# Patient Record
Sex: MALE | Race: WHITE | NOT HISPANIC OR LATINO | Employment: FULL TIME | URBAN - METROPOLITAN AREA
[De-identification: names, ages, dates, MRNs, and addresses within clinical notes are randomized per-mention and may not be internally consistent; named-entity substitution may affect disease eponyms.]

---

## 2001-10-09 PROCEDURE — 0003A: CPT | Performed by: INTERNAL MEDICINE

## 2017-02-20 DIAGNOSIS — Z00.00 PREVENTATIVE HEALTH CARE: Primary | ICD-10-CM

## 2017-02-20 DIAGNOSIS — I10 ESSENTIAL HYPERTENSION: Chronic | ICD-10-CM

## 2017-02-20 RX ORDER — AMLODIPINE BESYLATE 5 MG/1
TABLET ORAL
Qty: 30 TABLET | Refills: 3 | Status: SHIPPED | OUTPATIENT
Start: 2017-02-20 | End: 2017-03-13 | Stop reason: SDUPTHER

## 2017-02-22 LAB
25(OH)D3+25(OH)D2 SERPL-MCNC: 36.8 NG/ML (ref 30–100)
ALBUMIN SERPL-MCNC: 4.6 G/DL (ref 3.5–5.2)
ALBUMIN/GLOB SERPL: 2 G/DL
ALP SERPL-CCNC: 88 U/L (ref 39–117)
ALT SERPL-CCNC: 16 U/L (ref 1–41)
APPEARANCE UR: CLEAR
AST SERPL-CCNC: 19 U/L (ref 1–40)
BASOPHILS # BLD AUTO: 0.01 10*3/MM3 (ref 0–0.2)
BASOPHILS NFR BLD AUTO: 0.1 % (ref 0–1.5)
BILIRUB SERPL-MCNC: 0.9 MG/DL (ref 0.1–1.2)
BILIRUB UR QL STRIP: NEGATIVE
BUN SERPL-MCNC: 18 MG/DL (ref 8–23)
BUN/CREAT SERPL: 13.2 (ref 7–25)
CALCIUM SERPL-MCNC: 9.5 MG/DL (ref 8.6–10.5)
CHLORIDE SERPL-SCNC: 102 MMOL/L (ref 98–107)
CHOLEST SERPL-MCNC: 207 MG/DL (ref 0–200)
CHOLEST/HDLC SERPL: 3 {RATIO}
CO2 SERPL-SCNC: 21.2 MMOL/L (ref 22–29)
COLOR UR: YELLOW
CONV COMMENT: NORMAL
CREAT SERPL-MCNC: 1.36 MG/DL (ref 0.76–1.27)
EOSINOPHIL # BLD AUTO: 0.29 10*3/MM3 (ref 0–0.7)
EOSINOPHIL NFR BLD AUTO: 3.9 % (ref 0.3–6.2)
ERYTHROCYTE [DISTWIDTH] IN BLOOD BY AUTOMATED COUNT: 14.1 % (ref 11.5–14.5)
GLOBULIN SER CALC-MCNC: 2.3 GM/DL
GLUCOSE SERPL-MCNC: 94 MG/DL (ref 65–99)
GLUCOSE UR QL: NEGATIVE
HBA1C MFR BLD: 5.1 % (ref 4.8–5.6)
HCT VFR BLD AUTO: 48.2 % (ref 40.4–52.2)
HCV AB S/CO SERPL IA: <0.1 S/CO RATIO (ref 0–0.9)
HDLC SERPL-MCNC: 69 MG/DL (ref 40–60)
HGB BLD-MCNC: 16.3 G/DL (ref 13.7–17.6)
HGB UR QL STRIP: NEGATIVE
IMM GRANULOCYTES # BLD: 0 10*3/MM3 (ref 0–0.03)
IMM GRANULOCYTES NFR BLD: 0 % (ref 0–0.5)
KETONES UR QL STRIP: ABNORMAL
LDLC SERPL CALC-MCNC: 117 MG/DL (ref 0–100)
LEUKOCYTE ESTERASE UR QL STRIP: NEGATIVE
LYMPHOCYTES # BLD AUTO: 2.11 10*3/MM3 (ref 0.9–4.8)
LYMPHOCYTES NFR BLD AUTO: 28.4 % (ref 19.6–45.3)
MCH RBC QN AUTO: 32.5 PG (ref 27–32.7)
MCHC RBC AUTO-ENTMCNC: 33.8 G/DL (ref 32.6–36.4)
MCV RBC AUTO: 96.2 FL (ref 79.8–96.2)
MONOCYTES # BLD AUTO: 0.41 10*3/MM3 (ref 0.2–1.2)
MONOCYTES NFR BLD AUTO: 5.5 % (ref 5–12)
NEUTROPHILS # BLD AUTO: 4.61 10*3/MM3 (ref 1.9–8.1)
NEUTROPHILS NFR BLD AUTO: 62.1 % (ref 42.7–76)
NITRITE UR QL STRIP: NEGATIVE
PH UR STRIP: 6 [PH] (ref 5–8)
PLATELET # BLD AUTO: 213 10*3/MM3 (ref 140–500)
POTASSIUM SERPL-SCNC: 5 MMOL/L (ref 3.5–5.2)
PROT SERPL-MCNC: 6.9 G/DL (ref 6–8.5)
PROT UR QL STRIP: NEGATIVE
PSA SERPL-MCNC: 0.86 NG/ML (ref 0–4)
RBC # BLD AUTO: 5.01 10*6/MM3 (ref 4.6–6)
SODIUM SERPL-SCNC: 142 MMOL/L (ref 136–145)
SP GR UR: 1.02 (ref 1–1.03)
T4 FREE SERPL-MCNC: 1.22 NG/DL (ref 0.93–1.7)
TESTOST SERPL-MCNC: 806 NG/DL (ref 348–1197)
TRIGL SERPL-MCNC: 104 MG/DL (ref 0–150)
TSH SERPL DL<=0.005 MIU/L-ACNC: 1.38 MIU/ML (ref 0.27–4.2)
UROBILINOGEN UR STRIP-MCNC: ABNORMAL MG/DL
VLDLC SERPL CALC-MCNC: 20.8 MG/DL (ref 5–40)
WBC # BLD AUTO: 7.43 10*3/MM3 (ref 4.5–10.7)

## 2017-02-28 ENCOUNTER — OFFICE VISIT (OUTPATIENT)
Dept: INTERNAL MEDICINE | Age: 62
End: 2017-02-28

## 2017-02-28 VITALS
HEIGHT: 68 IN | WEIGHT: 172 LBS | DIASTOLIC BLOOD PRESSURE: 70 MMHG | SYSTOLIC BLOOD PRESSURE: 132 MMHG | OXYGEN SATURATION: 96 % | BODY MASS INDEX: 26.07 KG/M2 | HEART RATE: 120 BPM | TEMPERATURE: 98 F

## 2017-02-28 DIAGNOSIS — F17.219 CIGARETTE NICOTINE DEPENDENCE WITH NICOTINE-INDUCED DISORDER: ICD-10-CM

## 2017-02-28 DIAGNOSIS — R06.09 DOE (DYSPNEA ON EXERTION): ICD-10-CM

## 2017-02-28 DIAGNOSIS — E78.5 HYPERLIPIDEMIA, UNSPECIFIED HYPERLIPIDEMIA TYPE: ICD-10-CM

## 2017-02-28 DIAGNOSIS — I10 ESSENTIAL HYPERTENSION: Chronic | ICD-10-CM

## 2017-02-28 DIAGNOSIS — J43.9 PULMONARY EMPHYSEMA, UNSPECIFIED EMPHYSEMA TYPE (HCC): Chronic | ICD-10-CM

## 2017-02-28 DIAGNOSIS — F17.210 CIGARETTE NICOTINE DEPENDENCE WITHOUT COMPLICATION: Chronic | ICD-10-CM

## 2017-02-28 DIAGNOSIS — Z00.00 ENCOUNTER FOR ANNUAL HEALTH EXAMINATION: Primary | ICD-10-CM

## 2017-02-28 PROCEDURE — 90471 IMMUNIZATION ADMIN: CPT | Performed by: INTERNAL MEDICINE

## 2017-02-28 PROCEDURE — 99406 BEHAV CHNG SMOKING 3-10 MIN: CPT | Performed by: INTERNAL MEDICINE

## 2017-02-28 PROCEDURE — 99396 PREV VISIT EST AGE 40-64: CPT | Performed by: INTERNAL MEDICINE

## 2017-02-28 PROCEDURE — 99213 OFFICE O/P EST LOW 20 MIN: CPT | Performed by: INTERNAL MEDICINE

## 2017-02-28 PROCEDURE — 90670 PCV13 VACCINE IM: CPT | Performed by: INTERNAL MEDICINE

## 2017-02-28 RX ORDER — ASPIRIN 81 MG/1
81 TABLET ORAL DAILY
COMMUNITY
Start: 2017-02-28

## 2017-02-28 RX ORDER — PRAVASTATIN SODIUM 20 MG
20 TABLET ORAL DAILY
Qty: 30 TABLET | Refills: 3 | Status: SHIPPED | OUTPATIENT
Start: 2017-02-28 | End: 2017-05-31 | Stop reason: SDUPTHER

## 2017-02-28 NOTE — ASSESSMENT & PLAN NOTE
-Counseled on smoking cessation and strongly advised him to quit. (5 minutes)  -Discussed treatment options including nicotine replacement therapy, Wellbutrin, and Chantix.  -Discussed risks/dangers of smoking including risk for various cancers, lung disease and cardiovascular disease.    -Advised to watch for suicidal ideations, depression, increased anxiety/agitation, mood swings. Instructed to contact me if any problems arise.  -Handout given on 5 steps to prepare for smoking cessation.   -F/U with smoking status on next visit

## 2017-02-28 NOTE — PROGRESS NOTES
Patrick Mckeon / 61 y.o. / male  02/28/2017    CC: Main reason(s) for today's visit: Annual Exam      HPI:      Patrick presents for annual health maintenance visit.  >40 years of smoking; smokes 1 ppd, not committed to quitting.   Prior low dose CT in 2014 showed emphysema and shotty mediastinal LN's. He was not aware of emphysematous findings.  C/o mild dyspnea w/ exertion, physical activity. Never had a stress test. Strong family h/o sudden death and heart dz.  Last time started amlodipine for htn. Home BP is >150s mostly. No ha's, side effects.    · Last health maintenance visit: 2 years ago  · General health: fair  · Lifestyle:  · Attempting to lose weight?: No   · Diet: adequate/fair  · Exercise: adequate/fair  · Tobacco: Regular use (~1 pack/day)   · Alcohol: regular (moderate)  · Work: Full-time  · Reproductive health:  · Sexually active?: Yes   · Concern for STD?: No   · Sexual problems?: No     · Sees Urologist?: No    · Depression Screening: Denies depression symptoms (PHQ-2 screen is negative)      ______________________________________________________________________    ALLERGIES:    Review of patient's allergies indicates no known allergies.    MEDICATIONS:    Outpatient Medications Prior to Visit   Medication Sig Dispense Refill   • amLODIPine (NORVASC) 5 MG tablet TAKE 1 TABLET BY MOUTH DAILY. 30 tablet 3     No facility-administered medications prior to visit.        PFSH:     The following portions of the patient's history were reviewed and updated as appropriate: Allergies / Current Medications / Past Medical History / Surgical History / Social History / Family History    PROBLEM LIST:    Patient Active Problem List   Diagnosis   • Benign colonic polyp   • Enlarged prostate   • Essential hypertension   • Cigarette nicotine dependence without complication   • Emphysema lung       PAST MEDICAL HX:    Past Medical History   Diagnosis Date   • Benign colonic polyp    • Emphysema lung 2/28/2017   •  Herpes zoster    • Hypertension        PAST SURGICAL HX:    Past Surgical History   Procedure Laterality Date   • Hernia repair     • Umbilical hernia repair         SOCIAL HX:    Social History     Social History   • Marital status:      Spouse name: Samira   • Number of children: 1   • Years of education: N/A     Occupational History   • Business: owner of metal fabrication      Social History Main Topics   • Smoking status: Current Every Day Smoker     Packs/day: 1.00     Years: 40.00     Types: Cigarettes   • Smokeless tobacco: Never Used      Comment: still not committed to quitting at this time   • Alcohol use 6.0 oz/week     10 Standard drinks or equivalent per week   • Drug use: No   • Sexual activity: Yes     Partners: Female     Other Topics Concern   • None     Social History Narrative       FAMILY HX:    Family History   Problem Relation Age of Onset   • Colon cancer Father 55   • Heart attack Father 71   • Hypertension Mother    • Hyperlipidemia Mother    • Sudden death Mother 78   • Benign prostatic hyperplasia Brother    • Hypertension Brother    • Benign prostatic hyperplasia Brother    • No Known Problems Sister    • Brain cancer Maternal Grandmother    • Heart disease Maternal Grandfather    • Sudden death Maternal Grandfather        IMMUNIZATION HISTORY:    Immunization History   Administered Date(s) Administered   • Influenza (IM) Preservative Free 10/28/2016   • Influenza Split Preservative Free ID 09/19/2012   • Influenza TIV (IM) 09/19/2014   • Pneumococcal Conjugate 13-Valent 02/28/2017   • Tdap 09/21/2011       ______________________________________________________________________    REVIEW OF SYSTEMS    Review of Systems   Constitutional: Negative.    HENT: Negative.    Eyes: Negative.    Respiratory: Positive for shortness of breath.    Cardiovascular: Negative.    Gastrointestinal: Negative.    Endocrine: Negative.    Genitourinary: Negative.    Musculoskeletal: Negative.    Skin:  "Negative.    Allergic/Immunologic: Negative.    Neurological: Negative.    Hematological: Negative.    Psychiatric/Behavioral: Negative.        VITALS:    Visit Vitals   • /70   • Pulse 120   • Temp 98 °F (36.7 °C)   • Ht 68\" (172.7 cm)   • Wt 172 lb (78 kg)   • SpO2 96%   • BMI 26.15 kg/m2     BP Readings from Last 3 Encounters:   02/28/17 132/70   10/28/16 147/98   09/21/15 150/90     Wt Readings from Last 3 Encounters:   02/28/17 172 lb (78 kg)   10/28/16 172 lb (78 kg)   09/21/15 172 lb 0.1 oz (78 kg)      Body mass index is 26.15 kg/(m^2).    PHYSICAL EXAMINATION    Physical Exam   Constitutional: He is oriented to person, place, and time. He appears well-nourished. No distress.   HENT:   Head: Normocephalic.   Right Ear: External ear normal.   Left Ear: External ear normal.   Nose: Nose normal.   Mouth/Throat: Oropharynx is clear and moist.   Eyes: Conjunctivae and EOM are normal. Pupils are equal, round, and reactive to light. No scleral icterus.   Neck: Normal range of motion. Neck supple. No tracheal deviation present. No thyromegaly present.   Cardiovascular: Normal rate, regular rhythm, normal heart sounds and intact distal pulses.  Exam reveals no gallop and no friction rub.    No murmur heard.  Pulmonary/Chest: Effort normal. He has decreased breath sounds. He has no wheezes.   Abdominal: Soft. Bowel sounds are normal. He exhibits no distension and no mass. There is no tenderness. No hernia.   Genitourinary: Rectum normal, prostate normal, testes normal and penis normal.   Musculoskeletal: Normal range of motion. He exhibits no edema or deformity.   Lymphadenopathy:     He has no cervical adenopathy.     He has no axillary adenopathy.        Right: No inguinal and no supraclavicular adenopathy present.        Left: No inguinal and no supraclavicular adenopathy present.   Neurological: He is alert and oriented to person, place, and time. He has normal reflexes. He displays normal reflexes. No " cranial nerve deficit. He exhibits normal muscle tone. Coordination normal.   Skin: Skin is intact. No rash noted. He is not diaphoretic. No cyanosis or erythema. No pallor. Nails show no clubbing.   Multiple SK on torso   Psychiatric: He has a normal mood and affect. His behavior is normal. Judgment and thought content normal. Cognition and memory are normal.       REVIEWED DATA:    Lab Results   Component Value Date     02/21/2017    K 5.0 02/21/2017    AST 19 02/21/2017    ALT 16 02/21/2017    BUN 18 02/21/2017    CREATININE 1.36 (H) 02/21/2017    CREATININE 1.27 09/21/2015    EGFRIFNONA 53 (L) 02/21/2017    EGFRIFAFRI 64 02/21/2017    HGBA1C 5.10 02/21/2017    HGBA1C 5.7 (H) 09/21/2015     (H) 02/21/2017    LDL 95 09/21/2015    HDL 69 (H) 02/21/2017    HDL 55 09/21/2015    TRIG 104 02/21/2017    TRIG 92 09/21/2015    CHOLHDLRATIO 3.00 02/21/2017    TSH 1.380 02/21/2017    FREET4 1.22 02/21/2017    GCLW51UU 36.8 02/21/2017    PSA 0.859 02/21/2017    PSA 0.85 09/21/2015    TESTOSTEROTT 806 02/21/2017    WBC 7.43 02/21/2017    HGB 16.3 02/21/2017    MCV 96.2 02/21/2017     02/21/2017    PROTEIN Negative 02/21/2017    GLUCOSEU Negative 02/21/2017    BLOODU Negative 02/21/2017    NITRITEU Negative 02/21/2017    LEUKOCYTESUR Negative 02/21/2017     ______________________________________________________________________    ASSESSMENT & PLAN    1. Encounter for annual health examination    - Pneumococcal Conjugate Vaccine 13-Valent All    2. Hyperlipidemia, unspecified hyperlipidemia type  Start meds  - aspirin 81 MG EC tablet; Take 1 tablet by mouth Daily.  - pravastatin (PRAVACHOL) 20 MG tablet; Take 1 tablet by mouth Daily.  Dispense: 30 tablet; Refill: 3    3. GOODRICH (dyspnea on exertion)    - Treadmill Stress Test    4. Pulmonary emphysema, unspecified emphysema type  Quit smoking    5. Cigarette nicotine dependence with complication  -Counseled on smoking cessation and strongly advised him to quit.  (5 minutes)  -Discussed treatment options including nicotine replacement therapy, Wellbutrin, and Chantix.  -Discussed risks/dangers of smoking including risk for various cancers, lung disease and cardiovascular disease.    -Advised to watch for suicidal ideations, depression, increased anxiety/agitation, mood swings. Instructed to contact me if any problems arise.  -Handout given on 5 steps to prepare for smoking cessation.   -F/U with smoking status on next visit     - CT Chest Low Dose Wo    6. Essential hypertension  Continue amlodipine 5 mg. Send BP results in 1 month    Summary/Discussion:     ·       Return in about 3 months (around 5/28/2017) for F/U chronic medical problems.    Future Appointments  Date Time Provider Department Center   5/31/2017 8:40 AM MD JOCELYN Stone None         HEALTHCARE MAINTENANCE ISSUES:    Cancer Screening:  · Colon: Initial/Next screening at age: CURRENT  · Repeat colonoscopy every 5 years  · Prostate: Performed today and recommended annual screening  · Testicular: Recommended monthly self exam  · Skin: Monthly self skin examination, annual exam by health professional  · Lung:   · Other:    Screening Labs & Tests:  · Lab results reviewed & discussed with with patient or orders placed today.  · EKG:  · DEXA (75+ or risk factors):  · HEP C (If born [id number] or risk factors): Negative screen    Immunization/Vaccinations (to be given today unless deferred by patient)  · Tetanus/Pertussis: Up to date  · Pneumovax: Not needed at this time  · Prevnar 13: Administer today  · Zostavax: Recommended at outside pharmacy  · Influenza: Patient already had the flu vaccine.  · Other:     Lifestyle Counseling:  · Lifestyle Modifications: Follow a low fat, low cholesterol diet and Quit smoking  · Safety Issues: Always wear seatbelt, Avoid texting while driving   · Use sunscreen, regular skin examination  · Recommended annual dental/vision examination.  · Emotional/Stress/Sleep:  Reviewed and  given when appropriate      HEALTH MAINTENANCE LIST:    Health Maintenance   Topic Date Due   • GLAUCOMA SCREENING  02/28/2017   • PROSTATE CANCER SCREENING  02/21/2018   • LIPID PANEL  02/21/2018   • COLONOSCOPY  11/17/2020   • TDAP/TD VACCINES (2 - Td) 09/21/2021   • PNEUMOCOCCAL VACCINE (19-64 MEDIUM RISK)  Completed   • HEPATITIS C SCREENING  Completed   • INFLUENZA VACCINE  Completed   • ZOSTER VACCINE  Addressed         **Lila Disclaimer:   Much of this encounter note is an electronic transcription/translation of spoken language to printed text. The electronic translation of spoken language may permit erroneous, or at times, nonsensical words or phrases to be inadvertently transcribed. Although I have reviewed the note for such errors, some may still exist.

## 2017-03-09 ENCOUNTER — HOSPITAL ENCOUNTER (OUTPATIENT)
Dept: CARDIOLOGY | Facility: HOSPITAL | Age: 62
Discharge: HOME | End: 2017-03-09
Admitting: INTERNAL MEDICINE

## 2017-03-09 ENCOUNTER — HOSPITAL ENCOUNTER (OUTPATIENT)
Dept: CT IMAGING | Facility: HOSPITAL | Age: 62
Discharge: HOME | End: 2017-03-09
Admitting: INTERNAL MEDICINE

## 2017-03-09 LAB
BH CV STRESS BP STAGE 1: NORMAL
BH CV STRESS BP STAGE 2: NORMAL
BH CV STRESS DURATION MIN STAGE 1: 3
BH CV STRESS DURATION MIN STAGE 2: 3
BH CV STRESS DURATION MIN STAGE 3: 1
BH CV STRESS DURATION SEC STAGE 1: 0
BH CV STRESS DURATION SEC STAGE 2: 0
BH CV STRESS DURATION SEC STAGE 3: 0
BH CV STRESS GRADE STAGE 1: 10
BH CV STRESS GRADE STAGE 2: 12
BH CV STRESS GRADE STAGE 3: 14
BH CV STRESS HR STAGE 1: 119
BH CV STRESS HR STAGE 2: 144
BH CV STRESS HR STAGE 3: 156
BH CV STRESS METS STAGE 1: 5
BH CV STRESS METS STAGE 2: 7.5
BH CV STRESS METS STAGE 3: 10
BH CV STRESS PROTOCOL 1: NORMAL
BH CV STRESS RECOVERY BP: NORMAL MMHG
BH CV STRESS RECOVERY HR: 98 BPM
BH CV STRESS SPEED STAGE 1: 1.7
BH CV STRESS SPEED STAGE 2: 2.5
BH CV STRESS SPEED STAGE 3: 3.4
BH CV STRESS STAGE 1: 1
BH CV STRESS STAGE 2: 2
BH CV STRESS STAGE 3: 3
MAXIMAL PREDICTED HEART RATE: 158 BPM
PERCENT MAX PREDICTED HR: 98.73 %
STRESS BASELINE BP: NORMAL MMHG
STRESS BASELINE HR: 81 BPM
STRESS PERCENT HR: 116 %
STRESS POST ESTIMATED WORKLOAD: 8 METS
STRESS POST EXERCISE DUR MIN: 7 MIN
STRESS POST EXERCISE DUR SEC: 0 SEC
STRESS POST PEAK BP: NORMAL MMHG
STRESS POST PEAK HR: 156 BPM
STRESS TARGET HR: 134 BPM

## 2017-03-09 PROCEDURE — 93016 CV STRESS TEST SUPVJ ONLY: CPT | Performed by: INTERNAL MEDICINE

## 2017-03-09 PROCEDURE — 93018 CV STRESS TEST I&R ONLY: CPT | Performed by: INTERNAL MEDICINE

## 2017-03-09 PROCEDURE — G0297 LDCT FOR LUNG CA SCREEN: HCPCS

## 2017-03-09 PROCEDURE — 93017 CV STRESS TEST TRACING ONLY: CPT

## 2017-03-10 ENCOUNTER — TELEPHONE (OUTPATIENT)
Dept: INTERNAL MEDICINE | Age: 62
End: 2017-03-10

## 2017-03-10 NOTE — TELEPHONE ENCOUNTER
Spoke with pt he said he has both kidneys and does not know anything about the abdominal aorta issue.

## 2017-03-10 NOTE — TELEPHONE ENCOUNTER
----- Message from Lester Carter MD sent at 3/10/2017  4:00 PM EST -----  Call patient with his test result(s) and mail the results to him if MyChart is NOT active.    CT of chest for lung cancer screening is negative for lung mass/nodules.  Incidental finding of dilated abdominal aorta which requires further evaluation.  Radiologist noted absence of left kidney (verify whether he was aware of this: ? Prior surgery or congenital absence).    #Respond back to me regarding above question and I will determine the next best course of action to further evaluate the abdominal aorta issue.

## 2017-03-13 ENCOUNTER — OFFICE VISIT (OUTPATIENT)
Dept: INTERNAL MEDICINE | Age: 62
End: 2017-03-13

## 2017-03-13 VITALS
DIASTOLIC BLOOD PRESSURE: 70 MMHG | SYSTOLIC BLOOD PRESSURE: 148 MMHG | WEIGHT: 173 LBS | BODY MASS INDEX: 26.22 KG/M2 | OXYGEN SATURATION: 94 % | HEART RATE: 87 BPM | HEIGHT: 68 IN | TEMPERATURE: 96.8 F

## 2017-03-13 DIAGNOSIS — E78.5 HYPERLIPIDEMIA, UNSPECIFIED HYPERLIPIDEMIA TYPE: ICD-10-CM

## 2017-03-13 DIAGNOSIS — I71.40 ABDOMINAL AORTIC ANEURYSM (AAA) WITHOUT RUPTURE (HCC): Primary | ICD-10-CM

## 2017-03-13 DIAGNOSIS — Q60.0 CONGENITAL ABSENCE OF ONE KIDNEY: Chronic | ICD-10-CM

## 2017-03-13 DIAGNOSIS — I10 ESSENTIAL HYPERTENSION: Chronic | ICD-10-CM

## 2017-03-13 DIAGNOSIS — F17.219 CIGARETTE NICOTINE DEPENDENCE WITH NICOTINE-INDUCED DISORDER: Chronic | ICD-10-CM

## 2017-03-13 PROCEDURE — 99214 OFFICE O/P EST MOD 30 MIN: CPT | Performed by: INTERNAL MEDICINE

## 2017-03-13 RX ORDER — AMLODIPINE BESYLATE 10 MG/1
10 TABLET ORAL DAILY
Qty: 30 TABLET | Refills: 5 | Status: SHIPPED | OUTPATIENT
Start: 2017-03-13 | End: 2017-08-17 | Stop reason: SDUPTHER

## 2017-03-13 NOTE — PROGRESS NOTES
"Patrick Mckeon / 61 y.o. / male  03/13/2017    VITALS    Visit Vitals   • /70   • Pulse 87   • Temp 96.8 °F (36 °C)   • Ht 68\" (172.7 cm)   • Wt 173 lb (78.5 kg)   • SpO2 94%   • BMI 26.3 kg/m2     BP Readings from Last 3 Encounters:   03/13/17 148/70   02/28/17 132/70   10/28/16 147/98     Wt Readings from Last 3 Encounters:   03/13/17 173 lb (78.5 kg)   02/28/17 172 lb (78 kg)   10/28/16 172 lb (78 kg)      Body mass index is 26.3 kg/(m^2).    CC:  Main reason(s) for today's visit: Discuss abnormal CT      HPI:     Ct of chest for lung cancer screening was negative for nodules but showed 4.9 cm dilated abd aorta. No prior h/o aneurysm. Prior CT in 2014 (of chest) there was no mention of aneurysm. Denies abd pain or back pain.     Chronic essential hypertension  Home BP readings: stable with SBP <150. Compliant with medication(s).  Denies significant problems or side effects. Most recent in-office blood pressure readings:   BP Readings from Last 3 Encounters:   03/13/17 148/70   02/28/17 132/70   10/28/16 147/98     Chronic hyperlipidemia  Current therapy include pravastatin.  Denies significant problems with medication(s).  Most recent labs:   Lab Results   Component Value Date     (H) 02/21/2017    LDL 95 09/21/2015    HDL 69 (H) 02/21/2017    HDL 55 09/21/2015    TRIG 104 02/21/2017    TRIG 92 09/21/2015    CHOLHDLRATIO 3.00 02/21/2017     Still smokes but has been working to reduce the amount. Smokes 1 ppd.     ____________________________________________________________________    ASSESSMENT & PLAN:    Problem List Items Addressed This Visit        High    Abdominal aortic aneurysm (AAA) without rupture - Primary (Chronic)    Current Assessment & Plan     Check abdominal CT to further delineate AAA noted on chest CT. Consider referral to vascular.          Relevant Orders    CT Abdomen Pelvis With Contrast       Medium    Essential hypertension (Chronic)    Current Assessment & Plan     " Uncontrolled. Increase amlodipine to 10 mg QD.          Relevant Medications    amLODIPine (NORVASC) 10 MG tablet    Hyperlipidemia (Chronic)    Current Assessment & Plan     Continue pravastatin 20 mg.          Relevant Medications    aspirin 81 MG EC tablet    pravastatin (PRAVACHOL) 20 MG tablet    Congenital absence of one kidney (Chronic)    Overview     No kidney visualized on CT (2/28/17)(left side)(patient was not aware).            Low    Cigarette nicotine dependence with nicotine-induced disorder (Chronic)    Current Assessment & Plan     Again advised him to stop smoking especially in light of AAA finding on CT.              Orders Placed This Encounter   Procedures   • CT Abdomen Pelvis With Contrast       Summary/Discussion:     ·       No Follow-up on file.    Future Appointments  Date Time Provider Department Center   5/31/2017 8:40 AM MD JOCELYN Stone None       ____________________________________________________________________    REVIEW OF SYSTEMS    Review of Systems  As noted per HPI  Constitutional neg   Resp neg x h/o emphysema  CV neg    Gi neg for pain  Other: As noted per HPI      PHYSICAL EXAMINATION    Physical Exam  Constitutional  No distress   Cardiovascular Rate  normal . Rhythm: regular . Heart sounds:  normal  ; abd aorta is palpable. Bilateral femoral pulses are equal.   Psychiatric  Alert. Judgment and thought content normal. Mood normal      REVIEWED DATA:    Labs:   Lab Results   Component Value Date     02/21/2017    K 5.0 02/21/2017    AST 19 02/21/2017    ALT 16 02/21/2017    BUN 18 02/21/2017    CREATININE 1.36 (H) 02/21/2017    CREATININE 1.27 09/21/2015    EGFRIFNONA 53 (L) 02/21/2017    EGFRIFAFRI 64 02/21/2017       Lab Results   Component Value Date    GLU 94 02/21/2017    GLU 81 09/21/2015    HGBA1C 5.10 02/21/2017    HGBA1C 5.7 (H) 09/21/2015       Lab Results   Component Value Date     (H) 02/21/2017    LDL 95 09/21/2015    HDL 69 (H)  02/21/2017    TRIG 104 02/21/2017    CHOLHDLRATIO 3.00 02/21/2017       Lab Results   Component Value Date    TSH 1.380 02/21/2017    FREET4 1.22 02/21/2017          Lab Results   Component Value Date    WBC 7.43 02/21/2017    HGB 16.3 02/21/2017    HGB 15.1 09/21/2015     02/21/2017        Imaging:   CT chest (3/10/17):   No lung nodule; 4.9 cm abdominal aorta dilation; left kidney not visualized.     Medical Tests:        Summary of old records / correspondence / consultant report:        Request outside records:          ALLERGIES:    Review of patient's allergies indicates no known allergies.    MEDICATIONS:  Outpatient Medications Prior to Visit   Medication Sig Dispense Refill   • aspirin 81 MG EC tablet Take 1 tablet by mouth Daily.     • pravastatin (PRAVACHOL) 20 MG tablet Take 1 tablet by mouth Daily. 30 tablet 3   • amLODIPine (NORVASC) 5 MG tablet TAKE 1 TABLET BY MOUTH DAILY. 30 tablet 3     No facility-administered medications prior to visit.        [facility]    The following portions of the patient's history were reviewed and updated as appropriate: Allergies / Current Medications / Past Medical History / Surgical History / Social History / Family History    PROBLEM LIST:    Patient Active Problem List   Diagnosis   • Benign colonic polyp   • Enlarged prostate   • Essential hypertension   • Cigarette nicotine dependence with nicotine-induced disorder   • Emphysema lung   • Hyperlipidemia   • Abdominal aortic aneurysm (AAA) without rupture   • Congenital absence of one kidney       PAST MEDICAL HX:    Past Medical History   Diagnosis Date   • Benign colonic polyp    • Emphysema lung 2/28/2017   • Herpes zoster    • Hypertension        PAST SURGICAL HX:    Past Surgical History   Procedure Laterality Date   • Hernia repair     • Umbilical hernia repair         SOCIAL HX:    Social History     Social History   • Marital status:      Spouse name: Samira   • Number of children: 1   • Years of  education: N/A     Occupational History   • Business: owner of metal fabrication      Social History Main Topics   • Smoking status: Current Every Day Smoker     Packs/day: 1.00     Years: 40.00     Types: Cigarettes   • Smokeless tobacco: Never Used      Comment: still not committed to quitting at this time   • Alcohol use 6.0 oz/week     10 Standard drinks or equivalent per week   • Drug use: No   • Sexual activity: Yes     Partners: Female     Other Topics Concern   • None     Social History Narrative       FAMILY HX:    Family History   Problem Relation Age of Onset   • Colon cancer Father 55   • Heart attack Father 71   • Hypertension Mother    • Hyperlipidemia Mother    • Sudden death Mother 78   • Benign prostatic hyperplasia Brother    • Hypertension Brother    • Benign prostatic hyperplasia Brother    • No Known Problems Sister    • Brain cancer Maternal Grandmother    • Heart disease Maternal Grandfather    • Sudden death Maternal Grandfather          **Lila Disclaimer:   Much of this encounter note is an electronic transcription/translation of spoken language to printed text. The electronic translation of spoken language may permit erroneous, or at times, nonsensical words or phrases to be inadvertently transcribed. Although I have reviewed the note for such errors, some may still exist.

## 2017-03-13 NOTE — TELEPHONE ENCOUNTER
Have him come in to discuss these issues in greater detail. Will need further evaluation.   See if he can come in today as I have a slot open this afternoon.

## 2017-03-17 ENCOUNTER — HOSPITAL ENCOUNTER (OUTPATIENT)
Dept: CT IMAGING | Facility: HOSPITAL | Age: 62
Discharge: HOME | End: 2017-03-17
Admitting: INTERNAL MEDICINE

## 2017-03-17 LAB — CREAT BLDA-MCNC: 1.2 MG/DL (ref 0.6–1.3)

## 2017-03-17 PROCEDURE — 74177 CT ABD & PELVIS W/CONTRAST: CPT

## 2017-03-17 PROCEDURE — 0 IOPAMIDOL 61 % SOLUTION: Performed by: INTERNAL MEDICINE

## 2017-03-17 PROCEDURE — 82565 ASSAY OF CREATININE: CPT

## 2017-03-17 RX ADMIN — IOPAMIDOL 85 ML: 612 INJECTION, SOLUTION INTRAVENOUS at 07:20

## 2017-03-21 ENCOUNTER — TELEPHONE (OUTPATIENT)
Dept: INTERNAL MEDICINE | Age: 62
End: 2017-03-21

## 2017-03-21 DIAGNOSIS — I71.40 ABDOMINAL AORTIC ANEURYSM (AAA) WITHOUT RUPTURE (HCC): Primary | Chronic | ICD-10-CM

## 2017-03-21 NOTE — TELEPHONE ENCOUNTER
Discussed finding on CT of abdomen showing 2 adjacent AAA 4.8 cm and 5 cm.   Will refer to vascular for evaluation and surveillance.   Incidental findings of diverticulosis and right renal nonobstructing stone discussed w/ patient.   He expressed understanding and agreed to follow above instructions.

## 2017-03-29 ENCOUNTER — TELEPHONE (OUTPATIENT)
Dept: INTERNAL MEDICINE | Age: 62
End: 2017-03-29

## 2017-03-29 NOTE — TELEPHONE ENCOUNTER
----- Message from Janell Atkinson MA sent at 3/29/2017  9:58 AM EDT -----  Regarding: FW: Non-Urgent Medical Question  Contact: [phone]      ----- Message -----     From: Patrick Mckeon     Sent: 3/29/2017   9:47 AM       To: Traci Arreguin [id number] Clinical Pool  Subject: Non-Urgent Medical Question                      Dr. Carter , I have been to see Dr. Humble Hamilton at [facility]. and am going to plan on having surgery for my abdominal aorta aneurism. In preparation for that they have referred me to Dr. Judith Nogueira at [facility] for a pre-surgical test ( appt. is on April 11th ) . My question is , shouldn't the stress test and EKG done thru them on March 9th be sufficient ? I called Cardiology but they said I would have to go back thru you .  Thanks ,  Chidi Mckeon

## 2017-03-29 NOTE — TELEPHONE ENCOUNTER
Send as MyChart Message:     It's such a major surgery with high risk. I would suggest seeing the cardiologist for clearance in any case.

## 2017-04-11 ENCOUNTER — OFFICE VISIT (OUTPATIENT)
Dept: CARDIOLOGY | Facility: CLINIC | Age: 62
End: 2017-04-11

## 2017-04-11 VITALS
BODY MASS INDEX: 26.22 KG/M2 | DIASTOLIC BLOOD PRESSURE: 72 MMHG | HEIGHT: 68 IN | SYSTOLIC BLOOD PRESSURE: 108 MMHG | HEART RATE: 90 BPM | WEIGHT: 173 LBS

## 2017-04-11 DIAGNOSIS — E78.5 HYPERLIPIDEMIA, UNSPECIFIED HYPERLIPIDEMIA TYPE: Chronic | ICD-10-CM

## 2017-04-11 DIAGNOSIS — F17.219 CIGARETTE NICOTINE DEPENDENCE WITH NICOTINE-INDUCED DISORDER: Chronic | ICD-10-CM

## 2017-04-11 DIAGNOSIS — I10 ESSENTIAL HYPERTENSION: Chronic | ICD-10-CM

## 2017-04-11 DIAGNOSIS — I71.40 ABDOMINAL AORTIC ANEURYSM (AAA) WITHOUT RUPTURE (HCC): Chronic | ICD-10-CM

## 2017-04-11 DIAGNOSIS — Z01.810 ENCOUNTER FOR PRE-OPERATIVE CARDIOVASCULAR CLEARANCE: Primary | ICD-10-CM

## 2017-04-11 PROCEDURE — 99243 OFF/OP CNSLTJ NEW/EST LOW 30: CPT | Performed by: NURSE PRACTITIONER

## 2017-04-11 PROCEDURE — 93000 ELECTROCARDIOGRAM COMPLETE: CPT | Performed by: NURSE PRACTITIONER

## 2017-04-11 NOTE — PROGRESS NOTES
Date of Office Visit: 2017  Encounter Provider: CATIA Sam  Place of Service: [facility] CARDIOLOGY  Patient Name: Patrick Mckeon  :1955    Chief Complaint   Patient presents with   • Pre-op Exam   :     HPI: Patrick Mckeon is a 61 y.o. male who presents today for perioperative cardiac risk assessment.  The patient was referred by Dr. Humble Chaudhry.  The patient recently had a CT scan of his chest completed for his significant past history of cigarette smoking.  He was incidentally found to have an abdominal aortic aneurysm.  A CT scan of his abdomen and pelvis was completed revealing a 5.0 cm abdominal aortic aneurysm.  Also on that CT scan he was noted to have absence of the left kidney which was new information for the patient.  He has been recommended to undergo endovascular repair of the aneurysm by Dr. Chaudhry.  The procedure is currently not scheduled.    The patient states that he's been smoking since his early 20s and has been diagnosed with emphysema.  He does not see a pulmonologist.  He has a history of hyperlipidemia and remains on pravastatin.  He also has a history of essential hypertension and his blood pressure has been well-controlled on amlodipine.  He denies a history of coronary artery disease, heart failure, or diabetes.    His family physician, Dr. Carter recently ordered for the patient to have a treadmill stress test because of his history of cigarette smoking.  The treadmill stress test was completed on 3/9/17 at our office.  The EKG portion of the stress test was normal.  The patient denied any angina or shortness of breath while on the treadmill.  He was able to exercise 7 minutes and the overall impression was a normal stress test.    The patient states that he remains active exercising on a stationary bicycle 20 minutes-4 days per week.  He denies chest pain, shortness of air, paroxysmal nocturnal dyspnea, orthopnea, edema,  "palpitations, dizziness, or syncope.  He states that he occasionally has a \"smoker's cough\".      Past Medical History:   Diagnosis Date   • AAA (abdominal aortic aneurysm) without rupture    • Benign colonic polyp    • Cigarette nicotine dependence with nicotine-induced disorder    • Congenital absence of one kidney    • Emphysema lung 2/28/2017   • Enlarged prostate    • Herpes zoster    • Hyperlipidemia 2/28/2017   • Hypertension        Past Surgical History:   Procedure Laterality Date   • HERNIA REPAIR  1980   • UMBILICAL HERNIA REPAIR  1975       Social History     Social History   • Marital status:      Spouse name: Samira   • Number of children: 1   • Years of education: N/A     Occupational History   • Business: owner of metal fabrication      Social History Main Topics   • Smoking status: Current Every Day Smoker     Packs/day: 1.00     Years: 40.00     Types: Cigarettes   • Smokeless tobacco: Never Used      Comment: still not committed to quitting at this time; started in early 20s   • Alcohol use 6.0 oz/week     10 Standard drinks or equivalent per week   • Drug use: No   • Sexual activity: Yes     Partners: Female     Other Topics Concern   • Not on file     Social History Narrative       Family History   Problem Relation Age of Onset   • Colon cancer Father 55   • Heart attack Father 71   • Coronary artery disease Father    • Hypertension Mother    • Hyperlipidemia Mother    • Sudden death Mother 78   • Benign prostatic hyperplasia Brother    • Hypertension Brother    • Benign prostatic hyperplasia Brother    • No Known Problems Sister    • Brain cancer Maternal Grandmother    • Heart disease Maternal Grandfather    • Sudden death Maternal Grandfather        Review of Systems   Constitution: Negative for chills, diaphoresis, fever, malaise/fatigue, night sweats, weight gain and weight loss.   HENT: Negative for hearing loss, nosebleeds, sore throat and tinnitus.    Eyes: Negative for blurred " "vision, double vision, pain and visual disturbance.   Cardiovascular: Negative for chest pain, claudication, cyanosis, dyspnea on exertion, irregular heartbeat, leg swelling, near-syncope, orthopnea, palpitations, paroxysmal nocturnal dyspnea and syncope.   Respiratory: Positive for cough. Negative for hemoptysis, shortness of breath, snoring and wheezing.    Endocrine: Negative for cold intolerance, heat intolerance and polyuria.   Hematologic/Lymphatic: Negative for bleeding problem. Does not bruise/bleed easily.   Skin: Negative for color change, dry skin, flushing and itching.   Musculoskeletal: Negative for falls, joint pain, joint swelling, muscle cramps, muscle weakness and myalgias.   Gastrointestinal: Negative for abdominal pain, constipation, heartburn, melena, nausea and vomiting.   Genitourinary: Negative for dysuria and hematuria.   Neurological: Negative for excessive daytime sleepiness, dizziness, light-headedness, loss of balance, numbness, paresthesias, seizures and vertigo.   Psychiatric/Behavioral: Negative for altered mental status, depression, memory loss and substance abuse. The patient does not have insomnia and is not nervous/anxious.    Allergic/Immunologic: Negative for environmental allergies.       No Known Allergies      Current Outpatient Prescriptions:   •  amLODIPine (NORVASC) 10 MG tablet, Take 1 tablet by mouth Daily., Disp: 30 tablet, Rfl: 5  •  aspirin 81 MG EC tablet, Take 1 tablet by mouth Daily., Disp: , Rfl:   •  pravastatin (PRAVACHOL) 20 MG tablet, Take 1 tablet by mouth Daily., Disp: 30 tablet, Rfl: 3     Objective:     Vitals:    04/11/17 0854 04/11/17 0858   BP: 108/76 108/72   BP Location: Left arm Right arm   Pulse: 90    Weight: 173 lb (78.5 kg)    Height: 68\" (172.7 cm)      Body mass index is 26.3 kg/(m^2).    PHYSICAL EXAM:    Vitals Reviewed.   General Appearance: No acute distress, well developed and well nourished.   Eyes: Conjunctiva and lids: No erythema, " swelling, or discharge. Sclera non-icteric.   HENT: Atraumatic, normocephalic. External eyes, ears, and nose normal. No hearing loss noted. Mucous membranes normal. Lips not cyanotic. Neck supple with no tenderness.  Respiratory: No signs of respiratory distress. Respiration rhythm and depth normal.   Clear to auscultation. No rales, crackles, rhonchi, or wheezing auscultated.   Cardiovascular:  Jugular Venous Pressure: Normal  Heart Rate and Rhythm: Normal, Heart Sounds: Normal S1 and S2. No S3 or S4 noted.  Murmurs: No murmurs noted. No rubs, thrills, or gallops.   Arterial Pulses: Carotid pulses normal. No carotid bruit noted. Posterior tibialis and dorsalis pedis pulses normal.   Lower Extremities: No edema noted.  Gastrointestinal:  Abdomen soft, non-distended, non-tender. Normal bowel sounds. No hepatomegaly.   Musculoskeletal: Normal movement of extremities  Skin and Nails: General appearance normal. No pallor, cyanosis, diaphoresis. Skin temperature normal. No clubbing of fingernails.   Psychiatric: Patient alert and oriented to person, place, and time. Speech and behavior appropriate. Normal mood and affect.       ECG 12 Lead  Date/Time: 4/11/2017 9:02 AM  Performed by: MIKEY OLIVERA  Authorized by: MIKEY OLIVERA   Comparison: compared with previous ECG from 3/9/2017  Similar to previous ECG  Rhythm: sinus rhythm  Rate: normal  BPM: 90  Conduction: conduction normal  ST Segments: ST segments normal  T Waves: T waves normal  QRS axis: normal  Other: no other findings  Clinical impression: normal ECG              Assessment:       Diagnosis Plan   1. Encounter for pre-operative cardiovascular clearance     2. Abdominal aortic aneurysm (AAA) without rupture     3. Essential hypertension     4. Hyperlipidemia, unspecified hyperlipidemia type     5. Cigarette nicotine dependence with nicotine-induced disorder            Plan:       1. Perioperative Cardiac Risk Assessment: The patient presents today for  perioperative cardiac risk assessment for upcoming abdominal aortic aneurysm repair by Dr. Humble Hamilton.  The patient EKG tracing today is normal.  He recently had a treadmill stress test completed for his risk factors on 3/9/17 which was normal.  The patient had good exercise capacity on the treadmill as he went 7 minutes in duration and did not experience any EKG changes or symptoms.  He further denies any chest pain or shortness of breath.  I discussed his plan of care Dr. Roger Calvillo.  He does not feel the patient needs to pursue any further cardiac testing and is considered low risk. Patient is considered at acceptable risk for surgery from a cardiovascular standpoint. According to the Dipak's Revised Cardiac Risk Index, patient is considered low risk (0.9%) of adverse cardiovascular events occurring with high risk surgery.     2. Essential Hypertension: Blood pressure is well-controlled today in the office.     3. Hyperlipidemia: This is managed by his PCP and he remains on a pravastatin.    4.  Nicotine Dependence: The patient is actively trying to quit smoking of applauded his efforts.  I've recommended that he abstain from cigarette smoking entirely.    As always, it has been a pleasure to participate in your patient's care.  Thank you for the referral.      Sincerely,         CATIA Wheatley

## 2017-05-02 ENCOUNTER — APPOINTMENT (OUTPATIENT)
Dept: PREADMISSION TESTING | Facility: HOSPITAL | Age: 62
End: 2017-05-02

## 2017-05-02 ENCOUNTER — HOSPITAL ENCOUNTER (OUTPATIENT)
Dept: GENERAL RADIOLOGY | Facility: HOSPITAL | Age: 62
Discharge: HOME | End: 2017-05-02
Admitting: SURGERY

## 2017-05-02 VITALS
TEMPERATURE: 97.3 F | HEIGHT: 68 IN | HEART RATE: 85 BPM | DIASTOLIC BLOOD PRESSURE: 86 MMHG | WEIGHT: 172 LBS | OXYGEN SATURATION: 97 % | RESPIRATION RATE: 20 BRPM | BODY MASS INDEX: 26.07 KG/M2 | SYSTOLIC BLOOD PRESSURE: 137 MMHG

## 2017-05-02 LAB
ALBUMIN SERPL-MCNC: 4.1 G/DL (ref 3.5–5.2)
ALBUMIN/GLOB SERPL: 1.3 G/DL
ALP SERPL-CCNC: 78 U/L (ref 39–117)
ALT SERPL W P-5'-P-CCNC: 24 U/L (ref 1–41)
ANION GAP SERPL CALCULATED.3IONS-SCNC: 15.2 MMOL/L
APTT PPP: 25.3 SECONDS (ref 22.7–35.4)
AST SERPL-CCNC: 25 U/L (ref 1–40)
BACTERIA UR QL AUTO: NORMAL /HPF
BILIRUB SERPL-MCNC: 0.5 MG/DL (ref 0.1–1.2)
BILIRUB UR QL STRIP: NEGATIVE
BUN BLD-MCNC: 20 MG/DL (ref 8–23)
BUN/CREAT SERPL: 16.8 (ref 7–25)
CALCIUM SPEC-SCNC: 9.6 MG/DL (ref 8.6–10.5)
CHLORIDE SERPL-SCNC: 102 MMOL/L (ref 98–107)
CLARITY UR: CLEAR
CO2 SERPL-SCNC: 23.8 MMOL/L (ref 22–29)
COLOR UR: YELLOW
CREAT BLD-MCNC: 1.19 MG/DL (ref 0.76–1.27)
DEPRECATED RDW RBC AUTO: 49.1 FL (ref 37–54)
ERYTHROCYTE [DISTWIDTH] IN BLOOD BY AUTOMATED COUNT: 13.9 % (ref 11.5–14.5)
GFR SERPL CREATININE-BSD FRML MDRD: 62 ML/MIN/1.73
GLOBULIN UR ELPH-MCNC: 3.2 GM/DL
GLUCOSE BLD-MCNC: 95 MG/DL (ref 65–99)
GLUCOSE UR STRIP-MCNC: NEGATIVE MG/DL
HCT VFR BLD AUTO: 42.7 % (ref 40.4–52.2)
HGB BLD-MCNC: 14.2 G/DL (ref 13.7–17.6)
HGB UR QL STRIP.AUTO: NEGATIVE
HYALINE CASTS UR QL AUTO: NORMAL /LPF
INR PPP: 0.98 (ref 0.9–1.1)
KETONES UR QL STRIP: NEGATIVE
LEUKOCYTE ESTERASE UR QL STRIP.AUTO: NEGATIVE
MCH RBC QN AUTO: 32.1 PG (ref 27–32.7)
MCHC RBC AUTO-ENTMCNC: 33.3 G/DL (ref 32.6–36.4)
MCV RBC AUTO: 96.4 FL (ref 79.8–96.2)
NITRITE UR QL STRIP: NEGATIVE
PH UR STRIP.AUTO: 6.5 [PH] (ref 5–8)
PLATELET # BLD AUTO: 182 10*3/MM3 (ref 140–500)
PMV BLD AUTO: 10.5 FL (ref 6–12)
POTASSIUM BLD-SCNC: 4.8 MMOL/L (ref 3.5–5.2)
PROT SERPL-MCNC: 7.3 G/DL (ref 6–8.5)
PROT UR QL STRIP: NEGATIVE
PROTHROMBIN TIME: 12.6 SECONDS (ref 11.7–14.2)
RBC # BLD AUTO: 4.43 10*6/MM3 (ref 4.6–6)
RBC # UR: NORMAL /HPF
REF LAB TEST METHOD: NORMAL
SODIUM BLD-SCNC: 141 MMOL/L (ref 136–145)
SP GR UR STRIP: 1.02 (ref 1–1.03)
SQUAMOUS #/AREA URNS HPF: NORMAL /HPF
UROBILINOGEN UR QL STRIP: NORMAL
WBC NRBC COR # BLD: 7.61 10*3/MM3 (ref 4.5–10.7)
WBC UR QL AUTO: NORMAL /HPF

## 2017-05-02 PROCEDURE — 85027 COMPLETE CBC AUTOMATED: CPT | Performed by: SURGERY

## 2017-05-02 PROCEDURE — 85730 THROMBOPLASTIN TIME PARTIAL: CPT | Performed by: SURGERY

## 2017-05-02 PROCEDURE — 71020 HC CHEST PA AND LATERAL: CPT

## 2017-05-02 PROCEDURE — 80053 COMPREHEN METABOLIC PANEL: CPT | Performed by: SURGERY

## 2017-05-02 PROCEDURE — 81001 URINALYSIS AUTO W/SCOPE: CPT | Performed by: SURGERY

## 2017-05-02 PROCEDURE — 36415 COLL VENOUS BLD VENIPUNCTURE: CPT

## 2017-05-02 PROCEDURE — 85610 PROTHROMBIN TIME: CPT | Performed by: SURGERY

## 2017-05-08 ENCOUNTER — APPOINTMENT (OUTPATIENT)
Dept: GENERAL RADIOLOGY | Facility: HOSPITAL | Age: 62
End: 2017-05-08

## 2017-05-08 ENCOUNTER — ANESTHESIA EVENT (OUTPATIENT)
Dept: PERIOP | Facility: HOSPITAL | Age: 62
End: 2017-05-08

## 2017-05-08 ENCOUNTER — ANESTHESIA (OUTPATIENT)
Dept: PERIOP | Facility: HOSPITAL | Age: 62
End: 2017-05-08

## 2017-05-08 ENCOUNTER — HOSPITAL ENCOUNTER (INPATIENT)
Facility: HOSPITAL | Age: 62
LOS: 1 days | Discharge: HOME | End: 2017-05-09
Attending: SURGERY | Admitting: SURGERY

## 2017-05-08 PROBLEM — I71.40 AAA (ABDOMINAL AORTIC ANEURYSM): Status: ACTIVE | Noted: 2017-05-08

## 2017-05-08 LAB
ABO GROUP BLD: NORMAL
BLD GP AB SCN SERPL QL: NEGATIVE
RH BLD: POSITIVE

## 2017-05-08 PROCEDURE — 25010000002 HEPARIN (PORCINE) PER 1000 UNITS: Performed by: SURGERY

## 2017-05-08 PROCEDURE — 86901 BLOOD TYPING SEROLOGIC RH(D): CPT | Performed by: SURGERY

## 2017-05-08 PROCEDURE — C1894 INTRO/SHEATH, NON-LASER: HCPCS | Performed by: SURGERY

## 2017-05-08 PROCEDURE — 25010000002 ONDANSETRON PER 1 MG: Performed by: SURGERY

## 2017-05-08 PROCEDURE — 25010000002 MORPHINE PER 10 MG: Performed by: SURGERY

## 2017-05-08 PROCEDURE — 25010000002 DEXAMETHASONE PER 1 MG: Performed by: NURSE ANESTHETIST, CERTIFIED REGISTERED

## 2017-05-08 PROCEDURE — 86850 RBC ANTIBODY SCREEN: CPT | Performed by: SURGERY

## 2017-05-08 PROCEDURE — 25010000002 FENTANYL CITRATE (PF) 100 MCG/2ML SOLUTION: Performed by: ANESTHESIOLOGY

## 2017-05-08 PROCEDURE — C1769 GUIDE WIRE: HCPCS | Performed by: SURGERY

## 2017-05-08 PROCEDURE — 25010000002 FENTANYL CITRATE (PF) 100 MCG/2ML SOLUTION: Performed by: NURSE ANESTHETIST, CERTIFIED REGISTERED

## 2017-05-08 PROCEDURE — 75952 HC ENDOVASC REPAIR INFRARENAL AAA S AND I: CPT

## 2017-05-08 PROCEDURE — 04V03D6 RESTRICT ABD AORTA, BIFURC, W INTRALUM DEV, PERC: ICD-10-PCS | Performed by: SURGERY

## 2017-05-08 PROCEDURE — 85347 COAGULATION TIME ACTIVATED: CPT

## 2017-05-08 PROCEDURE — 25010000002 HEPARIN (PORCINE) PER 1000 UNITS: Performed by: NURSE ANESTHETIST, CERTIFIED REGISTERED

## 2017-05-08 PROCEDURE — 94799 UNLISTED PULMONARY SVC/PX: CPT

## 2017-05-08 PROCEDURE — C1760 CLOSURE DEV, VASC: HCPCS | Performed by: SURGERY

## 2017-05-08 PROCEDURE — 25010000003 CEFAZOLIN IN DEXTROSE 2-4 GM/100ML-% SOLUTION: Performed by: SURGERY

## 2017-05-08 PROCEDURE — 25010000002 PROPOFOL 10 MG/ML EMULSION: Performed by: NURSE ANESTHETIST, CERTIFIED REGISTERED

## 2017-05-08 PROCEDURE — 86900 BLOOD TYPING SEROLOGIC ABO: CPT | Performed by: SURGERY

## 2017-05-08 PROCEDURE — 25010000002 NEOSTIGMINE 10 MG/10ML SOLUTION: Performed by: NURSE ANESTHETIST, CERTIFIED REGISTERED

## 2017-05-08 PROCEDURE — 25010000002 PHENYLEPHRINE PER 1 ML: Performed by: NURSE ANESTHETIST, CERTIFIED REGISTERED

## 2017-05-08 PROCEDURE — 25010000002 PROTAMINE SULFATE PER 10 MG: Performed by: NURSE ANESTHETIST, CERTIFIED REGISTERED

## 2017-05-08 PROCEDURE — 25010000002 MIDAZOLAM PER 1 MG: Performed by: ANESTHESIOLOGY

## 2017-05-08 PROCEDURE — 25010000003 CEFAZOLIN IN DEXTROSE 2-4 GM/100ML-% SOLUTION

## 2017-05-08 PROCEDURE — C1725 CATH, TRANSLUMIN NON-LASER: HCPCS | Performed by: SURGERY

## 2017-05-08 PROCEDURE — 25010000003 CEFAZOLIN PER 500 MG: Performed by: SURGERY

## 2017-05-08 PROCEDURE — 0 IODIXANOL PER 1 ML: Performed by: SURGERY

## 2017-05-08 DEVICE — IMPLANTABLE DEVICE: Type: IMPLANTABLE DEVICE | Status: FUNCTIONAL

## 2017-05-08 DEVICE — GRFT EXCLDR CONTRALAT 14.5MMX14CM: Type: IMPLANTABLE DEVICE | Status: FUNCTIONAL

## 2017-05-08 RX ORDER — NALOXONE HCL 0.4 MG/ML
0.2 VIAL (ML) INJECTION AS NEEDED
Status: DISCONTINUED | OUTPATIENT
Start: 2017-05-08 | End: 2017-05-08 | Stop reason: HOSPADM

## 2017-05-08 RX ORDER — SODIUM CHLORIDE 0.9 % (FLUSH) 0.9 %
1-10 SYRINGE (ML) INJECTION AS NEEDED
Status: DISCONTINUED | OUTPATIENT
Start: 2017-05-08 | End: 2017-05-08 | Stop reason: HOSPADM

## 2017-05-08 RX ORDER — CEFAZOLIN SODIUM 2 G/100ML
INJECTION, SOLUTION INTRAVENOUS
Status: COMPLETED
Start: 2017-05-08 | End: 2017-05-08

## 2017-05-08 RX ORDER — HEPARIN SODIUM 1000 [USP'U]/ML
INJECTION, SOLUTION INTRAVENOUS; SUBCUTANEOUS AS NEEDED
Status: DISCONTINUED | OUTPATIENT
Start: 2017-05-08 | End: 2017-05-08 | Stop reason: SURG

## 2017-05-08 RX ORDER — PROMETHAZINE HYDROCHLORIDE 25 MG/1
12.5 TABLET ORAL ONCE AS NEEDED
Status: DISCONTINUED | OUTPATIENT
Start: 2017-05-08 | End: 2017-05-08 | Stop reason: HOSPADM

## 2017-05-08 RX ORDER — DIPHENHYDRAMINE HYDROCHLORIDE 50 MG/ML
12.5 INJECTION INTRAMUSCULAR; INTRAVENOUS
Status: DISCONTINUED | OUTPATIENT
Start: 2017-05-08 | End: 2017-05-08 | Stop reason: HOSPADM

## 2017-05-08 RX ORDER — ONDANSETRON 2 MG/ML
4 INJECTION INTRAMUSCULAR; INTRAVENOUS EVERY 6 HOURS PRN
Status: DISCONTINUED | OUTPATIENT
Start: 2017-05-08 | End: 2017-05-09 | Stop reason: HOSPADM

## 2017-05-08 RX ORDER — FENTANYL CITRATE 50 UG/ML
INJECTION, SOLUTION INTRAMUSCULAR; INTRAVENOUS AS NEEDED
Status: DISCONTINUED | OUTPATIENT
Start: 2017-05-08 | End: 2017-05-08 | Stop reason: SURG

## 2017-05-08 RX ORDER — NITROGLYCERIN 0.4 MG/1
0.4 TABLET SUBLINGUAL
Status: DISCONTINUED | OUTPATIENT
Start: 2017-05-08 | End: 2017-05-09 | Stop reason: HOSPADM

## 2017-05-08 RX ORDER — MIDAZOLAM HYDROCHLORIDE 1 MG/ML
2 INJECTION INTRAMUSCULAR; INTRAVENOUS
Status: DISCONTINUED | OUTPATIENT
Start: 2017-05-08 | End: 2017-05-08 | Stop reason: HOSPADM

## 2017-05-08 RX ORDER — ONDANSETRON 4 MG/1
4 TABLET, FILM COATED ORAL EVERY 6 HOURS PRN
Status: DISCONTINUED | OUTPATIENT
Start: 2017-05-08 | End: 2017-05-09 | Stop reason: HOSPADM

## 2017-05-08 RX ORDER — IODIXANOL 320 MG/ML
200 INJECTION, SOLUTION INTRAVASCULAR
Status: COMPLETED | OUTPATIENT
Start: 2017-05-08 | End: 2017-05-08

## 2017-05-08 RX ORDER — HEPARIN SODIUM 5000 [USP'U]/ML
5000 INJECTION, SOLUTION INTRAVENOUS; SUBCUTANEOUS EVERY 8 HOURS SCHEDULED
Status: DISCONTINUED | OUTPATIENT
Start: 2017-05-09 | End: 2017-05-09 | Stop reason: HOSPADM

## 2017-05-08 RX ORDER — MIDAZOLAM HYDROCHLORIDE 1 MG/ML
1 INJECTION INTRAMUSCULAR; INTRAVENOUS
Status: DISCONTINUED | OUTPATIENT
Start: 2017-05-08 | End: 2017-05-08 | Stop reason: HOSPADM

## 2017-05-08 RX ORDER — NALOXONE HCL 0.4 MG/ML
0.4 VIAL (ML) INJECTION
Status: DISCONTINUED | OUTPATIENT
Start: 2017-05-08 | End: 2017-05-09 | Stop reason: HOSPADM

## 2017-05-08 RX ORDER — NEOSTIGMINE METHYLSULFATE 1 MG/ML
INJECTION, SOLUTION INTRAVENOUS AS NEEDED
Status: DISCONTINUED | OUTPATIENT
Start: 2017-05-08 | End: 2017-05-08 | Stop reason: SURG

## 2017-05-08 RX ORDER — PROPOFOL 10 MG/ML
VIAL (ML) INTRAVENOUS AS NEEDED
Status: DISCONTINUED | OUTPATIENT
Start: 2017-05-08 | End: 2017-05-08 | Stop reason: SURG

## 2017-05-08 RX ORDER — PROTAMINE SULFATE 10 MG/ML
INJECTION, SOLUTION INTRAVENOUS AS NEEDED
Status: DISCONTINUED | OUTPATIENT
Start: 2017-05-08 | End: 2017-05-08 | Stop reason: SURG

## 2017-05-08 RX ORDER — HYDROMORPHONE HYDROCHLORIDE 1 MG/ML
0.5 INJECTION, SOLUTION INTRAMUSCULAR; INTRAVENOUS; SUBCUTANEOUS
Status: DISCONTINUED | OUTPATIENT
Start: 2017-05-08 | End: 2017-05-08 | Stop reason: HOSPADM

## 2017-05-08 RX ORDER — HYDRALAZINE HYDROCHLORIDE 20 MG/ML
10 INJECTION INTRAMUSCULAR; INTRAVENOUS EVERY 4 HOURS PRN
Status: DISCONTINUED | OUTPATIENT
Start: 2017-05-08 | End: 2017-05-09 | Stop reason: HOSPADM

## 2017-05-08 RX ORDER — CEFAZOLIN SODIUM 2 G/100ML
2 INJECTION, SOLUTION INTRAVENOUS ONCE
Status: DISCONTINUED | OUTPATIENT
Start: 2017-05-08 | End: 2017-05-08

## 2017-05-08 RX ORDER — HYDROCODONE BITARTRATE AND ACETAMINOPHEN 7.5; 325 MG/1; MG/1
1 TABLET ORAL ONCE AS NEEDED
Status: DISCONTINUED | OUTPATIENT
Start: 2017-05-08 | End: 2017-05-08 | Stop reason: HOSPADM

## 2017-05-08 RX ORDER — FENTANYL CITRATE 50 UG/ML
50 INJECTION, SOLUTION INTRAMUSCULAR; INTRAVENOUS
Status: DISCONTINUED | OUTPATIENT
Start: 2017-05-08 | End: 2017-05-08 | Stop reason: HOSPADM

## 2017-05-08 RX ORDER — GLYCOPYRROLATE 0.2 MG/ML
INJECTION INTRAMUSCULAR; INTRAVENOUS AS NEEDED
Status: DISCONTINUED | OUTPATIENT
Start: 2017-05-08 | End: 2017-05-08 | Stop reason: SURG

## 2017-05-08 RX ORDER — OXYCODONE AND ACETAMINOPHEN 7.5; 325 MG/1; MG/1
1 TABLET ORAL ONCE AS NEEDED
Status: DISCONTINUED | OUTPATIENT
Start: 2017-05-08 | End: 2017-05-08 | Stop reason: HOSPADM

## 2017-05-08 RX ORDER — HYDRALAZINE HYDROCHLORIDE 20 MG/ML
5 INJECTION INTRAMUSCULAR; INTRAVENOUS
Status: DISCONTINUED | OUTPATIENT
Start: 2017-05-08 | End: 2017-05-08 | Stop reason: HOSPADM

## 2017-05-08 RX ORDER — SODIUM CHLORIDE, SODIUM LACTATE, POTASSIUM CHLORIDE, CALCIUM CHLORIDE 600; 310; 30; 20 MG/100ML; MG/100ML; MG/100ML; MG/100ML
9 INJECTION, SOLUTION INTRAVENOUS CONTINUOUS
Status: DISCONTINUED | OUTPATIENT
Start: 2017-05-08 | End: 2017-05-08

## 2017-05-08 RX ORDER — SODIUM CHLORIDE 0.9 % (FLUSH) 0.9 %
1-10 SYRINGE (ML) INJECTION AS NEEDED
Status: DISCONTINUED | OUTPATIENT
Start: 2017-05-08 | End: 2017-05-09 | Stop reason: HOSPADM

## 2017-05-08 RX ORDER — ROCURONIUM BROMIDE 10 MG/ML
INJECTION, SOLUTION INTRAVENOUS AS NEEDED
Status: DISCONTINUED | OUTPATIENT
Start: 2017-05-08 | End: 2017-05-08 | Stop reason: SURG

## 2017-05-08 RX ORDER — ROSUVASTATIN CALCIUM 5 MG/1
5 TABLET, COATED ORAL DAILY
Status: DISCONTINUED | OUTPATIENT
Start: 2017-05-08 | End: 2017-05-09 | Stop reason: HOSPADM

## 2017-05-08 RX ORDER — LIDOCAINE HYDROCHLORIDE 20 MG/ML
INJECTION, SOLUTION INFILTRATION; PERINEURAL AS NEEDED
Status: DISCONTINUED | OUTPATIENT
Start: 2017-05-08 | End: 2017-05-08 | Stop reason: SURG

## 2017-05-08 RX ORDER — PROMETHAZINE HYDROCHLORIDE 25 MG/1
25 SUPPOSITORY RECTAL ONCE AS NEEDED
Status: DISCONTINUED | OUTPATIENT
Start: 2017-05-08 | End: 2017-05-08 | Stop reason: HOSPADM

## 2017-05-08 RX ORDER — PROMETHAZINE HYDROCHLORIDE 25 MG/1
25 TABLET ORAL ONCE AS NEEDED
Status: DISCONTINUED | OUTPATIENT
Start: 2017-05-08 | End: 2017-05-08 | Stop reason: HOSPADM

## 2017-05-08 RX ORDER — FLUMAZENIL 0.1 MG/ML
0.2 INJECTION INTRAVENOUS AS NEEDED
Status: DISCONTINUED | OUTPATIENT
Start: 2017-05-08 | End: 2017-05-08 | Stop reason: HOSPADM

## 2017-05-08 RX ORDER — PRAVASTATIN SODIUM 20 MG
20 TABLET ORAL DAILY
Status: DISCONTINUED | OUTPATIENT
Start: 2017-05-08 | End: 2017-05-08 | Stop reason: CLARIF

## 2017-05-08 RX ORDER — SODIUM CHLORIDE, SODIUM LACTATE, POTASSIUM CHLORIDE, CALCIUM CHLORIDE 600; 310; 30; 20 MG/100ML; MG/100ML; MG/100ML; MG/100ML
100 INJECTION, SOLUTION INTRAVENOUS CONTINUOUS
Status: DISCONTINUED | OUTPATIENT
Start: 2017-05-08 | End: 2017-05-09 | Stop reason: HOSPADM

## 2017-05-08 RX ORDER — CEFAZOLIN SODIUM 2 G/100ML
2 INJECTION, SOLUTION INTRAVENOUS EVERY 8 HOURS
Status: COMPLETED | OUTPATIENT
Start: 2017-05-08 | End: 2017-05-09

## 2017-05-08 RX ORDER — FAMOTIDINE 10 MG/ML
20 INJECTION, SOLUTION INTRAVENOUS ONCE
Status: COMPLETED | OUTPATIENT
Start: 2017-05-08 | End: 2017-05-08

## 2017-05-08 RX ORDER — PROMETHAZINE HYDROCHLORIDE 25 MG/ML
12.5 INJECTION, SOLUTION INTRAMUSCULAR; INTRAVENOUS ONCE AS NEEDED
Status: DISCONTINUED | OUTPATIENT
Start: 2017-05-08 | End: 2017-05-08 | Stop reason: HOSPADM

## 2017-05-08 RX ORDER — AMLODIPINE BESYLATE 10 MG/1
10 TABLET ORAL DAILY
Status: DISCONTINUED | OUTPATIENT
Start: 2017-05-08 | End: 2017-05-09 | Stop reason: HOSPADM

## 2017-05-08 RX ORDER — ONDANSETRON 4 MG/1
4 TABLET, ORALLY DISINTEGRATING ORAL EVERY 6 HOURS PRN
Status: DISCONTINUED | OUTPATIENT
Start: 2017-05-08 | End: 2017-05-09 | Stop reason: HOSPADM

## 2017-05-08 RX ORDER — ASPIRIN 81 MG/1
81 TABLET ORAL DAILY
Status: DISCONTINUED | OUTPATIENT
Start: 2017-05-08 | End: 2017-05-09 | Stop reason: HOSPADM

## 2017-05-08 RX ORDER — LABETALOL HYDROCHLORIDE 5 MG/ML
5 INJECTION, SOLUTION INTRAVENOUS
Status: DISCONTINUED | OUTPATIENT
Start: 2017-05-08 | End: 2017-05-08 | Stop reason: HOSPADM

## 2017-05-08 RX ORDER — ONDANSETRON 2 MG/ML
4 INJECTION INTRAMUSCULAR; INTRAVENOUS ONCE AS NEEDED
Status: DISCONTINUED | OUTPATIENT
Start: 2017-05-08 | End: 2017-05-08 | Stop reason: HOSPADM

## 2017-05-08 RX ORDER — HYDROCODONE BITARTRATE AND ACETAMINOPHEN 5; 325 MG/1; MG/1
1 TABLET ORAL EVERY 4 HOURS PRN
Status: DISCONTINUED | OUTPATIENT
Start: 2017-05-08 | End: 2017-05-09 | Stop reason: HOSPADM

## 2017-05-08 RX ORDER — BUPIVACAINE HYDROCHLORIDE 2.5 MG/ML
INJECTION, SOLUTION EPIDURAL; INFILTRATION; INTRACAUDAL AS NEEDED
Status: DISCONTINUED | OUTPATIENT
Start: 2017-05-08 | End: 2017-05-08 | Stop reason: HOSPADM

## 2017-05-08 RX ORDER — DEXAMETHASONE SODIUM PHOSPHATE 10 MG/ML
INJECTION INTRAMUSCULAR; INTRAVENOUS AS NEEDED
Status: DISCONTINUED | OUTPATIENT
Start: 2017-05-08 | End: 2017-05-08 | Stop reason: SURG

## 2017-05-08 RX ADMIN — NEOSTIGMINE METHYLSULFATE 4 MG: 1 INJECTION INTRAVENOUS at 11:46

## 2017-05-08 RX ADMIN — EPHEDRINE SULFATE 5 MG: 50 INJECTION INTRAMUSCULAR; INTRAVENOUS; SUBCUTANEOUS at 10:46

## 2017-05-08 RX ADMIN — CEFAZOLIN SODIUM 2 G: 2 INJECTION, SOLUTION INTRAVENOUS at 10:06

## 2017-05-08 RX ADMIN — FENTANYL CITRATE 50 MCG: 50 INJECTION INTRAMUSCULAR; INTRAVENOUS at 13:29

## 2017-05-08 RX ADMIN — GLYCOPYRROLATE 0.6 MG: 0.2 INJECTION INTRAMUSCULAR; INTRAVENOUS at 11:46

## 2017-05-08 RX ADMIN — ROCURONIUM BROMIDE 40 MG: 10 INJECTION INTRAVENOUS at 10:12

## 2017-05-08 RX ADMIN — MIDAZOLAM 2 MG: 1 INJECTION INTRAMUSCULAR; INTRAVENOUS at 09:17

## 2017-05-08 RX ADMIN — ROCURONIUM BROMIDE 10 MG: 10 INJECTION INTRAVENOUS at 11:00

## 2017-05-08 RX ADMIN — SODIUM CHLORIDE, POTASSIUM CHLORIDE, SODIUM LACTATE AND CALCIUM CHLORIDE 50 ML/HR: 600; 310; 30; 20 INJECTION, SOLUTION INTRAVENOUS at 16:01

## 2017-05-08 RX ADMIN — FENTANYL CITRATE 25 MCG: 50 INJECTION INTRAMUSCULAR; INTRAVENOUS at 11:28

## 2017-05-08 RX ADMIN — PHENYLEPHRINE HYDROCHLORIDE 100 MCG: 10 INJECTION INTRAVENOUS at 10:38

## 2017-05-08 RX ADMIN — EPHEDRINE SULFATE 5 MG: 50 INJECTION INTRAMUSCULAR; INTRAVENOUS; SUBCUTANEOUS at 10:38

## 2017-05-08 RX ADMIN — LIDOCAINE HYDROCHLORIDE 60 MG: 20 INJECTION, SOLUTION INFILTRATION; PERINEURAL at 10:12

## 2017-05-08 RX ADMIN — MIDAZOLAM 1 MG: 1 INJECTION INTRAMUSCULAR; INTRAVENOUS at 09:37

## 2017-05-08 RX ADMIN — SODIUM CHLORIDE, POTASSIUM CHLORIDE, SODIUM LACTATE AND CALCIUM CHLORIDE 9 ML/HR: 600; 310; 30; 20 INJECTION, SOLUTION INTRAVENOUS at 09:17

## 2017-05-08 RX ADMIN — CEFAZOLIN SODIUM 2 G: 2 INJECTION, SOLUTION INTRAVENOUS at 18:24

## 2017-05-08 RX ADMIN — FENTANYL CITRATE 50 MCG: 50 INJECTION INTRAMUSCULAR; INTRAVENOUS at 09:36

## 2017-05-08 RX ADMIN — FAMOTIDINE 20 MG: 10 INJECTION, SOLUTION INTRAVENOUS at 09:17

## 2017-05-08 RX ADMIN — FENTANYL CITRATE 50 MCG: 50 INJECTION INTRAMUSCULAR; INTRAVENOUS at 11:00

## 2017-05-08 RX ADMIN — DEXAMETHASONE SODIUM PHOSPHATE 6 MG: 10 INJECTION INTRAMUSCULAR; INTRAVENOUS at 10:27

## 2017-05-08 RX ADMIN — IODIXANOL 91.8 ML: 320 INJECTION, SOLUTION INTRAVASCULAR at 12:45

## 2017-05-08 RX ADMIN — FENTANYL CITRATE 50 MCG: 50 INJECTION INTRAMUSCULAR; INTRAVENOUS at 10:11

## 2017-05-08 RX ADMIN — PROTAMINE SULFATE 40 MG: 10 INJECTION, SOLUTION INTRAVENOUS at 11:35

## 2017-05-08 RX ADMIN — MORPHINE SULFATE 4 MG: 4 INJECTION, SOLUTION INTRAMUSCULAR; INTRAVENOUS at 19:54

## 2017-05-08 RX ADMIN — ASPIRIN 81 MG: 81 TABLET ORAL at 18:22

## 2017-05-08 RX ADMIN — ROSUVASTATIN CALCIUM 5 MG: 5 TABLET, FILM COATED ORAL at 18:22

## 2017-05-08 RX ADMIN — HEPARIN SODIUM 10000 UNITS: 1000 INJECTION, SOLUTION INTRAVENOUS; SUBCUTANEOUS at 10:50

## 2017-05-08 RX ADMIN — EPHEDRINE SULFATE 5 MG: 50 INJECTION INTRAMUSCULAR; INTRAVENOUS; SUBCUTANEOUS at 10:28

## 2017-05-08 RX ADMIN — PHENYLEPHRINE HYDROCHLORIDE 100 MCG: 10 INJECTION INTRAVENOUS at 11:34

## 2017-05-08 RX ADMIN — PROPOFOL 150 MG: 10 INJECTION, EMULSION INTRAVENOUS at 10:12

## 2017-05-08 RX ADMIN — PHENYLEPHRINE HYDROCHLORIDE 100 MCG: 10 INJECTION INTRAVENOUS at 10:28

## 2017-05-08 RX ADMIN — MORPHINE SULFATE 4 MG: 4 INJECTION, SOLUTION INTRAMUSCULAR; INTRAVENOUS at 16:03

## 2017-05-08 RX ADMIN — ONDANSETRON 4 MG: 2 INJECTION INTRAMUSCULAR; INTRAVENOUS at 16:03

## 2017-05-08 RX ADMIN — EPHEDRINE SULFATE 5 MG: 50 INJECTION INTRAMUSCULAR; INTRAVENOUS; SUBCUTANEOUS at 11:34

## 2017-05-08 RX ADMIN — EPHEDRINE SULFATE 10 MG: 50 INJECTION INTRAMUSCULAR; INTRAVENOUS; SUBCUTANEOUS at 10:16

## 2017-05-09 VITALS
RESPIRATION RATE: 18 BRPM | WEIGHT: 171.3 LBS | HEIGHT: 68 IN | SYSTOLIC BLOOD PRESSURE: 125 MMHG | BODY MASS INDEX: 25.96 KG/M2 | OXYGEN SATURATION: 95 % | HEART RATE: 90 BPM | TEMPERATURE: 98.3 F | DIASTOLIC BLOOD PRESSURE: 85 MMHG

## 2017-05-09 LAB
ACT BLD: 131 SECONDS (ref 82–152)
ACT BLD: 137 SECONDS (ref 82–152)
ACT BLD: 250 SECONDS (ref 82–152)
ACT BLD: 276 SECONDS (ref 82–152)
ANION GAP SERPL CALCULATED.3IONS-SCNC: 15.5 MMOL/L
ANION GAP SERPL CALCULATED.3IONS-SCNC: 15.7 MMOL/L
BUN BLD-MCNC: 16 MG/DL (ref 8–23)
BUN BLD-MCNC: 19 MG/DL (ref 8–23)
BUN/CREAT SERPL: 12.5 (ref 7–25)
BUN/CREAT SERPL: 15.3 (ref 7–25)
CALCIUM SPEC-SCNC: 8.4 MG/DL (ref 8.6–10.5)
CALCIUM SPEC-SCNC: 9 MG/DL (ref 8.6–10.5)
CHLORIDE SERPL-SCNC: 100 MMOL/L (ref 98–107)
CHLORIDE SERPL-SCNC: 101 MMOL/L (ref 98–107)
CO2 SERPL-SCNC: 20.3 MMOL/L (ref 22–29)
CO2 SERPL-SCNC: 23.5 MMOL/L (ref 22–29)
CREAT BLD-MCNC: 1.24 MG/DL (ref 0.76–1.27)
CREAT BLD-MCNC: 1.28 MG/DL (ref 0.76–1.27)
DEPRECATED RDW RBC AUTO: 49.5 FL (ref 37–54)
ERYTHROCYTE [DISTWIDTH] IN BLOOD BY AUTOMATED COUNT: 14 % (ref 11.5–14.5)
GFR SERPL CREATININE-BSD FRML MDRD: 57 ML/MIN/1.73
GFR SERPL CREATININE-BSD FRML MDRD: 59 ML/MIN/1.73
GLUCOSE BLD-MCNC: 115 MG/DL (ref 65–99)
GLUCOSE BLD-MCNC: 158 MG/DL (ref 65–99)
HCT VFR BLD AUTO: 38.7 % (ref 40.4–52.2)
HGB BLD-MCNC: 12.7 G/DL (ref 13.7–17.6)
MCH RBC QN AUTO: 31.7 PG (ref 27–32.7)
MCHC RBC AUTO-ENTMCNC: 32.8 G/DL (ref 32.6–36.4)
MCV RBC AUTO: 96.5 FL (ref 79.8–96.2)
PLATELET # BLD AUTO: 184 10*3/MM3 (ref 140–500)
PMV BLD AUTO: 10.6 FL (ref 6–12)
POTASSIUM BLD-SCNC: 4.1 MMOL/L (ref 3.5–5.2)
POTASSIUM BLD-SCNC: 4.7 MMOL/L (ref 3.5–5.2)
RBC # BLD AUTO: 4.01 10*6/MM3 (ref 4.6–6)
SODIUM BLD-SCNC: 136 MMOL/L (ref 136–145)
SODIUM BLD-SCNC: 140 MMOL/L (ref 136–145)
WBC NRBC COR # BLD: 12.6 10*3/MM3 (ref 4.5–10.7)

## 2017-05-09 PROCEDURE — 25010000002 MORPHINE PER 10 MG: Performed by: SURGERY

## 2017-05-09 PROCEDURE — 25010000003 CEFAZOLIN IN DEXTROSE 2-4 GM/100ML-% SOLUTION: Performed by: SURGERY

## 2017-05-09 PROCEDURE — 80048 BASIC METABOLIC PNL TOTAL CA: CPT | Performed by: SURGERY

## 2017-05-09 PROCEDURE — 85027 COMPLETE CBC AUTOMATED: CPT | Performed by: SURGERY

## 2017-05-09 PROCEDURE — 94799 UNLISTED PULMONARY SVC/PX: CPT

## 2017-05-09 PROCEDURE — 25010000002 HEPARIN (PORCINE) PER 1000 UNITS: Performed by: SURGERY

## 2017-05-09 RX ORDER — HYDROCODONE BITARTRATE AND ACETAMINOPHEN 5; 325 MG/1; MG/1
1 TABLET ORAL EVERY 6 HOURS PRN
Qty: 25 TABLET | Refills: 0 | Status: SHIPPED | OUTPATIENT
Start: 2017-05-09 | End: 2017-05-18

## 2017-05-09 RX ADMIN — ROSUVASTATIN CALCIUM 5 MG: 5 TABLET, FILM COATED ORAL at 09:29

## 2017-05-09 RX ADMIN — HEPARIN SODIUM 5000 UNITS: 5000 INJECTION, SOLUTION INTRAVENOUS; SUBCUTANEOUS at 14:57

## 2017-05-09 RX ADMIN — CEFAZOLIN SODIUM 2 G: 2 INJECTION, SOLUTION INTRAVENOUS at 02:33

## 2017-05-09 RX ADMIN — ASPIRIN 81 MG: 81 TABLET ORAL at 09:29

## 2017-05-09 RX ADMIN — HEPARIN SODIUM 5000 UNITS: 5000 INJECTION, SOLUTION INTRAVENOUS; SUBCUTANEOUS at 05:51

## 2017-05-09 RX ADMIN — MORPHINE SULFATE 4 MG: 4 INJECTION, SOLUTION INTRAMUSCULAR; INTRAVENOUS at 02:34

## 2017-05-09 RX ADMIN — AMLODIPINE BESYLATE 10 MG: 10 TABLET ORAL at 09:29

## 2017-05-30 ENCOUNTER — TRANSCRIBE ORDERS (OUTPATIENT)
Dept: ADMINISTRATIVE | Facility: HOSPITAL | Age: 62
End: 2017-05-30

## 2017-05-30 DIAGNOSIS — I71.40 ABDOMINAL AORTIC ANEURYSM WITHOUT RUPTURE (HCC): Primary | ICD-10-CM

## 2017-05-31 ENCOUNTER — OFFICE VISIT (OUTPATIENT)
Dept: INTERNAL MEDICINE | Age: 62
End: 2017-05-31

## 2017-05-31 VITALS
HEIGHT: 68 IN | SYSTOLIC BLOOD PRESSURE: 118 MMHG | HEART RATE: 98 BPM | WEIGHT: 171 LBS | TEMPERATURE: 97.7 F | OXYGEN SATURATION: 99 % | BODY MASS INDEX: 25.91 KG/M2 | DIASTOLIC BLOOD PRESSURE: 64 MMHG

## 2017-05-31 DIAGNOSIS — E78.2 MIXED HYPERLIPIDEMIA: Chronic | ICD-10-CM

## 2017-05-31 DIAGNOSIS — I71.40 ABDOMINAL AORTIC ANEURYSM (AAA) WITHOUT RUPTURE (HCC): Primary | Chronic | ICD-10-CM

## 2017-05-31 DIAGNOSIS — I10 ESSENTIAL HYPERTENSION: Chronic | ICD-10-CM

## 2017-05-31 DIAGNOSIS — F17.219 CIGARETTE NICOTINE DEPENDENCE WITH NICOTINE-INDUCED DISORDER: Chronic | ICD-10-CM

## 2017-05-31 DIAGNOSIS — E78.5 HYPERLIPIDEMIA, UNSPECIFIED HYPERLIPIDEMIA TYPE: ICD-10-CM

## 2017-05-31 DIAGNOSIS — J43.9 PULMONARY EMPHYSEMA, UNSPECIFIED EMPHYSEMA TYPE (HCC): Chronic | ICD-10-CM

## 2017-05-31 PROBLEM — Q60.0 CONGENITAL ABSENCE OF ONE KIDNEY: Chronic | Status: RESOLVED | Noted: 2017-03-13 | Resolved: 2017-05-31

## 2017-05-31 PROBLEM — C44.90 SKIN CANCER: Status: RESOLVED | Noted: 2017-05-31 | Resolved: 2017-05-31

## 2017-05-31 PROCEDURE — 99214 OFFICE O/P EST MOD 30 MIN: CPT | Performed by: INTERNAL MEDICINE

## 2017-05-31 RX ORDER — PRAVASTATIN SODIUM 40 MG
40 TABLET ORAL NIGHTLY
Qty: 30 TABLET | Refills: 5 | Status: ON HOLD | OUTPATIENT
Start: 2017-05-31 | End: 2017-07-30

## 2017-06-13 ENCOUNTER — HOSPITAL ENCOUNTER (OUTPATIENT)
Dept: CT IMAGING | Facility: HOSPITAL | Age: 62
Discharge: HOME | End: 2017-06-13
Attending: SURGERY | Admitting: SURGERY

## 2017-06-13 DIAGNOSIS — I71.40 ABDOMINAL AORTIC ANEURYSM WITHOUT RUPTURE (HCC): ICD-10-CM

## 2017-06-13 LAB — CREAT BLDA-MCNC: 1.4 MG/DL (ref 0.6–1.3)

## 2017-06-13 PROCEDURE — 82565 ASSAY OF CREATININE: CPT

## 2017-06-13 PROCEDURE — 74174 CTA ABD&PLVS W/CONTRAST: CPT

## 2017-06-13 PROCEDURE — 0 IOPAMIDOL 61 % SOLUTION: Performed by: SURGERY

## 2017-06-13 RX ADMIN — IOPAMIDOL 100 ML: 612 INJECTION, SOLUTION INTRAVENOUS at 08:00

## 2017-06-24 DIAGNOSIS — E78.5 HYPERLIPIDEMIA, UNSPECIFIED HYPERLIPIDEMIA TYPE: ICD-10-CM

## 2017-06-26 RX ORDER — PRAVASTATIN SODIUM 20 MG
TABLET ORAL
Qty: 30 TABLET | Refills: 3 | Status: SHIPPED | OUTPATIENT
Start: 2017-06-26 | End: 2017-08-17 | Stop reason: SDUPTHER

## 2017-07-10 ENCOUNTER — HOSPITAL ENCOUNTER (OUTPATIENT)
Dept: RESPIRATORY THERAPY | Facility: HOSPITAL | Age: 62
Discharge: HOME | End: 2017-07-10
Admitting: INTERNAL MEDICINE

## 2017-07-10 LAB
BDY SITE: NORMAL
HGB BLDA-MCNC: 13.7 G/DL

## 2017-07-10 PROCEDURE — 94010 BREATHING CAPACITY TEST: CPT

## 2017-07-10 PROCEDURE — 94729 DIFFUSING CAPACITY: CPT

## 2017-07-10 PROCEDURE — 94726 PLETHYSMOGRAPHY LUNG VOLUMES: CPT

## 2017-07-10 PROCEDURE — 82820 HEMOGLOBIN-OXYGEN AFFINITY: CPT | Performed by: INTERNAL MEDICINE

## 2017-07-30 ENCOUNTER — APPOINTMENT (OUTPATIENT)
Dept: CT IMAGING | Facility: HOSPITAL | Age: 62
End: 2017-07-30

## 2017-07-30 ENCOUNTER — APPOINTMENT (OUTPATIENT)
Dept: GENERAL RADIOLOGY | Facility: HOSPITAL | Age: 62
End: 2017-07-30

## 2017-07-30 ENCOUNTER — HOSPITAL ENCOUNTER (INPATIENT)
Facility: HOSPITAL | Age: 62
LOS: 4 days | Discharge: HOME | End: 2017-08-03
Attending: EMERGENCY MEDICINE | Admitting: INTERNAL MEDICINE

## 2017-07-30 DIAGNOSIS — R50.9 FEVER, UNSPECIFIED FEVER CAUSE: ICD-10-CM

## 2017-07-30 DIAGNOSIS — K92.1 HEMATOCHEZIA: Primary | ICD-10-CM

## 2017-07-30 PROBLEM — R79.89 ELEVATED LFTS: Status: ACTIVE | Noted: 2017-07-30

## 2017-07-30 PROBLEM — R06.02 SHORTNESS OF BREATH: Status: ACTIVE | Noted: 2017-07-30

## 2017-07-30 PROBLEM — N17.9 ACUTE RENAL FAILURE: Status: ACTIVE | Noted: 2017-07-30

## 2017-07-30 PROBLEM — R93.41 ABNORMAL CT SCAN, BLADDER: Status: ACTIVE | Noted: 2017-07-30

## 2017-07-30 LAB
ALBUMIN SERPL-MCNC: 3.7 G/DL (ref 3.5–5.2)
ALBUMIN/GLOB SERPL: 1.1 G/DL
ALP SERPL-CCNC: 180 U/L (ref 39–117)
ALT SERPL W P-5'-P-CCNC: 67 U/L (ref 1–41)
ANION GAP SERPL CALCULATED.3IONS-SCNC: 14.5 MMOL/L
ANION GAP SERPL CALCULATED.3IONS-SCNC: 16.6 MMOL/L
AST SERPL-CCNC: 49 U/L (ref 1–40)
BASOPHILS # BLD AUTO: 0.01 10*3/MM3 (ref 0–0.2)
BASOPHILS # BLD AUTO: 0.01 10*3/MM3 (ref 0–0.2)
BASOPHILS NFR BLD AUTO: 0.1 % (ref 0–1.5)
BASOPHILS NFR BLD AUTO: 0.1 % (ref 0–1.5)
BILIRUB SERPL-MCNC: 1.4 MG/DL (ref 0.1–1.2)
BILIRUB UR QL STRIP: NEGATIVE
BUN BLD-MCNC: 20 MG/DL (ref 8–23)
BUN BLD-MCNC: 21 MG/DL (ref 8–23)
BUN/CREAT SERPL: 13.4 (ref 7–25)
BUN/CREAT SERPL: 14 (ref 7–25)
CALCIUM SPEC-SCNC: 8.4 MG/DL (ref 8.6–10.5)
CALCIUM SPEC-SCNC: 9 MG/DL (ref 8.6–10.5)
CEA SERPL-MCNC: 3.25 NG/ML
CHLORIDE SERPL-SCNC: 98 MMOL/L (ref 98–107)
CHLORIDE SERPL-SCNC: 99 MMOL/L (ref 98–107)
CLARITY UR: CLEAR
CO2 SERPL-SCNC: 22.4 MMOL/L (ref 22–29)
CO2 SERPL-SCNC: 22.5 MMOL/L (ref 22–29)
COLOR UR: ABNORMAL
CREAT BLD-MCNC: 1.43 MG/DL (ref 0.76–1.27)
CREAT BLD-MCNC: 1.57 MG/DL (ref 0.76–1.27)
D-LACTATE SERPL-SCNC: 1.2 MMOL/L (ref 0.5–2)
DEPRECATED RDW RBC AUTO: 49 FL (ref 37–54)
DEPRECATED RDW RBC AUTO: 51.8 FL (ref 37–54)
EOSINOPHIL # BLD AUTO: 0 10*3/MM3 (ref 0–0.7)
EOSINOPHIL # BLD AUTO: 0.01 10*3/MM3 (ref 0–0.7)
EOSINOPHIL NFR BLD AUTO: 0 % (ref 0.3–6.2)
EOSINOPHIL NFR BLD AUTO: 0.1 % (ref 0.3–6.2)
ERYTHROCYTE [DISTWIDTH] IN BLOOD BY AUTOMATED COUNT: 14.1 % (ref 11.5–14.5)
ERYTHROCYTE [DISTWIDTH] IN BLOOD BY AUTOMATED COUNT: 14.4 % (ref 11.5–14.5)
GFR SERPL CREATININE-BSD FRML MDRD: 45 ML/MIN/1.73
GFR SERPL CREATININE-BSD FRML MDRD: 50 ML/MIN/1.73
GLOBULIN UR ELPH-MCNC: 3.5 GM/DL
GLUCOSE BLD-MCNC: 108 MG/DL (ref 65–99)
GLUCOSE BLD-MCNC: 119 MG/DL (ref 65–99)
GLUCOSE UR STRIP-MCNC: NEGATIVE MG/DL
HCT VFR BLD AUTO: 36.2 % (ref 40.4–52.2)
HCT VFR BLD AUTO: 38.2 % (ref 40.4–52.2)
HGB BLD-MCNC: 11.7 G/DL (ref 13.7–17.6)
HGB BLD-MCNC: 13 G/DL (ref 13.7–17.6)
HGB UR QL STRIP.AUTO: NEGATIVE
HOLD SPECIMEN: NORMAL
HOLD SPECIMEN: NORMAL
IMM GRANULOCYTES # BLD: 0.03 10*3/MM3 (ref 0–0.03)
IMM GRANULOCYTES # BLD: 0.04 10*3/MM3 (ref 0–0.03)
IMM GRANULOCYTES NFR BLD: 0.2 % (ref 0–0.5)
IMM GRANULOCYTES NFR BLD: 0.3 % (ref 0–0.5)
KETONES UR QL STRIP: ABNORMAL
LEUKOCYTE ESTERASE UR QL STRIP.AUTO: NEGATIVE
LIPASE SERPL-CCNC: 24 U/L (ref 13–60)
LYMPHOCYTES # BLD AUTO: 0.94 10*3/MM3 (ref 0.9–4.8)
LYMPHOCYTES # BLD AUTO: 1.75 10*3/MM3 (ref 0.9–4.8)
LYMPHOCYTES NFR BLD AUTO: 14.5 % (ref 19.6–45.3)
LYMPHOCYTES NFR BLD AUTO: 7.1 % (ref 19.6–45.3)
MCH RBC QN AUTO: 31.4 PG (ref 27–32.7)
MCH RBC QN AUTO: 32.3 PG (ref 27–32.7)
MCHC RBC AUTO-ENTMCNC: 32.3 G/DL (ref 32.6–36.4)
MCHC RBC AUTO-ENTMCNC: 34 G/DL (ref 32.6–36.4)
MCV RBC AUTO: 95 FL (ref 79.8–96.2)
MCV RBC AUTO: 97.1 FL (ref 79.8–96.2)
MONOCYTES # BLD AUTO: 0.95 10*3/MM3 (ref 0.2–1.2)
MONOCYTES # BLD AUTO: 1.34 10*3/MM3 (ref 0.2–1.2)
MONOCYTES NFR BLD AUTO: 10.1 % (ref 5–12)
MONOCYTES NFR BLD AUTO: 7.9 % (ref 5–12)
NEUTROPHILS # BLD AUTO: 10.89 10*3/MM3 (ref 1.9–8.1)
NEUTROPHILS # BLD AUTO: 9.35 10*3/MM3 (ref 1.9–8.1)
NEUTROPHILS NFR BLD AUTO: 77.3 % (ref 42.7–76)
NEUTROPHILS NFR BLD AUTO: 82.3 % (ref 42.7–76)
NITRITE UR QL STRIP: NEGATIVE
NRBC BLD MANUAL-RTO: 0 /100 WBC (ref 0–0)
PH UR STRIP.AUTO: 5.5 [PH] (ref 5–8)
PLATELET # BLD AUTO: 120 10*3/MM3 (ref 140–500)
PLATELET # BLD AUTO: 129 10*3/MM3 (ref 140–500)
PMV BLD AUTO: 10.2 FL (ref 6–12)
PMV BLD AUTO: 10.7 FL (ref 6–12)
POTASSIUM BLD-SCNC: 4 MMOL/L (ref 3.5–5.2)
POTASSIUM BLD-SCNC: 4.5 MMOL/L (ref 3.5–5.2)
PROCALCITONIN SERPL-MCNC: 1.56 NG/ML (ref 0.1–0.25)
PROT SERPL-MCNC: 7.2 G/DL (ref 6–8.5)
PROT UR QL STRIP: ABNORMAL
RBC # BLD AUTO: 3.73 10*6/MM3 (ref 4.6–6)
RBC # BLD AUTO: 4.02 10*6/MM3 (ref 4.6–6)
SODIUM BLD-SCNC: 136 MMOL/L (ref 136–145)
SODIUM BLD-SCNC: 137 MMOL/L (ref 136–145)
SP GR UR STRIP: 1.03 (ref 1–1.03)
UROBILINOGEN UR QL STRIP: ABNORMAL
WBC NRBC COR # BLD: 12.09 10*3/MM3 (ref 4.5–10.7)
WBC NRBC COR # BLD: 13.23 10*3/MM3 (ref 4.5–10.7)
WHOLE BLOOD HOLD SPECIMEN: NORMAL
WHOLE BLOOD HOLD SPECIMEN: NORMAL

## 2017-07-30 PROCEDURE — 87040 BLOOD CULTURE FOR BACTERIA: CPT

## 2017-07-30 PROCEDURE — 85025 COMPLETE CBC W/AUTO DIFF WBC: CPT | Performed by: INTERNAL MEDICINE

## 2017-07-30 PROCEDURE — 71020 HC CHEST PA AND LATERAL: CPT

## 2017-07-30 PROCEDURE — 80048 BASIC METABOLIC PNL TOTAL CA: CPT | Performed by: INTERNAL MEDICINE

## 2017-07-30 PROCEDURE — 25010000002 PIPERACILLIN SOD-TAZOBACTAM PER 1 G: Performed by: INTERNAL MEDICINE

## 2017-07-30 PROCEDURE — 84145 PROCALCITONIN (PCT): CPT | Performed by: EMERGENCY MEDICINE

## 2017-07-30 PROCEDURE — 99254 IP/OBS CNSLTJ NEW/EST MOD 60: CPT | Performed by: SURGERY

## 2017-07-30 PROCEDURE — 85025 COMPLETE CBC W/AUTO DIFF WBC: CPT

## 2017-07-30 PROCEDURE — 83690 ASSAY OF LIPASE: CPT | Performed by: EMERGENCY MEDICINE

## 2017-07-30 PROCEDURE — 82378 CARCINOEMBRYONIC ANTIGEN: CPT | Performed by: INTERNAL MEDICINE

## 2017-07-30 PROCEDURE — 80053 COMPREHEN METABOLIC PANEL: CPT | Performed by: EMERGENCY MEDICINE

## 2017-07-30 PROCEDURE — 81003 URINALYSIS AUTO W/O SCOPE: CPT | Performed by: EMERGENCY MEDICINE

## 2017-07-30 PROCEDURE — 87040 BLOOD CULTURE FOR BACTERIA: CPT | Performed by: EMERGENCY MEDICINE

## 2017-07-30 PROCEDURE — 25010000002 PIPERACILLIN SOD-TAZOBACTAM PER 1 G: Performed by: EMERGENCY MEDICINE

## 2017-07-30 PROCEDURE — 71250 CT THORAX DX C-: CPT

## 2017-07-30 PROCEDURE — 86301 IMMUNOASSAY TUMOR CA 19-9: CPT | Performed by: INTERNAL MEDICINE

## 2017-07-30 PROCEDURE — 74177 CT ABD & PELVIS W/CONTRAST: CPT

## 2017-07-30 PROCEDURE — 0 IOPAMIDOL 61 % SOLUTION: Performed by: EMERGENCY MEDICINE

## 2017-07-30 PROCEDURE — 83605 ASSAY OF LACTIC ACID: CPT | Performed by: EMERGENCY MEDICINE

## 2017-07-30 PROCEDURE — 99284 EMERGENCY DEPT VISIT MOD MDM: CPT

## 2017-07-30 RX ORDER — SODIUM CHLORIDE 9 MG/ML
30 INJECTION, SOLUTION INTRAVENOUS CONTINUOUS
Status: DISCONTINUED | OUTPATIENT
Start: 2017-07-30 | End: 2017-08-03 | Stop reason: HOSPADM

## 2017-07-30 RX ORDER — ONDANSETRON 4 MG/1
4 TABLET, FILM COATED ORAL EVERY 6 HOURS PRN
Status: DISCONTINUED | OUTPATIENT
Start: 2017-07-30 | End: 2017-08-03 | Stop reason: HOSPADM

## 2017-07-30 RX ORDER — MORPHINE SULFATE 2 MG/ML
2 INJECTION, SOLUTION INTRAMUSCULAR; INTRAVENOUS
Status: DISCONTINUED | OUTPATIENT
Start: 2017-07-30 | End: 2017-08-03 | Stop reason: HOSPADM

## 2017-07-30 RX ORDER — ACETAMINOPHEN 325 MG/1
650 TABLET ORAL EVERY 4 HOURS PRN
Status: DISCONTINUED | OUTPATIENT
Start: 2017-07-30 | End: 2017-08-03 | Stop reason: HOSPADM

## 2017-07-30 RX ORDER — SODIUM CHLORIDE 0.9 % (FLUSH) 0.9 %
10 SYRINGE (ML) INJECTION AS NEEDED
Status: DISCONTINUED | OUTPATIENT
Start: 2017-07-30 | End: 2017-08-03 | Stop reason: HOSPADM

## 2017-07-30 RX ORDER — SODIUM CHLORIDE 9 MG/ML
125 INJECTION, SOLUTION INTRAVENOUS CONTINUOUS
Status: DISCONTINUED | OUTPATIENT
Start: 2017-07-30 | End: 2017-07-30

## 2017-07-30 RX ORDER — ASPIRIN 81 MG/1
81 TABLET ORAL DAILY
Status: DISCONTINUED | OUTPATIENT
Start: 2017-07-30 | End: 2017-08-03 | Stop reason: HOSPADM

## 2017-07-30 RX ORDER — ONDANSETRON 4 MG/1
4 TABLET, ORALLY DISINTEGRATING ORAL EVERY 6 HOURS PRN
Status: DISCONTINUED | OUTPATIENT
Start: 2017-07-30 | End: 2017-08-03 | Stop reason: HOSPADM

## 2017-07-30 RX ORDER — AMLODIPINE BESYLATE 10 MG/1
10 TABLET ORAL DAILY
Status: DISCONTINUED | OUTPATIENT
Start: 2017-07-30 | End: 2017-08-03 | Stop reason: HOSPADM

## 2017-07-30 RX ORDER — SODIUM CHLORIDE 0.9 % (FLUSH) 0.9 %
1-10 SYRINGE (ML) INJECTION AS NEEDED
Status: DISCONTINUED | OUTPATIENT
Start: 2017-07-30 | End: 2017-08-03 | Stop reason: HOSPADM

## 2017-07-30 RX ORDER — ACETAMINOPHEN 500 MG
1000 TABLET ORAL ONCE
Status: COMPLETED | OUTPATIENT
Start: 2017-07-30 | End: 2017-07-30

## 2017-07-30 RX ORDER — NALOXONE HCL 0.4 MG/ML
0.4 VIAL (ML) INJECTION
Status: DISCONTINUED | OUTPATIENT
Start: 2017-07-30 | End: 2017-08-03 | Stop reason: HOSPADM

## 2017-07-30 RX ORDER — ONDANSETRON 2 MG/ML
4 INJECTION INTRAMUSCULAR; INTRAVENOUS EVERY 6 HOURS PRN
Status: DISCONTINUED | OUTPATIENT
Start: 2017-07-30 | End: 2017-08-03 | Stop reason: HOSPADM

## 2017-07-30 RX ADMIN — ACETAMINOPHEN 1000 MG: 500 TABLET ORAL at 12:10

## 2017-07-30 RX ADMIN — IOPAMIDOL 85 ML: 612 INJECTION, SOLUTION INTRAVENOUS at 14:14

## 2017-07-30 RX ADMIN — ASPIRIN 81 MG: 81 TABLET ORAL at 17:21

## 2017-07-30 RX ADMIN — SODIUM CHLORIDE 125 ML/HR: 9 INJECTION, SOLUTION INTRAVENOUS at 14:46

## 2017-07-30 RX ADMIN — TAZOBACTAM SODIUM AND PIPERACILLIN SODIUM 3.38 G: 375; 3 INJECTION, SOLUTION INTRAVENOUS at 20:43

## 2017-07-30 RX ADMIN — ACETAMINOPHEN 650 MG: 325 TABLET ORAL at 20:46

## 2017-07-30 RX ADMIN — SODIUM CHLORIDE 500 ML: 9 INJECTION, SOLUTION INTRAVENOUS at 14:46

## 2017-07-30 RX ADMIN — TAZOBACTAM SODIUM AND PIPERACILLIN SODIUM 4.5 G: 500; 4 INJECTION, SOLUTION INTRAVENOUS at 13:48

## 2017-07-31 ENCOUNTER — APPOINTMENT (OUTPATIENT)
Dept: MRI IMAGING | Facility: HOSPITAL | Age: 62
End: 2017-07-31

## 2017-07-31 ENCOUNTER — TELEPHONE (OUTPATIENT)
Dept: INTERNAL MEDICINE | Age: 62
End: 2017-07-31

## 2017-07-31 ENCOUNTER — APPOINTMENT (OUTPATIENT)
Dept: ULTRASOUND IMAGING | Facility: HOSPITAL | Age: 62
End: 2017-07-31
Attending: INTERNAL MEDICINE

## 2017-07-31 LAB
ALBUMIN SERPL-MCNC: 3.3 G/DL (ref 3.5–5.2)
ALBUMIN/GLOB SERPL: 1.1 G/DL
ALP SERPL-CCNC: 160 U/L (ref 39–117)
ALT SERPL W P-5'-P-CCNC: 93 U/L (ref 1–41)
ANION GAP SERPL CALCULATED.3IONS-SCNC: 13.8 MMOL/L
ANION GAP SERPL CALCULATED.3IONS-SCNC: 14.5 MMOL/L
AST SERPL-CCNC: 69 U/L (ref 1–40)
BASOPHILS # BLD AUTO: 0.01 10*3/MM3 (ref 0–0.2)
BASOPHILS NFR BLD AUTO: 0.1 % (ref 0–1.5)
BILIRUB SERPL-MCNC: 1 MG/DL (ref 0.1–1.2)
BUN BLD-MCNC: 13 MG/DL (ref 8–23)
BUN BLD-MCNC: 18 MG/DL (ref 8–23)
BUN/CREAT SERPL: 11.3 (ref 7–25)
BUN/CREAT SERPL: 14.8 (ref 7–25)
C DIFF TOX GENS STL QL NAA+PROBE: NEGATIVE
CALCIUM SPEC-SCNC: 8.1 MG/DL (ref 8.6–10.5)
CALCIUM SPEC-SCNC: 8.1 MG/DL (ref 8.6–10.5)
CHLORIDE SERPL-SCNC: 101 MMOL/L (ref 98–107)
CHLORIDE SERPL-SCNC: 103 MMOL/L (ref 98–107)
CO2 SERPL-SCNC: 19.5 MMOL/L (ref 22–29)
CO2 SERPL-SCNC: 21.2 MMOL/L (ref 22–29)
CREAT BLD-MCNC: 1.15 MG/DL (ref 0.76–1.27)
CREAT BLD-MCNC: 1.22 MG/DL (ref 0.76–1.27)
DEPRECATED RDW RBC AUTO: 51.5 FL (ref 37–54)
EOSINOPHIL # BLD AUTO: 0.08 10*3/MM3 (ref 0–0.7)
EOSINOPHIL NFR BLD AUTO: 0.8 % (ref 0.3–6.2)
ERYTHROCYTE [DISTWIDTH] IN BLOOD BY AUTOMATED COUNT: 14.4 % (ref 11.5–14.5)
GFR SERPL CREATININE-BSD FRML MDRD: 60 ML/MIN/1.73
GFR SERPL CREATININE-BSD FRML MDRD: 64 ML/MIN/1.73
GLOBULIN UR ELPH-MCNC: 3.1 GM/DL
GLUCOSE BLD-MCNC: 103 MG/DL (ref 65–99)
GLUCOSE BLD-MCNC: 98 MG/DL (ref 65–99)
HCT VFR BLD AUTO: 35.8 % (ref 40.4–52.2)
HCT VFR BLD AUTO: 36.8 % (ref 40.4–52.2)
HEMOCCULT STL QL: POSITIVE
HGB BLD-MCNC: 11.9 G/DL (ref 13.7–17.6)
HGB BLD-MCNC: 12.1 G/DL (ref 13.7–17.6)
IMM GRANULOCYTES # BLD: 0.02 10*3/MM3 (ref 0–0.03)
IMM GRANULOCYTES NFR BLD: 0.2 % (ref 0–0.5)
LIPASE SERPL-CCNC: 27 U/L (ref 13–60)
LYMPHOCYTES # BLD AUTO: 1.35 10*3/MM3 (ref 0.9–4.8)
LYMPHOCYTES NFR BLD AUTO: 13 % (ref 19.6–45.3)
MCH RBC QN AUTO: 31.4 PG (ref 27–32.7)
MCHC RBC AUTO-ENTMCNC: 32.3 G/DL (ref 32.6–36.4)
MCV RBC AUTO: 97.1 FL (ref 79.8–96.2)
MONOCYTES # BLD AUTO: 0.71 10*3/MM3 (ref 0.2–1.2)
MONOCYTES NFR BLD AUTO: 6.8 % (ref 5–12)
NEUTROPHILS # BLD AUTO: 8.24 10*3/MM3 (ref 1.9–8.1)
NEUTROPHILS NFR BLD AUTO: 79.1 % (ref 42.7–76)
PLATELET # BLD AUTO: 122 10*3/MM3 (ref 140–500)
PMV BLD AUTO: 10.6 FL (ref 6–12)
POTASSIUM BLD-SCNC: 4.2 MMOL/L (ref 3.5–5.2)
POTASSIUM BLD-SCNC: 4.2 MMOL/L (ref 3.5–5.2)
PROT SERPL-MCNC: 6.4 G/DL (ref 6–8.5)
RBC # BLD AUTO: 3.79 10*6/MM3 (ref 4.6–6)
SODIUM BLD-SCNC: 135 MMOL/L (ref 136–145)
SODIUM BLD-SCNC: 138 MMOL/L (ref 136–145)
WBC NRBC COR # BLD: 10.41 10*3/MM3 (ref 4.5–10.7)

## 2017-07-31 PROCEDURE — 25010000002 PIPERACILLIN SOD-TAZOBACTAM PER 1 G: Performed by: INTERNAL MEDICINE

## 2017-07-31 PROCEDURE — 85014 HEMATOCRIT: CPT | Performed by: INTERNAL MEDICINE

## 2017-07-31 PROCEDURE — 76705 ECHO EXAM OF ABDOMEN: CPT

## 2017-07-31 PROCEDURE — 74183 MRI ABD W/O CNTR FLWD CNTR: CPT

## 2017-07-31 PROCEDURE — 87045 FECES CULTURE AEROBIC BACT: CPT | Performed by: SURGERY

## 2017-07-31 PROCEDURE — 87046 STOOL CULTR AEROBIC BACT EA: CPT | Performed by: SURGERY

## 2017-07-31 PROCEDURE — 87209 SMEAR COMPLEX STAIN: CPT | Performed by: SURGERY

## 2017-07-31 PROCEDURE — A9577 INJ MULTIHANCE: HCPCS | Performed by: INTERNAL MEDICINE

## 2017-07-31 PROCEDURE — 83690 ASSAY OF LIPASE: CPT | Performed by: INTERNAL MEDICINE

## 2017-07-31 PROCEDURE — 85025 COMPLETE CBC W/AUTO DIFF WBC: CPT | Performed by: INTERNAL MEDICINE

## 2017-07-31 PROCEDURE — 87177 OVA AND PARASITES SMEARS: CPT | Performed by: SURGERY

## 2017-07-31 PROCEDURE — 80053 COMPREHEN METABOLIC PANEL: CPT | Performed by: INTERNAL MEDICINE

## 2017-07-31 PROCEDURE — 87493 C DIFF AMPLIFIED PROBE: CPT | Performed by: SURGERY

## 2017-07-31 PROCEDURE — 82272 OCCULT BLD FECES 1-3 TESTS: CPT | Performed by: SURGERY

## 2017-07-31 PROCEDURE — 80048 BASIC METABOLIC PNL TOTAL CA: CPT | Performed by: INTERNAL MEDICINE

## 2017-07-31 PROCEDURE — 0 GADOBENATE DIMEGLUMINE 529 MG/ML SOLUTION: Performed by: INTERNAL MEDICINE

## 2017-07-31 PROCEDURE — 85018 HEMOGLOBIN: CPT | Performed by: INTERNAL MEDICINE

## 2017-07-31 PROCEDURE — 99232 SBSQ HOSP IP/OBS MODERATE 35: CPT | Performed by: SURGERY

## 2017-07-31 RX ADMIN — TAZOBACTAM SODIUM AND PIPERACILLIN SODIUM 3.38 G: 375; 3 INJECTION, SOLUTION INTRAVENOUS at 04:36

## 2017-07-31 RX ADMIN — TAZOBACTAM SODIUM AND PIPERACILLIN SODIUM 3.38 G: 375; 3 INJECTION, SOLUTION INTRAVENOUS at 20:24

## 2017-07-31 RX ADMIN — ASPIRIN 81 MG: 81 TABLET ORAL at 08:23

## 2017-07-31 RX ADMIN — AMLODIPINE BESYLATE 10 MG: 10 TABLET ORAL at 08:23

## 2017-07-31 RX ADMIN — SODIUM CHLORIDE 150 ML/HR: 9 INJECTION, SOLUTION INTRAVENOUS at 02:15

## 2017-07-31 RX ADMIN — SODIUM CHLORIDE 100 ML/HR: 9 INJECTION, SOLUTION INTRAVENOUS at 15:44

## 2017-07-31 RX ADMIN — TAZOBACTAM SODIUM AND PIPERACILLIN SODIUM 3.38 G: 375; 3 INJECTION, SOLUTION INTRAVENOUS at 13:04

## 2017-07-31 RX ADMIN — ACETAMINOPHEN 650 MG: 325 TABLET ORAL at 04:39

## 2017-07-31 RX ADMIN — SODIUM CHLORIDE 150 ML/HR: 9 INJECTION, SOLUTION INTRAVENOUS at 08:32

## 2017-07-31 RX ADMIN — GADOBENATE DIMEGLUMINE 15 ML: 529 INJECTION, SOLUTION INTRAVENOUS at 16:58

## 2017-07-31 NOTE — TELEPHONE ENCOUNTER
----- Message from Patrick Mckeon sent at 7/29/2017  9:24 PM EDT -----  Regarding: Non-Urgent Medical Question  Contact: [phone]  Dr Betito Carter , not sure if you will see this in time , but I started feeling pretty bad on Wed. , slept about 15-16 hours & felt a fair amount better on Thur. Started off about the same on Fri. then it went down hill in the afternoon & evening. Today was the worst of all , rarely up more then 30 mins. at a time .Chills , and sweating , shortness of breath , coughing , a little pain below the rib cage and tired , very tired . If not quite a bit better in the morning , I've got to go somewhere. Didn't know if you had a recommendation , preferably within the [facility].Going to bed soon but will check back on here in the morning , just in case you see this before Mon.    Patrick Mckeon

## 2017-08-01 LAB
ALBUMIN SERPL-MCNC: 3.3 G/DL (ref 3.5–5.2)
ALBUMIN/GLOB SERPL: 0.9 G/DL
ALP SERPL-CCNC: 203 U/L (ref 39–117)
ALT SERPL W P-5'-P-CCNC: 83 U/L (ref 1–41)
ANION GAP SERPL CALCULATED.3IONS-SCNC: 14.4 MMOL/L
APTT PPP: 26.6 SECONDS (ref 22.7–35.4)
APTT PPP: 54.2 SECONDS (ref 22.7–35.4)
AST SERPL-CCNC: 39 U/L (ref 1–40)
AT III PPP CHRO-ACNC: 83 % (ref 90–134)
BASOPHILS # BLD AUTO: 0.02 10*3/MM3 (ref 0–0.2)
BASOPHILS NFR BLD AUTO: 0.2 % (ref 0–1.5)
BILIRUB SERPL-MCNC: 0.5 MG/DL (ref 0.1–1.2)
BUN BLD-MCNC: 10 MG/DL (ref 8–23)
BUN/CREAT SERPL: 10.2 (ref 7–25)
CALCIUM SPEC-SCNC: 8.8 MG/DL (ref 8.6–10.5)
CANCER AG19-9 SERPL-ACNC: 1 U/ML (ref 0–35)
CHLORIDE SERPL-SCNC: 103 MMOL/L (ref 98–107)
CO2 SERPL-SCNC: 19.6 MMOL/L (ref 22–29)
CREAT BLD-MCNC: 0.98 MG/DL (ref 0.76–1.27)
DEPRECATED RDW RBC AUTO: 52 FL (ref 37–54)
EOSINOPHIL # BLD AUTO: 0.13 10*3/MM3 (ref 0–0.7)
EOSINOPHIL NFR BLD AUTO: 1.4 % (ref 0.3–6.2)
ERYTHROCYTE [DISTWIDTH] IN BLOOD BY AUTOMATED COUNT: 14.6 % (ref 11.5–14.5)
FERRITIN SERPL-MCNC: 890.9 NG/ML (ref 30–400)
FOLATE SERPL-MCNC: >20 NG/ML (ref 4.78–24.2)
GFR SERPL CREATININE-BSD FRML MDRD: 77 ML/MIN/1.73
GLOBULIN UR ELPH-MCNC: 3.5 GM/DL
GLUCOSE BLD-MCNC: 111 MG/DL (ref 65–99)
HCT VFR BLD AUTO: 36.3 % (ref 40.4–52.2)
HGB BLD-MCNC: 12.1 G/DL (ref 13.7–17.6)
HGB RETIC QN: 23.9 PG (ref 32.7–38.6)
IMM GRANULOCYTES # BLD: 0.02 10*3/MM3 (ref 0–0.03)
IMM GRANULOCYTES NFR BLD: 0.2 % (ref 0–0.5)
IMM RETICS NFR: 13.2 % (ref 0.7–13.7)
INR PPP: 1.25 (ref 0.9–1.1)
IRON 24H UR-MRATE: 17 MCG/DL (ref 59–158)
IRON SATN MFR SERPL: 8 % (ref 20–50)
LYMPHOCYTES # BLD AUTO: 1.44 10*3/MM3 (ref 0.9–4.8)
LYMPHOCYTES NFR BLD AUTO: 15.4 % (ref 19.6–45.3)
MCH RBC QN AUTO: 32.3 PG (ref 27–32.7)
MCHC RBC AUTO-ENTMCNC: 33.3 G/DL (ref 32.6–36.4)
MCV RBC AUTO: 96.8 FL (ref 79.8–96.2)
MONOCYTES # BLD AUTO: 0.56 10*3/MM3 (ref 0.2–1.2)
MONOCYTES NFR BLD AUTO: 6 % (ref 5–12)
NEUTROPHILS # BLD AUTO: 7.2 10*3/MM3 (ref 1.9–8.1)
NEUTROPHILS NFR BLD AUTO: 76.8 % (ref 42.7–76)
PLATELET # BLD AUTO: 174 10*3/MM3 (ref 140–500)
PMV BLD AUTO: 10.6 FL (ref 6–12)
POTASSIUM BLD-SCNC: 3.8 MMOL/L (ref 3.5–5.2)
PROCALCITONIN SERPL-MCNC: 0.94 NG/ML (ref 0.1–0.25)
PROT SERPL-MCNC: 6.8 G/DL (ref 6–8.5)
PROTHROMBIN TIME: 15.2 SECONDS (ref 11.7–14.2)
RBC # BLD AUTO: 3.75 10*6/MM3 (ref 4.6–6)
RETICS/RBC NFR AUTO: 0.74 % (ref 0.5–1.5)
SODIUM BLD-SCNC: 137 MMOL/L (ref 136–145)
TIBC SERPL-MCNC: 206 MCG/DL
TRANSFERRIN SERPL-MCNC: 138 MG/DL (ref 200–360)
VIT B12 BLD-MCNC: 667 PG/ML (ref 211–946)
WBC NRBC COR # BLD: 9.37 10*3/MM3 (ref 4.5–10.7)

## 2017-08-01 PROCEDURE — 85732 THROMBOPLASTIN TIME PARTIAL: CPT | Performed by: INTERNAL MEDICINE

## 2017-08-01 PROCEDURE — 85730 THROMBOPLASTIN TIME PARTIAL: CPT | Performed by: INTERNAL MEDICINE

## 2017-08-01 PROCEDURE — 85613 RUSSELL VIPER VENOM DILUTED: CPT | Performed by: INTERNAL MEDICINE

## 2017-08-01 PROCEDURE — 99255 IP/OBS CONSLTJ NEW/EST HI 80: CPT | Performed by: INTERNAL MEDICINE

## 2017-08-01 PROCEDURE — 80053 COMPREHEN METABOLIC PANEL: CPT | Performed by: INTERNAL MEDICINE

## 2017-08-01 PROCEDURE — 82746 ASSAY OF FOLIC ACID SERUM: CPT | Performed by: INTERNAL MEDICINE

## 2017-08-01 PROCEDURE — 81241 F5 GENE: CPT | Performed by: INTERNAL MEDICINE

## 2017-08-01 PROCEDURE — 85303 CLOT INHIBIT PROT C ACTIVITY: CPT | Performed by: INTERNAL MEDICINE

## 2017-08-01 PROCEDURE — 84145 PROCALCITONIN (PCT): CPT | Performed by: INTERNAL MEDICINE

## 2017-08-01 PROCEDURE — 85306 CLOT INHIBIT PROT S FREE: CPT | Performed by: INTERNAL MEDICINE

## 2017-08-01 PROCEDURE — 85610 PROTHROMBIN TIME: CPT | Performed by: INTERNAL MEDICINE

## 2017-08-01 PROCEDURE — 81240 F2 GENE: CPT | Performed by: INTERNAL MEDICINE

## 2017-08-01 PROCEDURE — 85300 ANTITHROMBIN III ACTIVITY: CPT | Performed by: INTERNAL MEDICINE

## 2017-08-01 PROCEDURE — 82728 ASSAY OF FERRITIN: CPT | Performed by: INTERNAL MEDICINE

## 2017-08-01 PROCEDURE — 82607 VITAMIN B-12: CPT | Performed by: INTERNAL MEDICINE

## 2017-08-01 PROCEDURE — 25010000002 PIPERACILLIN SOD-TAZOBACTAM PER 1 G: Performed by: INTERNAL MEDICINE

## 2017-08-01 PROCEDURE — 83540 ASSAY OF IRON: CPT | Performed by: INTERNAL MEDICINE

## 2017-08-01 PROCEDURE — 85025 COMPLETE CBC W/AUTO DIFF WBC: CPT | Performed by: INTERNAL MEDICINE

## 2017-08-01 PROCEDURE — 85046 RETICYTE/HGB CONCENTRATE: CPT | Performed by: INTERNAL MEDICINE

## 2017-08-01 PROCEDURE — 85705 THROMBOPLASTIN INHIBITION: CPT | Performed by: INTERNAL MEDICINE

## 2017-08-01 PROCEDURE — 85670 THROMBIN TIME PLASMA: CPT | Performed by: INTERNAL MEDICINE

## 2017-08-01 PROCEDURE — 84466 ASSAY OF TRANSFERRIN: CPT | Performed by: INTERNAL MEDICINE

## 2017-08-01 PROCEDURE — 25010000002 HEPARIN (PORCINE) PER 1000 UNITS: Performed by: INTERNAL MEDICINE

## 2017-08-01 PROCEDURE — 99233 SBSQ HOSP IP/OBS HIGH 50: CPT | Performed by: SURGERY

## 2017-08-01 PROCEDURE — 99223 1ST HOSP IP/OBS HIGH 75: CPT | Performed by: INTERNAL MEDICINE

## 2017-08-01 RX ADMIN — ASPIRIN 81 MG: 81 TABLET ORAL at 08:29

## 2017-08-01 RX ADMIN — TAZOBACTAM SODIUM AND PIPERACILLIN SODIUM 3.38 G: 375; 3 INJECTION, SOLUTION INTRAVENOUS at 04:52

## 2017-08-01 RX ADMIN — TAZOBACTAM SODIUM AND PIPERACILLIN SODIUM 3.38 G: 375; 3 INJECTION, SOLUTION INTRAVENOUS at 14:25

## 2017-08-01 RX ADMIN — TAZOBACTAM SODIUM AND PIPERACILLIN SODIUM 3.38 G: 375; 3 INJECTION, SOLUTION INTRAVENOUS at 21:09

## 2017-08-01 RX ADMIN — ACETAMINOPHEN 650 MG: 325 TABLET ORAL at 20:01

## 2017-08-01 RX ADMIN — SODIUM CHLORIDE 125 ML/HR: 9 INJECTION, SOLUTION INTRAVENOUS at 08:44

## 2017-08-01 RX ADMIN — HEPARIN SODIUM 18 UNITS/KG/HR: 10000 INJECTION, SOLUTION INTRAVENOUS at 16:10

## 2017-08-01 RX ADMIN — AMLODIPINE BESYLATE 10 MG: 10 TABLET ORAL at 08:29

## 2017-08-01 RX ADMIN — SODIUM CHLORIDE 125 ML/HR: 9 INJECTION, SOLUTION INTRAVENOUS at 01:10

## 2017-08-01 RX ADMIN — SODIUM CHLORIDE 125 ML/HR: 9 INJECTION, SOLUTION INTRAVENOUS at 16:47

## 2017-08-01 NOTE — CONSULTS
Referring Provider: Rachel Tapia MD    Reason for Consultation: fever and leukocytosis    History of present illness:  Patrick is a 61 YO who I am asked to evaluate and give opinion for fever and leukocytosis. History is obtained from the patient, Dr Tapia, and review of the old medical records which I summarize/synthesize as follows: He was in his usual state of health until Wednesdsay 7/26 when he had sudden onset fatigue, fever, and shaking chills. He left work early and slept most of the day but alternated between hot and freezing cold. This pattern persisted. he denies much in the way of pain. He reports very mild epigastric pain when I really push for symptoms. No recent travel and no sick contacts. He drinks EtOH but not excessively. He was not having weight loss, sweats, or poor appetite prior to this time. He did not take any antibiotics prior to admission and OTC anti-pyretics would moderately alleviate the fever.    He presented to the ER on 7/30/17 with fever to 102 F with  and normal BP. Labs were notable for WBC 13k with 82 % PMNs, Crt 1.6, AST 49, ALT 67, AlkPhos 180, TBili 1.4, and procal 1.56. He was started on piperacillin-tazobactam. 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 His blood cultures have been negative. He now feels back to his baseline with Zosyn and IVFs. MRCP was overall reassuring other than the clots.    Of note, he reports a normal colonoscopy in 2015 though has had some hematochezia this admission. His AAA incisions are well-healed. Vascular has been following. No evidence of graft infection on imaging.    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  No pets    Family History:  Mom: HTN, HLD  Dad: colon CA and CAD    Allergies:  NKDA    Medications:    Current  Facility-Administered Medications:   •  acetaminophen (TYLENOL) tablet 650 mg, 650 mg, Oral, Q4H PRN, Esther Tapia MD, 650 mg at 07/31/17 0439  •  amLODIPine (NORVASC) tablet 10 mg, 10 mg, Oral, Daily, Esther Tapia MD, 10 mg at 08/01/17 0829  •  aspirin EC tablet 81 mg, 81 mg, Oral, Daily, Esther Tapia MD, 81 mg at 08/01/17 0829  •  heparin 57690 units/250 mL (100 units/mL) in 0.45 % NaCl infusion, 18 Units/kg/hr, Intravenous, Titrated, Esther Tapia MD  •  morphine injection 2 mg, 2 mg, Intravenous, Q2H PRN **AND** naloxone (NARCAN) injection 0.4 mg, 0.4 mg, Intravenous, Q5 Min PRN, Esther Tapia MD  •  ondansetron (ZOFRAN) tablet 4 mg, 4 mg, Oral, Q6H PRN **OR** ondansetron ODT (ZOFRAN-ODT) disintegrating tablet 4 mg, 4 mg, Oral, Q6H PRN **OR** ondansetron (ZOFRAN) injection 4 mg, 4 mg, Intravenous, Q6H PRN, Esther Tapia MD  •  piperacillin-tazobactam (ZOSYN) 3.375 g in iso-osmotic dextrose 50 ml (premix), 3.375 g, Intravenous, Q8H, Esther Tapia MD, 3.375 g at 08/01/17 0452  •  sodium chloride 0.9 % flush 1-10 mL, 1-10 mL, Intravenous, PRN, Esther Tapia MD  •  sodium chloride 0.9 % flush 10 mL, 10 mL, Intravenous, PRN, Eros Su MD  •  sodium chloride 0.9 % infusion, 125 mL/hr, Intravenous, Continuous, Esther Tapia MD, Last Rate: 125 mL/hr at 08/01/17 0844, 125 mL/hr at 08/01/17 0844      Review of Systems  All systems were reviewed and are negative unless otherwise stated above in the HPI    Objective   Vital Signs   Temp:  [97.6 °F (36.4 °C)-99.5 °F (37.5 °C)] 97.6 °F (36.4 °C)  Heart Rate:  [] 98  Resp:  [18-20] 18  BP: (122-131)/(82-87) 131/87    Physical Exam:   General: awake, alert, NAD, good historian, very nice   Head: Normocephalic, atraumatic  Eyes: PERRL, EOMI, no scleral icterus, no conjunctival pallor, no conjunctival hemorrhages.   ENT: MMM, OP clear, no thrush. Fair dentition.   Neck: Supple, no visible  thyromegaly  Cardiovascular: NR, RR, no murmurs, rubs, or gallops; no LE edema  Respiratory: Lungs are clear to ascultation bilaterally, no rales or wheezing; normal work of breathing on ambient air  GI: Abdomen is soft, mildly tender in epigastric area, mildly distended, normal bowel sounds in all four quadrants; no hepatosplenomegaly, no masses palpated  : no Faustin catheter present  Musculoskeletal: no joint abnormalities, normal musculature  Skin: many nevi, no rashes, lesions, or embolic phenomenon  Neurological: Alert and oriented x 3, cranial nerves 2-12 grossly intact, motor strength 5/5 in all four extremities  Psychiatric: Normal mood and affect   Lymph: no pre-auricular, post-auricular, submandibular, cervical, supraclavicular  LAD  Vasc: no cyanosis; PIV w/o erythema    Labs:     Lab Results   Component Value Date    WBC 9.37 08/01/2017    HGB 12.1 (L) 08/01/2017    HCT 36.3 (L) 08/01/2017    MCV 96.8 (H) 08/01/2017     08/01/2017       Lab Results   Component Value Date    GLUCOSE 111 (H) 08/01/2017    BUN 10 08/01/2017    CREATININE 0.98 08/01/2017    EGFRIFNONA 77 08/01/2017    EGFRIFAFRI 64 02/21/2017    BCR 10.2 08/01/2017    CO2 19.6 (L) 08/01/2017    CALCIUM 8.8 08/01/2017    PROTENTOTREF 6.9 02/21/2017    ALBUMIN 3.30 (L) 08/01/2017    LABIL2 0.9 08/01/2017    AST 39 08/01/2017    ALT 83 (H) 08/01/2017     Procal 1.5  LA 1.2  Lipase 24    Microbiology:  7/30 BCx: NGTD  7/31 C diff: negative  7/31 Stool Cx: negative    Radiology (personally reviewed images/report):  CXR negative for pneumonia  MRCP with left hepatic vein thrombosis and right hepatic vein thrombosis    Assessment/Plan   1. Sepsis due to unspecified organism, possibly cholangitis  -definitely septic when he came in and has responded well to IVFs and antibiotics  -cholangitis fits clinically though imaging didn't support this very well  -does not appear to have cholecystitis or pancreatitis  -Blood cultures negative without  antecedent antibiotics  -continue Zosyn 3.375 g q8h; duration TBD likely 7 days (could switch to PO if ready for DC prior to that time)  -repeat procal to make sure trending down  -appears to be improving and he is eager for discharge    2. Hepatic vein thromboses  - hematology consulted for AC considerations    3. Acute kidney injury and solitary kidney  -Crt normalized w/ hydration and supportive care  -Zosyn dosed appropriately    4. Hematochezia  -colonoscopy negative as of 2015    5. Hepatitis  -improved    6. AAA repair  -incisions healed; no concerns; vascular following      Thank you for this consult. ID will follow. I discussed the case w/ Dr Tapia.

## 2017-08-01 NOTE — PLAN OF CARE
Problem: Patient Care Overview (Adult)  Goal: Plan of Care Review  Outcome: Ongoing (interventions implemented as appropriate)    08/01/17 9134   Coping/Psychosocial Response Interventions   Plan Of Care Reviewed With patient   Patient Care Overview   Progress improving   Outcome Evaluation   Outcome Summary/Follow up Plan Pt rested well. No c/o pain or SOB. No bloody stools noted during shift. Cont IVF and ABX. VSS, no s/s acute distress H&H stable. Will continue to monitor       Goal: Adult Individualization and Mutuality  Outcome: Ongoing (interventions implemented as appropriate)    Problem: Fall Risk (Adult)  Goal: Absence of Falls  Outcome: Ongoing (interventions implemented as appropriate)    Problem: Infection, Risk/Actual (Adult)  Goal: Infection Prevention/Resolution  Outcome: Ongoing (interventions implemented as appropriate)

## 2017-08-01 NOTE — CONSULTS
Subjective .     REASON FOR CONSULTATION:   Portal vein thrombosis  Provide an opinion on any further workup or treatment                             REQUESTING PHYSICIAN: Dr. Tapia  RECORDS OBTAINED:  Records of the patients history including those obtained from the referring provider were reviewed and summarized in detail.    HISTORY OF PRESENT ILLNESS:  The patient is a 62 y.o. year old male  who is here for follow-up with the above-mentioned history.    Came to the emergency department 7/30/17 for subjective fevers and shivering ×4 days.  CT 7/30/17 AP with contrast and CT without contrast:  · New left hepatic biliary ductal dilation  · Portal inflammatory change extending to the pancreatic head.  The radiologist questioned acute pancreatitis.  · Prostate enlargement  Ultrasound gallbladder 7/31/17:   · Mild left hepatic ductal dilation.  1 cm hepatic cysts.  Gallbladder sludge.  MRCP 7/31/17:   · Acute/subacute thrombosis of multiple intrahepatic left portal venous branches.  · Single tiny right hepatic lobe portal venous branch, thrombosed.  · Right portal vein otherwise patent.  Left portal vein otherwise patent.  Main portal vein, splenic vein, SMV patent.  · Unremarkable gallbladder.    Denies any prior clotting events.  Denies any family history of clotting.  He reports last colonoscopy 2015.  Has not had an EGD.  He reports he is up-to-date on digital rectal exams through his PCP and these and PSAs have been unremarkable.    Past Medical History:   Diagnosis Date   • AAA (abdominal aortic aneurysm) without rupture    • Cancer    • Colon polyp    • Congenital absence of one kidney    • Emphysema lung 2/28/2017   • Enlarged prostate    • Hyperlipidemia 2/28/2017   • Skin cancer 5/31/2017     Past Surgical History:   Procedure Laterality Date   • ABDOMINAL AORTIC ANEURYSM REPAIR     • ARTERIOGRAM AORTIC N/A 5/8/2017    Procedure:  AORTIC STENT GRAFT REPAIR W/ GORE BILATERAL FEMORAL MINI CUTDOWNS;  Surgeon:  Humble Hamilton MD;  Location: [facility] 18/19;  Service:    • HERNIA REPAIR  1980   • UMBILICAL HERNIA REPAIR  1975       HEMATOLOGIC/ONCOLOGIC HISTORY:  (History from previous dates can be found in the separate document.)    MEDICATIONS    Current Facility-Administered Medications:   •  acetaminophen (TYLENOL) tablet 650 mg, 650 mg, Oral, Q4H PRN, Esther Tapia MD, 650 mg at 07/31/17 0439  •  amLODIPine (NORVASC) tablet 10 mg, 10 mg, Oral, Daily, Esther Tapia MD, 10 mg at 08/01/17 0829  •  aspirin EC tablet 81 mg, 81 mg, Oral, Daily, Esther Tapia MD, 81 mg at 08/01/17 0829  •  heparin 32660 units/250 mL (100 units/mL) in 0.45 % NaCl infusion, 18 Units/kg/hr, Intravenous, Titrated, Esther Tapia MD  •  morphine injection 2 mg, 2 mg, Intravenous, Q2H PRN **AND** naloxone (NARCAN) injection 0.4 mg, 0.4 mg, Intravenous, Q5 Min PRN, Esther Tapia MD  •  ondansetron (ZOFRAN) tablet 4 mg, 4 mg, Oral, Q6H PRN **OR** ondansetron ODT (ZOFRAN-ODT) disintegrating tablet 4 mg, 4 mg, Oral, Q6H PRN **OR** ondansetron (ZOFRAN) injection 4 mg, 4 mg, Intravenous, Q6H PRN, Esther Tapia MD  •  piperacillin-tazobactam (ZOSYN) 3.375 g in iso-osmotic dextrose 50 ml (premix), 3.375 g, Intravenous, Q8H, John Parker MD, 3.375 g at 08/01/17 1425  •  sodium chloride 0.9 % flush 1-10 mL, 1-10 mL, Intravenous, PRN, Esther Tapia MD  •  sodium chloride 0.9 % flush 10 mL, 10 mL, Intravenous, PRN, Eros Su MD  •  sodium chloride 0.9 % infusion, 125 mL/hr, Intravenous, Continuous, Esther Tapia MD, Last Rate: 125 mL/hr at 08/01/17 0844, 125 mL/hr at 08/01/17 0844    ALLERGIES:   No Known Allergies    SOCIAL HISTORY:       Social History     Social History   • Marital status:      Spouse name: Samira   • Number of children: 1   • Years of education: N/A     Occupational History   • Business: owner of metal fabrication      Social History Main Topics   •  Smoking status: Current Every Day Smoker     Packs/day: 0.25     Years: 40.00     Types: Cigarettes   • Smokeless tobacco: Never Used      Comment: previously over 1 ppd   • Alcohol use 6.0 oz/week     10 Standard drinks or equivalent per week      Comment: 1-2 drinks bourbon   • Drug use: No   • Sexual activity: Defer     Other Topics Concern   • Not on file     Social History Narrative         FAMILY HISTORY:  Family History   Problem Relation Age of Onset   • Colon cancer Father 55   • Heart attack Father 71   • Coronary artery disease Father    • Hypertension Mother    • Hyperlipidemia Mother    • Sudden death Mother 78   • Benign prostatic hyperplasia Brother    • Hypertension Brother    • Benign prostatic hyperplasia Brother    • No Known Problems Sister    • Brain cancer Maternal Grandmother    • Heart disease Maternal Grandfather    • Sudden death Maternal Grandfather        REVIEW OF SYSTEMS:  GENERAL: No change in appetite or weight;   No fevers, chills, sweats.    SKIN: No nonhealing lesions.   No rashes.  HEME/LYMPH: No easy bruising, bleeding.   No swollen nodes.   EYES: No vision changes or diplopia.   ENT: No tinnitus, hearing loss, gum bleeding, epistaxis, hoarseness or dysphagia.   RESPIRATORY: No cough, shortness of breath, hemoptysis or wheezing.   CVS: No chest pain, palpitations, orthopnea, dyspnea on exertion or PND.   GI: No melena or hematochezia.   No abdominal pain.  No nausea, vomiting, constipation, diarrhea  : No lower tract obstructive symptoms, dysuria or hematuria.   MUSCULOSKELETAL: No bone pain.  No joint stiffness.   NEUROLOGICAL: No global weakness, loss of consciousness or seizures.   PSYCHIATRIC: No increased nervousness, mood changes or depression.     Objective    Vitals:    07/31/17 1940 07/31/17 2344 08/01/17 0827 08/01/17 1404   BP: 122/82 122/82 131/87 135/86   BP Location: Left arm Left arm Left arm Right arm   Patient Position: Lying Lying Lying Sitting   Pulse: 102 98  98 102   Resp: 20 20 18 18   Temp: 99.5 °F (37.5 °C) 98.9 °F (37.2 °C) 97.6 °F (36.4 °C) 97.8 °F (36.6 °C)   TempSrc: Oral Oral Oral Oral   SpO2:  95% 97%    Weight:       Height:         No flowsheet data found.   PHYSICAL EXAM:    GENERAL:  Well-developed, well-nourished in no acute distress.   SKIN:  Warm, dry without rashes, purpura or petechiae.  EYES:  Pupils equal, round and reactive to light.  EOMs intact.  Conjunctivae normal.  EARS:  Hearing intact.  NOSE:  Septum midline.  No excoriations or nasal discharge.  MOUTH:  Tongue is well-papillated; no stomatitis or ulcers.  Lips normal.  THROAT:  Oropharynx without lesions or exudates.  NECK:  Supple with good range of motion; no thyromegaly or masses, no JVD.  LYMPHATICS:  No cervical, supraclavicular, axillary or inguinal adenopathy.  CHEST:  Lungs clear to auscultation. Good airflow.  CARDIAC:  Regular rate and rhythm without murmurs, rubs or gallops. Normal S1,S2.  ABDOMEN:  Soft, nontender with no hepatosplenomegaly or masses.  EXTREMITIES:  No clubbing, cyanosis or edema.  NEUROLOGICAL:  Cranial Nerves II-XII grossly intact.  No focal neurological deficits.  PSYCHIATRIC:  Normal affect and mood.    RECENT LABS:        WBC   Date Value Ref Range Status   08/01/2017 9.37 4.50 - 10.70 10*3/mm3 Final   07/31/2017 10.41 4.50 - 10.70 10*3/mm3 Final   07/30/2017 12.09 (H) 4.50 - 10.70 10*3/mm3 Final   07/30/2017 13.23 (H) 4.50 - 10.70 10*3/mm3 Final     Hemoglobin   Date Value Ref Range Status   08/01/2017 12.1 (L) 13.7 - 17.6 g/dL Final   07/31/2017 12.1 (L) 13.7 - 17.6 g/dL Final   07/31/2017 11.9 (L) 13.7 - 17.6 g/dL Final   07/30/2017 11.7 (L) 13.7 - 17.6 g/dL Final   07/30/2017 13.0 (L) 13.7 - 17.6 g/dL Final     Platelets   Date Value Ref Range Status   08/01/2017 174 140 - 500 10*3/mm3 Final   07/31/2017 122 (L) 140 - 500 10*3/mm3 Final   07/30/2017 129 (L) 140 - 500 10*3/mm3 Final   07/30/2017 120 (L) 140 - 500 10*3/mm3 Final       Assessment/Plan    *Portal venous thrombosis.  (Left portal venous branches, acute/subacute and single tiny right hepatic lobe on MRCP 7/31/17.  Not seen on CT AP with contrast).  Started on heparin per Dr. Farrell.  (Patient has had rectal bleeding)  I will order hypercoagulable labs.    *Sepsis due to unidentified organism, possibly cholangitis.  Shaking chills, fever, RUQ pain    *Rectal bleeding.  Suspected hemorrhoidal, per Dr. Farrell.  Last scope 2015.  If this significantly worsens on anticoagulation, anticoagulation may need to be discontinued.    *Possible prostate mass on CT 7/30/17.  Urology has been consulted.  Await their evaluation.  PSA 0.9 2/21/17.    *Anemia.  Check anemia labs.    plan  · Check hypercoagulable labs  · Check anemia labs.  · Plan 6 months anticoagulation unless a significant hypercoagulable state is found.  · Any oral anticoagulant is fine with me.  Defer timing/choice to hospitalist service.   · (Note the use of heparin now to see if he has significant worsening of rectal bleeding on anticoagulation)

## 2017-08-01 NOTE — PROGRESS NOTES
"General Surgery  Patrick Mckeon  1955    CC:  \"i want to get out of here.\"      Reason for consult: left hepatic ductal dilatation, cholangitis    HPI:  Better overnight, now saying that he feels pretty well.  No nausea.  No chest pain.  No emesis.      Yesterday, he had a bowel movement with a small amount of blood in the stool, then followed by a bowel movement of all blood, then completely brown.  He gives a long history of hemorrhoidal bleeding with blood on the stool.    Mild improvement in his abdominal pain, with minor RUQ tenderness that persists.    We discussed the results of his MRCP with left hepatic vein thrombosis, left veins distally with thrombosis.  There is also a single branch in the right hepatic lobe thrombosed.  The main portal vein, splenic vein, and SMV are patent.  There is gallbladder sludge.  Thrombosis is acute/subacute.      Diet:  Clear liquids    Temp:  [97.6 °F (36.4 °C)-99.5 °F (37.5 °C)] 97.6 °F (36.4 °C)  Heart Rate:  [] 98  Resp:  [18-20] 18  BP: (122-131)/(82-87) 131/87    Intake & Output (last day)       07/31 0701 - 08/01 0700 08/01 0701 - 08/02 0700    P.O. 360     I.V. (mL/kg) 1646 (21.7)     IV Piggyback 100     Total Intake(mL/kg) 2106 (27.8)     Urine (mL/kg/hr) 775 (0.4) 450 (1.1)    Stool 0 (0)     Total Output 775 450    Net +1331 -450          Unmeasured Urine Occurrence 1 x     Unmeasured Stool Occurrence 2 x         Physical Exam   Constitutional: He is oriented to person, place, and time. He appears well-developed and well-nourished.   HENT:   Head: Normocephalic and atraumatic.   Eyes: Conjunctivae are normal. No scleral icterus.   Neck: Neck supple. No JVD present. No tracheal deviation present.   Cardiovascular: Normal rate.    Pulmonary/Chest: Effort normal.   Abdominal: Soft. Tenderness: right upper quadrant.   Musculoskeletal: He exhibits no edema or deformity.   Lymphadenopathy:     He has no cervical adenopathy.   Neurological: He is alert and " oriented to person, place, and time.   Skin:   Male pattern balding   Psychiatric: He has a normal mood and affect. His behavior is normal.       Results from last 7 days  Lab Units 17  18517  0818   WBC 10*3/mm3  --  10.41   HEMOGLOBIN g/dL 12.1* 11.9*   HEMATOCRIT % 35.8* 36.8*   PLATELETS 10*3/mm3  --  122*       Results from last 7 days  Lab Units 17  1859 17  0818 17  1906 17  1200   SODIUM mmol/L 135* 138 137 136   POTASSIUM mmol/L 4.2 4.2 4.0 4.5   CHLORIDE mmol/L 101 103 98 99   CO2 mmol/L 19.5* 21.2* 22.4 22.5   BUN mg/dL 13 18 20 21   CREATININE mg/dL 1.15 1.22 1.43* 1.57*   CALCIUM mg/dL 8.1* 8.1* 8.4* 9.0   BILIRUBIN mg/dL  --  1.0  --  1.4*   ALK PHOS U/L  --  160*  --  180*   ALT (SGPT) U/L  --  93*  --  67*   AST (SGOT) U/L  --  69*  --  49*   GLUCOSE mg/dL 103* 98 108* 119*       US GB with sludge in the gallbladder, common duct normal size, left hepatic mildly dilated.    CEA 3.25, ca 19-9=1 (normal).    Assessment:  · Cholangitis, clinically, with shaking chills, fever, right upper quadrant pain.  Improved today with WBC decreased, LFT's decreased.  · MRCP with results that conflict with prior images, now diagnosed as biliary venous thrombosis.    · Father with rectal cancer  · Rectal bleeding, likely hemorrhoidal, without abdominal pain, with last scope 2 years ago.  · History of colon polyps.  · Congenital absence of left kidney, with elevated creatinine, improved today with GFR >50    · Anemia, new, (Hgb 14.2 in )  · S/p AAA stent placement  with no known op complications.   · Possible inflammation extending to the pancreas, with 1-2 drinks daily, with 2 cousins  from pancreatic cancer.  History and CT not consistent with pancreatic mass or classic pancreatitis.   · Prostrate mass on CT, with stable PSA, 0.85    Plan  · Anticoagulation.  Discussed with Dr Tapia.  I think the use of heparin would be best, especially with his rectal bleeding,  ideally without a bolus.  If necessary, can scope while here.  · Cancel any eminent plans for lap chol.  · Hematologic workup.      Anni Farrell MD  >35 minutes spent with over half in counseling and coordination of care.

## 2017-08-01 NOTE — PLAN OF CARE
Problem: Patient Care Overview (Adult)  Goal: Plan of Care Review  Outcome: Ongoing (interventions implemented as appropriate)    08/01/17 5561   Coping/Psychosocial Response Interventions   Plan Of Care Reviewed With patient   Patient Care Overview   Progress no change   Outcome Evaluation   Outcome Summary/Follow up Plan no c/o pain or shortness of breath, and no bloody stools this shift, heparin drip started as per dr's orders next ptt due at 2200, wife at bedside          Problem: Fall Risk (Adult)  Goal: Absence of Falls  Outcome: Ongoing (interventions implemented as appropriate)    Problem: Infection, Risk/Actual (Adult)  Goal: Infection Prevention/Resolution  Outcome: Ongoing (interventions implemented as appropriate)

## 2017-08-01 NOTE — PROGRESS NOTES
Date of Admission:  7/30/2017  Humble Hamilton MD       LOS: 2 days   Patient Care Team:  Lester Carter MD as PCP - General (Internal Medicine)  Orlin Quintero MD as Consulting Physician (Gastroenterology)    Subjective     62 y.o. male overall feels much better since admission the hospital.  No chest pain or shortness of breath.  Did well yesterday with his MRI yesterday afternoon.    Review of Systems    Objective     Vital Signs  Vital Signs Patient Vitals for the past 24 hrs:   BP Temp Temp src Pulse Resp SpO2   07/31/17 2344 122/82 98.9 °F (37.2 °C) Oral 98 20 95 %   07/31/17 1940 122/82 99.5 °F (37.5 °C) Oral 102 20 -   07/31/17 1200 109/74 98.2 °F (36.8 °C) Oral 90 20 97 %     I/O:  I/O last 3 completed shifts:  In: 4765 [P.O.:360; I.V.:4205; IV Piggyback:200]  Out: 1325 [Urine:1325]    Physical Exam:  Physical Exam   Constitutional: He is oriented to person, place, and time. He appears well-developed.   Pulmonary/Chest: Effort normal. No respiratory distress.   Abdominal: Soft. He exhibits no distension.   Neurological: He is alert and oriented to person, place, and time.    minimal if any abdominal pain.  Bilateral common femoral artery percutaneous access sites appear well healed.    Results Review:     CBC    Results from last 7 days  Lab Units 07/31/17 1859 07/31/17 0818 07/30/17 1906 07/30/17  1200   WBC 10*3/mm3  --  10.41 12.09* 13.23*   HEMOGLOBIN g/dL 12.1* 11.9* 11.7* 13.0*   PLATELETS 10*3/mm3  --  122* 129* 120*     BMP   Results from last 7 days  Lab Units 07/31/17 1859 07/31/17 0818 07/30/17 1906 07/30/17  1200   SODIUM mmol/L 135* 138 137 136   POTASSIUM mmol/L 4.2 4.2 4.0 4.5   CHLORIDE mmol/L 101 103 98 99   CO2 mmol/L 19.5* 21.2* 22.4 22.5   BUN mg/dL 13 18 20 21   CREATININE mg/dL 1.15 1.22 1.43* 1.57*   GLUCOSE mg/dL 103* 98 108* 119*     Cr Clearance Estimated Creatinine Clearance: 70.5 mL/min (by C-G formula based on Cr of 1.15).  Coag     HbA1C   Lab Results   Component Value Date     HGBA1C 5.10 02/21/2017    HGBA1C 5.7 (H) 09/21/2015     Blood Glucose No results found for: POCGLU  Micro   Results from last 7 days  Lab Units 07/30/17  1227 07/30/17  1200   BLOODCX  No growth at 24 hours No growth at 24 hours       Assessment/Plan     Assessment & Plan    Active Problems:    Essential hypertension    Emphysema lung    Congenital absence of left kidney    Fever    Acute kidney injury    Elevated LFTs    Shortness of breath    Abnormal CT scan, bladder      62 y.o. male with left lobe of the liver intrahepatic ductal dilatation.  MRI performed yesterday afternoon.  We'll have to follow up with the interpretation.  I see no evidence that his recent aortic stent graft cause the ductal dilatation.  We will follow peripherally.    Humble Hamilton MD  08/01/17  8:07 AM    Please call my office with any question: [phone]    Active Hospital Problems (** Indicates Principal Problem)    Diagnosis Date Noted   • Fever [R50.9] 07/30/2017   • Acute kidney injury [N17.9] 07/30/2017   • Elevated LFTs [R79.89] 07/30/2017   • Shortness of breath [R06.02] 07/30/2017   • Abnormal CT scan, bladder [R93.41] 07/30/2017   • Congenital absence of left kidney [Q60.0] 03/13/2017   • Emphysema lung [J43.9] 02/28/2017   • Essential hypertension [I10] 10/28/2016      Resolved Hospital Problems    Diagnosis Date Noted Date Resolved   No resolved problems to display.

## 2017-08-01 NOTE — PROGRESS NOTES
Discharge Planning Assessment  [facility]     Patient Name: Patrick Mckeon  MRN: [id number]  Today's Date: 8/1/2017    Admit Date: 7/30/2017          Discharge Needs Assessment       08/01/17 1053    Living Environment    Lives With spouse    Living Arrangements house   [facility] upstairs    Transportation Available car;family or friend will provide            Discharge Plan       08/01/17 1055    Case Management/Social Work Plan    Plan Home, no needs    Additional Comments Spoke with patient. He works full time and owns his own business. Face sheet, pharmacy and PCP verified. He has [employer] insurance and has no trouble getting his medication. He hopes to go home soon, hopefully today. No HH. No DME, No Rehab. Patient  plans to go home  on discharge. He  anticipates no needs and  states his wife will transport. ...........  Krystin Bass ROSAURA        Discharge Placement     No information found                Demographic Summary       08/01/17 1052    Referral Information    Admission Type inpatient    Arrived From home or self-care    Referral Source admission list    Reason For Consult discharge planning    Primary Care Physician Information    Name Lester Carter MD            Functional Status       08/01/17 1055    Cognitive/Perceptual/Developmental    Developmental Stage (Eriksson's Stages of Development) Stage 7 (35-65 years/Middle Adulthood) Generativity vs. Stagnation      08/01/17 1053    Functional Status Current    Ambulation 0-->independent    Transferring 0-->independent    Toileting 0-->independent    Bathing 0-->independent    Dressing 0-->independent    Eating 0-->independent    Communication 0-->understands/communicates without difficulty    Functional Status Prior    Ambulation 0-->independent    Transferring 0-->independent    Toileting 0-->independent    Bathing 0-->independent    Dressing 0-->independent    Eating 0-->independent    Communication 0-->understands/communicates without  difficulty            Psychosocial     None            Abuse/Neglect     None            Legal     None            Substance Abuse     None            Patient Forms     None          KENZIE Hardy

## 2017-08-02 ENCOUNTER — APPOINTMENT (OUTPATIENT)
Dept: CT IMAGING | Facility: HOSPITAL | Age: 62
End: 2017-08-02

## 2017-08-02 PROBLEM — A41.9 SEPSIS (HCC): Status: ACTIVE | Noted: 2017-08-02

## 2017-08-02 PROBLEM — K92.1 HEMATOCHEZIA: Status: ACTIVE | Noted: 2017-07-30

## 2017-08-02 LAB
ALBUMIN SERPL-MCNC: 3 G/DL (ref 3.5–5.2)
ALBUMIN/GLOB SERPL: 1 G/DL
ALP SERPL-CCNC: 257 U/L (ref 39–117)
ALT SERPL W P-5'-P-CCNC: 86 U/L (ref 1–41)
ANION GAP SERPL CALCULATED.3IONS-SCNC: 14.1 MMOL/L
APTT PPP: 80.8 SECONDS (ref 22.7–35.4)
AST SERPL-CCNC: 58 U/L (ref 1–40)
BACTERIA SPEC AEROBE CULT: NORMAL
BACTERIA SPEC AEROBE CULT: NORMAL
BASOPHILS # BLD AUTO: 0.01 10*3/MM3 (ref 0–0.2)
BASOPHILS NFR BLD AUTO: 0.1 % (ref 0–1.5)
BILIRUB SERPL-MCNC: 0.4 MG/DL (ref 0.1–1.2)
BUN BLD-MCNC: 7 MG/DL (ref 8–23)
BUN/CREAT SERPL: 6.8 (ref 7–25)
CALCIUM SPEC-SCNC: 8 MG/DL (ref 8.6–10.5)
CHLORIDE SERPL-SCNC: 106 MMOL/L (ref 98–107)
CO2 SERPL-SCNC: 19.9 MMOL/L (ref 22–29)
CREAT BLD-MCNC: 1.03 MG/DL (ref 0.76–1.27)
DEPRECATED RDW RBC AUTO: 51.2 FL (ref 37–54)
EOSINOPHIL # BLD AUTO: 0.21 10*3/MM3 (ref 0–0.7)
EOSINOPHIL NFR BLD AUTO: 2.7 % (ref 0.3–6.2)
ERYTHROCYTE [DISTWIDTH] IN BLOOD BY AUTOMATED COUNT: 14.4 % (ref 11.5–14.5)
GFR SERPL CREATININE-BSD FRML MDRD: 73 ML/MIN/1.73
GLOBULIN UR ELPH-MCNC: 2.9 GM/DL
GLUCOSE BLD-MCNC: 92 MG/DL (ref 65–99)
HCT VFR BLD AUTO: 31.1 % (ref 40.4–52.2)
HCT VFR BLD AUTO: 35.9 % (ref 40.4–52.2)
HGB BLD-MCNC: 10.4 G/DL (ref 13.7–17.6)
HGB BLD-MCNC: 11.6 G/DL (ref 13.7–17.6)
IMM GRANULOCYTES # BLD: 0.02 10*3/MM3 (ref 0–0.03)
IMM GRANULOCYTES NFR BLD: 0.3 % (ref 0–0.5)
LYMPHOCYTES # BLD AUTO: 2.13 10*3/MM3 (ref 0.9–4.8)
LYMPHOCYTES NFR BLD AUTO: 27.4 % (ref 19.6–45.3)
MCH RBC QN AUTO: 32 PG (ref 27–32.7)
MCHC RBC AUTO-ENTMCNC: 33.4 G/DL (ref 32.6–36.4)
MCV RBC AUTO: 95.7 FL (ref 79.8–96.2)
MONOCYTES # BLD AUTO: 0.61 10*3/MM3 (ref 0.2–1.2)
MONOCYTES NFR BLD AUTO: 7.8 % (ref 5–12)
NEUTROPHILS # BLD AUTO: 4.8 10*3/MM3 (ref 1.9–8.1)
NEUTROPHILS NFR BLD AUTO: 61.7 % (ref 42.7–76)
PLATELET # BLD AUTO: 160 10*3/MM3 (ref 140–500)
PMV BLD AUTO: 10.3 FL (ref 6–12)
POTASSIUM BLD-SCNC: 4 MMOL/L (ref 3.5–5.2)
PROT SERPL-MCNC: 5.9 G/DL (ref 6–8.5)
RBC # BLD AUTO: 3.25 10*6/MM3 (ref 4.6–6)
SODIUM BLD-SCNC: 140 MMOL/L (ref 136–145)
WBC NRBC COR # BLD: 7.78 10*3/MM3 (ref 4.5–10.7)

## 2017-08-02 PROCEDURE — 0 IOPAMIDOL 61 % SOLUTION: Performed by: INTERNAL MEDICINE

## 2017-08-02 PROCEDURE — 80053 COMPREHEN METABOLIC PANEL: CPT | Performed by: INTERNAL MEDICINE

## 2017-08-02 PROCEDURE — 25010000002 PIPERACILLIN SOD-TAZOBACTAM PER 1 G: Performed by: INTERNAL MEDICINE

## 2017-08-02 PROCEDURE — 99232 SBSQ HOSP IP/OBS MODERATE 35: CPT | Performed by: INTERNAL MEDICINE

## 2017-08-02 PROCEDURE — 85018 HEMOGLOBIN: CPT | Performed by: INTERNAL MEDICINE

## 2017-08-02 PROCEDURE — 85025 COMPLETE CBC W/AUTO DIFF WBC: CPT | Performed by: INTERNAL MEDICINE

## 2017-08-02 PROCEDURE — 99231 SBSQ HOSP IP/OBS SF/LOW 25: CPT | Performed by: SURGERY

## 2017-08-02 PROCEDURE — 25010000002 HEPARIN (PORCINE) PER 1000 UNITS: Performed by: INTERNAL MEDICINE

## 2017-08-02 PROCEDURE — 74177 CT ABD & PELVIS W/CONTRAST: CPT

## 2017-08-02 PROCEDURE — 85014 HEMATOCRIT: CPT | Performed by: INTERNAL MEDICINE

## 2017-08-02 PROCEDURE — 85730 THROMBOPLASTIN TIME PARTIAL: CPT | Performed by: INTERNAL MEDICINE

## 2017-08-02 RX ORDER — POLYETHYLENE GLYCOL 3350, SODIUM CHLORIDE, POTASSIUM CHLORIDE, SODIUM BICARBONATE, AND SODIUM SULFATE 240; 5.84; 2.98; 6.72; 22.72 G/4L; G/4L; G/4L; G/4L; G/4L
2000 POWDER, FOR SOLUTION ORAL EVERY 8 HOURS SCHEDULED
Status: COMPLETED | OUTPATIENT
Start: 2017-08-02 | End: 2017-08-03

## 2017-08-02 RX ADMIN — TAZOBACTAM SODIUM AND PIPERACILLIN SODIUM 3.38 G: 375; 3 INJECTION, SOLUTION INTRAVENOUS at 14:39

## 2017-08-02 RX ADMIN — POLYETHYLENE GLYCOL 3350, SODIUM CHLORIDE, POTASSIUM CHLORIDE, SODIUM BICARBONATE, AND SODIUM SULFATE 2000 ML: 240; 5.84; 2.98; 6.72; 22.72 POWDER, FOR SOLUTION ORAL at 22:15

## 2017-08-02 RX ADMIN — TAZOBACTAM SODIUM AND PIPERACILLIN SODIUM 3.38 G: 375; 3 INJECTION, SOLUTION INTRAVENOUS at 04:59

## 2017-08-02 RX ADMIN — SODIUM CHLORIDE 125 ML/HR: 9 INJECTION, SOLUTION INTRAVENOUS at 00:44

## 2017-08-02 RX ADMIN — IOPAMIDOL 85 ML: 612 INJECTION, SOLUTION INTRAVENOUS at 22:15

## 2017-08-02 RX ADMIN — HEPARIN SODIUM 21.94 UNITS/KG/HR: 10000 INJECTION, SOLUTION INTRAVENOUS at 08:35

## 2017-08-02 RX ADMIN — AMLODIPINE BESYLATE 10 MG: 10 TABLET ORAL at 08:36

## 2017-08-02 RX ADMIN — TAZOBACTAM SODIUM AND PIPERACILLIN SODIUM 3.38 G: 375; 3 INJECTION, SOLUTION INTRAVENOUS at 22:15

## 2017-08-02 NOTE — PROGRESS NOTES
[facility] INPATIENT PROGRESS NOTE  Subjective     CC: portal vein thrombosis    Interval history: He denies any GI bleeding or dark stools over past 24 hours, but hemoglobin has declined quite significantly. He did have rectal bleeding a few days ago. Fever last night at 7 pm. Anxious to go home. Abdominal pain greatly improved.     General ROS: negative for - chills or night sweats     Medications:  The current medication list was reviewed in the EMR.    Allergies:  No Known Allergies    Objective      Vitals:    08/02/17 0751   BP: 127/80   Pulse: 90   Resp: 18   Temp: 98 °F (36.7 °C)   SpO2: 97%        Physical exam:   General appearance: alert, appears stated age and no distress  Lungs: clear to auscultation bilaterally and good air exchange  Heart: regular rate and rhythm, S1, S2 normal, no murmur, click, rub or gallop  Abdomen: soft, non-tender; bowel sounds normal; no masses,  no organomegaly  Extremities: extremities normal, atraumatic, no cyanosis or edema  Skin: Skin color, texture, turgor normal. No rashes or lesions      RECENT LABS:      Results from last 7 days  Lab Units 08/02/17 0441 08/01/17  1213 07/31/17  1859 07/31/17  0818   WBC 10*3/mm3 7.78 9.37  --  10.41   HEMOGLOBIN g/dL 10.4* 12.1* 12.1* 11.9*   HEMATOCRIT % 31.1* 36.3* 35.8* 36.8*   PLATELETS 10*3/mm3 160 174  --  122*       Results from last 7 days  Lab Units 08/02/17 0441 08/01/17  1213 07/31/17  1859 07/31/17  0818   SODIUM mmol/L 140 137 135* 138   POTASSIUM mmol/L 4.0 3.8 4.2 4.2   CHLORIDE mmol/L 106 103 101 103   CO2 mmol/L 19.9* 19.6* 19.5* 21.2*   BUN mg/dL 7* 10 13 18   CREATININE mg/dL 1.03 0.98 1.15 1.22   CALCIUM mg/dL 8.0* 8.8 8.1* 8.1*   BILIRUBIN mg/dL 0.4 0.5  --  1.0   ALK PHOS U/L 257* 203*  --  160*   ALT (SGPT) U/L 86* 83*  --  93*   AST (SGOT) U/L 58* 39  --  69*   GLUCOSE mg/dL 92 111* 103* 98           Assessment/Plan   1. Portal venous thrombosis.  (Left portal venous branches, acute/subacute and  single tiny right hepatic lobe on MRCP 7/31/17.  Not seen on CT AP with contrast).   - Started on heparin 8/1/17.     - Hypercoagulable labs pending.   - Hemoglobin with quite a decline, but no obvious bleeding. Continue heparin for now, but recommend that we proceed forward with GI eval.      2. Sepsis due to unidentified organism, possibly cholangitis.  Shaking chills, fever, RUQ pain. Per ID     3. Rectal bleeding.     - Suspected hemorrhoidal, per Dr. Farrell.  Last scope 2015.   - Reviewed Dr Farrell's note from 8/1.   - Given significant drop in hemoglobin since heparin started, I think its best to proceed forward with GI endoscopic evaluation now as I am worried that he'll have continued decline.      4. Possible prostate mass on CT 7/30/17.  Urology has been consulted.  Await their evaluation.  PSA 0.9 2/21/17.     5. Anemia.   - Worsened since initiation of heparin concerning for blood loss.    - Component of anemia of chronic disease     Plan/Recs:  · Follow up hypercoagulable labs  · GI endoscopic evaluation for hemoglobin decline if ok with sugery  · Continue IV heparin for time being since patient is stable. Repeat hemoglobin this afternoon.   · Plan 6 months anticoagulation unless a significant hypercoagulable state is found. If significant GI bleeding, may not be able to tolerate anticoagulation.                Vianney Samuels MD  8/2/2017  9:39 AM

## 2017-08-02 NOTE — PROGRESS NOTES
LOS: 3 days     Chief Complaint:  Follow-up fever    Interval History:  1x fever at 7 pm yesterday. He reports chills w/ it. Now feels back to normal. It resolved w/ Tylenol. He is now on heparin gtt. Tolerating Zosyn. Still eager for DC but told him we still had some work to do.    ROS: no n/v/d; no chest pain    Vital Signs  Temp:  [97.8 °F (36.6 °C)-100.6 °F (38.1 °C)] 98 °F (36.7 °C)  Heart Rate:  [] 90  Resp:  [18-20] 18  BP: ()/(73-86) 127/80    Physical Exam:  General: awake, alert, NAD  Head: Normocephalic  Eyes: no scleral icterus  ENT: MMM, OP clear, no thrush. Fair dentition.   Neck: Supple  Cardiovascular: NR, RR, no murmurs, rubs, or gallops; no LE edema  Respiratory:  normal work of breathing on ambient air  GI: Abdomen is soft, mildly tender in epigastric area, mildly distended  : no Faustin catheter present  Musculoskeletal: no joint abnormalities, normal musculature  Skin: many nevi, no rashes, lesions, or embolic phenomenon  Neurological: Alert and oriented x 3, motor strength 5/5 in all four extremities  Psychiatric: Normal mood and affect   Vasc: no cyanosis; PIV w/o erythema    Antibiotics:  •  piperacillin-tazobactam (ZOSYN) 3.375 g in iso-osmotic dextrose 50 ml (premix), 3.375 g, Intravenous, Q8H, John Parker MD, 3.375 g at 08/02/17 0459      LABS:  CBC, CMP, Procal, micro reviewed today  Lab Results   Component Value Date    WBC 7.78 08/02/2017    HGB 10.4 (L) 08/02/2017    HCT 31.1 (L) 08/02/2017    MCV 95.7 08/02/2017     08/02/2017     Lab Results   Component Value Date    GLUCOSE 92 08/02/2017    BUN 7 (L) 08/02/2017    CREATININE 1.03 08/02/2017    EGFRIFNONA 73 08/02/2017    EGFRIFAFRI 64 02/21/2017    BCR 6.8 (L) 08/02/2017    CO2 19.9 (L) 08/02/2017    CALCIUM 8.0 (L) 08/02/2017    PROTENTOTREF 6.9 02/21/2017    ALBUMIN 3.00 (L) 08/02/2017    LABIL2 1.0 08/02/2017    AST 58 (H) 08/02/2017    ALT 86 (H) 08/02/2017     Procal 1.5--->0.9  LA 1.2  Lipase  24     Microbiology:  7/30 BCx: NGTD  7/31 C diff: negative  7/31 Stool Cx: negative     Radiology (personally reviewed images/report):  No new imaging today    Assessment/Plan   1. Sepsis due to unspecified organism, possibly cholangitis  -definitely septic when he came in and has responded well to IVFs and antibiotics  -cholangitis fits clinically though imaging didn't support this very well  -does not appear to have cholecystitis or pancreatitis  -Blood cultures negative without antecedent antibiotics  -continue Zosyn 3.375 g q8h; duration TBD likely 7 days (could switch to PO if ready for DC prior to that time)  -procal improving  -1x fever yesterday at 7 pm; if this recurs, will check BCx     2. Hepatic vein thromboses  - on heparin gtt; malignancy and hypercoag w/u underway     3. Acute kidney injury and solitary kidney  -Crt normalized w/ hydration and supportive care  -Zosyn dosed appropriately     4. Hematochezia  -colonoscopy negative as of 2015     5. Hepatitis  -improved     6. AAA repair  -incisions healed; no concerns; vascular saw in-house        Thank you for this consult. ID will follow.

## 2017-08-02 NOTE — PLAN OF CARE
Problem: Patient Care Overview (Adult)  Goal: Plan of Care Review  Outcome: Ongoing (interventions implemented as appropriate)    08/02/17 5680   Coping/Psychosocial Response Interventions   Plan Of Care Reviewed With patient   Patient Care Overview   Progress progress toward functional goals as expected   Outcome Evaluation   Outcome Summary/Follow up Plan no c/o pain n/v or soa. Heparin drip running w/o change this shift. no s/s of acute distress.       Goal: Adult Individualization and Mutuality  Outcome: Ongoing (interventions implemented as appropriate)  Goal: Discharge Needs Assessment  Outcome: Ongoing (interventions implemented as appropriate)    Problem: Fall Risk (Adult)  Goal: Absence of Falls  Outcome: Ongoing (interventions implemented as appropriate)    Problem: Infection, Risk/Actual (Adult)  Goal: Infection Prevention/Resolution  Outcome: Ongoing (interventions implemented as appropriate)

## 2017-08-02 NOTE — PROGRESS NOTES
Date of Admission:  7/30/2017  Humble Hamilton MD       LOS: 3 days   Patient Care Team:  Lester Carter MD as PCP - General (Internal Medicine)  Orlin Quintero MD as Consulting Physician (Gastroenterology)    Subjective     62 y.o. male overall doing well.  Feels much better than on admission to hospital.  No chest pain or shortness of breath.    Review of Systems    Objective     Vital Signs  Vital Signs Patient Vitals for the past 24 hrs:   BP Temp Temp src Pulse Resp SpO2   08/02/17 0751 127/80 98 °F (36.7 °C) Oral 90 18 97 %   08/02/17 0007 112/73 98.1 °F (36.7 °C) Oral 79 18 94 %   08/01/17 2109 - 99.3 °F (37.4 °C) - - - -   08/01/17 1940 115/75 (!) 100.6 °F (38.1 °C) Oral 98 18 97 %   08/01/17 1604 99/84 - - 91 20 95 %   08/01/17 1404 135/86 97.8 °F (36.6 °C) Oral 102 18 -     I/O:  I/O last 3 completed shifts:  In: 7118.4 [P.O.:840; I.V.:6078.4; IV Piggyback:200]  Out: 1100 [Urine:1100]    Physical Exam:  Physical Exam   Constitutional: He is oriented to person, place, and time. He appears well-developed.   Pulmonary/Chest: Effort normal. No respiratory distress.   Abdominal: Soft. He exhibits no distension.   Neurological: He is alert and oriented to person, place, and time.       Results Review:     CBC    Results from last 7 days  Lab Units 08/02/17  0441 08/01/17  1213 07/31/17  1859 07/31/17  0818 07/30/17  1906 07/30/17  1200   WBC 10*3/mm3 7.78 9.37  --  10.41 12.09* 13.23*   HEMOGLOBIN g/dL 10.4* 12.1* 12.1* 11.9* 11.7* 13.0*   PLATELETS 10*3/mm3 160 174  --  122* 129* 120*     BMP   Results from last 7 days  Lab Units 08/02/17  0441 08/01/17  1213 07/31/17  1859 07/31/17  0818 07/30/17  1906 07/30/17  1200   SODIUM mmol/L 140 137 135* 138 137 136   POTASSIUM mmol/L 4.0 3.8 4.2 4.2 4.0 4.5   CHLORIDE mmol/L 106 103 101 103 98 99   CO2 mmol/L 19.9* 19.6* 19.5* 21.2* 22.4 22.5   BUN mg/dL 7* 10 13 18 20 21   CREATININE mg/dL 1.03 0.98 1.15 1.22 1.43* 1.57*   GLUCOSE mg/dL 92 111* 103* 98 108* 119*     Cr  Clearance Estimated Creatinine Clearance: 78.7 mL/min (by C-G formula based on Cr of 1.03).  Coag   Results from last 7 days  Lab Units 08/02/17  0441 08/01/17  2201 08/01/17  1213   INR   --   --  1.25*   APTT seconds 80.8* 54.2* 26.6     HbA1C   Lab Results   Component Value Date    HGBA1C 5.10 02/21/2017    HGBA1C 5.7 (H) 09/21/2015     Blood Glucose No results found for: POCGLU  Micro   Results from last 7 days  Lab Units 07/30/17  1227 07/30/17  1200   BLOODCX  No growth at 2 days No growth at 2 days       Assessment/Plan     Assessment & Plan    Active Problems:    Essential hypertension    Emphysema lung    Congenital absence of left kidney    Fever    Acute kidney injury    Elevated LFTs    Shortness of breath    Abnormal CT scan, bladder      62 y.o. male patient overall feeling better.  MRI of the liver shows clotted in portal vein branches.  On anticoagulation and hematology workup pending.  Okay for anticoagulation from my standpoint.  Please call with any further questions.  Patient can keep follow-up with me as previously scheduled appointment.    Humble Hamilton MD  08/02/17  8:49 AM    Please call my office with any question: [phone]    Active Hospital Problems (** Indicates Principal Problem)    Diagnosis Date Noted   • Fever [R50.9] 07/30/2017   • Acute kidney injury [N17.9] 07/30/2017   • Elevated LFTs [R79.89] 07/30/2017   • Shortness of breath [R06.02] 07/30/2017   • Abnormal CT scan, bladder [R93.41] 07/30/2017   • Congenital absence of left kidney [Q60.0] 03/13/2017   • Emphysema lung [J43.9] 02/28/2017   • Essential hypertension [I10] 10/28/2016      Resolved Hospital Problems    Diagnosis Date Noted Date Resolved   No resolved problems to display.

## 2017-08-02 NOTE — PLAN OF CARE
Problem: Patient Care Overview (Adult)  Goal: Plan of Care Review  Outcome: Ongoing (interventions implemented as appropriate)    08/02/17 0636   Coping/Psychosocial Response Interventions   Plan Of Care Reviewed With patient   Patient Care Overview   Progress improving   Outcome Evaluation   Outcome Summary/Follow up Plan Pt rested well. No c/o pain. Heparin drip adjusted earlier in the shift and Ptt was 80.8 with morning labs, no adjustment needed. VSS, no s/s acute distress, will continue to monitor       Goal: Adult Individualization and Mutuality  Outcome: Ongoing (interventions implemented as appropriate)    Problem: Fall Risk (Adult)  Goal: Absence of Falls  Outcome: Ongoing (interventions implemented as appropriate)    Problem: Infection, Risk/Actual (Adult)  Goal: Infection Prevention/Resolution  Outcome: Ongoing (interventions implemented as appropriate)

## 2017-08-02 NOTE — PROGRESS NOTES
"General Surgery  Patrick Mckeon  1955    CC:  \"i want to get out of here.\"      Reason for consult: left hepatic portal venous thrombosis, cholangitis    HPI:  Very anxious to leave, but had another bloody stool last night.  Hgb 11.7 at admit, then 12.1, now 10. 4    Mild improvement in his abdominal pain, with minor RUQ tenderness that persists.    Team feels c scope is necessary.  Patient is angry and states he won't stay here for c scope if we have to wait until tomorrow or Friday.  He says he will \"walk out of here\" if necessary.    Diet:  full liquids    Temp:  [97.8 °F (36.6 °C)-100.6 °F (38.1 °C)] 98 °F (36.7 °C)  Heart Rate:  [] 90  Resp:  [18-20] 18  BP: ()/(73-86) 127/80    Intake & Output (last day)       08/01 0701 - 08/02 0700 08/02 0701 - 08/03 0700    P.O. 840     I.V. (mL/kg) 4432.4 (58.5)     IV Piggyback 100     Total Intake(mL/kg) 5372.4 (70.9)     Urine (mL/kg/hr) 1100 (0.6)     Stool      Total Output 1100      Net +4272.4            Unmeasured Urine Occurrence  1 x        Physical Exam   Constitutional: He is oriented to person, place, and time. He appears well-developed and well-nourished.   HENT:   Head: Normocephalic and atraumatic.   Eyes: Conjunctivae are normal. No scleral icterus.   Neck: Neck supple. No JVD present. No tracheal deviation present.   Cardiovascular: Normal rate.    Pulmonary/Chest: Effort normal.   Abdominal: Soft. Tenderness: right upper quadrant.   Musculoskeletal: He exhibits no edema or deformity.   Lymphadenopathy:     He has no cervical adenopathy.   Neurological: He is alert and oriented to person, place, and time.   Skin:   Male pattern balding   Psychiatric: He has a normal mood and affect. His behavior is normal.       Results from last 7 days  Lab Units 08/02/17  0441   WBC 10*3/mm3 7.78   HEMOGLOBIN g/dL 10.4*   HEMATOCRIT % 31.1*   PLATELETS 10*3/mm3 160       Results from last 7 days  Lab Units 08/02/17  0441 08/01/17  1213 07/31/17  1859 " 17  0818   SODIUM mmol/L 140 137 135* 138   POTASSIUM mmol/L 4.0 3.8 4.2 4.2   CHLORIDE mmol/L 106 103 101 103   CO2 mmol/L 19.9* 19.6* 19.5* 21.2*   BUN mg/dL 7* 10 13 18   CREATININE mg/dL 1.03 0.98 1.15 1.22   CALCIUM mg/dL 8.0* 8.8 8.1* 8.1*   BILIRUBIN mg/dL 0.4 0.5  --  1.0   ALK PHOS U/L 257* 203*  --  160*   ALT (SGPT) U/L 86* 83*  --  93*   AST (SGOT) U/L 58* 39  --  69*   GLUCOSE mg/dL 92 111* 103* 98       US GB with sludge in the gallbladder, common duct normal size, left hepatic mildly dilated.    CEA 3.25, ca 19-9=1 (normal).    Assessment:  · Cholangitis, clinically, with shaking chills, fever, right upper quadrant pain.  Improved today with WBC decreased, LFT's decreased.  · MRCP with results that conflict with prior images, now diagnosed as biliary venous thrombosis.    · Father with rectal cancer  · Rectal bleeding, likely hemorrhoidal, without abdominal pain, with last scope 2 years ago.  · History of colon polyps.  · Congenital absence of left kidney, with elevated creatinine, improved today with GFR >50    · Anemia, new, (Hgb 14.2 in )  · S/p AAA stent placement  with no known op complications.   · Possible inflammation extending to the pancreas, with 1-2 drinks daily, with 2 cousins  from pancreatic cancer.  History and CT not consistent with pancreatic mass or classic pancreatitis.   · Prostrate mass on CT, with stable PSA, 0.85  · Patient adamant about leaving today.  I will speak to dr Tapia    Plan  · Anticoagulation.    · c scope  · Cancel any eminent plans for lap chol.  · Hematologic workup on going  · i'll phone Dr Willie Farrell MD

## 2017-08-02 NOTE — PROGRESS NOTES
LOS: 3 days   Patient Care Team:  Lester Carter MD as PCP - General (Internal Medicine)  Orlin Quintero MD as Consulting Physician (Gastroenterology)    Chief Complaint   Patient presents with   • SHIVERING     HOT AND COLD FLASHES, SHIVERING A LOT FOR THE PAST 4 DAYS         Subjective   Has now developed bloody diarrhea.  Has been taking Tylenol around the clock to keep from having the chills again     Objective     Vital Signs  Temp:  [98 °F (36.7 °C)-100.6 °F (38.1 °C)] 98.5 °F (36.9 °C)  Heart Rate:  [79-98] 96  Resp:  [18-20] 18  BP: ()/(73-88) 134/88    Physical Exam:  WDWN WM in NAD  Skin warm & dry  Lungs clear  Heart RRR  Abd soft, BS+, some tenderness in RUQ  Ext no edema     Results Review:     I reviewed the patient's new clinical results.    Medication Review:       LABS    Results from last 7 days  Lab Units 08/02/17  0441 08/01/17  1213 07/31/17  1859   SODIUM mmol/L 140 137 135*   POTASSIUM mmol/L 4.0 3.8 4.2   CHLORIDE mmol/L 106 103 101   CO2 mmol/L 19.9* 19.6* 19.5*   BUN mg/dL 7* 10 13   CREATININE mg/dL 1.03 0.98 1.15   GLUCOSE mg/dL 92 111* 103*   CALCIUM mg/dL 8.0* 8.8 8.1*       Results from last 7 days  Lab Units 08/02/17  1242 08/02/17  0441 08/01/17  1213  07/31/17  0818   WBC 10*3/mm3  --  7.78 9.37  --  10.41   HEMOGLOBIN g/dL 11.6* 10.4* 12.1*  < > 11.9*   HEMATOCRIT % 35.9* 31.1* 36.3*  < > 36.8*   PLATELETS 10*3/mm3  --  160 174  --  122*   < > = values in this interval not displayed.    Results from last 7 days  Lab Units 08/01/17  1213   INR  1.25*           Assessment/Plan     Active Problems:    Portal vein thrombosis - MRI showed that this was actually clot in the hepatic vein. Given the bloody diarrhea he had yesterday, will start pt on heparin gtt & monitor. Discussed at length with pt & wife. The pt says that he feels fine & wants to go home. I had a very lengthy discussion with the patient and his wife about the findings on the MRI and the need for anticoagulation.  I  also discussed the concern about having him on an anticoagulant given the bloody diarrhea that he's just had recently.  Discussed that he needs further workup to determine the cause of the thrombosis.  I plan to consult hematology.  I also spoke with Dr. Parker about seeing the patient.  There was the fever and leukocytosis that was present on admission and it doesn't seem that the amount of thrombosis that is present would be enough to have caused that.    Essential hypertension    Emphysema lung    Congenital absence of left kidney    Fever - none since admission so far but yesterday he was taking tylenol to prevent any fever.    Acute kidney injury - resolved    Elevated LFTs     Shortness of breath    Abnormal CT scan, bladder    Thrombocytopenia      Bloody diarrhea - he is still having diarrhea but no definite blood noted. His hgb is stable. I'm going to progress him to full liquid diet but explained to pt & his wife that he shouldn't have solid food until we can be sure that he isn't going to have any further bleeding.    Over 60 min total time      Esther Tapia MD  08/02/17  2:36 PM      Time:

## 2017-08-03 ENCOUNTER — ANESTHESIA EVENT (OUTPATIENT)
Dept: GASTROENTEROLOGY | Facility: HOSPITAL | Age: 62
End: 2017-08-03

## 2017-08-03 ENCOUNTER — ANESTHESIA (OUTPATIENT)
Dept: GASTROENTEROLOGY | Facility: HOSPITAL | Age: 62
End: 2017-08-03

## 2017-08-03 VITALS
HEART RATE: 70 BPM | DIASTOLIC BLOOD PRESSURE: 76 MMHG | TEMPERATURE: 98 F | OXYGEN SATURATION: 100 % | RESPIRATION RATE: 16 BRPM | BODY MASS INDEX: 26.24 KG/M2 | WEIGHT: 167.2 LBS | HEIGHT: 67 IN | SYSTOLIC BLOOD PRESSURE: 126 MMHG

## 2017-08-03 PROBLEM — K92.1 HEMATOCHEZIA: Status: ACTIVE | Noted: 2017-07-30

## 2017-08-03 PROBLEM — R50.9 FEVER: Status: RESOLVED | Noted: 2017-07-30 | Resolved: 2017-08-03

## 2017-08-03 PROBLEM — R93.41 ABNORMAL CT SCAN, BLADDER: Status: RESOLVED | Noted: 2017-07-30 | Resolved: 2017-08-03

## 2017-08-03 PROBLEM — A41.9 SEPSIS: Status: RESOLVED | Noted: 2017-08-02 | Resolved: 2017-08-03

## 2017-08-03 PROBLEM — N17.9 ACUTE KIDNEY INJURY: Status: RESOLVED | Noted: 2017-07-30 | Resolved: 2017-08-03

## 2017-08-03 LAB
ALBUMIN SERPL-MCNC: 3.3 G/DL (ref 3.5–5.2)
ALBUMIN/GLOB SERPL: 1 G/DL
ALP SERPL-CCNC: 313 U/L (ref 39–117)
ALT SERPL W P-5'-P-CCNC: 98 U/L (ref 1–41)
ANION GAP SERPL CALCULATED.3IONS-SCNC: 15.7 MMOL/L
APTT PPP: 26.4 SECONDS (ref 22.7–35.4)
APTT PPP: 75.8 SECONDS (ref 22.7–35.4)
AST SERPL-CCNC: 56 U/L (ref 1–40)
BASOPHILS # BLD AUTO: 0.02 10*3/MM3 (ref 0–0.2)
BASOPHILS NFR BLD AUTO: 0.3 % (ref 0–1.5)
BILIRUB SERPL-MCNC: 0.4 MG/DL (ref 0.1–1.2)
BUN BLD-MCNC: 5 MG/DL (ref 8–23)
BUN/CREAT SERPL: 4.7 (ref 7–25)
CALCIUM SPEC-SCNC: 8.7 MG/DL (ref 8.6–10.5)
CHLORIDE SERPL-SCNC: 104 MMOL/L (ref 98–107)
CO2 SERPL-SCNC: 22.3 MMOL/L (ref 22–29)
CREAT BLD-MCNC: 1.06 MG/DL (ref 0.76–1.27)
DEPRECATED RDW RBC AUTO: 50.6 FL (ref 37–54)
EOSINOPHIL # BLD AUTO: 0.24 10*3/MM3 (ref 0–0.7)
EOSINOPHIL NFR BLD AUTO: 3.4 % (ref 0.3–6.2)
ERYTHROCYTE [DISTWIDTH] IN BLOOD BY AUTOMATED COUNT: 14.4 % (ref 11.5–14.5)
GFR SERPL CREATININE-BSD FRML MDRD: 71 ML/MIN/1.73
GLOBULIN UR ELPH-MCNC: 3.4 GM/DL
GLUCOSE BLD-MCNC: 87 MG/DL (ref 65–99)
HCT VFR BLD AUTO: 34.6 % (ref 40.4–52.2)
HGB BLD-MCNC: 11.4 G/DL (ref 13.7–17.6)
IMM GRANULOCYTES # BLD: 0.02 10*3/MM3 (ref 0–0.03)
IMM GRANULOCYTES NFR BLD: 0.3 % (ref 0–0.5)
LYMPHOCYTES # BLD AUTO: 1.57 10*3/MM3 (ref 0.9–4.8)
LYMPHOCYTES NFR BLD AUTO: 22.3 % (ref 19.6–45.3)
MCH RBC QN AUTO: 31.6 PG (ref 27–32.7)
MCHC RBC AUTO-ENTMCNC: 32.9 G/DL (ref 32.6–36.4)
MCV RBC AUTO: 95.8 FL (ref 79.8–96.2)
MONOCYTES # BLD AUTO: 0.59 10*3/MM3 (ref 0.2–1.2)
MONOCYTES NFR BLD AUTO: 8.4 % (ref 5–12)
NEUTROPHILS # BLD AUTO: 4.59 10*3/MM3 (ref 1.9–8.1)
NEUTROPHILS NFR BLD AUTO: 65.3 % (ref 42.7–76)
PLATELET # BLD AUTO: 226 10*3/MM3 (ref 140–500)
PMV BLD AUTO: 10 FL (ref 6–12)
POTASSIUM BLD-SCNC: 3.8 MMOL/L (ref 3.5–5.2)
PROT SERPL-MCNC: 6.7 G/DL (ref 6–8.5)
RBC # BLD AUTO: 3.61 10*6/MM3 (ref 4.6–6)
SODIUM BLD-SCNC: 142 MMOL/L (ref 136–145)
WBC NRBC COR # BLD: 7.03 10*3/MM3 (ref 4.5–10.7)

## 2017-08-03 PROCEDURE — 43235 EGD DIAGNOSTIC BRUSH WASH: CPT | Performed by: SURGERY

## 2017-08-03 PROCEDURE — 85730 THROMBOPLASTIN TIME PARTIAL: CPT | Performed by: INTERNAL MEDICINE

## 2017-08-03 PROCEDURE — 0DJ08ZZ INSPECTION OF UPPER INTESTINAL TRACT, VIA NATURAL OR ARTIFICIAL OPENING ENDOSCOPIC: ICD-10-PCS | Performed by: SURGERY

## 2017-08-03 PROCEDURE — 45378 DIAGNOSTIC COLONOSCOPY: CPT | Performed by: SURGERY

## 2017-08-03 PROCEDURE — 80053 COMPREHEN METABOLIC PANEL: CPT | Performed by: INTERNAL MEDICINE

## 2017-08-03 PROCEDURE — 25010000002 PROPOFOL 10 MG/ML EMULSION: Performed by: ANESTHESIOLOGY

## 2017-08-03 PROCEDURE — 0DJD8ZZ INSPECTION OF LOWER INTESTINAL TRACT, VIA NATURAL OR ARTIFICIAL OPENING ENDOSCOPIC: ICD-10-PCS | Performed by: SURGERY

## 2017-08-03 PROCEDURE — 25010000002 HEPARIN (PORCINE) PER 1000 UNITS: Performed by: INTERNAL MEDICINE

## 2017-08-03 PROCEDURE — 99232 SBSQ HOSP IP/OBS MODERATE 35: CPT | Performed by: INTERNAL MEDICINE

## 2017-08-03 PROCEDURE — 85025 COMPLETE CBC W/AUTO DIFF WBC: CPT | Performed by: INTERNAL MEDICINE

## 2017-08-03 PROCEDURE — 25010000002 PIPERACILLIN SOD-TAZOBACTAM PER 1 G: Performed by: INTERNAL MEDICINE

## 2017-08-03 RX ORDER — HYDRALAZINE HYDROCHLORIDE 20 MG/ML
5 INJECTION INTRAMUSCULAR; INTRAVENOUS
Status: DISCONTINUED | OUTPATIENT
Start: 2017-08-03 | End: 2017-08-03

## 2017-08-03 RX ORDER — FENTANYL CITRATE 50 UG/ML
50 INJECTION, SOLUTION INTRAMUSCULAR; INTRAVENOUS
Status: DISCONTINUED | OUTPATIENT
Start: 2017-08-03 | End: 2017-08-03

## 2017-08-03 RX ORDER — AMOXICILLIN AND CLAVULANATE POTASSIUM 500; 125 MG/1; MG/1
1 TABLET, FILM COATED ORAL EVERY 8 HOURS
Qty: 6 TABLET | Refills: 0 | Status: SHIPPED | OUTPATIENT
Start: 2017-08-04 | End: 2017-08-06

## 2017-08-03 RX ORDER — PROMETHAZINE HYDROCHLORIDE 25 MG/1
25 SUPPOSITORY RECTAL ONCE AS NEEDED
Status: DISCONTINUED | OUTPATIENT
Start: 2017-08-03 | End: 2017-08-03

## 2017-08-03 RX ORDER — PROPOFOL 10 MG/ML
VIAL (ML) INTRAVENOUS CONTINUOUS PRN
Status: DISCONTINUED | OUTPATIENT
Start: 2017-08-03 | End: 2017-08-03 | Stop reason: SURG

## 2017-08-03 RX ORDER — PROPOFOL 10 MG/ML
VIAL (ML) INTRAVENOUS AS NEEDED
Status: DISCONTINUED | OUTPATIENT
Start: 2017-08-03 | End: 2017-08-03 | Stop reason: SURG

## 2017-08-03 RX ORDER — FLUMAZENIL 0.1 MG/ML
0.2 INJECTION INTRAVENOUS AS NEEDED
Status: DISCONTINUED | OUTPATIENT
Start: 2017-08-03 | End: 2017-08-03

## 2017-08-03 RX ORDER — EPHEDRINE SULFATE 50 MG/ML
5 INJECTION, SOLUTION INTRAVENOUS ONCE AS NEEDED
Status: DISCONTINUED | OUTPATIENT
Start: 2017-08-03 | End: 2017-08-03

## 2017-08-03 RX ORDER — PROMETHAZINE HYDROCHLORIDE 25 MG/ML
12.5 INJECTION, SOLUTION INTRAMUSCULAR; INTRAVENOUS ONCE AS NEEDED
Status: DISCONTINUED | OUTPATIENT
Start: 2017-08-03 | End: 2017-08-03

## 2017-08-03 RX ORDER — SODIUM CHLORIDE, SODIUM LACTATE, POTASSIUM CHLORIDE, CALCIUM CHLORIDE 600; 310; 30; 20 MG/100ML; MG/100ML; MG/100ML; MG/100ML
INJECTION, SOLUTION INTRAVENOUS CONTINUOUS PRN
Status: DISCONTINUED | OUTPATIENT
Start: 2017-08-03 | End: 2017-08-03 | Stop reason: SURG

## 2017-08-03 RX ORDER — ONDANSETRON 2 MG/ML
4 INJECTION INTRAMUSCULAR; INTRAVENOUS ONCE AS NEEDED
Status: DISCONTINUED | OUTPATIENT
Start: 2017-08-03 | End: 2017-08-03

## 2017-08-03 RX ORDER — PROMETHAZINE HYDROCHLORIDE 25 MG/1
25 TABLET ORAL ONCE AS NEEDED
Status: DISCONTINUED | OUTPATIENT
Start: 2017-08-03 | End: 2017-08-03

## 2017-08-03 RX ORDER — DIPHENHYDRAMINE HYDROCHLORIDE 50 MG/ML
12.5 INJECTION INTRAMUSCULAR; INTRAVENOUS
Status: DISCONTINUED | OUTPATIENT
Start: 2017-08-03 | End: 2017-08-03

## 2017-08-03 RX ORDER — HYDROCODONE BITARTRATE AND ACETAMINOPHEN 7.5; 325 MG/1; MG/1
1 TABLET ORAL ONCE AS NEEDED
Status: DISCONTINUED | OUTPATIENT
Start: 2017-08-03 | End: 2017-08-03

## 2017-08-03 RX ORDER — AMOXICILLIN AND CLAVULANATE POTASSIUM 500; 125 MG/1; MG/1
1 TABLET, FILM COATED ORAL EVERY 8 HOURS
Status: DISCONTINUED | OUTPATIENT
Start: 2017-08-04 | End: 2017-08-03 | Stop reason: HOSPADM

## 2017-08-03 RX ORDER — PROMETHAZINE HYDROCHLORIDE 12.5 MG/1
12.5 TABLET ORAL ONCE AS NEEDED
Status: DISCONTINUED | OUTPATIENT
Start: 2017-08-03 | End: 2017-08-03

## 2017-08-03 RX ORDER — LABETALOL HYDROCHLORIDE 5 MG/ML
5 INJECTION, SOLUTION INTRAVENOUS
Status: DISCONTINUED | OUTPATIENT
Start: 2017-08-03 | End: 2017-08-03

## 2017-08-03 RX ORDER — HYDROMORPHONE HYDROCHLORIDE 1 MG/ML
0.5 INJECTION, SOLUTION INTRAMUSCULAR; INTRAVENOUS; SUBCUTANEOUS
Status: DISCONTINUED | OUTPATIENT
Start: 2017-08-03 | End: 2017-08-03

## 2017-08-03 RX ORDER — OXYCODONE AND ACETAMINOPHEN 7.5; 325 MG/1; MG/1
1 TABLET ORAL ONCE AS NEEDED
Status: DISCONTINUED | OUTPATIENT
Start: 2017-08-03 | End: 2017-08-03

## 2017-08-03 RX ADMIN — TAZOBACTAM SODIUM AND PIPERACILLIN SODIUM 3.38 G: 375; 3 INJECTION, SOLUTION INTRAVENOUS at 06:27

## 2017-08-03 RX ADMIN — SODIUM CHLORIDE, POTASSIUM CHLORIDE, SODIUM LACTATE AND CALCIUM CHLORIDE: 600; 310; 30; 20 INJECTION, SOLUTION INTRAVENOUS at 16:03

## 2017-08-03 RX ADMIN — PROPOFOL 75 MG: 10 INJECTION, EMULSION INTRAVENOUS at 16:14

## 2017-08-03 RX ADMIN — SODIUM CHLORIDE 30 ML/HR: 9 INJECTION, SOLUTION INTRAVENOUS at 15:34

## 2017-08-03 RX ADMIN — TAZOBACTAM SODIUM AND PIPERACILLIN SODIUM 3.38 G: 375; 3 INJECTION, SOLUTION INTRAVENOUS at 14:08

## 2017-08-03 RX ADMIN — AMLODIPINE BESYLATE 10 MG: 10 TABLET ORAL at 09:17

## 2017-08-03 RX ADMIN — POLYETHYLENE GLYCOL 3350, SODIUM CHLORIDE, POTASSIUM CHLORIDE, SODIUM BICARBONATE, AND SODIUM SULFATE 2000 ML: 240; 5.84; 2.98; 6.72; 22.72 POWDER, FOR SOLUTION ORAL at 06:27

## 2017-08-03 RX ADMIN — PROPOFOL 125 MCG/KG/MIN: 10 INJECTION, EMULSION INTRAVENOUS at 16:14

## 2017-08-03 RX ADMIN — TAZOBACTAM SODIUM AND PIPERACILLIN SODIUM 3.38 G: 375; 3 INJECTION, SOLUTION INTRAVENOUS at 20:00

## 2017-08-03 RX ADMIN — HEPARIN SODIUM 21.9 UNITS/KG/HR: 10000 INJECTION, SOLUTION INTRAVENOUS at 02:21

## 2017-08-03 NOTE — ANESTHESIA PREPROCEDURE EVALUATION
Anesthesia Evaluation     Patient summary reviewed   NPO Solid Status: > 8 hours       Airway   no difficulty expected  Dental      Pulmonary    (+) COPD,   Cardiovascular     Rhythm: regular    (+) hypertension, PVD,       Neuro/Psych  GI/Hepatic/Renal/Endo      Musculoskeletal     Abdominal    Substance History      OB/GYN          Other                                        Anesthesia Plan    ASA 3     MAC     intravenous induction   Anesthetic plan and risks discussed with patient.  Use of blood products discussed with patient .

## 2017-08-03 NOTE — PROGRESS NOTES
LOS: 4 days     Chief Complaint:  Follow-up fever    Interval History:  No acute events.  CT w/ hepatic vein thrombosis but no abscess. Fevers resolved. WBC normal. He tells me he is going for EGD/colonoscopy today.    ROS: no n/v/d; no chest pain    Vital Signs  Temp:  [97.6 °F (36.4 °C)-98.7 °F (37.1 °C)] 97.6 °F (36.4 °C)  Heart Rate:  [92-96] 93  Resp:  [18] 18  BP: (122-140)/(77-88) 130/84    Physical Exam:  General: awake, alert, NAD  Head: Normocephalic  Eyes: no scleral icterus  ENT: MMM, OP clear, no thrush. Fair dentition.   Neck: Supple  Cardiovascular: NR, RR, no murmurs, rubs, or gallops; no LE edema  Respiratory:  normal work of breathing on ambient air  GI: Abdomen is soft, mildly tender in epigastric area, mildly distended  : no Faustin catheter present  Musculoskeletal: no joint abnormalities, normal musculature  Skin: many nevi, no rashes, lesions, or embolic phenomenon  Neurological: Alert and oriented x 3, motor strength 5/5 in all four extremities  Psychiatric: Normal mood and affect   Vasc: no cyanosis; PIV w/o erythema    Antibiotics:  •  piperacillin-tazobactam (ZOSYN) 3.375 g in iso-osmotic dextrose 50 ml (premix), 3.375 g, Intravenous, Q8H, John Parker MD, 3.375 g at 08/02/17 0459      LABS:  CBC, CMP,  micro reviewed today  Lab Results   Component Value Date    WBC 7.03 08/03/2017    HGB 11.4 (L) 08/03/2017    HCT 34.6 (L) 08/03/2017    MCV 95.8 08/03/2017     08/03/2017     Lab Results   Component Value Date    GLUCOSE 87 08/03/2017    BUN 5 (L) 08/03/2017    CREATININE 1.06 08/03/2017    EGFRIFNONA 71 08/03/2017    EGFRIFAFRI 64 02/21/2017    BCR 4.7 (L) 08/03/2017    CO2 22.3 08/03/2017    CALCIUM 8.7 08/03/2017    PROTENTOTREF 6.9 02/21/2017    ALBUMIN 3.30 (L) 08/03/2017    LABIL2 1.0 08/03/2017    AST 56 (H) 08/03/2017    ALT 98 (H) 08/03/2017     Procal 1.5--->0.9  LA 1.2  Lipase 24     Microbiology:  7/30 BCx: NGTD  7/31 C diff: negative  7/31 Stool Cx:  negative     Radiology (personally reviewed images/report):  CT with acute thrombosis left portal vein    Assessment/Plan   1. Sepsis due to unspecified organism, possibly cholangitis  -definitely septic when he came in and has responded well to IVFs and antibiotics  -cholangitis fits clinically though imaging didn't support this very well  -does not appear to have cholecystitis or pancreatitis  -Blood cultures negative without antecedent antibiotics  -continue Zosyn 3.375 g q8h through tonight then change to amox-clav 500/125 mg PO q8h x 2 more days to complete a 7 day total course     2. Hepatic vein thromboses  - on heparin gtt; malignancy and hypercoag w/u underway  -will be on oral AC at discharge  -plans for colonoscopy and EGD today per patient     3. Acute kidney injury and solitary kidney  -Crt normalized w/ hydration and supportive care     4. Hematochezia  -colonoscopy negative as of 2015     5. Hepatitis  -improved     6. AAA repair  -incisions healed; no concerns; vascular saw in-house        Thank you for allowing me to be involved in the care of this patient. Infectious diseases will sign off at this time with antibiotics plan in place but please call me at [phone] if any further question.

## 2017-08-03 NOTE — PLAN OF CARE
Problem: Patient Care Overview (Adult)  Goal: Plan of Care Review  Outcome: Ongoing (interventions implemented as appropriate)    08/03/17 1616   Coping/Psychosocial Response Interventions   Plan Of Care Reviewed With patient   Patient Care Overview   Progress progress toward functional goals as expected   Outcome Evaluation   Outcome Summary/Follow up Plan pt denies pain, n/v soa. pt bowel prep completed this am. heparin stopped for procedure. no acute s/s of distress       Goal: Adult Individualization and Mutuality  Outcome: Ongoing (interventions implemented as appropriate)  Goal: Discharge Needs Assessment  Outcome: Ongoing (interventions implemented as appropriate)    Problem: Fall Risk (Adult)  Goal: Absence of Falls  Outcome: Ongoing (interventions implemented as appropriate)    Problem: Infection, Risk/Actual (Adult)  Goal: Infection Prevention/Resolution  Outcome: Ongoing (interventions implemented as appropriate)

## 2017-08-03 NOTE — PLAN OF CARE
Problem: Patient Care Overview (Adult)  Goal: Plan of Care Review  Outcome: Ongoing (interventions implemented as appropriate)    08/03/17 0416   Coping/Psychosocial Response Interventions   Plan Of Care Reviewed With patient   Outcome Evaluation   Outcome Summary/Follow up Plan Patient denies any pain or shortness of breath. Bowel prep started and tolerating well, On Heparin drip per protocol. Dr. Rivers notified of the CT results with no new orders made. No signs of bleeding seen. NPO for colonosocoy. Vitals stable. Monitored.       Goal: Adult Individualization and Mutuality  Outcome: Ongoing (interventions implemented as appropriate)  Goal: Discharge Needs Assessment  Outcome: Ongoing (interventions implemented as appropriate)    Problem: Fall Risk (Adult)  Goal: Absence of Falls  Outcome: Ongoing (interventions implemented as appropriate)

## 2017-08-03 NOTE — PROGRESS NOTES
[facility] INPATIENT PROGRESS NOTE  Subjective     CC: portal vein thrombosis    Interval history: Endoscopy today. Hemoglobin stable now. Anxious to leave.     General ROS: negative for - chills or night sweats     Medications:  The current medication list was reviewed in the EMR.    Allergies:  No Known Allergies    Objective      Vitals:    08/02/17 2352   BP: 140/87   Pulse:    Resp: 18   Temp: 97.9 °F (36.6 °C)   SpO2: 95%        Physical exam:   General appearance: alert, appears stated age and no distress  Lungs: clear to auscultation bilaterally and good air exchange  Heart: regular rate and rhythm, S1, S2 normal, no murmur, click, rub or gallop  Abdomen: soft, non-tender; bowel sounds normal; no masses,  no organomegaly  Extremities: extremities normal, atraumatic, no cyanosis or edema  Skin: Skin color, texture, turgor normal. No rashes or lesions      RECENT LABS:      Results from last 7 days  Lab Units 08/03/17  0625 08/02/17  1242 08/02/17  0441 08/01/17  1213   WBC 10*3/mm3 7.03  --  7.78 9.37   HEMOGLOBIN g/dL 11.4* 11.6* 10.4* 12.1*   HEMATOCRIT % 34.6* 35.9* 31.1* 36.3*   PLATELETS 10*3/mm3 226  --  160 174       Results from last 7 days  Lab Units 08/02/17  0441 08/01/17  1213 07/31/17  1859 07/31/17  0818   SODIUM mmol/L 140 137 135* 138   POTASSIUM mmol/L 4.0 3.8 4.2 4.2   CHLORIDE mmol/L 106 103 101 103   CO2 mmol/L 19.9* 19.6* 19.5* 21.2*   BUN mg/dL 7* 10 13 18   CREATININE mg/dL 1.03 0.98 1.15 1.22   CALCIUM mg/dL 8.0* 8.8 8.1* 8.1*   BILIRUBIN mg/dL 0.4 0.5  --  1.0   ALK PHOS U/L 257* 203*  --  160*   ALT (SGPT) U/L 86* 83*  --  93*   AST (SGOT) U/L 58* 39  --  69*   GLUCOSE mg/dL 92 111* 103* 98           Assessment/Plan   1. Portal venous thrombosis.  (Left portal venous branches, acute/subacute and single tiny right hepatic lobe on MRCP 7/31/17.  Not seen on CT AP with contrast).   - Started on heparin 8/1/17.     - Hypercoagulable labs pending.   - Hemoglobin with decline  yesterday and stability today. Gi eval today   - If GI eval without obvious bleeding, okay to initiate Xarelto 15 mg bid for 3 weeks then 20 mg daily. Discussed multiple anticoagulation agents and risks associated with each. He understands and wants to proceed forward with Xarelto.      2. Sepsis due to unidentified organism, possibly cholangitis.  Shaking chills, fever, RUQ pain. Per ID     3. Rectal bleeding.     - Suspected hemorrhoidal, per Dr. Farrell.  Last scope 2015.   - Reviewed Dr Farrell's note from 8/1.   - GI eval today     4. Possible prostate mass on CT 7/30/17. PSA 0.9 2/21/17. Needs urology eval - defer to Hospitalist.     5. Anemia.   - Worsened since initiation of heparin concerning for blood loss but now stabilized.     - Component of anemia of chronic disease     Plan/Recs:  · Follow up hypercoagulable labs as outpatient  · GI endoscopic evaluation for hemoglobin decline   · Plan 6 months anticoagulation unless a significant hypercoagulable state is found.   ¨ If no bleeding seen on GI eval, OK to start Xarelto 15 mg bid for three weeks and then 20 mg daily to complete 6 months.     I'll arrange outpatient follow up in [facility] office in a few weeks. OK for discharge on Xarelto after GI evaluation as long as no significant bleeding.                 Vianney Samuels MD  8/3/2017  7:24 AM

## 2017-08-03 NOTE — NURSING NOTE
Dr. Foy made aware of the Ct scan(right portal vein thrombosis) results with no new orders received.

## 2017-08-03 NOTE — ANESTHESIA POSTPROCEDURE EVALUATION
Patient: Patrick Mckeon    Procedure Summary     Date Anesthesia Start Anesthesia Stop Room / Location    08/03/17 1603 1642  [facility] /  [facility]       Procedure Diagnosis Surgeon Provider    COLONOSCOPY to cecum (N/A ); ESOPHAGOGASTRODUODENOSCOPY (Esophagus) Hematochezia  (Hematochezia [K92.1]) MD Olvin Roberts MD          Anesthesia Type: MAC  Last vitals  BP        Temp        Pulse       Resp        SpO2          Post Anesthesia Care and Evaluation    Patient location during evaluation: PHASE II  Anesthetic complications: No anesthetic complications

## 2017-08-03 NOTE — OP NOTE
Preoperative diagnosis:   Hematochezia.  Postoperative diagnosis:   Normal EGD.  Diverticulosis.  Hemorrhoids.  Procedure:   EGD  Colonoscopy to cecum.  Attending surgeon: Patrick Elizabeth M.D.  Anesthesia: MAC  Specimens: None.  Blood loss: None.  Drains: None.  Bowel prep: Excellent.  Scope withdrawal time: 7:27.    Description of procedure: He is taken to the endoscopy suite and positioned on the stretcher in the left lateral decubitus position. A surgical pause was performed. After graduated amounts of propofol were administered, the flexible endoscope was inserted into the mouth through a bite-block.    The esophagus was examined upon advancement of the scope. It was normal. There was no hiatal hernia. The GE junction occurred at 42 cm from the incisors. It was easily traversed.     The stomach was entered. A retroflexed view confirmed that there was no hiatal hernia. There were no ulcers, polyps or gastritis. The bulb, second and third portions were normal. The endoscope was withdrawn into the stomach. The insufflated air was evacuated. The scope was completely withdrawn.    He tolerated the procedure very well, and is returned to the recovery area in stable condition.    A digital rectal exam was performed. It was normal.     The flexible colonoscope was inserted into the anus and advanced to the level of the cecum without difficulty. The ileocecal valve was identified. The appendiceal orifice was identified and photographed.  The ileocecal valve was traversed briefly, and after I could not re-intubate it.  It appeared normal.  The intraluminal surface of the colon was examined upon withdrawal scope.    The bowel prep was excellent. There was some clear liquid within all segments of the colon that was easily evacuated with the suction channel the scope. There were scattered pan-colonic diverticuli, although they were more numerous in the sigmoid. There were no polyps. There were no vascular malformations.  There were grade 2 hemorrhoids.    He tolerated the procedure very well, and is returned to the recovery area in stable condition.

## 2017-08-04 LAB
BACTERIA SPEC AEROBE CULT: NORMAL
BACTERIA SPEC AEROBE CULT: NORMAL
LA NT DPL PPP: 43.4 SEC (ref 0–55)
LA NT DPL/LA NT HPL PPP-RTO: 0.88 RATIO (ref 0–1.4)
LA NT PLATELET PPP: 39.1 SEC (ref 0–51.9)
LUPUS ANTICOAGULANT REFLEX: NORMAL
PROT C PPP-ACNC: 77 % (ref 86–163)
PROT S ACT/NOR PPP: 91 % (ref 70–127)
PROT S FREE PPP-ACNC: >150 % (ref 49–138)
SCREEN DRVVT: 44.7 SEC (ref 0–47)
THROMBIN TIME: 16.2 SEC (ref 0–23)

## 2017-08-04 NOTE — PLAN OF CARE
Problem: Patient Care Overview (Adult)  Goal: Plan of Care Review  Outcome: Ongoing (interventions implemented as appropriate)    08/03/17 2101   Coping/Psychosocial Response Interventions   Plan Of Care Reviewed With patient;spouse   Patient Care Overview   Progress improving   Outcome Evaluation   Outcome Summary/Follow up Plan d/c pt home with po Eliquis & Augmentin which were both sent to RX per Dr. Tapia; f/u appts reviewed; up ad ruel; ambulated to ground floor with NA at side; IV sites x2 removed and 2x2 gauze applied; d/c paperwork given to pt with handout for Eliquis and coupon from [facility].       Goal: Adult Individualization and Mutuality  Outcome: Ongoing (interventions implemented as appropriate)  Goal: Discharge Needs Assessment  Outcome: Outcome(s) achieved Date Met:  08/03/17    Problem: Fall Risk (Adult)  Goal: Absence of Falls  Outcome: Outcome(s) achieved Date Met:  08/03/17    Problem: Infection, Risk/Actual (Adult)  Goal: Infection Prevention/Resolution  Outcome: Outcome(s) achieved Date Met:  08/03/17

## 2017-08-04 NOTE — DISCHARGE SUMMARY
NAME: Patrick Mckeon ADMIT: 2017   : 1955  PCP: Lester Carter MD    MRN: [id number] LOS: 4 days   AGE/SEX: 62 y.o. male  ROOM: [facility]/     Date of Admission:  2017  Date of Discharge:  8/3/2017    PCP: Lester Carter MD      CHIEF COMPLAINT  SHIVERING (HOT AND COLD FLASHES, SHIVERING A LOT FOR THE PAST 4 DAYS)      DISCHARGE DIAGNOSIS  Active Hospital Problems (** Indicates Principal Problem)    Diagnosis Date Noted   • **Hematochezia [K92.1] 2017   • Elevated LFTs [R79.89] 2017   • Shortness of breath [R06.02] 2017   • Congenital absence of left kidney [Q60.0] 2017   • Emphysema lung [J43.9] 2017   • Essential hypertension [I10] 10/28/2016      Resolved Hospital Problems    Diagnosis Date Noted Date Resolved   • Sepsis [A41.9] 2017   • Fever [R50.9] 2017   • Acute kidney injury [N17.9] 2017   • Abnormal CT scan, bladder [R93.41] 2017       SECONDARY DIAGNOSES  Past Medical History:   Diagnosis Date   • AAA (abdominal aortic aneurysm) without rupture    • Cancer    • Colon polyp    • Congenital absence of one kidney    • Emphysema lung 2017   • Enlarged prostate    • Hyperlipidemia 2017   • Skin cancer 2017       CONSULTS   None  Consult Orders (all)     Start     Ordered    17 1056  Inpatient Consult to Hematology & Oncology  Once     Specialty:  Hematology and Oncology  Provider:  Eli Strange MD    17 1055    17 1535  Inpatient Consult to Vascular Surgery  Once     Specialty:  Vascular Surgery  Provider:  Humble Hamilton MD    17 1534    17 1620  Inpatient Consult to General Surgery  Once     Specialty:  General Surgery  Provider:  Patrick Elizabeth MD    17 1619    17 1608  Inpatient Consult to General Surgery  Once,   Status:  Canceled     Specialty:  General Surgery  Provider:  (Not yet assigned)    17 1610    17 1338  A  (on-call MD unless specified)  Once     Specialty:  Internal Medicine  Provider:  (Not yet assigned)    07/30/17 1914        Treatment Team     Provider Relationship Specialty Contact    Esther Tapia MD Attending --  [phone]    [facility] --      Nathalia Nicole Mouser, RRT Respiratory Therapist Respiratory Therapy  [phone]    Anni Farrell MD Surgeon General Surgery  [phone]    Gilda Hoang, RN Case Manager --      Eli Strange MD Consulting Physician Hematology and Oncology  [phone]    Maribeth Parisi, RN Registered Nurse --      Jacqueline Contreras, RN Registered Nurse --  1616    Misty Beebe Patient Care Technician --      Cindy Burnett CNA Technician --            PROCEDURES PERFORMED  Procedure(s):  COLONOSCOPY to cecum  ESOPHAGOGASTRODUODENOSCOPY      HOSPITAL COURSE  63 yo WM who presented with fever, extreme fatigue & shortness of breath.  Please see the admitting history physical for further details.  He was started on IV Zosyn as well as IV fluids for hydration.  Dr. Farrell saw him in consultation and felt that his presentation would be consistent with cholangitis.  The day after admission the patient developed diarrhea and had blood in the stool initially.  He continued to have liquid diarrhea that wasn't odd color but not definitely bloody.  His hemoglobin did remain stable overall.  When he came in it was 13 but he was also dehydrated at that time and hemoconcentrated.  The following day it had dropped to 11.7 but he was receiving IV fluids.  His hemoglobin has been pretty much in the 11 range throughout the hospital stay.  There was 1 hemoglobin of 10.4 but a follow-up one done 6 hours later was 11.6 so I think that was more related to some laboratory variation.    An ultrasound of the gallbladder did show sludge.  MRCP was also obtained which showed acute/subacute thrombosis of multiple intrahepatic left portal venous branches as well as a single  tiny right hepatic lobe portal venous branch which was thrombosed.  He was started on a heparin drip and hematology consultation was requested.  I had also already requested infectious disease to see the patient.  Dr. Parker saw him and felt that he did have sepsis on presentation but the source of this was definitely not clear.  By 8/1, the patient was feeling significantly better and was very anxious for discharge.  I explained to the patient that, given the bloody diarrhea that had occurred just a day prior, there was no way for him to be safely discharged home at that point.  He continued, however, to be very anxious for discharge given that he was feeling so much better.  Because he was going to need further outpatient anticoagulation it was decided to proceed with an EGD and colonoscopy.  On his initial CT scan it was reported that there was periportal inflammatory changes extending to the pancreatic head.  There was also reported prostatic enlargement with focal median lobe enlargement and a question of tumor extending into the floor the bladder.  Because of these abdomen bowel disease on CT scan I did go ahead and repeat that.  There was no mention of periportal inflammatory change being present.  It was also noted that the urinary bladder was unremarkable.  I also spoke with one of the urologist today about the initial CT scan and he told me that the median lobe of the prostate will push into the bladder and cause an indentation.  He said it is perfectly normal.    He underwent his EGD and colonoscopy today both of which were unremarkable.  He will follow-up with Dr. Mejia at the [facility] office for the results of the hypercoagulable workup.  I had discussed anticoagulation with Dr. Mejia earlier today.  Because the clot load associated with the venous thrombosis is low and because he has been on a heparin drip for 2-1/2 days, doing the higher dose for the initial one week wouldn't be necessary.  The patient  does have extensive diverticulosis and with the bleeding that he had the other day I would rather not increase the risk of any recurrent bleeding any further.  No other source of bleeding was found but it certainly could've been diverticular.  He will have 2 additional days of by mouth Augmentin to complete a 7 day course of antibiotics.  I've asked him to follow-up with Dr. Carter late next week.      PHYSICAL EXAM  Temp:  [97.6 °F (36.4 °C)-98.7 °F (37.1 °C)] 98 °F (36.7 °C)  Heart Rate:  [66-92] 67  Resp:  [12-19] 16  BP: ()/(51-87) 96/60  Body mass index is 26.16 kg/(m^2).  Physical Exam  WDWN WM in NAD  Skin warm & dry  Lungs clear  Heart RRR  Ext no edema    CONDITION ON DISCHARGE  Stable.      DISCHARGE DISPOSITION   Home or Self Care        DISCHARGE MEDICATIONS   Patrick Mckeon   Home Medication Instructions KATT:[id number]    Printed on:08/03/17 2013   Medication Information                      amLODIPine (NORVASC) 10 MG tablet  Take 1 tablet by mouth Daily.             amoxicillin-clavulanate (AUGMENTIN) 500-125 MG per tablet  Take 1 tablet by mouth Every 8 (Eight) Hours for 2 days. Indications: Infection Within the Abdomen             apixaban (ELIQUIS) 5 MG tablet tablet  Take 1 tablet by mouth 2 (Two) Times a Day.             aspirin 81 MG EC tablet  Take 1 tablet by mouth Daily.             pravastatin (PRAVACHOL) 20 MG tablet  TAKE 1 TABLET BY MOUTH DAILY.                Future Appointments  Date Time Provider Department Center   8/29/2017 2:50 PM LAB CHAIR 4 [facility]  [facility]   8/29/2017 3:20 PM MD JOCELYN Hutchison [facility]   10/2/2017 8:40 AM MD JOCELYN [facility] None     Additional Instructions for the Follow-ups that You Need to Schedule     Discharge Follow-up with PCP    As directed    Follow Up Details:  late next wk             Follow-up Information     Follow up with Lester Carter MD .    Specialty:  Internal Medicine    Why:  late next wk    Contact  information:    [address] [address]  [phone]            TEST  RESULTS PENDING AT DISCHARGE   Order Current Status    Beta-2 Glycoprotein Antibodies In process    Cardiolipin Antibody In process    Factor 5 Leiden In process    Factor II, DNA Analysis In process    Lupus Anticoagulant In process    Ova & Parasite Examination In process    Protein C Activity In process    Protein S Antigen, Free In process    Protein S Functional In process    Blood Culture Preliminary result    Blood Culture Preliminary result             Esther Tapia MD  [facility]  08/03/17  8:13 PM      Time: greater than 30 minutes.

## 2017-08-04 NOTE — PAYOR COMM NOTE
"Patrick Patten (62 y.o. Male)     PLEASE SEE ATTACHED DC SUMMARY    REF#[id number]    THANK YOU    CALIXTO WILL LPN,[facility]  [phone]     Date of Birth Social Security Number Address Home Phone MRN    1955  [address]  [place] [id number] [phone] [id number]    Yazdanism Marital Status          Lutheran        Admission Date Admission Type Admitting Provider Attending Provider Department, Room/Bed    17 Emergency Esther Tapia MD  [address], [address]37    Discharge Date Discharge Disposition Discharge Destination        8/3/2017 Home or Self Care             Attending Provider: (none)    Allergies:  No Known Allergies    Isolation:  None   Infection:  None   Code Status:  Prior    Ht:  67\" (170.2 cm)   Wt:  167 lb 3.2 oz (75.8 kg)    Admission Cmt:  None   Principal Problem:  Hematochezia [K92.1] More...                 Active Insurance as of 2017     Primary Coverage     Payor Plan Insurance Group Employer/Plan Group    [employer]OSS [facility] [employer] [facility] [id number]     Payor Plan Address Payor Plan Phone Number Effective From Effective To    [address] [phone] 2015     [place] [address]       Subscriber Name Subscriber Birth Date Member ID       SLIME PATTEN 1955 [id number]                 Emergency Contacts      (Rel.) Home Phone Work Phone Mobile Phone    Slime Patten (Spouse) [phone] -- [phone]               Discharge Summary      Esther Tapia MD at 8/3/2017  8:13 PM                 NAME: Patrick Patten ADMIT: 2017   : 1955  PCP: Lester Carter MD    MRN: [id number] LOS: 4 days   AGE/SEX: 62 y.o. male  ROOM: [facility]/     Date of Admission:  2017  Date of Discharge:  8/3/2017    PCP: Lester Carter MD      CHIEF COMPLAINT  SHIVERING (HOT AND COLD FLASHES, SHIVERING A LOT FOR THE PAST 4 DAYS)      DISCHARGE DIAGNOSIS  Active Hospital Problems (** Indicates " Principal Problem)    Diagnosis Date Noted   • **Hematochezia [K92.1] 07/30/2017   • Elevated LFTs [R79.89] 07/30/2017   • Shortness of breath [R06.02] 07/30/2017   • Congenital absence of left kidney [Q60.0] 03/13/2017   • Emphysema lung [J43.9] 02/28/2017   • Essential hypertension [I10] 10/28/2016      Resolved Hospital Problems    Diagnosis Date Noted Date Resolved   • Sepsis [A41.9] 08/02/2017 08/03/2017   • Fever [R50.9] 07/30/2017 08/03/2017   • Acute kidney injury [N17.9] 07/30/2017 08/03/2017   • Abnormal CT scan, bladder [R93.41] 07/30/2017 08/03/2017       SECONDARY DIAGNOSES  Past Medical History:   Diagnosis Date   • AAA (abdominal aortic aneurysm) without rupture    • Cancer    • Colon polyp    • Congenital absence of one kidney    • Emphysema lung 2/28/2017   • Enlarged prostate    • Hyperlipidemia 2/28/2017   • Skin cancer 5/31/2017       CONSULTS   None  Consult Orders (all)     Start     Ordered    08/01/17 1056  Inpatient Consult to Hematology & Oncology  Once     Specialty:  Hematology and Oncology  Provider:  Eli Strange MD    08/01/17 1055    07/31/17 1535  Inpatient Consult to Vascular Surgery  Once     Specialty:  Vascular Surgery  Provider:  Humble Hamilton MD    07/31/17 1534    07/30/17 1620  Inpatient Consult to General Surgery  Once     Specialty:  General Surgery  Provider:  Patrick Elizabeth MD    07/30/17 1619    07/30/17 1608  Inpatient Consult to General Surgery  Once,   Status:  Canceled     Specialty:  General Surgery  Provider:  (Not yet assigned)    07/30/17 1610    07/30/17 1338  [facility] (on-call MD unless specified)  Once     Specialty:  Internal Medicine  Provider:  (Not yet assigned)    07/30/17 1337        Treatment Team     Provider Relationship Specialty Contact    Esther Tapia MD Attending --  [phone]    [facility] [place] --      Nathalia Nicole Mouser, RRT Respiratory Therapist Respiratory Therapy  [phone]    Anni Farrell MD Surgeon  General Surgery  [phone]    Gilda Hoang, ALLISON Case Manager --      Eli Strange MD Consulting Physician Hematology and Oncology  [phone]    Maribeth Parisi, RN Registered Nurse --      Jacqueline Contreras RN Registered Nurse --  [phone]    Misty Beebe Patient Care Technician --      Cindy Burnett CNA Technician --            PROCEDURES PERFORMED  Procedure(s):  COLONOSCOPY to cecum  ESOPHAGOGASTRODUODENOSCOPY      HOSPITAL COURSE  63 yo WM who presented with fever, extreme fatigue & shortness of breath.  Please see the admitting history physical for further details.  He was started on IV Zosyn as well as IV fluids for hydration.  Dr. Farrell saw him in consultation and felt that his presentation would be consistent with cholangitis.  The day after admission the patient developed diarrhea and had blood in the stool initially.  He continued to have liquid diarrhea that wasn't odd color but not definitely bloody.  His hemoglobin did remain stable overall.  When he came in it was 13 but he was also dehydrated at that time and hemoconcentrated.  The following day it had dropped to 11.7 but he was receiving IV fluids.  His hemoglobin has been pretty much in the 11 range throughout the hospital stay.  There was 1 hemoglobin of 10.4 but a follow-up one done 6 hours later was 11.6 so I think that was more related to some laboratory variation.    An ultrasound of the gallbladder did show sludge.  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  He was started on a heparin drip and hematology consultation was requested.  I had also already requested infectious disease to see the patient.  Dr. Parker saw him and felt that he did have sepsis on presentation but the source of this was definitely not clear.  By 8/1, the patient was feeling significantly better  and was very anxious for discharge.  I explained to the patient that, given the bloody diarrhea that had occurred just a day prior, there was no way for him to be safely discharged home at that point.  He continued, however, to be very anxious for discharge given that he was feeling so much better.  Because he was going to need further outpatient anticoagulation it was decided to proceed with an EGD and colonoscopy.  On his initial CT scan it was reported that there was periportal inflammatory changes extending to the pancreatic head.  There was also reported prostatic enlargement with focal median lobe enlargement and a question of tumor extending into the floor the bladder.  Because of these abdomen bowel disease on CT scan I did go ahead and repeat that.  There was no mention of periportal inflammatory change being present.  It was also noted that the urinary bladder was unremarkable.  I also spoke with one of the urologist today about the initial CT scan and he told me that the median lobe of the prostate will push into the bladder and cause an indentation.  He said it is perfectly normal.    He underwent his EGD and colonoscopy today both of which were unremarkable.  He will follow-up with Dr. Mejia at the [facility] office for the results of the hypercoagulable workup.  I had discussed anticoagulation with Dr. Mejia earlier today.  Because the clot load associated with the venous thrombosis is low and because he has been on a heparin drip for 2-1/2 days, doing the higher dose for the initial one week wouldn't be necessary.  The patient does have extensive diverticulosis and with the bleeding that he had the other day I would rather not increase the risk of any recurrent bleeding any further.  No other source of bleeding was found but it certainly could've been diverticular.  He will have 2 additional days of by mouth Augmentin to complete a 7 day course of antibiotics.  I've asked him to follow-up with Dr. Emma an  next week.      PHYSICAL EXAM  Temp:  [97.6 °F (36.4 °C)-98.7 °F (37.1 °C)] 98 °F (36.7 °C)  Heart Rate:  [66-92] 67  Resp:  [12-19] 16  BP: ()/(51-87) 96/60  Body mass index is 26.16 kg/(m^2).  Physical Exam  WDWN WM in NAD  Skin warm & dry  Lungs clear  Heart RRR  Ext no edema    CONDITION ON DISCHARGE  Stable.      DISCHARGE DISPOSITION   Home or Self Care        DISCHARGE MEDICATIONS   Patrick Mckeon   Home Medication Instructions KATT:[id number]    Printed on:08/03/17 2013   Medication Information                      amLODIPine (NORVASC) 10 MG tablet  Take 1 tablet by mouth Daily.             amoxicillin-clavulanate (AUGMENTIN) 500-125 MG per tablet  Take 1 tablet by mouth Every 8 (Eight) Hours for 2 days. Indications: Infection Within the Abdomen             apixaban (ELIQUIS) 5 MG tablet tablet  Take 1 tablet by mouth 2 (Two) Times a Day.             aspirin 81 MG EC tablet  Take 1 tablet by mouth Daily.             pravastatin (PRAVACHOL) 20 MG tablet  TAKE 1 TABLET BY MOUTH DAILY.                Future Appointments  Date Time Provider Department Center   8/29/2017 2:50 PM LAB CHAIR 4 [facility]   8/29/2017 3:20 PM Keyur Mejia II, [facility]   10/2/2017 8:40 AM Lester Carter MD [facility]  [facility] None     Additional Instructions for the Follow-ups that You Need to Schedule     Discharge Follow-up with PCP    As directed    Follow Up Details:  late next wk             Follow-up Information     Follow up with Lester Carter MD .    Specialty:  Internal Medicine    Why:  late next wk    Contact information:    [address] [place] [id number] [phone]            TEST  RESULTS PENDING AT DISCHARGE   Order Current Status    Beta-2 Glycoprotein Antibodies In process    Cardiolipin Antibody In process    Factor 5 Leiden In process    Factor II, DNA Analysis In process    Lupus Anticoagulant In process    Ova & Parasite Examination In process    Protein C Activity In  process    Protein S Antigen, Free In process    Protein S Functional In process    Blood Culture Preliminary result    Blood Culture Preliminary result             Esther Tapia MD  [facility]  08/03/17  8:13 PM      Time: greater than 30 minutes.     Electronically signed by Esther Tapia MD at 8/3/2017  8:51 PM

## 2017-08-05 LAB
B2 GLYCOPROT1 IGA SER-ACNC: <9 GPI IGA UNITS (ref 0–25)
B2 GLYCOPROT1 IGG SER-ACNC: <9 GPI IGG UNITS (ref 0–20)
B2 GLYCOPROT1 IGM SER-ACNC: <9 GPI IGM UNITS (ref 0–32)
CARDIOLIPIN IGA SER IA-ACNC: <9 APL U/ML (ref 0–11)
CARDIOLIPIN IGG SER IA-ACNC: <9 GPL U/ML (ref 0–14)
CARDIOLIPIN IGM SER IA-ACNC: <9 MPL U/ML (ref 0–12)

## 2017-08-07 ENCOUNTER — DOCUMENTATION (OUTPATIENT)
Dept: INTERNAL MEDICINE | Facility: HOSPITAL | Age: 62
End: 2017-08-07

## 2017-08-07 LAB
F5 GENE MUT ANL BLD/T: NORMAL
FACTOR V COMMENT: NORMAL
O+P SPEC MICRO: NORMAL
OVA + PARASITE RESULT 1: NORMAL

## 2017-08-08 LAB
F2 GENE MUT ANL BLD/T: NORMAL
Lab: NORMAL

## 2017-08-09 ENCOUNTER — OFFICE VISIT (OUTPATIENT)
Dept: INTERNAL MEDICINE | Age: 62
End: 2017-08-09

## 2017-08-09 VITALS
HEART RATE: 80 BPM | BODY MASS INDEX: 26.68 KG/M2 | SYSTOLIC BLOOD PRESSURE: 112 MMHG | OXYGEN SATURATION: 99 % | HEIGHT: 67 IN | TEMPERATURE: 98.5 F | DIASTOLIC BLOOD PRESSURE: 70 MMHG | WEIGHT: 170 LBS

## 2017-08-09 DIAGNOSIS — I82.0 THROMBOSIS, HEPATIC VEIN (HCC): ICD-10-CM

## 2017-08-09 DIAGNOSIS — R21 RASH OF BODY: ICD-10-CM

## 2017-08-09 DIAGNOSIS — A41.9 SEPSIS, DUE TO UNSPECIFIED ORGANISM: Primary | ICD-10-CM

## 2017-08-09 DIAGNOSIS — K57.30 DIVERTICULOSIS OF LARGE INTESTINE WITHOUT HEMORRHAGE: Chronic | ICD-10-CM

## 2017-08-09 PROCEDURE — 99215 OFFICE O/P EST HI 40 MIN: CPT | Performed by: INTERNAL MEDICINE

## 2017-08-09 NOTE — ASSESSMENT & PLAN NOTE
Had mild bloody diarrhea in the hospital. Instructed him to watch for any recurrent bleeding episode as he is on Eliquis.

## 2017-08-09 NOTE — ASSESSMENT & PLAN NOTE
? Source of infection. Blood cultures negative. Finished abx course and appears stable clinically.

## 2017-08-09 NOTE — PROGRESS NOTES
"Patrick Mckeon / 62 y.o. / male  08/09/2017    VITALS    /70  Pulse 80  Temp 98.5 °F (36.9 °C)  Ht 67\" (170.2 cm)  Wt 170 lb (77.1 kg)  SpO2 99%  BMI 26.63 kg/m2  BP Readings from Last 3 Encounters:   08/09/17 112/70   08/03/17 126/76   05/31/17 118/64     Wt Readings from Last 3 Encounters:   08/09/17 170 lb (77.1 kg)   07/30/17 167 lb 3.2 oz (75.8 kg)   07/31/17 167 lb (75.8 kg)      Body mass index is 26.63 kg/(m^2).    CC:  Main reason(s) for today's visit: Hematochezia (F/U hospital stay 7/30/17 Discharged 8/3/2017)      HPI:     Date of admission/discharge: above dates   Hospital: [facility]  Principle Dx: Probable sepsis  Secondary Dx: thrombosis of intrahepatic veins  Treatment: treated for probable sepsis with IVF, empiric antibiotics, IV heparin for thrombosis, underwent EGD/CLS due to blood diarrhea which were unremarkable x for extensive diverticulosis, discharged on Eliquis  Course: completed course of augmentin w/ fever, chills, abd pain or further bloody diarrhea. No n/v. To f/u with CBC for evaluation of thrombophilia workup.       Patient Care Team:  Lester Carter MD as PCP - General (Internal Medicine)  Orlin Quintero MD as Consulting Physician (Gastroenterology)    ____________________________________________________________________    ASSESSMENT & PLAN:    1. Sepsis, due to unspecified organism    2. Thrombosis, hepatic vein    3. Diverticulosis of large intestine without hemorrhage    4. Rash of body       No orders of the defined types were placed in this encounter.      Summary/Discussion:     · Stable after completion of abx course. Watch for recurrent fever, chills.  · Continue Eliquis and f/u with CBC to review thrombophilia lab results  · Watch for any recurrent bleeding  · Watch for worsening rash as this could possibly be drug related. He expressed understanding and agreed to follow above instructions.       No Follow-up on file.    Future Appointments  Date Time Provider Department " [place]   8/29/2017 2:50 PM LAB CHAIR 4 [facility]  LAB [facility]   8/29/2017 3:20 PM Keyur Mejia II, MD [facility] CBC [facility]  CBC [facility]   10/2/2017 8:40 AM Lester Carter MD [facility] None     ____________________________________________________________________    REVIEW OF SYSTEMS    Review of Systems   Constitutional: Negative for appetite change, chills, fever and unexpected weight change.   Respiratory: Negative.    Cardiovascular: Negative.    Gastrointestinal: Negative for abdominal pain, blood in stool, diarrhea, nausea and vomiting.   Genitourinary: Negative.  Negative for hematuria.   Musculoskeletal: Negative.    Skin: Positive for rash (torso).   Allergic/Immunologic: Negative.    Neurological: Negative.    Hematological: Bruises/bleeds easily: small superficial bruising on hand.   Psychiatric/Behavioral: Negative.      Other: As noted per HPI      PHYSICAL EXAMINATION    Physical Exam   Constitutional:  Non-toxic appearance. No distress.   Eyes: No scleral icterus.   Cardiovascular: Normal rate and regular rhythm.    No murmur heard.  Pulmonary/Chest: Effort normal. He has no wheezes. He has no rales.   Abdominal: Soft. He exhibits no distension, no pulsatile liver and no mass. There is no hepatosplenomegaly. There is no tenderness. There is no rebound, no guarding and negative Horner's sign.   Neurological: He is alert.   Skin: Skin is warm. Rash (flesh colored small macular rash diffusely on back and chest) noted.   Psychiatric: He has a normal mood and affect. Judgment normal.         REVIEWED DATA:    Labs:   Lab Results   Component Value Date    GLUCOSE 87 08/03/2017    BUN 5 (L) 08/03/2017    CREATININE 1.06 08/03/2017    EGFRIFNONA 71 08/03/2017    EGFRIFAFRI 64 02/21/2017    BCR 4.7 (L) 08/03/2017    K 3.8 08/03/2017    CO2 22.3 08/03/2017    CALCIUM 8.7 08/03/2017    PROTENTOTREF 6.9 02/21/2017    ALBUMIN 3.30 (L) 08/03/2017    LABIL2 1.0 08/03/2017    AST 56 (H) 08/03/2017    ALT 98 (H) 08/03/2017       Lab Results   Component Value Date    WBC 7.03 08/03/2017    HGB 11.4 (L) 08/03/2017     08/03/2017      Blood culture negative x 2    Stool culture negative for bacterial growth (in particular for salmonella)    Protein C and ATIII low, cardiolipin AB neg, factor v neg    Imaging:   Xr Chest 2 View    Result Date: 7/30/2017  Narrative: EMERGENCY TWO-VIEW CHEST  HISTORY: 62-year-old male with fever times several days  COMPARISON: 05/02/2017  FINDINGS: 1. Stable negative acute chest. 2. Thoracic spondylosis with previous AAA stent graft repair mild and chronic pulmonary change.  This report was finalized on 7/30/2017 1:24 PM by Dr. Betito Fairbanks MD.      Ct Chest Without Contrast    Addendum Date: 8/7/2017 Addendum:   CORRECTION:  EXAMINATION: NONCONTRAST CT CHEST  This report was finalized on 8/7/2017 4:48 PM by Dr. Betito Fairbanks MD.      Result Date: 8/7/2017  Narrative: EMERGENCY NONCONTRAST CT ABDOMEN AND PELVIS  HISTORY: 62-year-old male with fever, elevated liver function tests and lethargy. HISTORY includes AAA stent graft repair, tobacco abuse, emphysema, hypertension and hyperlipidemia.  COMPARISON: 03/09/2017  FINDINGS: 1. Mild chronic scarring right lung base, emphysema. 2. Emphysema, without evidence of an active pulmonary process, no mass, pleural fluid nor pneumonia. 3. Vascular calcification without aneurysm. 4. Prominent coronary artery calcification. 5. No mediastinal, hilar no other thoracic adenopathy.      Impression: 1. No evidence of an active intrathoracic process.  This report was finalized on 7/30/2017 2:45 PM by Dr. Betito Fairbanks MD.      Mri Abdomen With & Without Contrast    Result Date: 8/1/2017  Narrative: MRI ABDOMEN WITH AND WITHOUT CONTRAST- MRCP  HISTORY: 62-year-old male with abdominal pain and increasing temperature. Sludge within the gallbladder. Abnormal LFTs. Recent endovascular stent placement for an infrarenal abdominal aortic aneurysm.  TECHNIQUE: Routine MRCP was  performed without and with IV contrast. Compared with CT of the abdomen from 07/30/2017 as well as CTA of the abdomen from 06/13/2017.  FINDINGS: There is no intra or extrahepatic biliary dilatation. The common bile duct measures up to 6 mm. There is no abnormal signal intensity within the biliary tree. Pancreatic duct appears unremarkable as well and there is no evidence for pancreatic divisum. There is a truncated appearance of the intrahepatic left portal vein and the left hepatic lobe pulmonary veins distally appear thrombosed. There is also a single thrombosed portal venous branch within the right hepatic lobe. There is an associated change in the pattern of liver enhancement which normalizes on the delayed portal venous phase. The main portal vein, splenic vein, and SMV are patent. There is a 1 cm left hepatic lobe cyst. There is marginal thickening of the gallbladder wall, but the gallbladder appears otherwise unremarkable. No definite stones are seen within the gallbladder and there is no pericholecystic fluid. Spleen, pancreas, and adrenals appear unremarkable.      Impression: 1. There is acute/subacute thrombosis of multiple intrahepatic left portal venous branches. There is also a single tiny right hepatic lobe portal venous branch which is thrombosed. The right portal vein and branches are otherwise patent and the left portal vein is patent as well. The main portal vein, splenic vein, and SMV are patent. 2. Fairly unremarkable appearance of the gallbladder and there is no biliary dilatation or evidence for choledocholithiasis.  Discussed with Dr. Farrell by phone.  This report was finalized on 8/1/2017 1:26 PM by Dr. Smita Mena MD.      Ct Abdomen Pelvis With Contrast    Result Date: 8/3/2017  Narrative: CT ABDOMEN AND PELVIS WITH CONTRAST.  TECHNIQUE: A routine CT scan of the abdomen and pelvis was performed with coronal and sagittal reconstructed images.  HISTORY: Follow-up CT scan.  COMPARISON:  07/30/2017.  FINDINGS: Lung bases demonstrate no consolidation or effusion.      Liver demonstrates homogeneous parenchyma, no definite mass. Low-attenuation lesions of the liver are likely to be cysts, measuring up to 1 cm, unchanged. There is an abrupt termination of the left portal vein image 26, with corresponding changes of the portal vein in the left hepatic lobe. Unremarkable gallbladder. No intra or extrahepatic biliary ductal dilatation.  The spleen is unremarkable. Pancreas is unremarkable, no pancreatic ductal dilatation. Adrenal glands are within normal limits.  Left nephrectomy. Renal stones on the right measure up to 0.5 cm. Right renal cyst measures 1.2 cm. No definite renal calculi. Urinary bladder is unremarkable.     Small and large bowel loops are within normal limits. Appendix is normal. Colonic diverticulosis.  No significant retroperitoneal lymphadenopathy. Atherosclerotic disease, intra-aortic stent is unchanged.       Impression: 1.  Acute thrombosis of the intrahepatic left portal vein. Follow-up is recommended. 2.  Hepatic cysts, the right renal cyst and right renal calculi are unchanged. Left nephrectomy.   Findings called to patient's nurse Romana, 10:21 PM.  This report was finalized on 8/3/2017 5:22 AM by Dr. Jerod Friend MD.      Ct Abdomen Pelvis With Contrast    Result Date: 7/30/2017  Narrative: EMERGENCY POSTCONTRAST CT ABDOMEN AND PELVIS  HISTORY: 62-year-old male with fever, elevated liver function tests and lethargy. HISTORY includes AAA stent graft repair, tobacco abuse, emphysema, hypertension and hyperlipidemia.  NOTE: CT chest was obtained separately, be dictated separately  COMPARISON: 06/13/2017  FINDINGS: 1. There is evidence of left hepatic biliary ductal dilatation, new since previous study. 2. New periportal inflammatory change extending to the pancreatic head superiorly question acute pancreatitis, common duct is normal in caliber and the gallbladder has normal CT  appearance. 3. The liver and spleen are otherwise normal. 4. AAA stent graft repair stable. 5. Extensive diverticulosis without diverticulitis. 6. No obstruction, free air nor dilatation of bowel, the appendix is normal. 7. Prostatic enlargement with focal median lobe enlargement question tumor extending into the floor the bladder measuring up to 27 x 21 mm. 8. Nonobstructing right kidney stones, benign right renal cyst, absent left kidney, normal adrenal glands.  The above was discussed with Dr. Su by Dr. Fairbanks at approximately 14:38  This report was finalized on 7/30/2017 2:38 PM by Dr. Betito Fairbanks MD.      Us Gallbladder    Result Date: 7/31/2017  Narrative: RIGHT UPPER QUADRANT ULTRASOUND  CLINICAL HISTORY:  Male who is 62 years-old, with a history of elevated liver function tests  FINDINGS: 1. The right kidney is normal in size measuring 13.2 x 5.7 x 5.7 cm. 12 mm cyst incidentally noted. Nonobstructing kidney stone on CT of 07/30/2017 is not imaged with ultrasound. 2. The liver is normal in size measuring 18 cm. There is mild left hepatic ductal dilatation. 1 cm cyst noted. 3. Gallbladder sludge is present but there is no cholelithiasis nor gallbladder wall thickening. Common bile duct measures 5 mm which is normal. 4. Visualized pancreas is normal.      Impression: 1. Left hepatic ductal dilatation mild. 2. Gallbladder sludge without cholelithiasis.  This report was finalized on 7/31/2017 7:29 AM by Dr. Betito Fairbanks MD.         Medical Tests:   Colonoscopy: multiple diverticuli of colon     Summary of old records / correspondence / consultant report:   DC summary re: issues addressed on HPI    Request outside records:       No Known Allergies     Current Outpatient Prescriptions on File Prior to Visit   Medication Sig Dispense Refill   • amLODIPine (NORVASC) 10 MG tablet Take 1 tablet by mouth Daily. 30 tablet 5   • apixaban (ELIQUIS) 5 MG tablet tablet Take 1 tablet by mouth 2 (Two) Times a Day. 60  tablet 0   • aspirin 81 MG EC tablet Take 1 tablet by mouth Daily.     • pravastatin (PRAVACHOL) 20 MG tablet TAKE 1 TABLET BY MOUTH DAILY. 30 tablet 3     No current facility-administered medications on file prior to visit.        PFSH:     The following portions of the patient's history were reviewed and updated as appropriate: Allergies / Current Medications / Past Medical History / Surgical History / Social History / Family History    Patient Active Problem List   Diagnosis   • Benign colonic polyp   • Essential hypertension   • Cigarette nicotine dependence with nicotine-induced disorder   • Emphysema lung   • Hyperlipidemia   • Abdominal aortic aneurysm (AAA) without rupture   • Congenital absence of left kidney   • Elevated LFTs   • Sepsis   • Hematochezia   • Diverticulosis of large intestine without hemorrhage   • Rash of body   • Thrombosis, hepatic vein       Past Medical History:   Diagnosis Date   • Colon polyp    • Diverticulosis of large intestine without hemorrhage 8/9/2017   • Emphysema lung 2/28/2017   • Hyperlipidemia 2/28/2017   • Skin cancer 5/31/2017   • Thrombosis, 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       Past Surgical History:   Procedure Laterality Date   • ABDOMINAL AORTIC ANEURYSM REPAIR     • ARTERIOGRAM AORTIC N/A 5/8/2017    Procedure:  AORTIC STENT GRAFT REPAIR W/ GORE BILATERAL FEMORAL MINI CUTDOWNS;  Surgeon: Humble Hamilton MD;  [place] [facility] [place] 18[place];  Service:    • COLONOSCOPY N/A 8/3/2017    Procedure: COLONOSCOPY to cecum;  Surgeon: Patrick Elizabeth MD;  [place] [facility] [place];  Service:    • ENDOSCOPY  8/3/2017    Procedure: ESOPHAGOGASTRODUODENOSCOPY;  Surgeon: Patrick Elizabeth MD;  [place] [facility] [place];  Service:    • HERNIA REPAIR  1980   • UMBILICAL HERNIA REPAIR  1975       Social History     Social History   • Marital status:      Spouse name: Samira   • Number of children: 1   • Years of education: N/A     Occupational History   • Business: owner of  metal fabrication      Social History Main Topics   • Smoking status: Current Every Day Smoker     Packs/day: 0.25     Years: 40.00     Types: Cigarettes   • Smokeless tobacco: Never Used      Comment: previously over 1 ppd   • Alcohol use 6.0 oz/week     10 Standard drinks or equivalent per week      Comment: 1-2 drinks bourbon   • Drug use: No   • Sexual activity: Yes     Partners: Female     Other Topics Concern   • None     Social History Narrative       Family History   Problem Relation Age of Onset   • Colon cancer Father 55   • Heart attack Father 71   • Coronary artery disease Father    • Hypertension Mother    • Hyperlipidemia Mother    • Sudden death Mother 78   • Benign prostatic hyperplasia Brother    • Hypertension Brother    • Benign prostatic hyperplasia Brother    • No Known Problems Sister    • Brain cancer Maternal Grandmother    • Heart disease Maternal Grandfather    • Sudden death Maternal Grandfather          **Lila Disclaimer:   Much of this encounter note is an electronic transcription/translation of spoken language to printed text. The electronic translation of spoken language may permit erroneous, or at times, nonsensical words or phrases to be inadvertently transcribed. Although I have reviewed the note for such errors, some may still exist.

## 2017-08-17 DIAGNOSIS — E78.5 HYPERLIPIDEMIA, UNSPECIFIED HYPERLIPIDEMIA TYPE: ICD-10-CM

## 2017-08-17 DIAGNOSIS — I10 ESSENTIAL HYPERTENSION: Chronic | ICD-10-CM

## 2017-08-17 RX ORDER — PRAVASTATIN SODIUM 20 MG
20 TABLET ORAL DAILY
Qty: 90 TABLET | Refills: 0 | Status: SHIPPED | OUTPATIENT
Start: 2017-08-17 | End: 2017-11-13 | Stop reason: SDUPTHER

## 2017-08-17 RX ORDER — AMLODIPINE BESYLATE 10 MG/1
10 TABLET ORAL DAILY
Qty: 90 TABLET | Refills: 0 | Status: SHIPPED | OUTPATIENT
Start: 2017-08-17 | End: 2017-11-13 | Stop reason: SDUPTHER

## 2017-08-29 ENCOUNTER — OFFICE VISIT (OUTPATIENT)
Dept: ONCOLOGY | Facility: CLINIC | Age: 62
End: 2017-08-29

## 2017-08-29 ENCOUNTER — LAB (OUTPATIENT)
Dept: LAB | Facility: HOSPITAL | Age: 62
End: 2017-08-29

## 2017-08-29 VITALS
OXYGEN SATURATION: 99 % | HEART RATE: 85 BPM | HEIGHT: 68 IN | BODY MASS INDEX: 25.94 KG/M2 | DIASTOLIC BLOOD PRESSURE: 80 MMHG | RESPIRATION RATE: 14 BRPM | SYSTOLIC BLOOD PRESSURE: 120 MMHG | WEIGHT: 171.2 LBS | TEMPERATURE: 97.9 F

## 2017-08-29 DIAGNOSIS — I81 PORTAL VEIN THROMBOSIS: Primary | ICD-10-CM

## 2017-08-29 DIAGNOSIS — J43.9 PULMONARY EMPHYSEMA, UNSPECIFIED EMPHYSEMA TYPE (HCC): Primary | ICD-10-CM

## 2017-08-29 LAB
ALBUMIN SERPL-MCNC: 4.4 G/DL (ref 3.5–5.2)
ALBUMIN/GLOB SERPL: 1.5 G/DL (ref 1.1–2.4)
ALP SERPL-CCNC: 107 U/L (ref 38–116)
ALT SERPL W P-5'-P-CCNC: 65 U/L (ref 0–41)
ANION GAP SERPL CALCULATED.3IONS-SCNC: 15.2 MMOL/L
AST SERPL-CCNC: 40 U/L (ref 0–40)
BASOPHILS # BLD AUTO: 0.03 10*3/MM3 (ref 0–0.1)
BASOPHILS NFR BLD AUTO: 0.4 % (ref 0–1.1)
BILIRUB SERPL-MCNC: 0.6 MG/DL (ref 0.1–1.2)
BUN BLD-MCNC: 15 MG/DL (ref 6–20)
BUN/CREAT SERPL: 11.8 (ref 7.3–30)
CALCIUM SPEC-SCNC: 9.7 MG/DL (ref 8.5–10.2)
CHLORIDE SERPL-SCNC: 100 MMOL/L (ref 98–107)
CO2 SERPL-SCNC: 24.8 MMOL/L (ref 22–29)
CREAT BLD-MCNC: 1.27 MG/DL (ref 0.7–1.3)
DEPRECATED RDW RBC AUTO: 50.1 FL (ref 37–49)
EOSINOPHIL # BLD AUTO: 0.22 10*3/MM3 (ref 0–0.36)
EOSINOPHIL NFR BLD AUTO: 2.9 % (ref 1–5)
ERYTHROCYTE [DISTWIDTH] IN BLOOD BY AUTOMATED COUNT: 14.1 % (ref 11.7–14.5)
GFR SERPL CREATININE-BSD FRML MDRD: 57 ML/MIN/1.73
GLOBULIN UR ELPH-MCNC: 3 GM/DL (ref 1.8–3.5)
GLUCOSE BLD-MCNC: 101 MG/DL (ref 74–124)
HCT VFR BLD AUTO: 43.6 % (ref 40–49)
HGB BLD-MCNC: 14.3 G/DL (ref 13.5–16.5)
HOLD SPECIMEN: NORMAL
IMM GRANULOCYTES # BLD: 0.04 10*3/MM3 (ref 0–0.03)
IMM GRANULOCYTES NFR BLD: 0.5 % (ref 0–0.5)
LYMPHOCYTES # BLD AUTO: 2.72 10*3/MM3 (ref 1–3.5)
LYMPHOCYTES NFR BLD AUTO: 36.4 % (ref 20–49)
MCH RBC QN AUTO: 31.5 PG (ref 27–33)
MCHC RBC AUTO-ENTMCNC: 32.8 G/DL (ref 32–35)
MCV RBC AUTO: 96 FL (ref 83–97)
MONOCYTES # BLD AUTO: 0.41 10*3/MM3 (ref 0.25–0.8)
MONOCYTES NFR BLD AUTO: 5.5 % (ref 4–12)
NEUTROPHILS # BLD AUTO: 4.05 10*3/MM3 (ref 1.5–7)
NEUTROPHILS NFR BLD AUTO: 54.3 % (ref 39–75)
NRBC BLD MANUAL-RTO: 0 /100 WBC (ref 0–0)
PLATELET # BLD AUTO: 177 10*3/MM3 (ref 150–375)
PMV BLD AUTO: 9.7 FL (ref 8.9–12.1)
POTASSIUM BLD-SCNC: 4.8 MMOL/L (ref 3.5–4.7)
PROT SERPL-MCNC: 7.4 G/DL (ref 6.3–8)
RBC # BLD AUTO: 4.54 10*6/MM3 (ref 4.3–5.5)
SODIUM BLD-SCNC: 140 MMOL/L (ref 134–145)
WBC NRBC COR # BLD: 7.47 10*3/MM3 (ref 4–10)

## 2017-08-29 PROCEDURE — 85025 COMPLETE CBC W/AUTO DIFF WBC: CPT | Performed by: INTERNAL MEDICINE

## 2017-08-29 PROCEDURE — 36415 COLL VENOUS BLD VENIPUNCTURE: CPT | Performed by: INTERNAL MEDICINE

## 2017-08-29 PROCEDURE — 99214 OFFICE O/P EST MOD 30 MIN: CPT | Performed by: INTERNAL MEDICINE

## 2017-08-29 PROCEDURE — 80053 COMPREHEN METABOLIC PANEL: CPT | Performed by: INTERNAL MEDICINE

## 2017-08-29 NOTE — PROGRESS NOTES
Subjective .     REASONS FOR FOLLOWUP:  Portal vein thrombosis    HISTORY OF PRESENT ILLNESS:  The patient is a 62 y.o. year old male  who is here for follow-up with the above-mentioned history.    Denies chest pain, shortness of breath, pain or swelling in 1 leg more than the other.  Denies bleeding from any location.  Denies abdominal pain.    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     Past Surgical History:   Procedure Laterality Date   • ABDOMINAL AORTIC ANEURYSM REPAIR     • ARTERIOGRAM AORTIC N/A 5/8/2017    Procedure:  AORTIC STENT GRAFT REPAIR W/ GORE BILATERAL FEMORAL MINI CUTDOWNS;  Surgeon: Humble Hamilton MD;  Location: [facility] HYBRID OR 18/19;  Service:    • COLONOSCOPY N/A 8/3/2017    Procedure: COLONOSCOPY to cecum;  Surgeon: Patrick Elizabeth MD;  Location: [facility] ENDOSCOPY;  Service:    • COLONOSCOPY  09/2010   • ENDOSCOPY  8/3/2017    Procedure: ESOPHAGOGASTRODUODENOSCOPY;  Surgeon: Patrick Elizabeth MD;  Location: [facility] ENDOSCOPY;  Service:    • HERNIA REPAIR  1980   • UMBILICAL HERNIA REPAIR  1975       HEMATOLOGIC/ONCOLOGIC HISTORY:  (History from previous dates can be found in the separate document.)    MEDICATIONS    Current Outpatient Prescriptions:   •  amLODIPine (NORVASC) 10 MG tablet, Take 1 tablet by mouth Daily., Disp: 90 tablet, Rfl: 0  •  aspirin 81 MG EC tablet, Take 1 tablet by mouth Daily., Disp: , Rfl:   •  pravastatin (PRAVACHOL) 20 MG tablet, Take 1 tablet by mouth Daily., Disp: 90 tablet, Rfl: 0  •  rivaroxaban (XARELTO) 20 MG tablet, Take 1 tablet by mouth Daily., Disp: 30 tablet, Rfl: 5    ALLERGIES:   No Known Allergies    SOCIAL HISTORY:       Social History     Social History   • Marital status:      Spouse name: Samira   • Number of  children: 1   • Years of education: N/A     Occupational History   • Business: owner of metal fabrication [employer]     Social History Main Topics   • Smoking status: Current Every Day Smoker     Packs/day: 0.25     Years: 40.00     Types: Cigarettes   • Smokeless tobacco: Never Used      Comment: previously over 1 ppd   • Alcohol use 6.0 oz/week     10 Standard drinks or equivalent per week      Comment: 1-2 drinks bourbon   • Drug use: No   • Sexual activity: Yes     Partners: Female     Other Topics Concern   • Not on file     Social History Narrative         FAMILY HISTORY:  Family History   Problem Relation Age of Onset   • Colon cancer Father 55   • Heart attack Father 71   • Coronary artery disease Father    • Hypertension Mother    • Hyperlipidemia Mother    • Sudden death Mother 78   • Benign prostatic hyperplasia Brother    • Hypertension Brother    • Benign prostatic hyperplasia Brother    • No Known Problems Sister    • Brain cancer Maternal Grandmother    • Heart disease Maternal Grandfather    • Sudden death Maternal Grandfather        REVIEW OF SYSTEMS:  GENERAL: No change in appetite or weight;   No fevers, chills, sweats.    SKIN: No nonhealing lesions.   No rashes.  HEME/LYMPH: No easy bruising, bleeding.   No swollen nodes.   EYES: No vision changes or diplopia.   ENT: No tinnitus, hearing loss, gum bleeding, epistaxis, hoarseness or dysphagia.   RESPIRATORY: No cough, shortness of breath, hemoptysis or wheezing.   CVS: No chest pain, palpitations, orthopnea, dyspnea on exertion or PND.   GI: No melena or hematochezia.   No abdominal pain.  No nausea, vomiting, constipation, diarrhea  : No lower tract obstructive symptoms, dysuria or hematuria.   MUSCULOSKELETAL: No bone pain.  No joint stiffness.   NEUROLOGICAL: No global weakness, loss of consciousness or seizures.   PSYCHIATRIC: No increased nervousness, mood changes or depression.     Objective    Vitals:    08/29/17 1525   BP: 120/80  "  Pulse: 85   Resp: 14   Temp: 97.9 °F (36.6 °C)   TempSrc: Oral   SpO2: 99%   Weight: 171 lb 3.2 oz (77.7 kg)   Height: 68.11\" (173 cm)  Comment: new height   PainSc: 0-No pain     Current Status 8/29/2017   ECOG score 0      PHYSICAL EXAM:    GENERAL:  Well-developed, well-nourished in no acute distress.   SKIN:  Warm, dry without rashes, purpura or petechiae.  EYES:  Pupils equal, round and reactive to light.  EOMs intact.  Conjunctivae normal.  EARS:  Hearing intact.  NOSE:  Septum midline.  No excoriations or nasal discharge.  MOUTH:  Tongue is well-papillated; no stomatitis or ulcers.  Lips normal.  THROAT:  Oropharynx without lesions or exudates.  NECK:  Supple with good range of motion; no thyromegaly or masses, no JVD.  LYMPHATICS:  No cervical, supraclavicular, axillary or inguinal adenopathy.  CHEST:  Lungs clear to auscultation. Good airflow.  CARDIAC:  Regular rate and rhythm without murmurs, rubs or gallops. Normal S1,S2.  ABDOMEN:  Soft, nontender with no hepatosplenomegaly or masses.  EXTREMITIES:  No clubbing, cyanosis or edema.  NEUROLOGICAL:  Cranial Nerves II-XII grossly intact.  No focal neurological deficits.  PSYCHIATRIC:  Normal affect and mood.     RECENT LABS:        WBC   Date/Time Value Ref Range Status   08/29/2017 02:50 PM 7.47 4.00 - 10.00 10*3/mm3 Final     Hemoglobin   Date/Time Value Ref Range Status   08/29/2017 02:50 PM 14.3 13.5 - 16.5 g/dL Final     Platelets   Date/Time Value Ref Range Status   08/29/2017 02:50  150 - 375 10*3/mm3 Final       Assessment/Plan   Portal vein thrombosis  - Protein C Activity  - Antithrombin III  *Portal venous thrombosis.  (Left portal venous branches, acute/subacute and single tiny right hepatic lobe on MRCP 7/31/17.  Not seen on CT AP with contrast).  Unremarkable: Protein S free, protein S activity, lupus anticoagulant, anticardiolipin antibodies, beta-2 glycoprotein antibodies, prothrombin gene mutation, factor V Leiden gene " mutation.    On Eliquis.  States he forgets the evening dose of Eliquis 20% of the time.  He request to change to a daily anticoagulant.  Changed to Xarelto 20 mg daily, today, 8/29/17.    *Protein C activity mildly low at 77% (normal range  percent)  This was a new issue for me to address today.    *Antithrombin mildly low at 83% (normal range  percent).  This was a new issue for me to address today.      *Rectal bleeding.  Suspected hemorrhoidal, per Dr. Farrell.  Last scope 2015.  If this significantly worsens on anticoagulation, anticoagulation may need to be discontinued.  He denied bleeding today.     *Possible prostate mass on CT 7/30/17.  Not mentioned on CT 8/2/17.  Dr. Tapia (hospitalist) discussed with a urologist.  The urologist stated these scans look unremarkable and no further workup is needed.     *Anemia.  On 8/1/17 ferritin elevated at 890, but only 8% iron saturation.  Folate and B12 unremarkable.  Hb now normal.     PLAN  · Likely the mildly low antithrombin and protein C activity levels are not significant.  · It is likely reasonable to stop anticoagulation after a total of 6 months.  · M.D. 5 months.  One week prior: Protein C activity, antithrombin level  · Change Eliquis to Xarelto, 20 mg daily.

## 2017-10-02 ENCOUNTER — OFFICE VISIT (OUTPATIENT)
Dept: INTERNAL MEDICINE | Age: 62
End: 2017-10-02

## 2017-10-02 VITALS
BODY MASS INDEX: 26.07 KG/M2 | DIASTOLIC BLOOD PRESSURE: 66 MMHG | HEIGHT: 68 IN | SYSTOLIC BLOOD PRESSURE: 108 MMHG | WEIGHT: 172 LBS | HEART RATE: 88 BPM | OXYGEN SATURATION: 97 % | TEMPERATURE: 97.2 F

## 2017-10-02 DIAGNOSIS — I82.0 THROMBOSIS, HEPATIC VEIN (HCC): Primary | ICD-10-CM

## 2017-10-02 DIAGNOSIS — Z23 NEED FOR INFLUENZA VACCINATION: ICD-10-CM

## 2017-10-02 DIAGNOSIS — I10 ESSENTIAL HYPERTENSION: Chronic | ICD-10-CM

## 2017-10-02 DIAGNOSIS — E78.2 MIXED HYPERLIPIDEMIA: Chronic | ICD-10-CM

## 2017-10-02 DIAGNOSIS — F17.219 CIGARETTE NICOTINE DEPENDENCE WITH NICOTINE-INDUCED DISORDER: Chronic | ICD-10-CM

## 2017-10-02 PROCEDURE — 90471 IMMUNIZATION ADMIN: CPT | Performed by: INTERNAL MEDICINE

## 2017-10-02 PROCEDURE — 90686 IIV4 VACC NO PRSV 0.5 ML IM: CPT | Performed by: INTERNAL MEDICINE

## 2017-10-02 PROCEDURE — 99214 OFFICE O/P EST MOD 30 MIN: CPT | Performed by: INTERNAL MEDICINE

## 2017-10-02 NOTE — PROGRESS NOTES
"[facility] [place] [facility]  RAIMUNDO MEDINA M.D.      Patrick Mckeon / 62 y.o. / male  10/02/2017    VITALS:    /66 (BP Location: Left arm, Patient Position: Sitting, Cuff Size: Adult)  Pulse 88  Temp 97.2 °F (36.2 °C) (Tympanic)   Ht 68.11\" (173 cm)  Wt 172 lb (78 kg)  SpO2 97%  BMI 26.07 kg/m2    BP Readings from Last 3 Encounters:   10/02/17 108/66   08/29/17 120/80   08/09/17 112/70     Wt Readings from Last 3 Encounters:   10/02/17 172 lb (78 kg)   08/29/17 171 lb 3.2 oz (77.7 kg)   08/09/17 170 lb (77.1 kg)      Body mass index is 26.07 kg/(m^2).    CC:  Main reason(s) for today's visit: Hematochezia (1 month follow up from being in hospital was discharged on 8/3/17)      HPI:     Denies any bleeding complications on Xarelto for hepatic vein thrombosis.  Protein C activity and antithrombin III level was mildly low.  He has a follow-up with [facility].    Chronic essential hypertension:  Since prior visit: compliant with medication(s), checks blood pressure regularly and denies significant problems with medication(s).  Most recent in-office blood pressure readings:   BP Readings from Last 3 Encounters:   10/02/17 108/66   08/29/17 120/80   08/09/17 112/70     Chronic hyperlipidemia:  Current therapy include pravastatin, denies problems with medication.    Most recent labs:   Lab Results   Component Value Date     (H) 02/21/2017    LDL 95 09/21/2015    HDL 69 (H) 02/21/2017    HDL 55 09/21/2015    TRIG 104 02/21/2017    CHOLHDLRATIO 3.00 02/21/2017     Still smoking about 6 cigarettes daily but is working to further decrease.  History of COPD without complaints of increased cough or wheezing.      Patient Care Team:  Lester Medina MD as PCP - General (Internal Medicine)  Orlin Quintero MD as Consulting Physician (Gastroenterology)  Esther Tapia MD as Referring Physician (Internal Medicine)  Keyur Mejia II, MD as Consulting Physician (Hematology and " Oncology)  ____________________________________________________________________    ASSESSMENT & PLAN:    Problem List Items Addressed This Visit     Essential hypertension (Chronic)    Overview     Stable. Continue amlodipine          Relevant Medications    amLODIPine (NORVASC) 10 MG tablet    Thrombosis, hepatic vein - Primary    Current Assessment & Plan     Continue Xarelto. F/u with CBC. Consider stopping ASA. Instructed him to discuss with vascular.          Hyperlipidemia (Chronic)    Relevant Medications    aspirin 81 MG EC tablet    pravastatin (PRAVACHOL) 20 MG tablet    Cigarette nicotine dependence with nicotine-induced disorder (Chronic)    Current Assessment & Plan     Advised to quit smoking.            Other Visit Diagnoses     Need for influenza vaccination        Relevant Orders    Flu Vaccine Quad PF 3YR+        Orders Placed This Encounter   Procedures   • Flu Vaccine Quad PF 3YR+       Summary/Discussion:     · Discussed with his vascular surgeon.  He probably can discontinue aspirin while on Xarelto.    Return in about 6 months (around 4/2/2018) for Hypertension, hyperlipidemia.    Future Appointments  Date Time Provider Department Center   1/9/2018 8:00 AM LAB CHAIR 6 [facility]  LAB [facility]   1/16/2018 8:40 AM VITALS ONLY [facility] LAB [facility]   1/16/2018 9:00 AM Keyur Mejia II, MD K [facility] CBC [place]       ____________________________________________________________________    REVIEW OF SYSTEMS    Review of Systems   Gastrointestinal: Negative.  Negative for abdominal pain and blood in stool.     As noted per HPI  Constitutional neg  Resp neg  CV neg  Other: As noted per HPI      PHYSICAL EXAMINATION    Physical Exam  Constitutional  No distress  Cardiovascular Rate  normal . Rhythm: regular . Heart sounds:  Normal  Pulmonary/Chest  Effort normal. Breath sounds:  Decreased BS w/o wheezing  Abdomen: Soft and nontender, no palpable mass, no hepatomegaly.   Psychiatric  Alert.  Judgment and thought content normal. Mood normal    REVIEWED DATA:    Labs:     Lab Results   Component Value Date     08/29/2017    K 4.8 (H) 08/29/2017    AST 40 08/29/2017    ALT 65 (H) 08/29/2017    BUN 15 08/29/2017    CREATININE 1.27 08/29/2017    CREATININE 1.06 08/03/2017    CREATININE 1.03 08/02/2017    EGFRIFNONA 57 (L) 08/29/2017    EGFRIFAFRI 64 02/21/2017       Lab Results   Component Value Date    HGBA1C 5.10 02/21/2017    HGBA1C 5.7 (H) 09/21/2015    GLU 94 02/21/2017    GLU 81 09/21/2015       Lab Results   Component Value Date     (H) 02/21/2017    LDL 95 09/21/2015    HDL 69 (H) 02/21/2017    TRIG 104 02/21/2017    CHOLHDLRATIO 3.00 02/21/2017       Lab Results   Component Value Date    TSH 1.380 02/21/2017    FREET4 1.22 02/21/2017       Lab Results   Component Value Date    WBC 7.47 08/29/2017    HGB 14.3 08/29/2017    HGB 11.4 (L) 08/03/2017    HGB 11.6 (L) 08/02/2017     08/29/2017       Imaging:         Medical Tests:         Summary of old records / correspondence / consultant report:         Request outside records:         ALLERGIES  No Known Allergies     MEDICATIONS  Current Outpatient Prescriptions on File Prior to Visit   Medication Sig Dispense Refill   • amLODIPine (NORVASC) 10 MG tablet Take 1 tablet by mouth Daily. 90 tablet 0   • aspirin 81 MG EC tablet Take 1 tablet by mouth Daily.     • pravastatin (PRAVACHOL) 20 MG tablet Take 1 tablet by mouth Daily. 90 tablet 0   • rivaroxaban (XARELTO) 20 MG tablet Take 1 tablet by mouth Daily. 30 tablet 5     No current facility-administered medications on file prior to visit.        PFSH:     The following portions of the patient's history were reviewed and updated as appropriate: Allergies / Current Medications / Past Medical History / Surgical History / Social History / Family History    PROBLEM LIST   Patient Active Problem List   Diagnosis   • Benign colonic polyp   • Essential hypertension   • Cigarette nicotine  dependence with nicotine-induced disorder   • Emphysema lung   • Hyperlipidemia   • Abdominal aortic aneurysm (AAA) without rupture   • Congenital absence of left kidney   • Elevated LFTs   • Sepsis   • Hematochezia   • Diverticulosis of large intestine without hemorrhage   • Thrombosis, hepatic vein   • Portal vein thrombosis       PAST MEDICAL HISTORY  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       SURGICAL HISTORY  Past Surgical History:   Procedure Laterality Date   • ABDOMINAL AORTIC ANEURYSM REPAIR     • ARTERIOGRAM AORTIC N/A 5/8/2017    Procedure:  AORTIC STENT GRAFT REPAIR W/ GORE BILATERAL FEMORAL MINI CUTDOWNS;  Surgeon: Humble Hamilton MD;  Location: [facility] HYBRID OR 18/19;  Service:    • COLONOSCOPY N/A 8/3/2017    Procedure: COLONOSCOPY to cecum;  Surgeon: Patrick Elizabeth MD;  Location: [facility] ENDOSCOPY;  Service:    • COLONOSCOPY  09/2010   • ENDOSCOPY  8/3/2017    Procedure: ESOPHAGOGASTRODUODENOSCOPY;  Surgeon: Patrick Elizabeth MD;  Location: [facility] ENDOSCOPY;  Service:    • HERNIA REPAIR  1980   • UMBILICAL HERNIA REPAIR  1975       SOCIAL HISTORY  Social History     Social History   • Marital status:      Spouse name: Samira   • Number of children: 1   • Years of education: N/A     Occupational History   • Business: owner of metal [employer]     Social History Main Topics   • Smoking status: Current Every Day Smoker     Packs/day: 0.25     Years: 40.00     Types: Cigarettes   • Smokeless tobacco: Never Used      Comment: previously over 1 ppd   • Alcohol use 6.0 oz/week     10 Standard drinks or equivalent per week      Comment: 1-2 drinks bourbon   • Drug use: No   • Sexual activity: Yes     Partners: Female     Other Topics Concern   • None      Social History Narrative       FAMILY HISTORY  Family History   Problem Relation Age of Onset   • Colon cancer Father 55   • Heart attack Father 71   • Coronary artery disease Father    • Hypertension Mother    • Hyperlipidemia Mother    • Sudden death Mother 78   • Benign prostatic hyperplasia Brother    • Hypertension Brother    • Benign prostatic hyperplasia Brother    • No Known Problems Sister    • Brain cancer Maternal Grandmother    • Heart disease Maternal Grandfather    • Sudden death Maternal Grandfather          **Lila Disclaimer:   Much of this encounter note is an electronic transcription/translation of spoken language to printed text. The electronic translation of spoken language may permit erroneous, or at times, nonsensical words or phrases to be inadvertently transcribed. Although I have reviewed the note for such errors, some may still exist.

## 2017-10-03 ENCOUNTER — APPOINTMENT (OUTPATIENT)
Dept: GENERAL RADIOLOGY | Facility: HOSPITAL | Age: 62
End: 2017-10-03

## 2017-10-03 ENCOUNTER — HOSPITAL ENCOUNTER (EMERGENCY)
Facility: HOSPITAL | Age: 62
Discharge: HOME | End: 2017-10-03
Attending: EMERGENCY MEDICINE | Admitting: EMERGENCY MEDICINE

## 2017-10-03 VITALS
HEART RATE: 80 BPM | RESPIRATION RATE: 18 BRPM | SYSTOLIC BLOOD PRESSURE: 136 MMHG | BODY MASS INDEX: 27 KG/M2 | OXYGEN SATURATION: 92 % | HEIGHT: 67 IN | TEMPERATURE: 97.3 F | DIASTOLIC BLOOD PRESSURE: 94 MMHG | WEIGHT: 172 LBS

## 2017-10-03 DIAGNOSIS — S51.811A FOREARM LACERATION, RIGHT, INITIAL ENCOUNTER: Primary | ICD-10-CM

## 2017-10-03 DIAGNOSIS — S57.81XA CRUSHING INJURY OF RIGHT FOREARM, INITIAL ENCOUNTER: ICD-10-CM

## 2017-10-03 PROCEDURE — 99284 EMERGENCY DEPT VISIT MOD MDM: CPT

## 2017-10-03 PROCEDURE — 73090 X-RAY EXAM OF FOREARM: CPT

## 2017-10-03 RX ORDER — OXYCODONE HYDROCHLORIDE AND ACETAMINOPHEN 5; 325 MG/1; MG/1
1 TABLET ORAL ONCE
Status: COMPLETED | OUTPATIENT
Start: 2017-10-03 | End: 2017-10-03

## 2017-10-03 RX ORDER — LIDOCAINE HYDROCHLORIDE 10 MG/ML
INJECTION, SOLUTION INFILTRATION; PERINEURAL
Status: DISCONTINUED
Start: 2017-10-03 | End: 2017-10-03 | Stop reason: HOSPADM

## 2017-10-03 RX ORDER — LIDOCAINE HYDROCHLORIDE 10 MG/ML
10 INJECTION, SOLUTION INFILTRATION; PERINEURAL ONCE
Status: COMPLETED | OUTPATIENT
Start: 2017-10-03 | End: 2017-10-03

## 2017-10-03 RX ORDER — HYDROCODONE BITARTRATE AND ACETAMINOPHEN 5; 325 MG/1; MG/1
1 TABLET ORAL EVERY 6 HOURS PRN
Qty: 15 TABLET | Refills: 0 | Status: SHIPPED | OUTPATIENT
Start: 2017-10-03 | End: 2018-01-16

## 2017-10-03 RX ORDER — ONDANSETRON 4 MG/1
4 TABLET, ORALLY DISINTEGRATING ORAL ONCE
Status: COMPLETED | OUTPATIENT
Start: 2017-10-03 | End: 2017-10-03

## 2017-10-03 RX ADMIN — ONDANSETRON 4 MG: 4 TABLET, ORALLY DISINTEGRATING ORAL at 14:23

## 2017-10-03 RX ADMIN — LIDOCAINE HYDROCHLORIDE 10 ML: 10 INJECTION, SOLUTION INFILTRATION; PERINEURAL at 16:44

## 2017-10-03 RX ADMIN — OXYCODONE HYDROCHLORIDE AND ACETAMINOPHEN 1 TABLET: 5; 325 TABLET ORAL at 14:23

## 2017-10-03 NOTE — ED PROVIDER NOTES
Laceration Repair  Date/Time: 10/3/2017 4:43 PM  Performed by: ELMO SMYTH  Authorized by: FÁTIMA JULIAN   Consent: Verbal consent obtained.  Consent given by: patient  Patient identity confirmed: arm band  Body area: upper extremity  Location details: right lower arm  Laceration length: 7.2 cm  Foreign bodies: no foreign bodies  Tendon involvement: none  Nerve involvement: none  Vascular damage: yes    Anesthesia:  Local Anesthetic: lidocaine 1% without epinephrine  Anesthetic total: 20 mL    Sedation:  Patient sedated: no  Preparation: Patient was prepped and draped in the usual sterile fashion.  Irrigation solution: saline  Irrigation method: jet lavage  Amount of cleaning: standard  Debridement: moderate  Degree of undermining: extensive  Skin closure: 5-0 nylon  Number of sutures: 18  Technique: horizontal mattress (and Interrupted)  Approximation: close  Approximation difficulty: complex  Dressing: 4x4 sterile gauze, antibiotic ointment and gauze roll  Patient tolerance: Patient tolerated the procedure well with no immediate complications            Documentation assistance provided by ita Foreman for Elmo Smyth PA-C.  Information recorded by the scribe was done at my direction and has been verified and validated by me.       Jl Foreman  10/03/17 1738       SARAVANAN Blake  10/03/17 1824

## 2017-10-03 NOTE — DISCHARGE INSTRUCTIONS
Use ice as needed.  Keep wounds clean and dry and use neosporin or bacitracin twice a day.  Sutures need to be removed in 12 to 14 days.  Please return to the ED if you develop increasing pain to your left arm with tingling or numbness to your left hand.

## 2017-10-03 NOTE — ED PROVIDER NOTES
" EMERGENCY DEPARTMENT ENCOUNTER    CHIEF COMPLAINT  Chief Complaint: R arm pain  History given by: Pt  History limited by: N/A  Room Number: 22/22  PMD: Lester Carter MD      HPI:  Pt is a 62 y.o. male who presents complaining of R arm pain after squeezing his forearm between two pieces of metal while at work.  He states that the pain caused nausea, lightheadedness and caused him to briefly pass out.  He denies any chest pain, palpitations or trouble breathing.  Pt denies any other injuries, abdominal pain, chest pain, or any other numbness or tingling.     Duration:  PTA  Onset: sudden  Timing: constant  Location: R forearm  Radiation: none  Quality: \"pain\"  Intensity/Severity: moderate  Progression: unchanged   Associated Symptoms: none  Aggravating Factors: none  Alleviating Factors: none  Previous Episodes: No history.   Treatment before arrival: No treatment PTA.     PAST MEDICAL HISTORY  Active Ambulatory Problems     Diagnosis Date Noted   • Benign colonic polyp 10/28/2016   • Essential hypertension 10/28/2016   • Cigarette nicotine dependence with nicotine-induced disorder 10/28/2016   • Emphysema lung 02/28/2017   • Hyperlipidemia 02/28/2017   • Abdominal aortic aneurysm (AAA) without rupture 03/13/2017   • Congenital absence of left kidney 03/13/2017   • Elevated LFTs 07/30/2017   • Sepsis 08/02/2017   • Hematochezia 07/30/2017   • Diverticulosis of large intestine without hemorrhage 08/09/2017   • Thrombosis, 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   • Portal vein thrombosis 08/29/2017     Resolved Ambulatory Problems     Diagnosis Date Noted   • No Resolved Ambulatory Problems     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       PAST SURGICAL HISTORY  Past Surgical History:   Procedure Laterality Date "   • ABDOMINAL AORTIC ANEURYSM REPAIR     • ARTERIOGRAM AORTIC N/A 5/8/2017    Procedure:  AORTIC STENT GRAFT REPAIR W/ GORE BILATERAL FEMORAL MINI CUTDOWNS;  Surgeon: Humble Hamilton MD;  Location: [facility] HYBRID OR 18/19;  Service:    • COLONOSCOPY N/A 8/3/2017    Procedure: COLONOSCOPY to cecum;  Surgeon: Patrick Elizabeth MD;  Location: [facility] ENDOSCOPY;  Service:    • COLONOSCOPY  09/2010   • ENDOSCOPY  8/3/2017    Procedure: ESOPHAGOGASTRODUODENOSCOPY;  Surgeon: Patrick Elizabeth MD;  Location: [facility] ENDOSCOPY;  Service:    • HERNIA REPAIR  1980   • UMBILICAL HERNIA REPAIR  1975       FAMILY HISTORY  Family History   Problem Relation Age of Onset   • Colon cancer Father 55   • Heart attack Father 71   • Coronary artery disease Father    • Hypertension Mother    • Hyperlipidemia Mother    • Sudden death Mother 78   • Benign prostatic hyperplasia Brother    • Hypertension Brother    • Benign prostatic hyperplasia Brother    • No Known Problems Sister    • Brain cancer Maternal Grandmother    • Heart disease Maternal Grandfather    • Sudden death Maternal Grandfather        SOCIAL HISTORY  Social History     Social History   • Marital status:      Spouse name: Samira   • Number of children: 1   • Years of education: N/A     Occupational History   • Business: owner of metal [employer]     Social History Main Topics   • Smoking status: Current Every Day Smoker     Packs/day: 0.25     Years: 40.00     Types: Cigarettes   • Smokeless tobacco: Never Used      Comment: previously over 1 ppd   • Alcohol use 6.0 oz/week     10 Standard drinks or equivalent per week      Comment: 1-2 drinks bourbon   • Drug use: No   • Sexual activity: Yes     Partners: Female     Other Topics Concern   • Not on file     Social History Narrative       ALLERGIES  Review of patient's allergies indicates no known allergies.    REVIEW OF SYSTEMS  Review of Systems   Constitutional: Negative for activity change, appetite  change and fever.   HENT: Negative for congestion and sore throat.    Eyes: Negative.    Respiratory: Negative for cough and shortness of breath.    Cardiovascular: Negative for chest pain and leg swelling.   Gastrointestinal: Negative for abdominal pain, diarrhea and vomiting.   Endocrine: Negative.    Genitourinary: Negative for decreased urine volume and dysuria.   Musculoskeletal: Positive for myalgias (R arm pain). Negative for neck pain.   Skin: Negative for rash and wound.   Allergic/Immunologic: Negative.    Neurological: Positive for dizziness, light-headedness and numbness (R arm ). Negative for weakness and headaches.   Hematological: Negative.    Psychiatric/Behavioral: Negative.    All other systems reviewed and are negative.      PHYSICAL EXAM  ED Triage Vitals   Temp Heart Rate Resp BP SpO2   10/03/17 1245 10/03/17 1245 10/03/17 1245 10/03/17 1245 10/03/17 1245   97.3 °F (36.3 °C) 87 18 150/74 98 %      Temp src Heart Rate Source Patient Position BP Location FiO2 (%)   10/03/17 1245 10/03/17 1245 10/03/17 1245 10/03/17 1341 --   Tympanic Monitor Sitting Left arm        Physical Exam   Constitutional: He is oriented to person, place, and time and well-developed, well-nourished, and in no distress.   HENT:   Head: Normocephalic and atraumatic.   Eyes: EOM are normal. Pupils are equal, round, and reactive to light.   Neck: Normal range of motion. Neck supple.   Cardiovascular: Normal rate, regular rhythm and normal heart sounds.    No murmur heard.  Pulmonary/Chest: Effort normal and breath sounds normal. No respiratory distress.   Abdominal: Soft. There is no tenderness. There is no rebound and no guarding.   Musculoskeletal: Normal range of motion. He exhibits no edema.   Neurological: He is alert and oriented to person, place, and time. He has normal sensation and normal strength.   Skin: Skin is warm and dry.   Pt has a laceration on the ulnar aspect of his forearm, and 8 cm puncture wound on the  proximal radial aspect of his arm, that goes into the fascia. Pt has an abrasion on the distal forearm.    Psychiatric: Mood and affect normal.   Nursing note and vitals reviewed.      RADIOLOGY  XR Forearm 2 View Right    There is soft tissue deformity of the right forearm. No  foreign bodies are seen, no fractures are seen.           I ordered the above noted radiological studies. Interpreted by radiologist. Reviewed by me in PACS.       PROCEDURES  Procedures      PROGRESS AND CONSULTS  ED Course     1349  Ordered XR R forearm for further evaluation, and Zofran-ODT.    1508  Ordered Suture tray supplies to bedside.   1532  I updated patient about XR report, and advised them on what to look for compartment syndrome. Him and wife understand and agree with plan.   1643  Ordered Lac repair.   1710  Rechecked pt, who is getting a suture repair at this time and will be discharged by Roderick Smyth PA-C.   1734  Ordered to obtain and apply R arm sling.    MEDICAL DECISION MAKING  Results were reviewed/discussed with the patient and they were also made aware of online access. Pt also made aware that some labs, such as cultures, will not be resulted during ER visit and follow up with PMD is necessary.     MDM  Number of Diagnoses or Management Options     Amount and/or Complexity of Data Reviewed  Tests in the radiology section of CPT®: ordered and reviewed (XR R forearm- showed soft tissue deformity, no formed bodies, no fracture.   )  Independent visualization of images, tracings, or specimens: yes    Patient Progress  Patient progress: stable         DIAGNOSIS  Final diagnoses:   Forearm laceration, right, initial encounter   Crushing injury of right forearm, initial encounter       DISPOSITION  DISCHARGE    Patient discharged in stable condition.    Reviewed implications of results, diagnosis, meds, responsibility to follow up, warning signs and symptoms of possible worsening, potential complications and reasons to  return to ER.    Patient/Family voiced understanding of above instructions.    Discussed plan for discharge, as there is no emergent indication for admission.  Pt/family is agreeable and understands need for follow up and repeat testing.  Pt is aware that discharge does not mean that nothing is wrong but it indicates no emergency is present that requires admission and they must continue care with follow-up as given below or physician of their choice.     FOLLOW-UP  No follow-up provider specified.       Medication List      New Prescriptions          HYDROcodone-acetaminophen 5-325 MG per tablet   Commonly known as:  NORCO   Take 1 tablet by mouth Every 6 (Six) Hours As Needed for Moderate Pain .               Latest Documented Vital Signs:  As of 7:46 PM  BP- 136/94 HR- 80 Temp- 97.3 °F (36.3 °C) (Tympanic) O2 sat- 92%    --  Documentation assistance provided by ita Zarate for Dr.Ettin Scotty MD.  Information recorded by the scribe was done at my direction and has been verified and validated by me.     Violet Morgan  10/03/17 1426       Violet Morgan  10/03/17 1546       Violet Morgan  10/03/17 1745       Violet Morgan  10/03/17 1759       Scotty Rojas MD  10/03/17 2540

## 2017-10-05 ENCOUNTER — TELEPHONE (OUTPATIENT)
Dept: SOCIAL WORK | Facility: HOSPITAL | Age: 62
End: 2017-10-05

## 2017-10-05 ENCOUNTER — TELEPHONE (OUTPATIENT)
Dept: ONCOLOGY | Facility: HOSPITAL | Age: 62
End: 2017-10-05

## 2017-10-05 NOTE — TELEPHONE ENCOUNTER
Pt got his right forearm squeezed between two pieces of metal 2 days ago and had to go to the ER and had to get stiches. Pt is on Xarelto. He states that it is bleeding around the stiches and soaking through a gauze in about 18-24 hours. Spoke with CATIA Malcolm and per her order have pt watch through the weekend and if not better on Monday pt to call if not better and we might need to hold a dose or so of his Xarelto. Informed pt of this and he V/u.

## 2017-10-17 ENCOUNTER — OFFICE VISIT (OUTPATIENT)
Dept: INTERNAL MEDICINE | Age: 62
End: 2017-10-17

## 2017-10-17 VITALS
WEIGHT: 173.8 LBS | DIASTOLIC BLOOD PRESSURE: 76 MMHG | SYSTOLIC BLOOD PRESSURE: 132 MMHG | OXYGEN SATURATION: 99 % | TEMPERATURE: 97.5 F | HEART RATE: 97 BPM | HEIGHT: 67 IN | BODY MASS INDEX: 27.28 KG/M2

## 2017-10-17 DIAGNOSIS — M79.89 ARM SWELLING: ICD-10-CM

## 2017-10-17 DIAGNOSIS — Z48.02 VISIT FOR SUTURE REMOVAL: Primary | ICD-10-CM

## 2017-10-17 DIAGNOSIS — S51.811D FOREARM LACERATION, RIGHT, SUBSEQUENT ENCOUNTER: ICD-10-CM

## 2017-10-17 PROCEDURE — 99213 OFFICE O/P EST LOW 20 MIN: CPT | Performed by: NURSE PRACTITIONER

## 2017-10-17 NOTE — PROGRESS NOTES
Subjective   Patrick Mckeon is a 62 y.o. male.     Laceration    Incident onset: 2 weeks ago. The laceration is located on the right arm. The laceration is 8 cm in size. The laceration mechanism was a blunt object. The pain is moderate. The pain has been improving since onset. His tetanus status is UTD.    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  He was seen in [facility] ED on 10/3/17, negative forearm x-ray, had 18 stitches placed and right forearm.  He still has significant swelling of the arm, but does report that the swelling has somewhat improved.  No fevers or chills.  Still has some tenderness to the elbow area along with bruising.  Denies any numbness or tingling.  Denies any pain or injury to right wrist or hand.    The following portions of the patient's history were reviewed and updated as appropriate: allergies, current medications, past family history, past medical history, past social history, past surgical history and problem list.    Review of Systems   Constitutional: Negative for chills and fever.   Neurological: Negative for numbness.       Objective   Physical Exam   Constitutional: He is oriented to person, place, and time. Vital signs are normal. He appears well-developed and well-nourished. He is cooperative. He does not appear ill. No distress.   Musculoskeletal:        Right forearm: He exhibits tenderness, swelling and edema. He exhibits no bony tenderness, no deformity and no laceration.        Arms:  Generalized swelling of right forearm.  Large 6 cm area of bruising to dorsal elbow area.  Patient has laceration with sutures in place to dorsal forearm, no redness, swelling, or warmth noted of that area; however, he does appear to have some contact dermatitis to the immediate area that he associates with the use of topical antibiotic cream.     Neurological: He is alert and oriented to person, place, and time. He has normal strength.   Nursing note and vitals reviewed.      Assessment/Plan   Problems Addressed this Visit     None      Visit Diagnoses     Visit for suture removal    -  Primary    Forearm laceration, right, subsequent encounter        Arm swelling            1. Visit for suture removal      2. Forearm laceration, right, subsequent encounter      3. Arm swelling  Discussed and examined patient with Dr. Carter.  Patient with a history of significant crush injury to right arm 2 weeks ago status post laceration repair.  He still has significant amount of swelling, although he does self reports that it is improved significantly.  No neurovascular compromise noted of arm.  Instructed to continue to keep elevated as much as possible to reduce swelling, apply heat to area to help resolve the edema, and follow-up as needed if pain is worsening or new symptoms.

## 2017-10-17 NOTE — PATIENT INSTRUCTIONS
Suture Removal, Care After  Refer to this sheet in the next few weeks. These instructions provide you with information on caring for yourself after your procedure. Your health care provider may also give you more specific instructions. Your treatment has been planned according to current medical practices, but problems sometimes occur. Call your health care provider if you have any problems or questions after your procedure.  WHAT TO EXPECT AFTER THE PROCEDURE  After your stitches (sutures) are removed, it is typical to have the following:  · Some discomfort and swelling in the wound area.  · Slight redness in the area.  HOME CARE INSTRUCTIONS   · If you have skin adhesive strips over the wound area, do not take the strips off. They will fall off on their own in a few days. If the strips remain in place after 14 days, you may remove them.  · Change any bandages (dressings) at least once a day or as directed by your health care provider. If the bandage sticks, soak it off with warm, soapy water.  · Apply cream or ointment only as directed by your health care provider. If using cream or ointment, wash the area with soap and water 2 times a day to remove all the cream or ointment. Rinse off the soap and pat the area dry with a clean towel.  · Keep the wound area dry and clean. If the bandage becomes wet or dirty, or if it develops a bad smell, change it as soon as possible.  · Continue to protect the wound from injury.  · Use sunscreen when out in the sun. New scars become sunburned easily.  SEEK MEDICAL CARE IF:  · You have increasing redness, swelling, or pain in the wound.  · You see pus coming from the wound.  · You have a fever.  · You notice a bad smell coming from the wound or dressing.  · Your wound breaks open (edges not staying together).     This information is not intended to replace advice given to you by your health care provider. Make sure you discuss any questions you have with your health care  provider.     Document Released: 09/12/2002 Document Revised: 10/08/2014 Document Reviewed: 07/30/2014  [facility] Interactive Patient Education ©2017 [employer]

## 2017-11-06 ENCOUNTER — OFFICE VISIT (OUTPATIENT)
Dept: INTERNAL MEDICINE | Age: 62
End: 2017-11-06

## 2017-11-06 VITALS
SYSTOLIC BLOOD PRESSURE: 132 MMHG | HEIGHT: 67 IN | DIASTOLIC BLOOD PRESSURE: 72 MMHG | BODY MASS INDEX: 27.31 KG/M2 | OXYGEN SATURATION: 98 % | WEIGHT: 174 LBS | TEMPERATURE: 100.6 F | HEART RATE: 133 BPM

## 2017-11-06 DIAGNOSIS — R50.9 FEVER AND CHILLS: ICD-10-CM

## 2017-11-06 DIAGNOSIS — Z87.09 HISTORY OF COPD: ICD-10-CM

## 2017-11-06 DIAGNOSIS — J10.1 INFLUENZA A: Primary | ICD-10-CM

## 2017-11-06 DIAGNOSIS — R05.9 COUGH: ICD-10-CM

## 2017-11-06 DIAGNOSIS — F17.200 CURRENT SMOKER: ICD-10-CM

## 2017-11-06 LAB
EXPIRATION DATE: ABNORMAL
FLUAV AG NPH QL: POSITIVE
FLUBV AG NPH QL: NEGATIVE
INTERNAL CONTROL: ABNORMAL
Lab: ABNORMAL

## 2017-11-06 PROCEDURE — 87804 INFLUENZA ASSAY W/OPTIC: CPT | Performed by: NURSE PRACTITIONER

## 2017-11-06 PROCEDURE — 99213 OFFICE O/P EST LOW 20 MIN: CPT | Performed by: NURSE PRACTITIONER

## 2017-11-06 RX ORDER — OSELTAMIVIR PHOSPHATE 75 MG/1
75 CAPSULE ORAL 2 TIMES DAILY
Qty: 10 CAPSULE | Refills: 0 | Status: SHIPPED | OUTPATIENT
Start: 2017-11-06 | End: 2017-11-11

## 2017-11-06 NOTE — PATIENT INSTRUCTIONS
"Influenza, Adult  Influenza, more commonly known as \"the flu,\" is a viral infection that primarily affects the respiratory tract. The respiratory tract includes organs that help you breathe, such as the lungs, nose, and throat. The flu causes many common cold symptoms, as well as a high fever and body aches.  The flu spreads easily from person to person (is contagious). Getting a flu shot (influenza vaccination) every year is the best way to prevent influenza.  CAUSES  Influenza is caused by a virus. You can catch the virus by:  · Breathing in droplets from an infected person's cough or sneeze.  · Touching something that was recently contaminated with the virus and then touching your mouth, nose, or eyes.  RISK FACTORS  The following factors may make you more likely to get the flu:  · Not cleaning your hands frequently with soap and water or alcohol-based hand .  · Having close contact with many people during cold and flu season.  · Touching your mouth, eyes, or nose without washing or sanitizing your hands first.  · Not drinking enough fluids or not eating a healthy diet.  · Not getting enough sleep or exercise.  · Being under a high amount of stress.  · Not getting a yearly (annual) flu shot.  You may be at a higher risk of complications from the flu, such as a severe lung infection (pneumonia), if you:  · Are over the age of 65.  · Are pregnant.  · Have a weakened disease-fighting system (immune system). You may have a weakened immune system if you:    Have HIV or AIDS.    Are undergoing chemotherapy.    Are taking medicines that reduce the activity of (suppress) the immune system.  · Have a long-term (chronic) illness, such as heart disease, kidney disease, diabetes, or lung disease.  · Have a liver disorder.  · Are obese.  · Have anemia.  SYMPTOMS  Symptoms of this condition typically last 4-10 days and may include:  · Fever.  · Chills.  · Headache, body aches, or muscle aches.  · Sore " throat.  · Cough.  · Runny or congested nose.  · Chest discomfort and cough.  · Poor appetite.  · Weakness or tiredness (fatigue).  · Dizziness.  · Nausea or vomiting.  DIAGNOSIS  This condition may be diagnosed based on your medical history and a physical exam. Your health care provider may do a nose or throat swab test to confirm the diagnosis.  TREATMENT  If influenza is detected early, you can be treated with antiviral medicine that can reduce the length of your illness and the severity of your symptoms. This medicine may be given by mouth (orally) or through an IV tube that is inserted in one of your veins.  The goal of treatment is to relieve symptoms by taking care of yourself at home. This may include taking over-the-counter medicines, drinking plenty of fluids, and adding humidity to the air in your home.  In some cases, influenza goes away on its own. Severe influenza or complications from influenza may be treated in a hospital.  HOME CARE INSTRUCTIONS  · Take over-the-counter and prescription medicines only as told by your health care provider.  · Use a cool mist humidifier to add humidity to the air in your home. This can make breathing easier.  · Rest as needed.  · Drink enough fluid to keep your urine clear or pale yellow.  · Cover your mouth and nose when you cough or sneeze.  · Wash your hands with soap and water often, especially after you cough or sneeze. If soap and water are not available, use hand .  · Stay home from work or school as told by your health care provider. Unless you are visiting your health care provider, try to avoid leaving home until your fever has been gone for 24 hours without the use of medicine.  · Keep all follow-up visits as told by your health care provider. This is important.  PREVENTION  · Getting an annual flu shot is the best way to avoid getting the flu. You may get the flu shot in late summer, fall, or winter. Ask your health care provider when you should  get your flu shot.  · Wash your hands often or use hand  often.  · Avoid contact with people who are sick during cold and flu season.  · Eat a healthy diet, drink plenty of fluids, get enough sleep, and exercise regularly.  SEEK MEDICAL CARE IF:  · You develop new symptoms.  · You have:    Chest pain.    Diarrhea.    A fever.  · Your cough gets worse.  · You produce more mucus.  · You feel nauseous or you vomit.  SEEK IMMEDIATE MEDICAL CARE IF:  · You develop shortness of breath or difficulty breathing.  · Your skin or nails turn a bluish color.  · You have severe pain or stiffness in your neck.  · You develop a sudden headache or sudden pain in your face or ear.  · You cannot stop vomiting.     This information is not intended to replace advice given to you by your health care provider. Make sure you discuss any questions you have with your health care provider.     Document Released: 12/15/2001 Document Revised: 04/10/2017 Document Reviewed: 10/11/2016  [employer] Interactive Patient Education ©2017 [employer]

## 2017-11-06 NOTE — PROGRESS NOTES
Subjective   Patrick Mckeon is a 62 y.o. male.     URI    This is a new problem. The current episode started yesterday. The maximum temperature recorded prior to his arrival was 101 - 101.9 F. Associated symptoms include coughing (non-productive). Pertinent negatives include no abdominal pain, chest pain, congestion, dysuria, ear pain, headaches, joint pain, nausea, rhinorrhea, sore throat or vomiting. Associated symptoms comments: + fatigue. He has tried acetaminophen (cough drop) for the symptoms. The treatment provided no relief.    He has had flu vaccination this season.   No PMH of pneumonia, has COPD. Still smoking (5-6 cigarettes per day).       The following portions of the patient's history were reviewed and updated as appropriate: allergies, current medications, past family history, past medical history, past social history, past surgical history and problem list.    Review of Systems   Constitutional: Positive for chills, fatigue and fever.   HENT: Negative for congestion, ear pain, rhinorrhea and sore throat.    Respiratory: Positive for cough (non-productive).    Cardiovascular: Negative for chest pain.   Gastrointestinal: Negative for abdominal pain, constipation, nausea and vomiting.   Genitourinary: Positive for frequency. Negative for dysuria and hematuria.   Musculoskeletal: Negative for arthralgias, joint pain and myalgias.   Neurological: Negative for headaches.       Objective   Physical Exam   Constitutional: He is oriented to person, place, and time. Vital signs are normal. He appears well-developed and well-nourished. He is cooperative. He does not appear ill. No distress.   HENT:   Head: Normocephalic and atraumatic.   Right Ear: Hearing, tympanic membrane, external ear and ear canal normal. No drainage or swelling. Tympanic membrane is not injected and not erythematous. No middle ear effusion.   Left Ear: Hearing, tympanic membrane, external ear and ear canal normal. No drainage or  swelling. Tympanic membrane is not injected and not erythematous.  No middle ear effusion.   Nose: Nose normal.   Mouth/Throat: Uvula is midline, oropharynx is clear and moist and mucous membranes are normal. No tonsillar exudate.   Cardiovascular: Normal rate, regular rhythm and normal heart sounds.    No murmur heard.  Pulmonary/Chest: Effort normal and breath sounds normal. He has no decreased breath sounds. He has no wheezes. He has no rhonchi. He has no rales.   Lymphadenopathy:     He has no cervical adenopathy.        Right cervical: No superficial cervical, no deep cervical and no posterior cervical adenopathy present.       Left cervical: No superficial cervical, no deep cervical and no posterior cervical adenopathy present.   Neurological: He is alert and oriented to person, place, and time.   Skin: Skin is warm, dry and intact.   Psychiatric: He has a normal mood and affect. His speech is normal and behavior is normal. Judgment and thought content normal. Cognition and memory are normal.   Nursing note and vitals reviewed.      Assessment/Plan   Problems Addressed this Visit     None      Visit Diagnoses     Influenza A    -  Primary    Relevant Medications    oseltamivir (TAMIFLU) 75 MG capsule    Fever and chills        Relevant Orders    POC Influenza A / B    Cough        Relevant Orders    POC Influenza A / B    History of COPD        Current smoker              1. Influenza A  Patient with 24-36 hours of symptoms. Discussed risk vs. Benefit of starting Tamiflu, patient agrees to starting treatment. Discussed appropriate conservative and symptomatic treatment with patient, including use of Tylenol for fever or pain, increase by mouth fluids, and rest. Discussed when to follow up for recheck, including worsening symptoms such as fever, purulent nasal drainage, worsening cough, productive cough, etc. Discussed with patient contagious period of transmitting influenza. Discussed increased risk of  developing PNA due to history of COPD and smoking status.     - oseltamivir (TAMIFLU) 75 MG capsule; Take 1 capsule by mouth 2 (Two) Times a Day for 5 days.  Dispense: 10 capsule; Refill: 0    2. Fever and chills    - POC Influenza A / B    3. Cough    - POC Influenza A / B    4. History of COPD      5. Current smoker

## 2017-11-13 DIAGNOSIS — I10 ESSENTIAL HYPERTENSION: Chronic | ICD-10-CM

## 2017-11-13 DIAGNOSIS — E78.5 HYPERLIPIDEMIA, UNSPECIFIED HYPERLIPIDEMIA TYPE: ICD-10-CM

## 2017-11-14 RX ORDER — AMLODIPINE BESYLATE 10 MG/1
TABLET ORAL
Qty: 90 TABLET | Refills: 0 | Status: SHIPPED | OUTPATIENT
Start: 2017-11-14 | End: 2018-05-13 | Stop reason: SDUPTHER

## 2017-11-14 RX ORDER — PRAVASTATIN SODIUM 20 MG
TABLET ORAL
Qty: 90 TABLET | Refills: 0 | Status: SHIPPED | OUTPATIENT
Start: 2017-11-14 | End: 2018-05-13 | Stop reason: SDUPTHER

## 2017-12-12 ENCOUNTER — TRANSCRIBE ORDERS (OUTPATIENT)
Dept: ADMINISTRATIVE | Facility: HOSPITAL | Age: 62
End: 2017-12-12

## 2017-12-12 DIAGNOSIS — I71.40 ABDOMINAL AORTIC ANEURYSM (AAA) WITHOUT RUPTURE (HCC): Primary | ICD-10-CM

## 2017-12-18 ENCOUNTER — TELEPHONE (OUTPATIENT)
Dept: INTERNAL MEDICINE | Age: 62
End: 2017-12-18

## 2018-01-09 ENCOUNTER — LAB (OUTPATIENT)
Dept: LAB | Facility: HOSPITAL | Age: 63
End: 2018-01-09

## 2018-01-09 DIAGNOSIS — I81 PORTAL VEIN THROMBOSIS: ICD-10-CM

## 2018-01-09 PROCEDURE — 85303 CLOT INHIBIT PROT C ACTIVITY: CPT | Performed by: INTERNAL MEDICINE

## 2018-01-09 PROCEDURE — 36415 COLL VENOUS BLD VENIPUNCTURE: CPT

## 2018-01-09 PROCEDURE — 85300 ANTITHROMBIN III ACTIVITY: CPT | Performed by: INTERNAL MEDICINE

## 2018-01-11 LAB
AT III PPP CHRO-ACNC: 108 % (ref 90–134)
PROT C PPP-ACNC: 162 % (ref 86–163)

## 2018-01-16 ENCOUNTER — OFFICE VISIT (OUTPATIENT)
Dept: ONCOLOGY | Facility: CLINIC | Age: 63
End: 2018-01-16

## 2018-01-16 ENCOUNTER — APPOINTMENT (OUTPATIENT)
Dept: LAB | Facility: HOSPITAL | Age: 63
End: 2018-01-16

## 2018-01-16 VITALS
RESPIRATION RATE: 16 BRPM | WEIGHT: 175.8 LBS | HEIGHT: 68 IN | DIASTOLIC BLOOD PRESSURE: 80 MMHG | BODY MASS INDEX: 26.64 KG/M2 | SYSTOLIC BLOOD PRESSURE: 132 MMHG | TEMPERATURE: 97.7 F | HEART RATE: 86 BPM

## 2018-01-16 DIAGNOSIS — I81 PORTAL VEIN THROMBOSIS: Primary | ICD-10-CM

## 2018-01-16 PROCEDURE — 99214 OFFICE O/P EST MOD 30 MIN: CPT | Performed by: INTERNAL MEDICINE

## 2018-01-16 NOTE — PROGRESS NOTES
Subjective .     REASONS FOR FOLLOWUP:  Portal vein thrombosis    HISTORY OF PRESENT ILLNESS:  The patient is a 62 y.o. year old male  who is here for follow-up with the above-mentioned history.      Denies chest pain, shortness of breath, pain or swelling in 1 leg more than the other.  Denies bleeding from any location.      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     Past Surgical History:   Procedure Laterality Date   • ABDOMINAL AORTIC ANEURYSM REPAIR  05/2017   • ARTERIOGRAM AORTIC N/A 5/8/2017    Procedure:  AORTIC STENT GRAFT REPAIR W/ GORE BILATERAL FEMORAL MINI CUTDOWNS;  Surgeon: Humble Hamilton MD;  Location: [facility] HYBRID OR 18/19;  Service:    • COLONOSCOPY N/A 8/3/2017    Procedure: COLONOSCOPY to cecum;  Surgeon: Patrick Elizabeth MD;  Location: [facility] ENDOSCOPY;  Service:    • COLONOSCOPY  09/2010   • ENDOSCOPY  8/3/2017    Procedure: ESOPHAGOGASTRODUODENOSCOPY;  Surgeon: Patrick Elizabeth MD;  Location: [facility] ENDOSCOPY;  Service:    • HERNIA REPAIR  1980   • UMBILICAL HERNIA REPAIR  1975       HEMATOLOGIC/ONCOLOGIC HISTORY:  (History from previous dates can be found in the separate document.)    MEDICATIONS    Current Outpatient Prescriptions:   •  amLODIPine (NORVASC) 10 MG tablet, TAKE 1 TABLET BY MOUTH DAILY., Disp: 90 tablet, Rfl: 0  •  aspirin 81 MG EC tablet, Take 1 tablet by mouth Daily., Disp: , Rfl:   •  pravastatin (PRAVACHOL) 20 MG tablet, TAKE 1 TABLET BY MOUTH DAILY., Disp: 90 tablet, Rfl: 0  •  rivaroxaban (XARELTO) 20 MG tablet, Take 1 tablet by mouth Daily., Disp: 30 tablet, Rfl: 5    ALLERGIES:   No Known Allergies    SOCIAL HISTORY:       Social History     Social History   • Marital status:      Spouse name: Samira   • Number of children: 1   •  Years of education: High School     Occupational History   • Business: owner of metal [employer]     Social History Main Topics   • Smoking status: Current Every Day Smoker     Packs/day: 0.25     Years: 40.00     Types: Cigarettes   • Smokeless tobacco: Never Used      Comment: previously over 1 ppd   • Alcohol use 6.0 oz/week     10 Standard drinks or equivalent per week      Comment: 1-2 drinks bourbon   • Drug use: No   • Sexual activity: Yes     Partners: Female     Other Topics Concern   • Not on file     Social History Narrative         FAMILY HISTORY:  Family History   Problem Relation Age of Onset   • Colon cancer Father 55   • Heart attack Father 71   • Coronary artery disease Father    • Heart disease Father    • Hypertension Mother    • Hyperlipidemia Mother    • Sudden death Mother 78   • Aneurysm Mother    • Heart disease Mother    • Benign prostatic hyperplasia Brother    • Hypertension Brother    • Benign prostatic hyperplasia Brother    • No Known Problems Sister    • Brain cancer Maternal Grandmother    • Heart disease Maternal Grandfather    • Sudden death Maternal Grandfather        REVIEW OF SYSTEMS:  GENERAL: No change in appetite or weight;   No fevers, chills, sweats.    SKIN: No nonhealing lesions.   No rashes.  HEME/LYMPH: No easy bruising, bleeding.   No swollen nodes.   EYES: No vision changes or diplopia.   ENT: No tinnitus, hearing loss, gum bleeding, epistaxis, hoarseness or dysphagia.   RESPIRATORY: No cough, shortness of breath, hemoptysis or wheezing.   CVS: No chest pain, palpitations, orthopnea, dyspnea on exertion or PND.   GI: No melena or hematochezia.   No abdominal pain.  No nausea, vomiting, constipation, diarrhea  : No lower tract obstructive symptoms, dysuria or hematuria.   MUSCULOSKELETAL: No bone pain.  No joint stiffness.   NEUROLOGICAL: No global weakness, loss of consciousness or seizures.   PSYCHIATRIC: No increased nervousness, mood changes or  "depression.     Objective    Vitals:    01/16/18 0845   BP: 132/80   Pulse: 86   Resp: 16   Temp: 97.7 °F (36.5 °C)   Weight: 79.7 kg (175 lb 12.8 oz)   Height: 173 cm (68.11\")   PainSc: 0-No pain     Current Status 1/16/2018   ECOG score 0      PHYSICAL EXAM:    GENERAL:  Well-developed, well-nourished in no acute distress.   SKIN:  Warm, dry without rashes, purpura or petechiae.  EYES:  Pupils equal, round and reactive to light.  EOMs intact.  Conjunctivae normal.  EARS:  Hearing intact.  NOSE:  Septum midline.  No excoriations or nasal discharge.  MOUTH:  Tongue is well-papillated; no stomatitis or ulcers.  Lips normal.  THROAT:  Oropharynx without lesions or exudates.  NECK:  Supple with good range of motion; no thyromegaly or masses, no JVD.  LYMPHATICS:  No cervical, supraclavicular, axillary or inguinal adenopathy.  CHEST:  Lungs clear to auscultation. Good airflow.  CARDIAC:  Regular rate and rhythm without murmurs, rubs or gallops. Normal S1,S2.  ABDOMEN:  Soft, nontender with no hepatosplenomegaly or masses.  EXTREMITIES:  No clubbing, cyanosis or edema.  NEUROLOGICAL:  Cranial Nerves II-XII grossly intact.  No focal neurological deficits.  PSYCHIATRIC:  Normal affect and mood.     RECENT LABS:        WBC   Date/Time Value Ref Range Status   08/29/2017 02:50 PM 7.47 4.00 - 10.00 10*3/mm3 Final     Hemoglobin   Date/Time Value Ref Range Status   08/29/2017 02:50 PM 14.3 13.5 - 16.5 g/dL Final     Platelets   Date/Time Value Ref Range Status   08/29/2017 02:50  150 - 375 10*3/mm3 Final       Assessment/Plan   There are no diagnoses linked to this encounter.  *Portal venous thrombosis.  (Left portal venous branches, acute/subacute and single tiny right hepatic lobe on MRCP 7/31/17.  Not seen on CT AP with contrast).  Unremarkable: Protein S free, protein S activity, lupus anticoagulant, anticardiolipin antibodies, beta-2 glycoprotein antibodies, prothrombin gene mutation, factor V Leiden gene " mutation.Initially on Eliquis but change to Xarelto 8/29/17 because he was forgetting to take the evening dose of Eliquis (and Xarelto is a once a day medication).  Because protein C activity and antithrombin level are now normal, I think it is reasonable to stop Xarelto since he has completed around 6 months of therapy.  He was on aspirin prior to taking Xarelto and has been on aspirin during Xarelto.  I recommended he continue aspirin after stopping Xarelto.    *Protein C activity mildly low at 77% (normal range  percent)  Protein C activity normalized at 162% 1/9/18.    *Antithrombin mildly low at 83% (normal range  percent).  Antithrombin normalized at 108% on 1/9/18.      *Prior Rectal bleeding.  Suspected hemorrhoidal, per Dr. Farrell.  Last scope 2015.  If this significantly worsens on anticoagulation, anticoagulation may need to be discontinued.   He states this is no longer an issue.  He has not had any issues with bleeding for several months.     *Possible prostate mass on CT 7/30/17.  Not mentioned on CT 8/2/17.  Dr. Tapia (hospitalist) discussed with a urologist.  The urologist stated these scans look unremarkable and no further workup is needed.     *Anemia.  On 8/1/17 ferritin elevated at 890, but only 8% iron saturation.  Folate and B12 unremarkable.  Last Hb check 8/29/17, Hb normal.     PLAN  · Stop Xarelto.  · No scheduled follow-up here.

## 2018-05-02 ENCOUNTER — OFFICE VISIT (OUTPATIENT)
Dept: INTERNAL MEDICINE | Age: 63
End: 2018-05-02

## 2018-05-02 VITALS
HEIGHT: 68 IN | TEMPERATURE: 98.5 F | DIASTOLIC BLOOD PRESSURE: 74 MMHG | WEIGHT: 173 LBS | HEART RATE: 84 BPM | SYSTOLIC BLOOD PRESSURE: 134 MMHG | BODY MASS INDEX: 26.22 KG/M2 | OXYGEN SATURATION: 98 %

## 2018-05-02 DIAGNOSIS — Z23 NEED FOR 23-POLYVALENT PNEUMOCOCCAL POLYSACCHARIDE VACCINE: ICD-10-CM

## 2018-05-02 DIAGNOSIS — F17.219 CIGARETTE NICOTINE DEPENDENCE WITH NICOTINE-INDUCED DISORDER: Chronic | ICD-10-CM

## 2018-05-02 DIAGNOSIS — E78.2 MIXED HYPERLIPIDEMIA: Chronic | ICD-10-CM

## 2018-05-02 DIAGNOSIS — I71.40 ABDOMINAL AORTIC ANEURYSM (AAA) WITHOUT RUPTURE (HCC): Chronic | ICD-10-CM

## 2018-05-02 DIAGNOSIS — I10 ESSENTIAL HYPERTENSION: Primary | Chronic | ICD-10-CM

## 2018-05-02 PROBLEM — I82.0: Status: RESOLVED | Noted: 2017-08-09 | Resolved: 2018-05-02

## 2018-05-02 PROBLEM — A41.9 SEPSIS: Status: RESOLVED | Noted: 2017-08-02 | Resolved: 2018-05-02

## 2018-05-02 LAB
ALBUMIN SERPL-MCNC: 4.2 G/DL (ref 3.5–5.2)
ALBUMIN/GLOB SERPL: 1.8 G/DL
ALP SERPL-CCNC: 94 U/L (ref 39–117)
ALT SERPL-CCNC: 12 U/L (ref 1–41)
AST SERPL-CCNC: 15 U/L (ref 1–40)
BILIRUB SERPL-MCNC: 0.4 MG/DL (ref 0.1–1.2)
BUN SERPL-MCNC: 22 MG/DL (ref 8–23)
BUN/CREAT SERPL: 19.6 (ref 7–25)
CALCIUM SERPL-MCNC: 8.9 MG/DL (ref 8.6–10.5)
CHLORIDE SERPL-SCNC: 106 MMOL/L (ref 98–107)
CHOLEST SERPL-MCNC: 167 MG/DL (ref 0–200)
CHOLEST/HDLC SERPL: 2.78 {RATIO}
CO2 SERPL-SCNC: 20 MMOL/L (ref 22–29)
CREAT SERPL-MCNC: 1.12 MG/DL (ref 0.76–1.27)
GFR SERPLBLD CREATININE-BSD FMLA CKD-EPI: 66 ML/MIN/1.73
GFR SERPLBLD CREATININE-BSD FMLA CKD-EPI: 80 ML/MIN/1.73
GLOBULIN SER CALC-MCNC: 2.3 GM/DL
GLUCOSE SERPL-MCNC: 94 MG/DL (ref 65–99)
HDLC SERPL-MCNC: 60 MG/DL (ref 40–60)
LDLC SERPL CALC-MCNC: 91 MG/DL (ref 0–100)
POTASSIUM SERPL-SCNC: 4.9 MMOL/L (ref 3.5–5.2)
PROT SERPL-MCNC: 6.5 G/DL (ref 6–8.5)
SODIUM SERPL-SCNC: 141 MMOL/L (ref 136–145)
TRIGL SERPL-MCNC: 81 MG/DL (ref 0–150)
VLDLC SERPL CALC-MCNC: 16.2 MG/DL (ref 5–40)

## 2018-05-02 PROCEDURE — 90732 PPSV23 VACC 2 YRS+ SUBQ/IM: CPT | Performed by: INTERNAL MEDICINE

## 2018-05-02 PROCEDURE — 99406 BEHAV CHNG SMOKING 3-10 MIN: CPT | Performed by: INTERNAL MEDICINE

## 2018-05-02 PROCEDURE — 99214 OFFICE O/P EST MOD 30 MIN: CPT | Performed by: INTERNAL MEDICINE

## 2018-05-02 PROCEDURE — 90471 IMMUNIZATION ADMIN: CPT | Performed by: INTERNAL MEDICINE

## 2018-05-02 RX ORDER — LOSARTAN POTASSIUM 25 MG/1
25 TABLET ORAL DAILY
Qty: 30 TABLET | Refills: 3 | Status: SHIPPED | OUTPATIENT
Start: 2018-05-02 | End: 2018-08-20 | Stop reason: SDUPTHER

## 2018-05-02 NOTE — PROGRESS NOTES
[facility] [place] [facility] MEDICINE  RAIMUNDO MEDINA M.D.      Patrick Mckeon / 62 y.o. / male  05/02/2018      ASSESSMENT & PLAN:    Problem List Items Addressed This Visit        High    Essential hypertension - Primary (Chronic)    Current Assessment & Plan     Sub-optimal. Add losartan 25 mg qd. Continue amlodipine 10 mg qd.          Relevant Medications    amLODIPine (NORVASC) 10 MG tablet    losartan (COZAAR) 25 MG tablet    Other Relevant Orders    Comprehensive Metabolic Panel       Medium    Hyperlipidemia (Chronic)    Overview     Stable. Continue pravastatin 20 mg          Relevant Medications    aspirin 81 MG EC tablet    pravastatin (PRAVACHOL) 20 MG tablet    Other Relevant Orders    Lipid Panel With / Chol / HDL Ratio    Comprehensive Metabolic Panel       Low    Cigarette nicotine dependence with nicotine-induced disorder (Chronic)    Current Assessment & Plan     Counseled on smoking cessation for 3 minutes: advised patient to quit, discussed nicotine replacement therapy, start NRT (patch/gum) and discussed watching for significant mood changes, watching for suicidial ideations/depression/anxiety/agitiation          Abdominal aortic aneurysm (AAA) without rupture (Chronic)    Overview     S/p EVAR 5/2017         Current Assessment & Plan     Advised him to d/w vascular regarding nonspecific epigastric abdominal discomfort. Today's exam was normal. He expressed understanding and agreed to follow above instructions.            Other Visit Diagnoses     Need for 23-polyvalent pneumococcal polysaccharide vaccine        Relevant Orders    Pneumococcal Polysaccharide Vaccine 23-Valent Greater Than or Equal To 3yo Subcutaneous / IM        Orders Placed This Encounter   Procedures   • Pneumococcal Polysaccharide Vaccine 23-Valent Greater Than or Equal To 3yo Subcutaneous / IM   • Lipid Panel With / Chol / HDL Ratio   • Comprehensive Metabolic Panel       Summary/Discussion:      Return in about 4 months (around 9/2/2018) for  "Annual physical, Hypertension.    ____________________________________________________________________    MEDICATIONS  Current Outpatient Prescriptions   Medication Sig Dispense Refill   • amLODIPine (NORVASC) 10 MG tablet TAKE 1 TABLET BY MOUTH DAILY. 90 tablet 0   • aspirin 81 MG EC tablet Take 1 tablet by mouth Daily.     • pravastatin (PRAVACHOL) 20 MG tablet TAKE 1 TABLET BY MOUTH DAILY. 90 tablet 0   • losartan (COZAAR) 25 MG tablet Take 1 tablet by mouth Daily. 30 tablet 3     No current facility-administered medications for this visit.          VITALS:    Visit Vitals  /74   Pulse 84   Temp 98.5 °F (36.9 °C)   Ht 173 cm (68.11\")   Wt 78.5 kg (173 lb)   SpO2 98%   BMI 26.22 kg/m²       BP Readings from Last 3 Encounters:   05/02/18 134/74   01/16/18 132/80   11/06/17 132/72     Wt Readings from Last 3 Encounters:   05/02/18 78.5 kg (173 lb)   01/16/18 79.7 kg (175 lb 12.8 oz)   11/06/17 78.9 kg (174 lb)      Body mass index is 26.22 kg/m².    CC:  Main reason(s) for today's visit: Hypertension and Hyperlipidemia      HPI:     Burning sensation in the upper mid abdomen area. S/p EVAR 5/2017.    Chronic essential hypertension:  Since prior visit: compliant with medication(s), checks blood pressure regularly, denies significant problems with medication(s), SBP > 130 and SBP > 140.  Most recent in-office blood pressure readings:   BP Readings from Last 3 Encounters:   05/02/18 134/74   01/16/18 132/80   11/06/17 132/72     Chronic hyperlipidemia:  Current therapy include pravastatin, denies problems with medication.    Most recent labs:   Lab Results   Component Value Date     (H) 02/21/2017    LDL 95 09/21/2015    HDL 69 (H) 02/21/2017    HDL 55 09/21/2015    TRIG 104 02/21/2017    CHOLHDLRATIO 3.00 02/21/2017     Still smokes about 10 cig/day. Emphysema stable without increased cough or shortness of breath.     Patient Care Team:  Lester Carter MD as PCP - General (Internal Medicine)  Orlin Quintero MD " "as Consulting Physician (Gastroenterology)  Keyur Mejia II, MD as Consulting Physician (Hematology and Oncology)  Humble Hamilton MD as Surgeon (Vascular Surgery)    ____________________________________________________________________    REVIEW OF SYSTEMS    Review of Systems  Constitutional neg  Resp neg  CV neg for chest pain, claudication of legs  Abdomen \"burning\"   Off bloodthinner    PHYSICAL EXAMINATION    Physical Exam  Constitutional  No distress  Cardiovascular Rate  normal . Rhythm: regular . Heart sounds:  Normal  Pulmonary/Chest  Effort normal. Breath sounds:  Decreased breath sounds without wheezing  Abdomen soft, nontender, no pulsatile mass, no bruit, femoral pulses symmetric  Psychiatric  Alert. Judgment and thought content normal. Mood normal    REVIEWED DATA:    Labs:     Lab Results   Component Value Date     08/29/2017    K 4.8 (H) 08/29/2017    AST 40 08/29/2017    ALT 65 (H) 08/29/2017    BUN 15 08/29/2017    CREATININE 1.27 08/29/2017    CREATININE 1.06 08/03/2017    CREATININE 1.03 08/02/2017    EGFRIFNONA 57 (L) 08/29/2017    EGFRIFAFRI 64 02/21/2017       Lab Results   Component Value Date    HGBA1C 5.10 02/21/2017    HGBA1C 5.7 (H) 09/21/2015    GLUCOSE 101 08/29/2017    GLUCOSE 87 08/03/2017    GLUCOSE 92 08/02/2017       Lab Results   Component Value Date     (H) 02/21/2017    LDL 95 09/21/2015    HDL 69 (H) 02/21/2017    HDL 55 09/21/2015    TRIG 104 02/21/2017    TRIG 92 09/21/2015    CHOLHDLRATIO 3.00 02/21/2017       Lab Results   Component Value Date    TSH 1.380 02/21/2017    FREET4 1.22 02/21/2017       Lab Results   Component Value Date    WBC 7.47 08/29/2017    HGB 14.3 08/29/2017    HGB 11.4 (L) 08/03/2017    HGB 11.6 (L) 08/02/2017     08/29/2017       Imaging:         Medical Tests:         Summary of old records / correspondence / consultant report:         Request outside records:         ALLERGIES  No Known Allergies     PFSH:     The following " portions of the patient's history were reviewed and updated as appropriate: Allergies / Current Medications / Past Medical History / Surgical History / Social History / Family History    PROBLEM LIST   Patient Active Problem List   Diagnosis   • Essential hypertension   • Cigarette nicotine dependence with nicotine-induced disorder   • Emphysema lung   • Hyperlipidemia   • Abdominal aortic aneurysm (AAA) without rupture   • Congenital absence of left kidney   • Diverticulosis of large intestine without hemorrhage       PAST MEDICAL HISTORY  Past Medical History:   Diagnosis Date   • Colon polyp    • Diverticulosis of large intestine without hemorrhage 8/9/2017   • Emphysema lung 2/28/2017   • Hyperlipidemia 2/28/2017   • Portal vein thrombosis 8/29/2017   • Sepsis 8/2/2017   • Shingles 09/2010   • Skin cancer 5/31/2017   • Thrombosis, 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       SURGICAL HISTORY  Past Surgical History:   Procedure Laterality Date   • ABDOMINAL AORTIC ANEURYSM REPAIR  05/2017   • ARTERIOGRAM AORTIC N/A 5/8/2017    Procedure:  AORTIC STENT GRAFT REPAIR W/ GORE BILATERAL FEMORAL MINI CUTDOWNS;  Surgeon: Humble Hamilton MD;  Location: [facility] HYBRID OR 18/19;  Service:    • COLONOSCOPY N/A 8/3/2017    Procedure: COLONOSCOPY to cecum;  Surgeon: Patrick Elizabeth MD;  Location: [facility] ENDOSCOPY;  Service:    • COLONOSCOPY  09/2010   • ENDOSCOPY  8/3/2017    Procedure: ESOPHAGOGASTRODUODENOSCOPY;  Surgeon: Patrick Elizabeth MD;  Location: [facility] ENDOSCOPY;  Service:    • HERNIA REPAIR  1980   • UMBILICAL HERNIA REPAIR  1975       SOCIAL HISTORY  Social History     Social History   • Marital status:      Spouse name: Samira   • Number of children: 1   • Years of education: High School     Occupational History   • Business: owner of metal fabrication [employer]     Social History Main Topics   • Smoking status: Current Every Day Smoker     Packs/day: 0.25     Years: 40.00     Types: Cigarettes   • Smokeless  tobacco: Never Used      Comment: previously over 1 ppd   • Alcohol use 6.0 oz/week     10 Standard drinks or equivalent per week      Comment: 1-2 drinks bourbon   • Drug use: No   • Sexual activity: Yes     Partners: Female     Other Topics Concern   • Not on file       FAMILY HISTORY  Family History   Problem Relation Age of Onset   • Colon cancer Father 55   • Heart attack Father 71   • Coronary artery disease Father    • Heart disease Father    • Hypertension Mother    • Hyperlipidemia Mother    • Sudden death Mother 78   • Aneurysm Mother    • Heart disease Mother    • Benign prostatic hyperplasia Brother    • Hypertension Brother    • Benign prostatic hyperplasia Brother    • No Known Problems Sister    • Brain cancer Maternal Grandmother    • Heart disease Maternal Grandfather    • Sudden death Maternal Grandfather          **Lila Disclaimer:   Much of this encounter note is an electronic transcription/translation of spoken language to printed text. The electronic translation of spoken language may permit erroneous, or at times, nonsensical words or phrases to be inadvertently transcribed. Although I have reviewed the note for such errors, some may still exist.

## 2018-05-02 NOTE — ASSESSMENT & PLAN NOTE
Counseled on smoking cessation for 3 minutes: advised patient to quit, discussed nicotine replacement therapy, start NRT (patch/gum) and discussed watching for significant mood changes, watching for suicidial ideations/depression/anxiety/agitiation

## 2018-05-02 NOTE — ASSESSMENT & PLAN NOTE
Advised him to d/w vascular regarding nonspecific epigastric abdominal discomfort. Today's exam was normal. He expressed understanding and agreed to follow above instructions.

## 2018-05-13 DIAGNOSIS — I10 ESSENTIAL HYPERTENSION: Chronic | ICD-10-CM

## 2018-05-13 DIAGNOSIS — E78.5 HYPERLIPIDEMIA, UNSPECIFIED HYPERLIPIDEMIA TYPE: ICD-10-CM

## 2018-05-14 RX ORDER — AMLODIPINE BESYLATE 10 MG/1
TABLET ORAL
Qty: 90 TABLET | Refills: 1 | Status: SHIPPED | OUTPATIENT
Start: 2018-05-14 | End: 2018-11-06 | Stop reason: SDUPTHER

## 2018-05-14 RX ORDER — PRAVASTATIN SODIUM 20 MG
TABLET ORAL
Qty: 90 TABLET | Refills: 1 | Status: SHIPPED | OUTPATIENT
Start: 2018-05-14 | End: 2018-09-06 | Stop reason: SDUPTHER

## 2018-06-05 ENCOUNTER — HOSPITAL ENCOUNTER (OUTPATIENT)
Dept: CT IMAGING | Facility: HOSPITAL | Age: 63
Discharge: HOME | End: 2018-06-05
Attending: SURGERY | Admitting: SURGERY

## 2018-06-05 DIAGNOSIS — I71.40 ABDOMINAL AORTIC ANEURYSM (AAA) WITHOUT RUPTURE (HCC): ICD-10-CM

## 2018-06-05 LAB — CREAT BLDA-MCNC: 1.3 MG/DL (ref 0.6–1.3)

## 2018-06-05 PROCEDURE — 82565 ASSAY OF CREATININE: CPT

## 2018-06-05 PROCEDURE — 74174 CTA ABD&PLVS W/CONTRAST: CPT

## 2018-06-05 PROCEDURE — 0 IOPAMIDOL PER 1 ML: Performed by: SURGERY

## 2018-06-05 RX ADMIN — IOPAMIDOL 95 ML: 755 INJECTION, SOLUTION INTRAVENOUS at 08:45

## 2018-06-14 ENCOUNTER — TELEPHONE (OUTPATIENT)
Dept: INTERNAL MEDICINE | Age: 63
End: 2018-06-14

## 2018-06-14 NOTE — TELEPHONE ENCOUNTER
----- Message from Janell Atkinson MA sent at 6/14/2018  6:40 AM EDT -----  Regarding: FW: Visit Follow-Up Question  Contact: [phone]      ----- Message -----  From: Patrick Mckeon  Sent: 6/13/2018   6:56 PM  To: Traci Arreguin [facility] Clinical [facility]  Subject: Visit Follow-Up Question                         Dr. Carter , you added a supplement to my BP med. in early May and asked me to send some readings in about a month :       May 12th     130 / 78                                      16th     126 / 78                                      21st      129 / 79                                      26th     118 / 74                                      29th     130 / 82                                      31st      133 / 79                           June 2nd     125 / 80                                      12th     125 / 79   So far so good ?       See you in September .    Patrick Mckeon

## 2018-08-20 DIAGNOSIS — I10 ESSENTIAL HYPERTENSION: Chronic | ICD-10-CM

## 2018-08-20 RX ORDER — LOSARTAN POTASSIUM 25 MG/1
TABLET ORAL
Qty: 30 TABLET | Refills: 5 | Status: SHIPPED | OUTPATIENT
Start: 2018-08-20 | End: 2019-02-17 | Stop reason: SDUPTHER

## 2018-09-06 ENCOUNTER — OFFICE VISIT (OUTPATIENT)
Dept: INTERNAL MEDICINE | Age: 63
End: 2018-09-06

## 2018-09-06 VITALS
OXYGEN SATURATION: 97 % | TEMPERATURE: 98.4 F | WEIGHT: 172 LBS | BODY MASS INDEX: 26.07 KG/M2 | DIASTOLIC BLOOD PRESSURE: 70 MMHG | HEIGHT: 68 IN | SYSTOLIC BLOOD PRESSURE: 118 MMHG | HEART RATE: 78 BPM

## 2018-09-06 DIAGNOSIS — E78.5 HYPERLIPIDEMIA, UNSPECIFIED HYPERLIPIDEMIA TYPE: ICD-10-CM

## 2018-09-06 DIAGNOSIS — F17.219 CIGARETTE NICOTINE DEPENDENCE WITH NICOTINE-INDUCED DISORDER: Chronic | ICD-10-CM

## 2018-09-06 DIAGNOSIS — I10 ESSENTIAL HYPERTENSION: Primary | Chronic | ICD-10-CM

## 2018-09-06 DIAGNOSIS — R10.13 DYSPEPSIA: ICD-10-CM

## 2018-09-06 DIAGNOSIS — J43.9 PULMONARY EMPHYSEMA, UNSPECIFIED EMPHYSEMA TYPE (HCC): Chronic | ICD-10-CM

## 2018-09-06 LAB
BASOPHILS # BLD AUTO: 0.01 10*3/MM3 (ref 0–0.2)
BASOPHILS NFR BLD AUTO: 0.1 % (ref 0–1.5)
EOSINOPHIL # BLD AUTO: 0.29 10*3/MM3 (ref 0–0.7)
EOSINOPHIL NFR BLD AUTO: 3.7 % (ref 0.3–6.2)
ERYTHROCYTE [DISTWIDTH] IN BLOOD BY AUTOMATED COUNT: 14.3 % (ref 11.5–14.5)
HCT VFR BLD AUTO: 45.2 % (ref 40.4–52.2)
HGB BLD-MCNC: 15.1 G/DL (ref 13.7–17.6)
IMM GRANULOCYTES # BLD: 0.01 10*3/MM3 (ref 0–0.03)
IMM GRANULOCYTES NFR BLD: 0.1 % (ref 0–0.5)
LIPASE SERPL-CCNC: 39 U/L (ref 13–60)
LYMPHOCYTES # BLD AUTO: 1.94 10*3/MM3 (ref 0.9–4.8)
LYMPHOCYTES NFR BLD AUTO: 24.7 % (ref 19.6–45.3)
MCH RBC QN AUTO: 32.4 PG (ref 27–32.7)
MCHC RBC AUTO-ENTMCNC: 33.4 G/DL (ref 32.6–36.4)
MCV RBC AUTO: 97 FL (ref 79.8–96.2)
MONOCYTES # BLD AUTO: 0.58 10*3/MM3 (ref 0.2–1.2)
MONOCYTES NFR BLD AUTO: 7.4 % (ref 5–12)
NEUTROPHILS # BLD AUTO: 5.04 10*3/MM3 (ref 1.9–8.1)
NEUTROPHILS NFR BLD AUTO: 64.1 % (ref 42.7–76)
PLATELET # BLD AUTO: 193 10*3/MM3 (ref 140–500)
RBC # BLD AUTO: 4.66 10*6/MM3 (ref 4.6–6)
WBC # BLD AUTO: 7.86 10*3/MM3 (ref 4.5–10.7)

## 2018-09-06 PROCEDURE — 99214 OFFICE O/P EST MOD 30 MIN: CPT | Performed by: INTERNAL MEDICINE

## 2018-09-06 PROCEDURE — 99406 BEHAV CHNG SMOKING 3-10 MIN: CPT | Performed by: INTERNAL MEDICINE

## 2018-09-06 RX ORDER — PRAVASTATIN SODIUM 40 MG
40 TABLET ORAL DAILY
Qty: 90 TABLET | Refills: 3 | Status: SHIPPED | OUTPATIENT
Start: 2018-09-06 | End: 2018-10-22 | Stop reason: SDUPTHER

## 2018-09-06 RX ORDER — PANTOPRAZOLE SODIUM 40 MG/1
40 TABLET, DELAYED RELEASE ORAL DAILY
Qty: 30 TABLET | Refills: 0 | Status: SHIPPED | OUTPATIENT
Start: 2018-09-06 | End: 2018-09-10 | Stop reason: SDUPTHER

## 2018-09-06 NOTE — PROGRESS NOTES
[facility] [place] [facility]  RAIMUNDO MEDINA M.D.      Patrick Mckeon / 63 y.o. / male  2018      ASSESSMENT & PLAN:    Problem List Items Addressed This Visit        High    Dyspepsia    Current Assessment & Plan     Trial of pantoprazole 40 mg qd x 2 weeks. If not improved in 2 weeks will check CT of abdomen.          Relevant Medications    pantoprazole (PROTONIX) 40 MG EC tablet    Other Relevant Orders    CBC & Differential    Lipase       Medium    Essential hypertension - Primary (Chronic)    Overview     Stable. Continue amlodipine 10 mg and losartan 25 mg qd.          Relevant Medications    amLODIPine (NORVASC) 10 MG tablet    losartan (COZAAR) 25 MG tablet    Hyperlipidemia (Chronic)    Current Assessment & Plan     Increase pravastatin to 40 mg qd. Check labs on follow-up for CPE.          Relevant Medications    aspirin 81 MG EC tablet    pravastatin (PRAVACHOL) 40 MG tablet       Low    Cigarette nicotine dependence with nicotine-induced disorder (Chronic)    Current Assessment & Plan     Currently smokes 1/2 PPD  Duration of smokin years  Prior cessation efforts: no prior attempts  Counseled on smoking cessation for 5 minutes: advised patient to quit, discussed nicotine replacement therapy and start NRT (patch/gum)          Emphysema lung (CMS/HCC) (Chronic)    Overview     Noted on CT in          Current Assessment & Plan     Advised to quit smoking. Recommended flu shot by October.                Orders Placed This Encounter   Procedures   • Lipase   • CBC & Differential     New Medications Ordered This Visit   Medications   • pravastatin (PRAVACHOL) 40 MG tablet     Sig: Take 1 tablet by mouth Daily.     Dispense:  90 tablet     Refill:  3   • pantoprazole (PROTONIX) 40 MG EC tablet     Sig: Take 1 tablet by mouth Daily.     Dispense:  30 tablet     Refill:  0       Summary/Discussion:      Return in about 4 months (around 2019) for Annual  "physical.  ____________________________________________________________________    MEDICATIONS  Current Outpatient Prescriptions   Medication Sig Dispense Refill   • amLODIPine (NORVASC) 10 MG tablet TAKE 1 TABLET BY MOUTH EVERY DAY 90 tablet 1   • aspirin 81 MG EC tablet Take 1 tablet by mouth Daily.     • losartan (COZAAR) 25 MG tablet TAKE 1 TABLET BY MOUTH EVERY DAY 30 tablet 5   • pravastatin (PRAVACHOL) 20 MG tablet Take 1 tablet by mouth Daily. 90 tablet 3         VITALS    Visit Vitals  /70   Pulse 78   Temp 98.4 °F (36.9 °C)   Ht 173 cm (68.11\")   Wt 78 kg (172 lb)   SpO2 97%   BMI 26.07 kg/m²       BP Readings from Last 3 Encounters:   09/06/18 118/70   05/02/18 134/74   01/16/18 132/80     Wt Readings from Last 3 Encounters:   09/06/18 78 kg (172 lb)   05/02/18 78.5 kg (173 lb)   01/16/18 79.7 kg (175 lb 12.8 oz)      Body mass index is 26.07 kg/m².    CC:  Main reason(s) for today's visit: Follow-up for hypertension   HPI:     2 months of epigastric \"burning\" sensation. History of of AAA endovascular repair in 2017. Last imaging showing shrinking aneurysm.     Chronic essential hypertension:  Since prior visit: compliant with medication(s) and denies significant problems with medication(s).  Most recent in-office blood pressure readings:   BP Readings from Last 3 Encounters:   09/06/18 118/70   05/02/18 134/74   01/16/18 132/80     Chronic hyperlipidemia:  Current therapy include pravastatin, denies problems with medication.    Most recent labs:   Lab Results   Component Value Date    LDL 91 05/02/2018     (H) 02/21/2017    LDL 95 09/21/2015    HDL 60 05/02/2018    HDL 69 (H) 02/21/2017    TRIG 81 05/02/2018    CHOLHDLRATIO 2.78 05/02/2018     History of emphysema but still smokes.     Patient Care Team:  Lester Carter MD as PCP - General (Internal Medicine)  Orlin Quintero MD as Consulting Physician (Gastroenterology)  Keyur Mejia II, MD as Consulting Physician (Hematology and " Oncology)  Humble Hamilton MD as Surgeon (Vascular Surgery)  ____________________________________________________________________    ASSESSMENT & PLAN:    Problem List Items Addressed This Visit        High    Dyspepsia    Current Assessment & Plan     Trial of pantoprazole 40 mg qd x 2 weeks. If not improved in 2 weeks will check CT of abdomen.          Relevant Medications    pantoprazole (PROTONIX) 40 MG EC tablet    Other Relevant Orders    CBC & Differential    Lipase       Medium    Essential hypertension - Primary (Chronic)    Overview     Stable. Continue amlodipine 10 mg and losartan 25 mg qd.          Relevant Medications    amLODIPine (NORVASC) 10 MG tablet    losartan (COZAAR) 25 MG tablet    Hyperlipidemia (Chronic)    Current Assessment & Plan     Increase pravastatin to 40 mg qd. Check labs on follow-up for CPE.          Relevant Medications    aspirin 81 MG EC tablet    pravastatin (PRAVACHOL) 40 MG tablet       Low    Cigarette nicotine dependence with nicotine-induced disorder (Chronic)    Current Assessment & Plan     Currently smokes 1/2 PPD  Duration of smokin years  Prior cessation efforts: no prior attempts  Counseled on smoking cessation for 5 minutes: advised patient to quit, discussed nicotine replacement therapy and start NRT (patch/gum)          Emphysema lung (CMS/HCC) (Chronic)    Overview     Noted on CT in          Current Assessment & Plan     Advised to quit smoking. Recommended flu shot by October.                Orders Placed This Encounter   Procedures   • Lipase   • CBC & Differential       Summary/Discussion:         Return in about 4 months (around 2019) for Annual physical.    Future Appointments  Date Time Provider Department Center   2019 8:00 AM Lester Carter MD [facility] None       ____________________________________________________________________      REVIEW OF SYSTEMS    Review of Systems  As noted per HPI  Constitutional neg  Resp neg  CV neg    GI no melena or blood      PHYSICAL EXAMINATION    Physical Exam  Constitutional  No distress  Cardiovascular Rate  normal . Rhythm: regular . Heart sounds:  normal  Pulmonary/Chest  Effort normal. Breath sounds:  Normal  Abdomen: Soft and nontender, no palpable mass, no hepatomegaly.   Psychiatric  Alert. Judgment and thought content normal. Mood normal    REVIEWED DATA:    Labs:   Lab Results   Component Value Date     05/02/2018    K 4.9 05/02/2018    AST 15 05/02/2018    ALT 12 05/02/2018    BUN 22 05/02/2018    CREATININE 1.30 06/05/2018    CREATININE 1.12 05/02/2018    CREATININE 1.27 08/29/2017    EGFRIFNONA 66 05/02/2018    EGFRIFAFRI 80 05/02/2018       Lab Results   Component Value Date    HGBA1C 5.10 02/21/2017    HGBA1C 5.7 (H) 09/21/2015    GLUCOSE 101 08/29/2017    GLUCOSE 87 08/03/2017    GLUCOSE 92 08/02/2017       Lab Results   Component Value Date    LDL 91 05/02/2018     (H) 02/21/2017    LDL 95 09/21/2015    HDL 60 05/02/2018    HDL 69 (H) 02/21/2017    HDL 55 09/21/2015    TRIG 81 05/02/2018    TRIG 104 02/21/2017    TRIG 92 09/21/2015    CHOLHDLRATIO 2.78 05/02/2018    CHOLHDLRATIO 3.00 02/21/2017       Lab Results   Component Value Date    TSH 1.380 02/21/2017    FREET4 1.22 02/21/2017          Lab Results   Component Value Date    WBC 7.47 08/29/2017    HGB 14.3 08/29/2017    HGB 11.4 (L) 08/03/2017    HGB 11.6 (L) 08/02/2017     08/29/2017        Imaging:   CT ANGIOGRAM OF THE ABDOMEN AND PELVIS WITH AND WITHOUT CONTRAST     HISTORY: Abdominal aortic aneurysm. Yearly followup.     TECHNIQUE: Axial CT images of the abdomen and pelvis were obtained in  the angiographic phase following administration of intravenous contrast.  Coronal, sagittal and 3-D volume rendered images were then obtained.     COMPARISON: CT abdomen and pelvis from 08/02/2017.     FINDINGS: The abdominal aorta and its branches are well opacified with  intravenous contrast. There is moderate  atherosclerotic change. There is  an aortic stent graft in place within the infrarenal abdominal aorta  which originates just below the origin of the right renal artery. The  iliac portions of the stent graft extend into bilateral common iliac  arteries up to the level of their bifurcation. The native aneurysmal sac  shows interval decrease in size today measuring approximately 4.8 x 3.2  cm at a location where it previously measured 5.1 x 3.2 cm. There is no  CT evidence of endoleak present. Bilateral external iliac arteries  demonstrate normal caliber. The celiac and the superior mesenteric  artery demonstrate normal caliber. The right renal artery is widely  patent. The left kidney is absent. Evaluation of lung bases is limited  secondary to motion. Angiographic phase evaluation of the liver  demonstrates 2 small enhancing lesions within the hepatic dome that are  most consistent with small arterioportal shunts. The gallbladder, spleen  and pancreas are normal. Bilateral adrenal glands and the right kidney  are normal apart from 5 mm stone within the midpole. There is also a  midpole cortical cyst adjacent to the calculus. The small and large  bowel loops demonstrate normal caliber. Colonic diverticulosis is  present. The appendix is normal. The urinary bladder is partially  distended with mild circumferential wall thickening. Small slightly  hiatal hernia is present.     IMPRESSION:  1. Patent endograft with interval decrease in size of the native  abdominal aortic aneurysmal sac. No CT evidence to suggest endograft  leak.  2. Colonic diverticulosis.  3. Mild circumferential wall thickening of the urinary bladder that is  likely related to bladder outlet obstruction.  4. Punctate right nephrolith.      Radiation dose reduction techniques were utilized, including automated  exposure control and exposure modulation based on body size.     This report was finalized on 6/7/2018      Medical Tests:        Summary of  old records / correspondence / consultant report:        Request outside records:        ALLERGIES  No Known Allergies     PFSH:     The following portions of the patient's history were reviewed and updated as appropriate: Allergies / Current Medications / Past Medical History / Surgical History / Social History / Family History    PROBLEM LIST   Patient Active Problem List   Diagnosis   • Essential hypertension   • Cigarette nicotine dependence with nicotine-induced disorder   • Emphysema lung (CMS/HCC)   • Hyperlipidemia   • Abdominal aortic aneurysm (AAA) without rupture (CMS/HCC)   • Congenital absence of left kidney   • Diverticulosis of large intestine without hemorrhage   • Dyspepsia       PAST MEDICAL HISTORY  Past Medical History:   Diagnosis Date   • Colon polyp    • Diverticulosis of large intestine without hemorrhage 8/9/2017   • Emphysema lung (CMS/HCC) 2/28/2017   • Hyperlipidemia 2/28/2017   • Portal vein thrombosis 8/29/2017   • Sepsis (CMS/HCC) 8/2/2017   • Shingles 09/2010   • Skin cancer 5/31/2017   • Thrombosis, hepatic vein (CMS/HCC) 8/9/2017       SURGICAL HISTORY  Past Surgical History:   Procedure Laterality Date   • ABDOMINAL AORTIC ANEURYSM REPAIR  05/2017   • ARTERIOGRAM AORTIC N/A 5/8/2017    Procedure:  AORTIC STENT GRAFT REPAIR W/ GORE BILATERAL FEMORAL MINI CUTDOWNS;  Surgeon: Humble Hamilton MD;  Location: [facility] HYBRID OR 18/19;  Service:    • COLONOSCOPY N/A 8/3/2017    Procedure: COLONOSCOPY to cecum;  Surgeon: Patrick Elizabeth MD;  Location: [facility] ENDOSCOPY;  Service:    • COLONOSCOPY  09/2010   • ENDOSCOPY  8/3/2017    Procedure: ESOPHAGOGASTRODUODENOSCOPY;  Surgeon: Patrick Elizabeth MD;  Location: [facility] ENDOSCOPY;  Service:    • HERNIA REPAIR  1980   • UMBILICAL HERNIA REPAIR  1975       SOCIAL HISTORY  Social History     Social History   • Marital status:      Spouse name: Samira   • Number of children: 1   • Years of education: High School     Occupational  History   • Business: owner of metal fabrication [employer]     Social History Main Topics   • Smoking status: Current Every Day Smoker     Packs/day: 0.25     Years: 40.00     Types: Cigarettes   • Smokeless tobacco: Never Used      Comment: previously over 1 ppd   • Alcohol use 6.0 oz/week     10 Standard drinks or equivalent per week      Comment: 1-2 drinks bourbon   • Drug use: No   • Sexual activity: Yes     Partners: Female     Other Topics Concern   • Not on file       FAMILY HISTORY  Family History   Problem Relation Age of Onset   • Colon cancer Father 55   • Heart attack Father 71   • Coronary artery disease Father    • Heart disease Father    • Hypertension Mother    • Hyperlipidemia Mother    • Sudden death Mother 78   • Aneurysm Mother    • Heart disease Mother    • Benign prostatic hyperplasia Brother    • Hypertension Brother    • Benign prostatic hyperplasia Brother    • No Known Problems Sister    • Brain cancer Maternal Grandmother    • Heart disease Maternal Grandfather    • Sudden death Maternal Grandfather          **Mariion Disclaimer:   Much of this encounter note is an electronic transcription/translation of spoken language to printed text. The electronic translation of spoken language may permit erroneous, or at times, nonsensical words or phrases to be inadvertently transcribed. Although I have reviewed the note for such errors, some may still exist.

## 2018-09-10 DIAGNOSIS — R10.13 DYSPEPSIA: ICD-10-CM

## 2018-09-10 RX ORDER — PANTOPRAZOLE SODIUM 40 MG/1
40 TABLET, DELAYED RELEASE ORAL DAILY
Qty: 30 TABLET | Refills: 1 | Status: SHIPPED | OUTPATIENT
Start: 2018-09-10 | End: 2018-11-13 | Stop reason: SDUPTHER

## 2018-10-22 DIAGNOSIS — E78.5 HYPERLIPIDEMIA, UNSPECIFIED HYPERLIPIDEMIA TYPE: ICD-10-CM

## 2018-10-22 RX ORDER — PRAVASTATIN SODIUM 40 MG
40 TABLET ORAL DAILY
Qty: 90 TABLET | Refills: 3 | Status: SHIPPED | OUTPATIENT
Start: 2018-10-22 | End: 2018-10-24 | Stop reason: SDUPTHER

## 2018-10-23 DIAGNOSIS — E78.5 HYPERLIPIDEMIA, UNSPECIFIED HYPERLIPIDEMIA TYPE: ICD-10-CM

## 2018-10-24 DIAGNOSIS — E78.5 HYPERLIPIDEMIA, UNSPECIFIED HYPERLIPIDEMIA TYPE: ICD-10-CM

## 2018-10-24 RX ORDER — PRAVASTATIN SODIUM 40 MG
40 TABLET ORAL DAILY
Qty: 90 TABLET | Refills: 2 | Status: SHIPPED | OUTPATIENT
Start: 2018-10-24 | End: 2019-07-09 | Stop reason: SDUPTHER

## 2018-10-24 RX ORDER — PRAVASTATIN SODIUM 20 MG
TABLET ORAL
Qty: 90 TABLET | Refills: 1 | OUTPATIENT
Start: 2018-10-24

## 2018-10-24 RX ORDER — PRAVASTATIN SODIUM 40 MG
40 TABLET ORAL DAILY
Qty: 90 TABLET | Refills: 3 | Status: SHIPPED | OUTPATIENT
Start: 2018-10-24 | End: 2018-10-24 | Stop reason: SDUPTHER

## 2018-11-06 DIAGNOSIS — I10 ESSENTIAL HYPERTENSION: Chronic | ICD-10-CM

## 2018-11-07 RX ORDER — AMLODIPINE BESYLATE 10 MG/1
TABLET ORAL
Qty: 90 TABLET | Refills: 3 | Status: SHIPPED | OUTPATIENT
Start: 2018-11-07 | End: 2019-08-02 | Stop reason: SDUPTHER

## 2018-11-13 DIAGNOSIS — R10.13 DYSPEPSIA: ICD-10-CM

## 2018-11-13 RX ORDER — PANTOPRAZOLE SODIUM 40 MG/1
TABLET, DELAYED RELEASE ORAL
Qty: 30 TABLET | Refills: 5 | Status: SHIPPED | OUTPATIENT
Start: 2018-11-13 | End: 2019-07-18

## 2019-01-11 DIAGNOSIS — Z00.00 PREVENTATIVE HEALTH CARE: Primary | ICD-10-CM

## 2019-01-12 LAB
ALBUMIN SERPL-MCNC: 4.2 G/DL (ref 3.5–5.2)
ALBUMIN/GLOB SERPL: 1.8 G/DL
ALP SERPL-CCNC: 96 U/L (ref 39–117)
ALT SERPL-CCNC: 12 U/L (ref 1–41)
APPEARANCE UR: CLEAR
AST SERPL-CCNC: 13 U/L (ref 1–40)
BASOPHILS # BLD AUTO: 0.01 10*3/MM3 (ref 0–0.2)
BASOPHILS NFR BLD AUTO: 0.1 % (ref 0–1.5)
BILIRUB SERPL-MCNC: 0.5 MG/DL (ref 0.1–1.2)
BILIRUB UR QL STRIP: NEGATIVE
BUN SERPL-MCNC: 16 MG/DL (ref 8–23)
BUN/CREAT SERPL: 12.4 (ref 7–25)
CALCIUM SERPL-MCNC: 9.1 MG/DL (ref 8.6–10.5)
CHLORIDE SERPL-SCNC: 104 MMOL/L (ref 98–107)
CHOLEST SERPL-MCNC: 140 MG/DL (ref 0–200)
CHOLEST/HDLC SERPL: 2.26 {RATIO}
CO2 SERPL-SCNC: 21.7 MMOL/L (ref 22–29)
COLOR UR: YELLOW
CREAT SERPL-MCNC: 1.29 MG/DL (ref 0.76–1.27)
EOSINOPHIL # BLD AUTO: 0.22 10*3/MM3 (ref 0–0.7)
EOSINOPHIL NFR BLD AUTO: 2.9 % (ref 0.3–6.2)
ERYTHROCYTE [DISTWIDTH] IN BLOOD BY AUTOMATED COUNT: 14.3 % (ref 11.5–14.5)
GLOBULIN SER CALC-MCNC: 2.4 GM/DL
GLUCOSE SERPL-MCNC: 90 MG/DL (ref 65–99)
GLUCOSE UR QL: NEGATIVE
HBA1C MFR BLD: 5.6 % (ref 4.8–5.6)
HCT VFR BLD AUTO: 48.1 % (ref 40.4–52.2)
HDLC SERPL-MCNC: 62 MG/DL (ref 40–60)
HGB BLD-MCNC: 15.1 G/DL (ref 13.7–17.6)
HGB UR QL STRIP: NEGATIVE
IMM GRANULOCYTES # BLD AUTO: 0.01 10*3/MM3 (ref 0–0.03)
IMM GRANULOCYTES NFR BLD AUTO: 0.1 % (ref 0–0.5)
KETONES UR QL STRIP: NEGATIVE
LDLC SERPL CALC-MCNC: 55 MG/DL (ref 0–100)
LEUKOCYTE ESTERASE UR QL STRIP: NEGATIVE
LYMPHOCYTES # BLD AUTO: 2.24 10*3/MM3 (ref 0.9–4.8)
LYMPHOCYTES NFR BLD AUTO: 29.2 % (ref 19.6–45.3)
MCH RBC QN AUTO: 31.5 PG (ref 27–32.7)
MCHC RBC AUTO-ENTMCNC: 31.4 G/DL (ref 32.6–36.4)
MCV RBC AUTO: 100.2 FL (ref 79.8–96.2)
MONOCYTES # BLD AUTO: 0.42 10*3/MM3 (ref 0.2–1.2)
MONOCYTES NFR BLD AUTO: 5.5 % (ref 5–12)
NEUTROPHILS # BLD AUTO: 4.79 10*3/MM3 (ref 1.9–8.1)
NEUTROPHILS NFR BLD AUTO: 62.3 % (ref 42.7–76)
NITRITE UR QL STRIP: NEGATIVE
PH UR STRIP: 5.5 [PH] (ref 5–8)
PLATELET # BLD AUTO: 292 10*3/MM3 (ref 140–500)
POTASSIUM SERPL-SCNC: 4.8 MMOL/L (ref 3.5–5.2)
PROT SERPL-MCNC: 6.6 G/DL (ref 6–8.5)
PROT UR QL STRIP: NEGATIVE
PSA SERPL-MCNC: 1.09 NG/ML (ref 0–4)
RBC # BLD AUTO: 4.8 10*6/MM3 (ref 4.6–6)
SODIUM SERPL-SCNC: 140 MMOL/L (ref 136–145)
SP GR UR: 1.02 (ref 1–1.03)
T4 FREE SERPL-MCNC: 1.26 NG/DL (ref 0.93–1.7)
TRIGL SERPL-MCNC: 116 MG/DL (ref 0–150)
TSH SERPL DL<=0.005 MIU/L-ACNC: 1.11 MIU/ML (ref 0.27–4.2)
UROBILINOGEN UR STRIP-MCNC: NORMAL MG/DL
VLDLC SERPL CALC-MCNC: 23.2 MG/DL (ref 5–40)
WBC # BLD AUTO: 7.68 10*3/MM3 (ref 4.5–10.7)

## 2019-01-17 ENCOUNTER — OFFICE VISIT (OUTPATIENT)
Dept: INTERNAL MEDICINE | Age: 64
End: 2019-01-17

## 2019-01-17 VITALS
DIASTOLIC BLOOD PRESSURE: 70 MMHG | BODY MASS INDEX: 25.91 KG/M2 | RESPIRATION RATE: 12 BRPM | HEIGHT: 68 IN | TEMPERATURE: 98.5 F | HEART RATE: 98 BPM | OXYGEN SATURATION: 98 % | SYSTOLIC BLOOD PRESSURE: 112 MMHG | WEIGHT: 171 LBS

## 2019-01-17 DIAGNOSIS — I82.0 HEPATIC VEIN THROMBOSIS (HCC): ICD-10-CM

## 2019-01-17 DIAGNOSIS — Z12.2 ENCOUNTER FOR SCREENING FOR MALIGNANT NEOPLASM OF RESPIRATORY ORGANS: ICD-10-CM

## 2019-01-17 DIAGNOSIS — G89.29 ABDOMINAL PAIN, CHRONIC, EPIGASTRIC: ICD-10-CM

## 2019-01-17 DIAGNOSIS — Z87.891 HISTORY OF SMOKING 25-50 PACK YEARS: ICD-10-CM

## 2019-01-17 DIAGNOSIS — R10.13 ABDOMINAL PAIN, CHRONIC, EPIGASTRIC: ICD-10-CM

## 2019-01-17 DIAGNOSIS — Z00.00 ENCOUNTER FOR ANNUAL HEALTH EXAMINATION: Primary | ICD-10-CM

## 2019-01-17 PROCEDURE — 99213 OFFICE O/P EST LOW 20 MIN: CPT | Performed by: INTERNAL MEDICINE

## 2019-01-17 PROCEDURE — 90472 IMMUNIZATION ADMIN EACH ADD: CPT | Performed by: INTERNAL MEDICINE

## 2019-01-17 PROCEDURE — 90750 HZV VACC RECOMBINANT IM: CPT | Performed by: INTERNAL MEDICINE

## 2019-01-17 PROCEDURE — 90471 IMMUNIZATION ADMIN: CPT | Performed by: INTERNAL MEDICINE

## 2019-01-17 PROCEDURE — 90632 HEPA VACCINE ADULT IM: CPT | Performed by: INTERNAL MEDICINE

## 2019-01-17 PROCEDURE — 99396 PREV VISIT EST AGE 40-64: CPT | Performed by: INTERNAL MEDICINE

## 2019-01-17 NOTE — ASSESSMENT & PLAN NOTE
Persistent, no improvement with PPI therapy. History of hepatic vein thrombosis. Check CT abdomen with contrast for further evaluation.

## 2019-01-17 NOTE — PROGRESS NOTES
[facility]  RAIMUNDO MEDINA M.D.      Patrick Mckeon / 63 y.o. / male  01/17/2019    CC: Annual Exam (last 2/28/2017) and Cough (x 2 weeks)      HPI:      Patrick presents for annual health maintenance visit.  Complains of ongoing epigastric abdominal discomfort. Pantoprazole did not help at all. No melena or blood.   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     · Last health maintenance visit: 2 years ago  · General health: poor  · Lifestyle:  · Attempting to lose weight?: No   · Diet: could be better  · Exercise: does not exercise regularly but stays active with work  · Tobacco: Regular use (~ 1/4 pack/day)   · Alcohol: regular (moderate)  · Work: Full-time  · Reproductive health:  · Sexually active?: Yes   · Concern for STD?: No   · Sexual problems?: No problems   · Sees Urologist?: No   · Depression Screening:      PHQ-2/PHQ-9 Depression Screening 1/17/2019   Little interest or pleasure in doing things 0   Feeling down, depressed, or hopeless 0   Total Score 0         PHQ-2: 0 (Not depressed)     PHQ-9:     Patient Care Team:  Lester Medina MD as PCP - General (Internal Medicine)  Orlin Quintero MD as Consulting Physician (Gastroenterology)  Keyur Mejia II, MD as Consulting Physician (Hematology and Oncology)  Humble Hamilton MD as Surgeon (Vascular Surgery)  ______________________________________________________________________    ALLERGIES  No Known Allergies     MEDICATIONS  Current Outpatient Medications on File Prior to Visit   Medication Sig   • amLODIPine (NORVASC) 10 MG tablet TAKE 1 TABLET BY MOUTH EVERY DAY   • aspirin 81 MG EC tablet Take 1 tablet by mouth Daily.   • losartan (COZAAR) 25 MG tablet TAKE 1 TABLET BY MOUTH EVERY DAY   • pravastatin (PRAVACHOL) 40 MG tablet Take 1 tablet by mouth Daily.   • pantoprazole (PROTONIX) 40 MG EC tablet TAKE 1 TABLET BY MOUTH EVERY DAY     No current facility-administered medications on file prior to visit.        PFSH:     The following  portions of the patient's history were reviewed and updated as appropriate: Allergies / Current Medications / Past Medical History / Surgical History / Social History / Family History    PROBLEM LIST   Patient Active Problem List   Diagnosis   • Essential hypertension   • Cigarette nicotine dependence with nicotine-induced disorder   • Emphysema lung (CMS/HCC)   • Hyperlipidemia   • Abdominal aortic aneurysm (AAA) without rupture (CMS/HCC)   • Congenital absence of left kidney   • Diverticulosis of large intestine without hemorrhage   • Epigastric abdominal pain       PAST MEDICAL HISTORY  Past Medical History:   Diagnosis Date   • Colon polyp    • Diverticulosis of large intestine without hemorrhage 8/9/2017   • Emphysema lung (CMS/HCC) 2/28/2017   • Hyperlipidemia 2/28/2017   • Portal vein thrombosis 8/29/2017   • Sepsis (CMS/HCC) 8/2/2017   • Shingles 09/2010   • Skin cancer 5/31/2017   • Thrombosis, 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    coag hickey by heme neg; completed 6 months of AC and maintained on ASA       SURGICAL HISTORY  Past Surgical History:   Procedure Laterality Date   • ABDOMINAL AORTIC ANEURYSM REPAIR  05/2017   • ARTERIOGRAM AORTIC N/A 5/8/2017    Procedure:  AORTIC STENT GRAFT REPAIR W/ GORE BILATERAL FEMORAL MINI CUTDOWNS;  Surgeon: Humble Hamilton MD;  Location: [facility] HYBRID OR 18/19;  Service:    • COLONOSCOPY N/A 8/3/2017    Procedure: COLONOSCOPY to cecum;  Surgeon: Patrick Elizabeth MD;  Location: [facility] ENDOSCOPY;  Service:    • COLONOSCOPY  09/2010   • ENDOSCOPY  8/3/2017    Procedure: ESOPHAGOGASTRODUODENOSCOPY;  Surgeon: Patrick Elizabeth MD;  Location: [facility] ENDOSCOPY;  Service:    • HERNIA REPAIR  1980   • UMBILICAL HERNIA REPAIR  1975       SOCIAL HISTORY  Social History     Socioeconomic History   • Marital status:      Spouse name: Samira   • Number of children: 1   • Years of education: High School   • Highest education level: Not on file   Occupational History   •  Occupation: Business: owner of metal fabrication     Employer: [employer]   Tobacco Use   • Smoking status: Current Every Day Smoker     Packs/day: 1.00     Years: 40.00     Pack years: 40.00     Types: Cigarettes   • Smokeless tobacco: Never Used   • Tobacco comment: currently 1/4 ppd   Substance and Sexual Activity   • Alcohol use: Yes     Frequency: 4 or more times a week     Drinks per session: 1 or 2   • Drug use: No   • Sexual activity: Yes     Partners: Female       FAMILY HISTORY  Family History   Problem Relation Age of Onset   • Colon cancer Father 55   • Heart attack Father 71   • Coronary artery disease Father    • Hypertension Mother    • Hyperlipidemia Mother    • Sudden death Mother 78   • Aneurysm Mother    • Heart disease Mother    • Benign prostatic hyperplasia Brother    • Hypertension Brother    • Benign prostatic hyperplasia Brother    • No Known Problems Sister    • Brain cancer Maternal Grandmother    • Heart disease Maternal Grandfather    • Sudden death Maternal Grandfather    • No Known Problems Paternal Grandmother    • No Known Problems Paternal Grandfather    • Prostate cancer Neg Hx    • Dementia Neg Hx        IMMUNIZATION HISTORY  Immunization History   Administered Date(s) Administered   • Flu Vaccine Quad PF 6-35MO 09/13/2018   • Flu Vaccine Quad PF >18YRS 10/28/2016   • Flu Vaccine Quad PF >36MO 10/02/2017   • Hepatitis A 05/02/2018   • Influenza Split Preservative Free ID 09/19/2012   • Influenza TIV (IM) 09/19/2014   • Pneumococcal Conjugate 13-Valent (PCV13) 02/28/2017   • Pneumococcal Polysaccharide (PPSV23) 05/02/2018   • Shingrix 05/02/2018   • Tdap 09/21/2011       ______________________________________________________________________    REVIEW OF SYSTEMS    Review of Systems   Constitutional: Negative.    HENT: Negative.    Eyes: Negative.    Respiratory: Negative.  Negative for shortness of breath.    Cardiovascular: Negative.  Negative for chest pain.   Gastrointestinal:  "Positive for abdominal pain. Negative for blood in stool.   Endocrine: Negative.    Genitourinary: Negative.  Negative for difficulty urinating and hematuria.   Musculoskeletal: Negative.    Skin: Negative.    Allergic/Immunologic: Negative.    Neurological: Negative.    Hematological: Negative.    Psychiatric/Behavioral: Negative.          VITALS:    Visit Vitals  /70   Pulse 98   Temp 98.5 °F (36.9 °C)   Resp 12   Ht 173 cm (68.11\")   Wt 77.6 kg (171 lb)   SpO2 98%   BMI 25.92 kg/m²       BP Readings from Last 3 Encounters:   01/17/19 112/70   09/06/18 118/70   05/02/18 134/74     Wt Readings from Last 3 Encounters:   01/17/19 77.6 kg (171 lb)   09/06/18 78 kg (172 lb)   05/02/18 78.5 kg (173 lb)      Body mass index is 25.92 kg/m².    PHYSICAL EXAMINATION    Physical Exam   Constitutional: He is oriented to person, place, and time. He appears well-nourished. No distress.   HENT:   Head: Normocephalic.   Right Ear: External ear normal.   Left Ear: External ear normal.   Nose: Nose normal.   Mouth/Throat: Oropharynx is clear and moist. Abnormal dentition.   Eyes: Conjunctivae and EOM are normal. Pupils are equal, round, and reactive to light. No scleral icterus.   Neck: Normal range of motion. Neck supple. No tracheal deviation present. No thyromegaly present.   Cardiovascular: Normal rate, regular rhythm, normal heart sounds and intact distal pulses. Exam reveals no gallop and no friction rub.   No murmur heard.  Pulmonary/Chest: Effort normal and breath sounds normal.   Abdominal: Soft. Normal appearance and bowel sounds are normal. He exhibits no distension, no pulsatile midline mass and no mass. There is no hepatosplenomegaly. There is tenderness (mild epigastric region). No hernia.   Genitourinary: Rectum normal, prostate normal, testes normal and penis normal. Rectal exam shows no mass.   Musculoskeletal: Normal range of motion. He exhibits no edema or deformity.   Lymphadenopathy:     He has no " cervical adenopathy.     He has no axillary adenopathy.        Right: No inguinal and no supraclavicular adenopathy present.        Left: No inguinal and no supraclavicular adenopathy present.   Neurological: He is alert and oriented to person, place, and time. He has normal reflexes. He displays normal reflexes. No cranial nerve deficit. He exhibits normal muscle tone. Coordination normal.   Skin: Skin is intact. No lesion (Negative for suspicious skin lesions/growths) and no rash noted. No cyanosis or erythema. No pallor. Nails show no clubbing.   No jaundice  No suspicious skin lesions.   Extensive SK's over torso   Psychiatric: He has a normal mood and affect. His behavior is normal. Judgment and thought content normal. Cognition and memory are normal.         REVIEWED DATA    Labs:    Lab Results   Component Value Date     01/11/2019    K 4.8 01/11/2019    AST 13 01/11/2019    ALT 12 01/11/2019    BUN 16 01/11/2019    CREATININE 1.29 (H) 01/11/2019    CREATININE 1.30 06/05/2018    CREATININE 1.12 05/02/2018    EGFRIFNONA 56 (L) 01/11/2019    EGFRIFAFRI 68 01/11/2019       Lab Results   Component Value Date    GLUCOSE 101 08/29/2017    HGBA1C 5.60 01/11/2019    HGBA1C 5.10 02/21/2017    HGBA1C 5.7 (H) 09/21/2015    TSH 1.110 01/11/2019    FREET4 1.26 01/11/2019       Lab Results   Component Value Date    PSA 1.090 01/11/2019    PSA 0.859 02/21/2017    PSA 0.85 09/21/2015    TESTOSTEROTT 806 02/21/2017       Lab Results   Component Value Date    LDL 55 01/11/2019    HDL 62 (H) 01/11/2019    TRIG 116 01/11/2019    CHOLHDLRATIO 2.26 01/11/2019       No components found for: [id number]    Lab Results   Component Value Date    WBC 7.68 01/11/2019    HGB 15.1 01/11/2019    .2 (H) 01/11/2019     01/11/2019       Lab Results   Component Value Date    PROTEIN Negative 01/11/2019    GLUCOSEU Negative 01/11/2019    BLOODU Negative 01/11/2019    NITRITEU Negative 01/11/2019    LEUKOCYTESUR Negative  01/11/2019          Lab Results   Component Value Date    HEPCVIRUSABY <0.1 02/21/2017       Imaging:   CT ANGIOGRAM OF THE ABDOMEN AND PELVIS WITH AND WITHOUT CONTRAST     HISTORY: Abdominal aortic aneurysm. Yearly followup.     TECHNIQUE: Axial CT images of the abdomen and pelvis were obtained in  the angiographic phase following administration of intravenous contrast.  Coronal, sagittal and 3-D volume rendered images were then obtained.     COMPARISON: CT abdomen and pelvis from 08/02/2017.     FINDINGS: The abdominal aorta and its branches are well opacified with  intravenous contrast. There is moderate atherosclerotic change. There is  an aortic stent graft in place within the infrarenal abdominal aorta  which originates just below the origin of the right renal artery. The  iliac portions of the stent graft extend into bilateral common iliac  arteries up to the level of their bifurcation. The native aneurysmal sac  shows interval decrease in size today measuring approximately 4.8 x 3.2  cm at a location where it previously measured 5.1 x 3.2 cm. There is no  CT evidence of endoleak present. Bilateral external iliac arteries  demonstrate normal caliber. The celiac and the superior mesenteric  artery demonstrate normal caliber. The right renal artery is widely  patent. The left kidney is absent. Evaluation of lung bases is limited  secondary to motion. Angiographic phase evaluation of the liver  demonstrates 2 small enhancing lesions within the hepatic dome that are  most consistent with small arterioportal shunts. The gallbladder, spleen  and pancreas are normal. Bilateral adrenal glands and the right kidney  are normal apart from 5 mm stone within the midpole. There is also a  midpole cortical cyst adjacent to the calculus. The small and large  bowel loops demonstrate normal caliber. Colonic diverticulosis is  present. The appendix is normal. The urinary bladder is partially  distended with mild circumferential  wall thickening. Small slightly  hiatal hernia is present.     IMPRESSION:  1. Patent endograft with interval decrease in size of the native  abdominal aortic aneurysmal sac. No CT evidence to suggest endograft  leak.  2. Colonic diverticulosis.  3. Mild circumferential wall thickening of the urinary bladder that is  likely related to bladder outlet obstruction.  4. Punctate right nephrolith.       Medical Tests:       ______________________________________________________________________    ASSESSMENT & PLAN    ANNUAL WELLNESS EXAM / PHYSICAL     Other medical problems addressed today:  Problem List Items Addressed This Visit     None      Visit Diagnoses     Encounter for annual health examination    -  Primary    Relevant Orders    Hepatitis A Vaccine Adult IM    Shingrix Vaccine    Abdominal pain, chronic, epigastric        Relevant Orders    CT abdomen w contrast    Hepatic vein thrombosis (CMS/HCC)        Relevant Orders    CT abdomen w contrast    Encounter for screening for malignant neoplasm of respiratory organs        Relevant Orders    CT Chest Low Dose Wo    History of smoking 25-50 pack years        Relevant Orders    CT Chest Low Dose Wo          Summary/Discussion:     · Primary reason for today's visit was for annual health examination. However above active medical issues also needed to be addressed today.        Return in about 6 months (around 7/17/2019) for Reassess chronic medical problems.    No future appointments.      HEALTHCARE MAINTENANCE ISSUES:    Cancer Screening:  · Colon: Initial/Next screening at age: CURRENT  · Repeat colonoscopy every 5 years  · Prostate: Performed today and recommended annual screening  · Testicular: Recommended monthly self exam  · Skin: Monthly self skin examination, annual exam by health professional  · Lung:   · Other:    Screening Labs & Tests:  · Lab results reviewed & discussed with with patient or orders placed today.  · EKG:  · Vascular Screening:    · DEXA (75+ or risk factors):   · HEP C (If born [id number] or risk factors): Previously had negative screen    Immunization/Vaccinations (to be given today unless deferred by patient)  · Influenza: Patient had the flu shot this season  · Hepatitis A: Administer today  · Tetanus/Pertussis: Up to date  · Pneumovax: Up to date  · Prevnar 13: Up to date  · Shingles: 2nd Shingrix will be given today  · Other:     Lifestyle Counseling:  · Lifestyle Modifications: Improve dietary compliance, Follow a low fat, low cholesterol diet, Decrease or avoid alcohol intake and Quit smoking  · Safety Issues: Always wear seatbelt, Avoid texting while driving   · Use sunscreen, regular skin examination  · Recommended annual dental/vision examination.  · Emotional/Stress/Sleep: Reviewed and  given when appropriate      Health Maintenance   Topic Date Due   • GLAUCOMA SCREENING  03/14/2019   • PROSTATE CANCER SCREENING  01/11/2020   • LIPID PANEL  01/11/2020   • ANNUAL PHYSICAL  01/18/2020   • TDAP/TD VACCINES (2 - Td) 09/21/2021   • COLONOSCOPY  08/03/2022   • PNEUMOCOCCAL VACCINE (19-64 MEDIUM RISK)  Completed   • HEPATITIS C SCREENING  Completed   • INFLUENZA VACCINE  Addressed   • ZOSTER VACCINE  Addressed         **Dragon Disclaimer:   Much of this encounter note is an electronic transcription/translation of spoken language to printed text. The electronic translation of spoken language may permit erroneous, or at times, nonsensical words or phrases to be inadvertently transcribed. Although I have reviewed the note for such errors, some may still exist.

## 2019-02-06 ENCOUNTER — HOSPITAL ENCOUNTER (OUTPATIENT)
Dept: CT IMAGING | Facility: HOSPITAL | Age: 64
Discharge: HOME | End: 2019-02-06
Admitting: INTERNAL MEDICINE

## 2019-02-06 ENCOUNTER — HOSPITAL ENCOUNTER (OUTPATIENT)
Dept: CT IMAGING | Facility: HOSPITAL | Age: 64
Discharge: HOME | End: 2019-02-06

## 2019-02-06 LAB — CREAT BLDA-MCNC: 1.2 MG/DL (ref 0.6–1.3)

## 2019-02-06 PROCEDURE — G0297 LDCT FOR LUNG CA SCREEN: HCPCS

## 2019-02-06 PROCEDURE — 82565 ASSAY OF CREATININE: CPT

## 2019-02-06 PROCEDURE — 74160 CT ABDOMEN W/CONTRAST: CPT

## 2019-02-06 PROCEDURE — 25010000002 IOPAMIDOL 61 % SOLUTION: Performed by: INTERNAL MEDICINE

## 2019-02-06 RX ADMIN — IOPAMIDOL 100 ML: 612 INJECTION, SOLUTION INTRAVENOUS at 08:23

## 2019-02-17 DIAGNOSIS — I10 ESSENTIAL HYPERTENSION: Chronic | ICD-10-CM

## 2019-02-19 RX ORDER — LOSARTAN POTASSIUM 25 MG/1
TABLET ORAL
Qty: 30 TABLET | Refills: 5 | Status: SHIPPED | OUTPATIENT
Start: 2019-02-19 | End: 2019-07-09 | Stop reason: SDUPTHER

## 2019-07-09 ENCOUNTER — PATIENT MESSAGE (OUTPATIENT)
Dept: INTERNAL MEDICINE | Age: 64
End: 2019-07-09

## 2019-07-09 ENCOUNTER — TELEPHONE (OUTPATIENT)
Dept: INTERNAL MEDICINE | Age: 64
End: 2019-07-09

## 2019-07-09 DIAGNOSIS — E78.5 HYPERLIPIDEMIA, UNSPECIFIED HYPERLIPIDEMIA TYPE: ICD-10-CM

## 2019-07-09 DIAGNOSIS — I10 ESSENTIAL HYPERTENSION: Chronic | ICD-10-CM

## 2019-07-09 RX ORDER — LOSARTAN POTASSIUM 25 MG/1
25 TABLET ORAL DAILY
Qty: 90 TABLET | Refills: 1 | Status: SHIPPED | OUTPATIENT
Start: 2019-07-09 | End: 2019-12-10 | Stop reason: SDUPTHER

## 2019-07-09 RX ORDER — PRAVASTATIN SODIUM 40 MG
40 TABLET ORAL DAILY
Qty: 90 TABLET | Refills: 1 | Status: SHIPPED | OUTPATIENT
Start: 2019-07-09 | End: 2019-12-10 | Stop reason: SDUPTHER

## 2019-07-09 NOTE — TELEPHONE ENCOUNTER
Regarding: Prescription Question  Contact: [phone]  ----- Message from Mychart, Generic sent at 7/9/2019  8:51 AM EDT -----    Dr. Carter , I'm getting low on Pravastatin 40 mg  90 day  & Losaran 25 mg 90 day . My new preference for these meds  is [facility] . Let me know what I need to do to get this going soon as they are mail order and can take  7 - 10 days to arrive .  Thanks ,  Chidi Mckeon

## 2019-07-18 ENCOUNTER — OFFICE VISIT (OUTPATIENT)
Dept: INTERNAL MEDICINE | Age: 64
End: 2019-07-18

## 2019-07-18 VITALS
BODY MASS INDEX: 25.22 KG/M2 | HEIGHT: 68 IN | OXYGEN SATURATION: 95 % | HEART RATE: 85 BPM | SYSTOLIC BLOOD PRESSURE: 120 MMHG | WEIGHT: 166.4 LBS | TEMPERATURE: 98 F | RESPIRATION RATE: 18 BRPM | DIASTOLIC BLOOD PRESSURE: 68 MMHG

## 2019-07-18 DIAGNOSIS — I10 ESSENTIAL HYPERTENSION: Primary | Chronic | ICD-10-CM

## 2019-07-18 DIAGNOSIS — R10.13 EPIGASTRIC ABDOMINAL PAIN: ICD-10-CM

## 2019-07-18 DIAGNOSIS — E78.2 MIXED HYPERLIPIDEMIA: Chronic | ICD-10-CM

## 2019-07-18 DIAGNOSIS — F17.219 CIGARETTE NICOTINE DEPENDENCE WITH NICOTINE-INDUCED DISORDER: Chronic | ICD-10-CM

## 2019-07-18 DIAGNOSIS — J43.9 PULMONARY EMPHYSEMA, UNSPECIFIED EMPHYSEMA TYPE (HCC): Chronic | ICD-10-CM

## 2019-07-18 PROCEDURE — 99406 BEHAV CHNG SMOKING 3-10 MIN: CPT | Performed by: INTERNAL MEDICINE

## 2019-07-18 PROCEDURE — 99214 OFFICE O/P EST MOD 30 MIN: CPT | Performed by: INTERNAL MEDICINE

## 2019-07-18 NOTE — PATIENT INSTRUCTIONS
** IMPORTANT MESSAGE FROM DR. MEDINA **    In our office, your satisfaction is VERY important to us.     You may receive a survey from Michelle [employer] by mail or E-mail for you to provide feedback about your visit. This information is invaluable for me to know what we can do to improve our services.     I ask that you please take a few minutes to complete the survey and let us know how we are doing in serving your needs. (You may receive the survey more than once for multiple visits)    Thank You !    Dr. Medina & Staff    __________________________________________________________      ADDITIONAL INSTRUCTION / REMINDERS FROM DR. MEDINA

## 2019-07-18 NOTE — ASSESSMENT & PLAN NOTE
Time spent counseling: 3 minutes  Currently smokes 1/2 PPD  Duration of smokin years  Prior cessation efforts: no prior attempts  Counseled on smoking cessation for 5 minutes: advised patient to quit  Follow-up in 6 months

## 2019-07-18 NOTE — ASSESSMENT & PLAN NOTE
Stable without specific cause identified. Prior EGD 2017 negative, CT negative.   Advised to watch for worsening symptoms, weight loss, early satiety, worsening symptoms with eating, melena or blood in stool.

## 2019-07-18 NOTE — PROGRESS NOTES
[facility] INTERNAL MEDICINE  RAIMUNDO MEDINA M.D.      Patrick Palaciosoriana / 64 y.o. / male  2019    ASSESSMENT & PLAN     Problem List Items Addressed This Visit        High    Essential hypertension - Primary (Chronic)    Overview     Stable. Continue amlodipine 10 mg and losartan 25 mg qd.          Relevant Medications    amLODIPine (NORVASC) 10 MG tablet    losartan (COZAAR) 25 MG tablet       Medium    Hyperlipidemia (Chronic)    Overview     Continue pravastatin 40 mg qHS.          Relevant Medications    aspirin 81 MG EC tablet    pravastatin (PRAVACHOL) 40 MG tablet       Low    Cigarette nicotine dependence with nicotine-induced disorder (Chronic)    Current Assessment & Plan     Time spent counseling: 3 minutes  Currently smokes 1/2 PPD  Duration of smokin years  Prior cessation efforts: no prior attempts  Counseled on smoking cessation for 5 minutes: advised patient to quit  Follow-up in 6 months          Emphysema lung (CMS/HCC) (Chronic)    Overview     Noted on CT in          Current Assessment & Plan     Advised to quit smoking.            Epigastric abdominal pain    Current Assessment & Plan     Stable without specific cause identified. Prior EGD  negative, CT negative.   Advised to watch for worsening symptoms, weight loss, early satiety, worsening symptoms with eating, melena or blood in stool.              No orders of the defined types were placed in this encounter.    No orders of the defined types were placed in this encounter.      Summary/Discussion:  ·     Next Appointment with me: Visit date not found    Return in about 6 months (around 2020) for WELCOME TO MEDICARE VISIT (30 MIN), Hypertension & hyperlipidemia.      MEDICATIONS  Current Outpatient Medications   Medication Sig Dispense Refill   • amLODIPine (NORVASC) 10 MG tablet TAKE 1 TABLET BY MOUTH EVERY DAY 90 tablet 3   • aspirin 81 MG EC tablet Take 1 tablet by mouth Daily.     • losartan (COZAAR) 25 MG tablet Take 1 tablet  "by mouth Daily. 90 tablet 1   • Melatonin 5 MG capsule      • pravastatin (PRAVACHOL) 40 MG tablet Take 1 tablet by mouth Daily. 90 tablet 1   • pantoprazole (PROTONIX) 40 MG EC tablet TAKE 1 TABLET BY MOUTH EVERY DAY 30 tablet 5     No current facility-administered medications for this visit.           VITALS:    Visit Vitals  /68 (BP Location: Left arm, Patient Position: Sitting, Cuff Size: Adult)   Pulse 85   Temp 98 °F (36.7 °C) (Temporal)   Resp 18   Ht 173 cm (68.11\")   Wt 75.5 kg (166 lb 6.4 oz)   SpO2 95%   BMI 25.22 kg/m²       BP Readings from Last 3 Encounters:   07/18/19 120/68   01/17/19 112/70   09/06/18 118/70     Wt Readings from Last 3 Encounters:   07/18/19 75.5 kg (166 lb 6.4 oz)   01/17/19 77.6 kg (171 lb)   09/06/18 78 kg (172 lb)      Body mass index is 25.22 kg/m².        CC:  Main reason(s) for today's visit: Essential hypertension (6 month follow up ) and Hyperlipidemia      HPI:     Prior office note reviewed and there has been no significant interval change in history.      Epigastric abdominal discomfort is less noticeable. Pantoprazole did not seem to make a significant difference. Reviewed EGD report from 2017 which was negative. Reviewed recent CT of abdomen. Incidental finding of missing left kidney.     Chronic essential hypertension:  Since prior visit: compliant with medication(s) and denies significant problems with medication(s).  Most recent in-office blood pressure readings:   BP Readings from Last 3 Encounters:   07/18/19 120/68   01/17/19 112/70   09/06/18 118/70     Chronic hyperlipidemia:  Current therapy include pravastatin, denies problems with medication.    Most recent labs:   Lab Results   Component Value Date    LDL 55 01/11/2019    LDL 91 05/02/2018     (H) 02/21/2017    HDL 62 (H) 01/11/2019    HDL 60 05/02/2018    HDL 69 (H) 02/21/2017    TRIG 116 01/11/2019    CHOLHDLRATIO 2.26 01/11/2019    CHOLHDLRATIO 2.78 05/02/2018    CHOLHDLRATIO 3.00 02/21/2017 "     COPD:  He is a current smoker. He denies change in breathing symptoms.        Patient Care Team:  Lester Carter MD as PCP - General (Internal Medicine)  Orlin Quintero MD as Consulting Physician (Gastroenterology)  Keyur Mejia II, MD as Consulting Physician (Hematology and Oncology)  Humble Hamilton MD as Surgeon (Vascular Surgery)      REVIEW OF SYSTEMS     Review of Systems  Constitutional neg  Resp neg for worsening sob  CV neg for chest pain  GI neg for change   neg       PHYSICAL EXAMINATION     Physical Exam  Constitutional  No distress  Cardiovascular Rate  normal . Rhythm: regular . Heart sounds:  Normal  Pulmonary/Chest: Effort normal and breath sounds diminished throughout without wheezing.    Abdomen: Soft and nontender, no palpable mass, no hepatomegaly.   Psychiatric  Alert. Judgment and thought content normal. Mood normal      REVIEWED DATA     Labs:     Lab Results   Component Value Date     01/11/2019    K 4.8 01/11/2019    CALCIUM 9.1 01/11/2019    AST 13 01/11/2019    ALT 12 01/11/2019    BUN 16 01/11/2019    CREATININE 1.20 02/06/2019    CREATININE 1.29 (H) 01/11/2019    CREATININE 1.30 06/05/2018    EGFRIFNONA 56 (L) 01/11/2019    EGFRIFAFRI 68 01/11/2019       Lab Results   Component Value Date    HGBA1C 5.60 01/11/2019    HGBA1C 5.10 02/21/2017    HGBA1C 5.7 (H) 09/21/2015    GLU 90 01/11/2019    GLU 94 05/02/2018    GLU 94 02/21/2017       Lab Results   Component Value Date    LDL 55 01/11/2019    LDL 91 05/02/2018     (H) 02/21/2017    HDL 62 (H) 01/11/2019    HDL 60 05/02/2018    HDL 69 (H) 02/21/2017    TRIG 116 01/11/2019    TRIG 81 05/02/2018    TRIG 104 02/21/2017    CHOLHDLRATIO 2.26 01/11/2019    CHOLHDLRATIO 2.78 05/02/2018    CHOLHDLRATIO 3.00 02/21/2017       Lab Results   Component Value Date    TSH 1.110 01/11/2019    FREET4 1.26 01/11/2019       Lab Results   Component Value Date    WBC 7.68 01/11/2019    HGB 15.1 01/11/2019    HGB 15.1 09/06/2018    HGB 14.3  08/29/2017     01/11/2019       Lab Results   Component Value Date    PROTEIN Negative 01/11/2019    GLUCOSEU Negative 01/11/2019    BLOODU Negative 01/11/2019    NITRITEU Negative 01/11/2019    LEUKOCYTESUR Negative 01/11/2019       Imaging:   CT OF THE ABDOMEN WITH CONTRAST 02/06/2019     HISTORY: Epigastric pain. History of hepatic vein thrombosis.     TECHNIQUE: Axial images were obtained from the lung bases to the iliac  crests after intravenous and oral contrast.     Previous CT of the chest dated 06/05/2018 is compared.     FINDINGS: Small left hepatic lobe cyst is seen. Near the falciform  ligament there is some vague decreased attenuation which is a common  location for fatty infiltration. No focal hepatic lesions are seen.     The gallbladder, spleen, pancreas and adrenals appear unremarkable. Left  kidney is absent. Right renal cyst and punctate nonobstructing right  renal stone are seen. Aortic stent graft is seen with aneurysmal  dilatation of the aorta measuring up to approximately 4.8 cm in greatest  oblique dimension and appearing similar to the 06/05/2018 study.     There is no evidence of hepatic vein thrombosis or portal vein  thrombosis.     No masses are seen.     IMPRESSION:  1. Small right renal cyst and tiny nonobstructing right renal stone.  2. Stable aortic aneurysm with aortic stent graft.  3. Small hepatic cyst.  4. No acute process identified.  5. Absent left kidney.     Radiation dose reduction techniques were utilized, including automated  exposure control and exposure modulation based on body size.     This report was finalized on 2/6/2019 11      Medical Tests:     EGD 8/3/17: negative     Summary of old records / correspondence / consultant report:         Request outside records:         ALLERGIES  No Known Allergies     PFSH:     The following portions of the patient's history were reviewed and updated as appropriate: Allergies / Current Medications / Past Medical History /  Surgical History / Social History / Family History    PROBLEM LIST   Patient Active Problem List   Diagnosis   • Essential hypertension   • Cigarette nicotine dependence with nicotine-induced disorder   • Emphysema lung (CMS/HCC)   • Hyperlipidemia   • Abdominal aortic aneurysm (AAA) without rupture (CMS/HCC)   • Congenital absence of left kidney   • Diverticulosis of large intestine without hemorrhage   • Epigastric abdominal pain       PAST MEDICAL HISTORY  Past Medical History:   Diagnosis Date   • Colon polyp    • Diverticulosis of large intestine without hemorrhage 8/9/2017   • Emphysema lung (CMS/HCC) 2/28/2017   • Hyperlipidemia 2/28/2017   • Portal vein thrombosis 8/29/2017   • Sepsis (CMS/HCC) 8/2/2017   • Shingles 09/2010   • Skin cancer 5/31/2017   • Thrombosis, 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    coag edelmira by heme neg; completed 6 months of AC and maintained on ASA       SURGICAL HISTORY  Past Surgical History:   Procedure Laterality Date   • ABDOMINAL AORTIC ANEURYSM REPAIR  05/2017   • ARTERIOGRAM AORTIC N/A 5/8/2017    Procedure:  AORTIC STENT GRAFT REPAIR W/ GORE BILATERAL FEMORAL MINI CUTDOWNS;  Surgeon: Humble Hamilton MD;  Location: [facility] HYBRID OR 18/19;  Service:    • COLONOSCOPY N/A 8/3/2017    Procedure: COLONOSCOPY to cecum;  Surgeon: Patrick Elizabeth MD;  Location: [facility] ENDOSCOPY;  Service:    • COLONOSCOPY  09/2010   • ENDOSCOPY  8/3/2017    Procedure: ESOPHAGOGASTRODUODENOSCOPY;  Surgeon: Patrick Elizabeth MD;  Location: [facility] ENDOSCOPY;  Service:    • HERNIA REPAIR  1980   • UMBILICAL HERNIA REPAIR  1975       SOCIAL HISTORY  Social History     Socioeconomic History   • Marital status:      Spouse name: Samira   • Number of children: 1   • Years of education: High School   • Highest education level: Not on file   Occupational History   • Occupation: Business: owner of metal fabrication     Employer: [employer]   Tobacco Use   • Smoking status: Current Every Day Smoker      Packs/day: 1.00     Years: 40.00     Pack years: 40.00     Types: Cigarettes   • Smokeless tobacco: Never Used   • Tobacco comment: currently 1/4 ppd   Substance and Sexual Activity   • Alcohol use: Yes     Frequency: 4 or more times a week     Drinks per session: 1 or 2   • Drug use: No   • Sexual activity: Yes     Partners: Female   Lifestyle   • Physical activity:     Days per week: 0 days     Minutes per session: Not on file   • Stress: To some extent       FAMILY HISTORY  Family History   Problem Relation Age of Onset   • Colon cancer Father 55   • Heart attack Father 71   • Coronary artery disease Father    • Hypertension Mother    • Hyperlipidemia Mother    • Sudden death Mother 78   • Aneurysm Mother    • Heart disease Mother    • Benign prostatic hyperplasia Brother    • Hypertension Brother    • Benign prostatic hyperplasia Brother    • No Known Problems Sister    • Brain cancer Maternal Grandmother    • Heart disease Maternal Grandfather    • Sudden death Maternal Grandfather    • No Known Problems Paternal Grandmother    • No Known Problems Paternal Grandfather    • Prostate cancer Neg Hx    • Dementia Neg Hx          **Dragon Disclaimer:   Much of this encounter note is an electronic transcription/translation of spoken language to printed text. The electronic translation of spoken language may permit erroneous, or at times, nonsensical words or phrases to be inadvertently transcribed. Although I have reviewed the note for such errors, some may still exist.       Template created by Dalila Carter MD

## 2019-08-02 DIAGNOSIS — I10 ESSENTIAL HYPERTENSION: Chronic | ICD-10-CM

## 2019-08-06 RX ORDER — AMLODIPINE BESYLATE 10 MG/1
10 TABLET ORAL DAILY
Qty: 90 TABLET | Refills: 2 | Status: SHIPPED | OUTPATIENT
Start: 2019-08-06 | End: 2020-04-16 | Stop reason: SDUPTHER

## 2019-12-10 DIAGNOSIS — I10 ESSENTIAL HYPERTENSION: Chronic | ICD-10-CM

## 2019-12-10 DIAGNOSIS — E78.5 HYPERLIPIDEMIA, UNSPECIFIED HYPERLIPIDEMIA TYPE: ICD-10-CM

## 2019-12-11 RX ORDER — LOSARTAN POTASSIUM 25 MG/1
TABLET ORAL
Qty: 90 TABLET | Refills: 3 | Status: SHIPPED | OUTPATIENT
Start: 2019-12-11 | End: 2020-12-09

## 2019-12-11 RX ORDER — PRAVASTATIN SODIUM 40 MG
TABLET ORAL
Qty: 90 TABLET | Refills: 3 | Status: SHIPPED | OUTPATIENT
Start: 2019-12-11 | End: 2020-12-09

## 2020-01-27 ENCOUNTER — OFFICE VISIT (OUTPATIENT)
Dept: INTERNAL MEDICINE | Age: 65
End: 2020-01-27

## 2020-01-27 VITALS
SYSTOLIC BLOOD PRESSURE: 110 MMHG | TEMPERATURE: 97.7 F | HEART RATE: 91 BPM | WEIGHT: 165 LBS | HEIGHT: 68 IN | BODY MASS INDEX: 25.01 KG/M2 | OXYGEN SATURATION: 99 % | DIASTOLIC BLOOD PRESSURE: 70 MMHG

## 2020-01-27 DIAGNOSIS — R73.09 ELEVATED HEMOGLOBIN A1C: ICD-10-CM

## 2020-01-27 DIAGNOSIS — E78.2 MIXED HYPERLIPIDEMIA: Chronic | ICD-10-CM

## 2020-01-27 DIAGNOSIS — Z23 NEED FOR 23-POLYVALENT PNEUMOCOCCAL POLYSACCHARIDE VACCINE: ICD-10-CM

## 2020-01-27 DIAGNOSIS — Z12.5 ENCOUNTER FOR SCREENING FOR MALIGNANT NEOPLASM OF PROSTATE: ICD-10-CM

## 2020-01-27 DIAGNOSIS — Z87.891 HISTORY OF SMOKING 25-50 PACK YEARS: ICD-10-CM

## 2020-01-27 DIAGNOSIS — Z85.828 HISTORY OF SKIN CANCER: ICD-10-CM

## 2020-01-27 DIAGNOSIS — Z00.00 WELCOME TO MEDICARE PREVENTIVE VISIT: Primary | ICD-10-CM

## 2020-01-27 DIAGNOSIS — I10 ESSENTIAL HYPERTENSION: Chronic | ICD-10-CM

## 2020-01-27 DIAGNOSIS — F17.219 CIGARETTE NICOTINE DEPENDENCE WITH NICOTINE-INDUCED DISORDER: Chronic | ICD-10-CM

## 2020-01-27 DIAGNOSIS — J43.9 PULMONARY EMPHYSEMA, UNSPECIFIED EMPHYSEMA TYPE (HCC): Chronic | ICD-10-CM

## 2020-01-27 DIAGNOSIS — Z12.2 ENCOUNTER FOR SCREENING FOR MALIGNANT NEOPLASM OF RESPIRATORY ORGANS: ICD-10-CM

## 2020-01-27 PROBLEM — R10.13 EPIGASTRIC ABDOMINAL PAIN: Status: RESOLVED | Noted: 2018-09-06 | Resolved: 2020-01-27

## 2020-01-27 LAB
ALBUMIN SERPL-MCNC: 4.4 G/DL (ref 3.5–5.2)
ALBUMIN/GLOB SERPL: 2.2 G/DL
ALP SERPL-CCNC: 89 U/L (ref 39–117)
ALT SERPL-CCNC: 12 U/L (ref 1–41)
APPEARANCE UR: CLEAR
AST SERPL-CCNC: 15 U/L (ref 1–40)
BASOPHILS # BLD AUTO: 0.03 10*3/MM3 (ref 0–0.2)
BASOPHILS NFR BLD AUTO: 0.4 % (ref 0–1.5)
BILIRUB SERPL-MCNC: 0.3 MG/DL (ref 0.2–1.2)
BILIRUB UR QL STRIP: NEGATIVE
BUN SERPL-MCNC: 15 MG/DL (ref 8–23)
BUN/CREAT SERPL: 12.4 (ref 7–25)
CALCIUM SERPL-MCNC: 9 MG/DL (ref 8.6–10.5)
CHLORIDE SERPL-SCNC: 103 MMOL/L (ref 98–107)
CHOLEST SERPL-MCNC: 145 MG/DL (ref 0–200)
CHOLEST/HDLC SERPL: 2.13 {RATIO}
CO2 SERPL-SCNC: 23.4 MMOL/L (ref 22–29)
COLOR UR: YELLOW
CREAT SERPL-MCNC: 1.21 MG/DL (ref 0.76–1.27)
EOSINOPHIL # BLD AUTO: 0.24 10*3/MM3 (ref 0–0.4)
EOSINOPHIL NFR BLD AUTO: 2.9 % (ref 0.3–6.2)
ERYTHROCYTE [DISTWIDTH] IN BLOOD BY AUTOMATED COUNT: 13.1 % (ref 12.3–15.4)
GLOBULIN SER CALC-MCNC: 2 GM/DL
GLUCOSE SERPL-MCNC: 94 MG/DL (ref 65–99)
GLUCOSE UR QL: NEGATIVE
HBA1C MFR BLD: 5.6 % (ref 4.8–5.6)
HCT VFR BLD AUTO: 45.2 % (ref 37.5–51)
HDLC SERPL-MCNC: 68 MG/DL (ref 40–60)
HGB BLD-MCNC: 15.4 G/DL (ref 13–17.7)
HGB UR QL STRIP: NEGATIVE
IMM GRANULOCYTES # BLD AUTO: 0.02 10*3/MM3 (ref 0–0.05)
IMM GRANULOCYTES NFR BLD AUTO: 0.2 % (ref 0–0.5)
KETONES UR QL STRIP: NEGATIVE
LDLC SERPL CALC-MCNC: 58 MG/DL (ref 0–100)
LEUKOCYTE ESTERASE UR QL STRIP: NEGATIVE
LYMPHOCYTES # BLD AUTO: 1.71 10*3/MM3 (ref 0.7–3.1)
LYMPHOCYTES NFR BLD AUTO: 20.9 % (ref 19.6–45.3)
MCH RBC QN AUTO: 31.3 PG (ref 26.6–33)
MCHC RBC AUTO-ENTMCNC: 34.1 G/DL (ref 31.5–35.7)
MCV RBC AUTO: 91.9 FL (ref 79–97)
MONOCYTES # BLD AUTO: 0.5 10*3/MM3 (ref 0.1–0.9)
MONOCYTES NFR BLD AUTO: 6.1 % (ref 5–12)
NEUTROPHILS # BLD AUTO: 5.69 10*3/MM3 (ref 1.7–7)
NEUTROPHILS NFR BLD AUTO: 69.5 % (ref 42.7–76)
NITRITE UR QL STRIP: NEGATIVE
NRBC BLD AUTO-RTO: 0 /100 WBC (ref 0–0.2)
PH UR STRIP: 6 [PH] (ref 5–8)
PLATELET # BLD AUTO: 209 10*3/MM3 (ref 140–450)
POTASSIUM SERPL-SCNC: 4.5 MMOL/L (ref 3.5–5.2)
PROT SERPL-MCNC: 6.4 G/DL (ref 6–8.5)
PROT UR QL STRIP: NEGATIVE
PSA SERPL-MCNC: 0.98 NG/ML (ref 0–4)
RBC # BLD AUTO: 4.92 10*6/MM3 (ref 4.14–5.8)
SODIUM SERPL-SCNC: 141 MMOL/L (ref 136–145)
SP GR UR: 1.02 (ref 1–1.03)
TRIGL SERPL-MCNC: 96 MG/DL (ref 0–150)
UROBILINOGEN UR STRIP-MCNC: NORMAL MG/DL
VLDLC SERPL CALC-MCNC: 19.2 MG/DL
WBC # BLD AUTO: 8.19 10*3/MM3 (ref 3.4–10.8)

## 2020-01-27 PROCEDURE — 99214 OFFICE O/P EST MOD 30 MIN: CPT | Performed by: INTERNAL MEDICINE

## 2020-01-27 PROCEDURE — 90732 PPSV23 VACC 2 YRS+ SUBQ/IM: CPT | Performed by: INTERNAL MEDICINE

## 2020-01-27 PROCEDURE — 96160 PT-FOCUSED HLTH RISK ASSMT: CPT | Performed by: INTERNAL MEDICINE

## 2020-01-27 PROCEDURE — G0402 INITIAL PREVENTIVE EXAM: HCPCS | Performed by: INTERNAL MEDICINE

## 2020-01-27 PROCEDURE — G0009 ADMIN PNEUMOCOCCAL VACCINE: HCPCS | Performed by: INTERNAL MEDICINE

## 2020-01-27 NOTE — ASSESSMENT & PLAN NOTE
"Time spent counselin minutes  Currently smokes 1 PPD  Duration of smokin years  Prior cessation efforts: \"cold turkey\" method  Counseled on smoking cessation for 5 minutes: advised patient to quit, handout given on 5 steps to prepare for smoking cessation, discussed nicotine replacement therapy, start NRT (patch), discussed bupropion, discussed Chantix, discussed watching for significant mood changes, watching for suicidial ideations/depression/anxiety/agitiation and F/u IN 2 MONTHS IF NOT SUCCESSFUL     Low dose CT ordered   "

## 2020-01-27 NOTE — PATIENT INSTRUCTIONS
** IMPORTANT MESSAGE FROM DR. MEDINA **    In our office, your satisfaction is VERY important to us.     You may receive a survey from [facility] by mail or E-mail for you to provide feedback about your visit. This information is invaluable for me to know what we can do to improve our services.     I ask that you please take a few minutes to complete the survey and let us know how we are doing in serving your needs. (You may receive the survey more than once for multiple visits)    Thank You !    Dr. Medina & Staff    _________________________________________________________________________________________________________________________      ** ADDITIONAL INSTRUCTION / REMINDERS FROM DR. MEDINA **

## 2020-01-27 NOTE — PROGRESS NOTES
"01/27/2020      MEDICARE ANNUAL WELLNESS VISIT     Patrick Mkceon is a 64 y.o. male who presents for Welcome To Medicare; Hypertension; and Hyperlipidemia      Patient's general assessment of his health since a year ago:     - Compared to one year ago, he feels his physical health is about the same without significant change.    - Compared to one year ago, he feels his mental health is about the same without significant change.      HPI for other active medical problems:     Still smokes 1 pack per day, > 45 years, emphysema without increased cough or shortness of breath. Interested in smoking cessation.   No depression.     History of aaa repair followed by vascular, no claudication symptoms   On ASA, statin    Hypertension remains stable on amlodipine and losartan   Hyperlipidemia on pravastatin       * The required components of Health Risk Assessment (HRA) that were completed by the patient and/or my staff are contained within this note and in the scanned documents titled \"Health Risk Assessment\" within the media section of the patient's chart in [employer].       HISTORY    Recent Hospitalizations:    Recent hospitalization?:     If YES, location, date, and diagnoses:     · Location:   · Date:   · Principle Discharge Dx:   · Secondary Dx:       Patient Care Team:    Patient Care Team:  Keyur Garrido MD as PCP - General (Dermatology)  Orlin Quintero MD as Consulting Physician (Gastroenterology)  Keyur Mejia II, MD as Consulting Physician (Hematology and Oncology)  Humble Hamilton MD as Surgeon (Vascular Surgery)      Allergies:  Patient has no known allergies.    Medications:  Current Outpatient Medications   Medication Sig Dispense Refill   • amLODIPine (NORVASC) 10 MG tablet Take 1 tablet by mouth Daily. 90 tablet 2   • aspirin 81 MG EC tablet Take 1 tablet by mouth Daily.     • losartan (COZAAR) 25 MG tablet TAKE 1 TABLET EVERY DAY 90 tablet 3   • pravastatin (PRAVACHOL) 40 MG tablet TAKE 1 TABLET " EVERY DAY 90 tablet 3     No current facility-administered medications for this visit.         PFSH:     The following portions of the patient's history were reviewed and updated as appropriate: Allergies / Current Medications / Past Medical History / Surgical History / Social History / Family History    Problem List:  Patient Active Problem List   Diagnosis   • Essential hypertension   • Cigarette nicotine dependence with nicotine-induced disorder   • Emphysema lung (CMS/HCC)   • Hyperlipidemia   • Abdominal aortic aneurysm (AAA) without rupture (CMS/HCC)   • Congenital absence of left kidney   • Diverticulosis of large intestine without hemorrhage       Past Medical History:  Past Medical History:   Diagnosis Date   • Colon polyp    • Diverticulosis of large intestine without hemorrhage 8/9/2017   • Emphysema lung (CMS/HCC) 2/28/2017   • Hyperlipidemia 2/28/2017   • Portal vein thrombosis 8/29/2017   • Sepsis (CMS/HCC) 8/2/2017   • Shingles 09/2010   • Skin cancer 5/31/2017   • Thrombosis, 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    coag hickey by heme neg; completed 6 months of AC and maintained on ASA       Past Surgical History:  Past Surgical History:   Procedure Laterality Date   • ABDOMINAL AORTIC ANEURYSM REPAIR  05/2017   • ARTERIOGRAM AORTIC N/A 5/8/2017    Procedure:  AORTIC STENT GRAFT REPAIR W/ GORE BILATERAL FEMORAL MINI CUTDOWNS;  Surgeon: Humble Hamilton MD;  Location: [facility] HYBRID OR 18/19;  Service:    • COLONOSCOPY N/A 8/3/2017    Procedure: COLONOSCOPY to cecum;  Surgeon: Patrick Elizabeth MD;  Location: [facility] ENDOSCOPY;  Service:    • COLONOSCOPY  09/2010   • ENDOSCOPY  8/3/2017    Procedure: ESOPHAGOGASTRODUODENOSCOPY;  Surgeon: Patrick Elizabeth MD;  Location: [facility] ENDOSCOPY;  Service:    • HERNIA REPAIR  1980   • UMBILICAL HERNIA REPAIR  1975       Social History:  Social History     Socioeconomic History   • Marital status:      Spouse name: Samira   • Number of children: 1   • Years of  "education: High School   • Highest education level: Not on file   Occupational History   • Occupation: Business: owner of metal fabrication     Employer: [employer]   Social Needs   • Financial resource strain: Not hard at all   • Food insecurity:     Worry: Never true     Inability: Not on file   • Transportation needs:     Medical: No     Non-medical: No   Tobacco Use   • Smoking status: Current Every Day Smoker     Packs/day: 1.00     Years: 42.00     Pack years: 42.00     Types: Cigarettes   • Smokeless tobacco: Never Used   • Tobacco comment: currently 1 ppd    Substance and Sexual Activity   • Alcohol use: Yes     Frequency: 4 or more times a week     Drinks per session: 1 or 2   • Drug use: No   • Sexual activity: Yes     Partners: Female   Lifestyle   • Physical activity:     Days per week: 5 days     Minutes per session: Not on file   • Stress: To some extent       Family History:  Family History   Problem Relation Age of Onset   • Colon cancer Father 55   • Heart attack Father 71   • Coronary artery disease Father    • Hypertension Mother    • Hyperlipidemia Mother    • Sudden death Mother 78   • Aneurysm Mother    • Heart disease Mother    • Benign prostatic hyperplasia Brother    • Hypertension Brother    • Benign prostatic hyperplasia Brother    • No Known Problems Sister    • Brain cancer Maternal Grandmother    • Heart disease Maternal Grandfather    • Sudden death Maternal Grandfather    • No Known Problems Paternal Grandmother    • No Known Problems Paternal Grandfather    • Prostate cancer Neg Hx    • Dementia Neg Hx          PATIENT ASSESSMENT    Vitals:  /70   Pulse 91   Temp 97.7 °F (36.5 °C)   Ht 173 cm (68.11\")   Wt 74.8 kg (165 lb)   SpO2 99%   BMI 25.01 kg/m²   BP Readings from Last 3 Encounters:   01/27/20 110/70   07/18/19 120/68   01/17/19 112/70     Wt Readings from Last 3 Encounters:   01/27/20 74.8 kg (165 lb)   07/18/19 75.5 kg (166 lb 6.4 oz)   01/17/19 77.6 kg (171 lb) "      Body mass index is 25.01 kg/m².    Pain Score    01/27/20 0740   PainSc:   4   PainLoc: Shoulder         Review of Systems:    Review of Systems   Constitutional: Negative.    HENT: Negative.    Eyes: Negative.    Respiratory: Negative.  Negative for shortness of breath.    Cardiovascular: Negative.  Negative for chest pain and palpitations.        No claudications    Gastrointestinal: Negative.    Endocrine: Negative.    Genitourinary: Negative.  Negative for hematuria.   Musculoskeletal: Negative.    Skin: Negative.    Allergic/Immunologic: Negative.    Neurological: Negative.    Hematological: Negative.    Psychiatric/Behavioral: Negative.  Negative for agitation and dysphoric mood. The patient is not nervous/anxious.        Physical Exam:    Physical Exam   Constitutional: He is oriented to person, place, and time. He appears well-developed and well-nourished. No distress.   HENT:   Head: Normocephalic and atraumatic.   Right Ear: External ear normal.   Left Ear: External ear normal.   Nose: Nose normal.   Mouth/Throat: Oropharynx is clear and moist.   Eyes: Pupils are equal, round, and reactive to light. Conjunctivae and EOM are normal. No scleral icterus.   Neck: Normal range of motion. Neck supple. No tracheal deviation present. No thyroid mass and no thyromegaly present.   Cardiovascular: Normal rate, regular rhythm, normal heart sounds and intact distal pulses.   Pulmonary/Chest: Effort normal. He has decreased breath sounds. He has no wheezes. He has no rhonchi. He has no rales.   Abdominal: Soft. Normal appearance and bowel sounds are normal. He exhibits no distension and no mass. There is no hepatosplenomegaly. There is no tenderness. No hernia.   Musculoskeletal: He exhibits no edema.   Lymphadenopathy:     He has no cervical adenopathy.     He has no axillary adenopathy.        Right: No inguinal and no supraclavicular adenopathy present.        Left: No inguinal and no supraclavicular adenopathy  present.   Neurological: He is alert and oriented to person, place, and time. He has normal reflexes. No cranial nerve deficit. He exhibits normal muscle tone.   Skin: Skin is warm. No lesion (Negative for suspicious skin lesions/growths) and no rash noted. No pallor.   No jaundice  No suspicious skin lesions.    Psychiatric: He has a normal mood and affect. His behavior is normal. Judgment and thought content normal.         Reviewed Data:    Labs:   Lab Results   Component Value Date     01/11/2019    K 4.8 01/11/2019    CALCIUM 9.1 01/11/2019    AST 13 01/11/2019    ALT 12 01/11/2019    BUN 16 01/11/2019    CREATININE 1.20 02/06/2019    CREATININE 1.29 (H) 01/11/2019    CREATININE 1.30 06/05/2018    EGFRIFNONA 56 (L) 01/11/2019    EGFRIFAFRI 68 01/11/2019       Lab Results   Component Value Date    GLU 90 01/11/2019    GLU 94 05/02/2018    GLU 94 02/21/2017    HGBA1C 5.60 01/11/2019    HGBA1C 5.10 02/21/2017    HGBA1C 5.7 (H) 09/21/2015       Lab Results   Component Value Date    LDL 55 01/11/2019    LDL 91 05/02/2018     (H) 02/21/2017    HDL 62 (H) 01/11/2019    TRIG 116 01/11/2019    CHOLHDLRATIO 2.26 01/11/2019       Lab Results   Component Value Date    TSH 1.110 01/11/2019    FREET4 1.26 01/11/2019          Lab Results   Component Value Date    WBC 7.68 01/11/2019    HGB 15.1 01/11/2019    HGB 15.1 09/06/2018    HGB 14.3 08/29/2017     01/11/2019                   Lab Results   Component Value Date    PSA 1.090 01/11/2019    PSA 0.859 02/21/2017    PSA 0.85 09/21/2015       Imaging:   LOW-DOSE CHEST CT WITHOUT IV CONTRAST     HISTORY: 63-year-old male. Lung cancer screening.     TECHNIQUE: Radiation dose reduction techniques were utilized, including  automated exposure control and exposure modulation based on body size.  Low-dose chest CT protocol with 2 mm intervals. Compared with previous  chest CT from 07/30/2017.     FINDINGS: There is mild scarring at the posterior periphery of the  right  lower lobe which is stable and there is also mild pleural parenchymal  thickening at the anterior inferior aspect of the right upper lobe.  There are no suspicious pulmonary opacities or nodules. There are no  pleural or pericardial effusions. There are shotty mediastinal nodes  which are stable. No pathologically enlarged nodes are seen.     IMPRESSION:  1. There are no suspicious pulmonary opacities or nodules. LungRADS  category 1. Screening low-dose chest CT is recommended in 12 months.  2. CTDI 2.7 mGy. .7 mGycm.     This report was finalized on 2/7/2019        Medical Tests:      ETT 2017 negative     Screening for Glaucoma:  Previous screening for glaucoma?: Yes      Hearing Loss Screen:  Finger Rub Hearing Test (right ear): passed  Finger Rub Hearing Test (left ear): passed      Urinary Incontinence Screen:  Episodes of urinary incontinence? : No      Depression Screen:  PHQ-2/PHQ-9 Depression Screening 1/27/2020   Little interest or pleasure in doing things 0   Feeling down, depressed, or hopeless 0   Total Score 0        PHQ-2: 0 (Not depressed)    PHQ-9: 0 (Negative screening for depression)       FUNCTIONAL, FALL RISK, & COGNITIVE SCREENING (Components below):    DATA:    Functional & Cognitive Status 1/27/2020   Do you have difficulty preparing food and eating? No   Do you have difficulty bathing yourself, getting dressed or grooming yourself? No   Do you have difficulty using the toilet? No   Do you have difficulty moving around from place to place? No   Do you have trouble with steps or getting out of a bed or a chair? No   Current Diet Limited Junk Food   Dental Exam Up to date   Eye Exam Up to date   Exercise (times per week) 5 times per week   Current Exercise Activities Include Walking   Do you need help using the phone?  No   Are you deaf or do you have serious difficulty hearing?  No   Do you need help with transportation? No   Do you need help shopping? No   Do you need help  preparing meals?  No   Do you need help with housework?  No   Do you need help with laundry? No   Do you need help taking your medications? No   Do you need help managing money? No   Do you ever drive or ride in a car without wearing a seat belt? No   Do you have difficulty concentrating, remembering or making decisions? No         A) Assessment of Functional Ability:  (Assessment of ability to perform ADL's (showering/bathing, using toilet, dressing, feeding self, moving self around) and IADL's (use telephone, shop, prepare food, housekeep, do laundry, transport independently, take medications independently, and handle finances)    Degree of functional impairment: NONE (based on assessment noted above)      B) Assessment of Fall Risk:  Fall Risk Assessment was completed, and patient is at low risk for falls.       Need for further evaluation of gait, strength, and balance? : No    Timed Up and Go (TUG):   (>= 12 seconds indicates high risk for falling)    Observable abnormalities included: Normal gait pattern       C. Assessment of Cognitive Function:    Mini-Cog Test:     1) Registration (3 objects): Yes   2) Number of objects recalled: 3   3) Clock Draw: Passed? : N/A       Further evaluation required? : No      COUNSELING    A. Identification of Health Risk Factors:    Risk factors include: cardiovascular risk factors, tobacco use and excess alcohol      B. Age-Appropriate Screening Schedule:  (Refer to the list below for future screening recommendations based on patient's age, sex and/or medical conditions. Orders for these recommended tests are listed in the plan section. The patient has been provided with a written plan)    Health Maintenance Topics  Health Maintenance   Topic Date Due   • GLAUCOMA SCREENING  06/12/2020   • PROSTATE CANCER SCREENING  01/27/2021   • LIPID PANEL  01/27/2021   • TDAP/TD VACCINES (2 - Td) 09/21/2021   • COLONOSCOPY  08/03/2022   • INFLUENZA VACCINE  Completed   • ZOSTER VACCINE   Addressed       Health Maintenance Topics Due or Over-Due  There are no preventive care reminders to display for this patient.      C. Advanced Care Planning:    has an advanced directive which is on file and has a medical power of       D. Patient Self-Management and Personalized Health Advice:    He has been provided with personalized counseling/information (including brochures/handouts) about:     -- optimizing diet/nutrition plans, tobacco cessation, alcohol use, recommended hearing testing and reducing risk for cardiovascular disease (heart, stroke, vascular)      He has been recommended for the following preventative services which has been performed today, will be ordered today or ordered/performed on upcoming follow-up visit:     -- smoking cessation counseling completed, alcohol use counseling completed, exercise counseling provided, counseling for cardiovascular disease risk reduction, fall risk assessment / plan of care completed, urinary incontinence assessment done, screening for diabetes performed (current/reviewed labs/lab ordered), triple vessel screening recommended (including AAA screening), screening for ischemic heart disease, screening for prostate cancer (discussed and MACKENZIE deferred but PSA will be performed annually), vaccination for Pneumovax administered (or recommended at pharmacy)      E. Miscellaneous Items:    -Aspirin use counseling: Taking ASA appropriately as indicated    -Discussed BMI with him. The BMI is in the acceptable range    -Reviewed use of high risk medication in the elderly: YES    -Reviewed for potential of harmful drug interactions in the elderly: YES      IV. WRAP-UP    Assessment & Plan:    1) MEDICARE ANNUAL WELLNESS VISIT    2) OTHER MEDICAL CONDITIONS ADDRESSED TODAY:              Problem List Items Addressed This Visit        High    Essential hypertension (Chronic)    Overview     Continue amlodipine and losartan         Current Assessment & Plan      "Continue amlodipine 10 mg and losartan 25 mg qd.          Relevant Medications    amLODIPine (NORVASC) 10 MG tablet    losartan (COZAAR) 25 MG tablet       Medium    Cigarette nicotine dependence with nicotine-induced disorder (Chronic)    Current Assessment & Plan     Time spent counselin minutes  Currently smokes 1 PPD  Duration of smokin years  Prior cessation efforts: \"cold turkey\" method  Counseled on smoking cessation for 5 minutes: advised patient to quit, handout given on 5 steps to prepare for smoking cessation, discussed nicotine replacement therapy, start NRT (patch), discussed bupropion, discussed Chantix, discussed watching for significant mood changes, watching for suicidial ideations/depression/anxiety/agitiation and F/u IN 2 MONTHS IF NOT SUCCESSFUL     Low dose CT ordered          Hyperlipidemia (Chronic)    Overview     Continue pravastatin         Relevant Medications    aspirin 81 MG EC tablet    pravastatin (PRAVACHOL) 40 MG tablet    Other Relevant Orders    Lipid Panel With / Chol / HDL Ratio    Comprehensive Metabolic Panel       Low    Emphysema lung (CMS/HCC) (Chronic)    Overview     Noted on CT in          Relevant Orders    CBC & Differential      Other Visit Diagnoses     Welcome to Medicare preventive visit    -  Primary    Elevated hemoglobin A1c        Relevant Orders    Hemoglobin A1c    Need for 23-polyvalent pneumococcal polysaccharide vaccine        Relevant Orders    Pneumococcal Polysaccharide Vaccine 23-Valent Greater Than or Equal To 3yo Subcutaneous / IM (Completed)    History of skin cancer        Relevant Orders    Ambulatory Referral to Dermatology    Encounter for screening for malignant neoplasm of prostate        Relevant Orders    Urinalysis With Microscopic If Indicated (No Culture) - Urine, Clean Catch    PSA Screen    History of smoking 25-50 pack years        Relevant Orders    CT Chest Low Dose Wo    Encounter for screening for malignant neoplasm of " respiratory organs        Relevant Orders    CT Chest Low Dose Wo                    Orders Placed This Encounter   Procedures   • CT Chest Low Dose Wo   • Pneumococcal Polysaccharide Vaccine 23-Valent Greater Than or Equal To 1yo Subcutaneous / IM   • Hemoglobin A1c   • Lipid Panel With / Chol / HDL Ratio   • Comprehensive Metabolic Panel   • Urinalysis With Microscopic If Indicated (No Culture) - Urine, Clean Catch   • PSA Screen   • Ambulatory Referral to Dermatology   • CBC & Differential       Discussion / Summary:        Medications as of TODAY:              Current Outpatient Medications   Medication Sig Dispense Refill   • amLODIPine (NORVASC) 10 MG tablet Take 1 tablet by mouth Daily. 90 tablet 2   • aspirin 81 MG EC tablet Take 1 tablet by mouth Daily.     • losartan (COZAAR) 25 MG tablet TAKE 1 TABLET EVERY DAY 90 tablet 3   • pravastatin (PRAVACHOL) 40 MG tablet TAKE 1 TABLET EVERY DAY 90 tablet 3     No current facility-administered medications for this visit.          FOLLOW-UP:            Return in about 6 months (around 7/27/2020) for Reassess chronic medical problems, Schedule AWV with DR. MEDINA for next year.              Future Appointments   Date Time Provider Department Center   7/27/2020  8:15 AM Lester Medina MD [facility] None   2/1/2021  7:45 AM Lester Medina MD [facility] None         ______________________________________________________________________      A printed After Visit Summary (AVS) including the Personalized Prevention  Plan Services (PPPS) was given to the patient at check-out today.     Educational Handouts    Strong & Stable / Balance / Physical Activity / Pain / Depression /  Forgetfulness / Healthy Eating / Alcohol / Smoking /  Medicine / Sexuality / Scams     **Dragon Disclaimer:   Much of this encounter note is an electronic transcription/translation of spoken language to printed text. The electronic translation of spoken language may permit erroneous, or at times,  nonsensical words or phrases to be inadvertently transcribed. Although I have reviewed the note for such errors, some may still exist.     This template was created by Dalila Carter MD.

## 2020-02-21 ENCOUNTER — TELEPHONE (OUTPATIENT)
Dept: INTERNAL MEDICINE | Age: 65
End: 2020-02-21

## 2020-02-21 NOTE — TELEPHONE ENCOUNTER
----- Message from Patrick Mckeon sent at 2/20/2020  7:24 PM EST -----  Regarding: Non-Urgent Medical Question  Contact: [phone]  Dr. Carter , on my recent visit with you it was suggested that I get checked for blood flow to my upper and lower extremities . Who should I contact to have this checked ?  I have an appointment scheduled in June to see Dr. Humble Hamilton at [facility] . He did the stent for my aneurysm (AAA). Can this be done in conjunction with that visit or would you suggest either someone else or sooner ?    Thanks,  Chidi Mckeon

## 2020-02-25 ENCOUNTER — TELEPHONE (OUTPATIENT)
Dept: INTERNAL MEDICINE | Age: 65
End: 2020-02-25

## 2020-02-25 ENCOUNTER — HOSPITAL ENCOUNTER (OUTPATIENT)
Dept: CT IMAGING | Facility: HOSPITAL | Age: 65
Discharge: HOME | End: 2020-02-25
Admitting: INTERNAL MEDICINE

## 2020-02-25 PROCEDURE — G0297 LDCT FOR LUNG CA SCREEN: HCPCS

## 2020-02-25 NOTE — TELEPHONE ENCOUNTER
Pt called and stated he received a letter from the referral department. Pt states that he is going to use referral for [facility].     Pt back   [phone]

## 2020-02-26 ENCOUNTER — TELEPHONE (OUTPATIENT)
Dept: INTERNAL MEDICINE | Age: 65
End: 2020-02-26

## 2020-02-26 NOTE — TELEPHONE ENCOUNTER
----- Message from Lester Carter MD sent at 2/26/2020  6:57 AM EST -----  Call with results:    There are multiple abnormalities a Stable changes noted on screening CT of the chest and I would like to discuss details in person. Have him see me within 1 month.

## 2020-03-03 ENCOUNTER — TELEPHONE (OUTPATIENT)
Dept: INTERNAL MEDICINE | Age: 65
End: 2020-03-03

## 2020-03-03 NOTE — TELEPHONE ENCOUNTER
----- Message from Patrick Mckeon sent at 3/2/2020  4:42 PM EST -----  Regarding: Test Results Question  Contact: [phone]  Dr. Carter , I am very concerned with the info I was given regarding my recent CT lung scan . Do I have to wait 4 more weeks for the results ?                    Should I be referred to a specialist ? Let me know .  Thanks ,  Chidi Mckeon

## 2020-03-10 ENCOUNTER — OFFICE VISIT (OUTPATIENT)
Dept: INTERNAL MEDICINE | Age: 65
End: 2020-03-10

## 2020-03-10 VITALS
WEIGHT: 169 LBS | TEMPERATURE: 97.5 F | HEIGHT: 68 IN | DIASTOLIC BLOOD PRESSURE: 70 MMHG | SYSTOLIC BLOOD PRESSURE: 110 MMHG | OXYGEN SATURATION: 98 % | HEART RATE: 80 BPM | BODY MASS INDEX: 25.61 KG/M2

## 2020-03-10 DIAGNOSIS — K44.9 HIATAL HERNIA WITH GASTROESOPHAGEAL REFLUX DISEASE AND ESOPHAGITIS: ICD-10-CM

## 2020-03-10 DIAGNOSIS — K21.00 HIATAL HERNIA WITH GASTROESOPHAGEAL REFLUX DISEASE AND ESOPHAGITIS: ICD-10-CM

## 2020-03-10 DIAGNOSIS — K80.20 CALCULUS OF GALLBLADDER WITHOUT CHOLECYSTITIS WITHOUT OBSTRUCTION: ICD-10-CM

## 2020-03-10 DIAGNOSIS — I25.10 ATHEROSCLEROSIS OF NATIVE CORONARY ARTERY OF NATIVE HEART WITHOUT ANGINA PECTORIS: ICD-10-CM

## 2020-03-10 DIAGNOSIS — R91.8 PULMONARY NODULES: Primary | ICD-10-CM

## 2020-03-10 PROCEDURE — 99214 OFFICE O/P EST MOD 30 MIN: CPT | Performed by: INTERNAL MEDICINE

## 2020-03-10 RX ORDER — ESOMEPRAZOLE MAGNESIUM 40 MG/1
40 CAPSULE, DELAYED RELEASE ORAL
Qty: 30 CAPSULE | Refills: 5 | Status: SHIPPED | OUTPATIENT
Start: 2020-03-10 | End: 2020-07-30 | Stop reason: SDUPTHER

## 2020-03-10 NOTE — ASSESSMENT & PLAN NOTE
Last EGD 2017 was negative. Based on CT evidence of HH and probably esophagitis recommended PPI therapy with Nexium 40 mg daily. Consider repeating EGD with next colonoscopy. He expressed understanding and agreed to follow above instructions.

## 2020-03-10 NOTE — PROGRESS NOTES
[facility] [place] INTERNAL MEDICINE  RAIMUNDO MEDINA M.D.      Patrick Mckeon / 64 y.o. / male  03/10/2020      CHIEF COMPLAINT     discuss CT      ASSESSMENT & PLAN     Problem List Items Addressed This Visit        High    Pulmonary nodules - Primary (Chronic)    Current Assessment & Plan     New sub-4mm nodules. Recommended repeat CT chest in 6-8 months.             Medium    Atherosclerosis of native coronary artery of native heart without angina pectoris (Chronic)    Current Assessment & Plan     This is a new problem to this examiner.   Extensive coronary calcification/atherosclerosis noted on CT of chest.   · Ordered nuclear stress test for further evaluation.   · Continue ASA 81 mg and pravastatin 40 mg daily.          Relevant Medications    amLODIPine (NORVASC) 10 MG tablet    Other Relevant Orders    Stress Test With Myocardial Perfusion One Day    Hiatal hernia with gastroesophageal reflux disease and esophagitis (Chronic)    Current Assessment & Plan     Last EGD 2017 was negative. Based on CT evidence of HH and probably esophagitis recommended PPI therapy with Nexium 40 mg daily. Consider repeating EGD with next colonoscopy. He expressed understanding and agreed to follow above instructions.          Relevant Medications    esomeprazole (nexIUM) 40 MG capsule       Low    Calculus of gallbladder without cholecystitis without obstruction (Chronic)    Current Assessment & Plan     Discussed symptoms of cholecystitis. He does have some upper abdominal symptoms that may be related to gallstones. He defers surgical evaluation at this time. I did advise him that surgery may be needed if he becomes more symptomatic.              Orders Placed This Encounter   Procedures   • Stress Test With Myocardial Perfusion One Day     New Medications Ordered This Visit   Medications   • esomeprazole (nexIUM) 40 MG capsule     Sig: Take 1 capsule by mouth Every Morning Before Breakfast.     Dispense:  30 capsule     Refill:  5  "      Summary/Discussion:  Spent 25 minutes in face-to-face encounter time and >50% of time spent in counseling regarding above medical problems/issues.        Next Appointment with me: 7/27/2020    No follow-ups on file.      MEDICATIONS     Current Outpatient Medications   Medication Sig Dispense Refill   • amLODIPine (NORVASC) 10 MG tablet Take 1 tablet by mouth Daily. 90 tablet 2   • aspirin 81 MG EC tablet Take 1 tablet by mouth Daily.     • losartan (COZAAR) 25 MG tablet TAKE 1 TABLET EVERY DAY 90 tablet 3   • pravastatin (PRAVACHOL) 40 MG tablet TAKE 1 TABLET EVERY DAY 90 tablet 3       VITAL SIGNS     Visit Vitals  /70   Pulse 80   Temp 97.5 °F (36.4 °C)   Ht 173 cm (68.11\")   Wt 76.7 kg (169 lb)   SpO2 98%   BMI 25.61 kg/m²       BP Readings from Last 3 Encounters:   03/10/20 110/70   01/27/20 110/70   07/18/19 120/68     Wt Readings from Last 3 Encounters:   03/10/20 76.7 kg (169 lb)   01/27/20 74.8 kg (165 lb)   07/18/19 75.5 kg (166 lb 6.4 oz)      Body mass index is 25.61 kg/m².      HISTORY OF PRESENT ILLNESS     Abnormal low dose CT of chest:    1) Multiple pulmonary nodules < 4 mm size (new compared to prior CT). Has heavy smoking history and is high risk.   2) Extensive coronary atherosclerosis/calcification noted. Denies angina. Takes ASA and pravastatin.   3) Probable HH and esophagitis noted on CT. Prior EGD 2017 was negative. Denies dysphagia.   4) Cholelithiasis: has intermittent upper abdominal discomfort that seems associated with eating.       Patient Care Team:  Lester Carter MD as PCP - General (Internal Medicine)  Orlin Quintero MD as Consulting Physician (Gastroenterology)  Keyur Mejia II, MD as Consulting Physician (Hematology and Oncology)  Humble Hamilton MD as Surgeon (Vascular Surgery)  Keyur Garrido MD as Consulting Physician (Dermatology)      REVIEW OF SYSTEMS     Review of Systems  Constitutional neg  Resp neg  CV neg cp  GI neg for dysphagia or early satiety, " no melena or bleeding    PHYSICAL EXAMINATION     Physical Exam  Constitutional  No distress  Cardiovascular Rate  normal . Rhythm: regular . Heart sounds:  Normal  Respiratory denies worsening shortness of breath   GI as per HPI   Psychiatric  Alert. Judgment intact. Thought content normal. Mood normal    REVIEWED DATA     Labs:     Lab Results   Component Value Date     01/27/2020    K 4.5 01/27/2020    CALCIUM 9.0 01/27/2020    AST 15 01/27/2020    ALT 12 01/27/2020    BUN 15 01/27/2020    CREATININE 1.21 01/27/2020    CREATININE 1.20 02/06/2019    CREATININE 1.29 (H) 01/11/2019    EGFRIFNONA 60 (L) 01/27/2020    EGFRIFAFRI 73 01/27/2020       Lab Results   Component Value Date    HGBA1C 5.60 01/27/2020    HGBA1C 5.60 01/11/2019    HGBA1C 5.10 02/21/2017    GLU 94 01/27/2020    GLU 90 01/11/2019    GLU 94 05/02/2018       Lab Results   Component Value Date    LDL 58 01/27/2020    LDL 55 01/11/2019    LDL 91 05/02/2018    HDL 68 (H) 01/27/2020    HDL 62 (H) 01/11/2019    HDL 60 05/02/2018    TRIG 96 01/27/2020    TRIG 116 01/11/2019    TRIG 81 05/02/2018    CHOLHDLRATIO 2.13 01/27/2020    CHOLHDLRATIO 2.26 01/11/2019    CHOLHDLRATIO 2.78 05/02/2018       Lab Results   Component Value Date    TSH 1.110 01/11/2019    FREET4 1.26 01/11/2019       Lab Results   Component Value Date    WBC 8.19 01/27/2020    HGB 15.4 01/27/2020    HGB 15.1 01/11/2019    HGB 15.1 09/06/2018     01/27/2020       Lab Results   Component Value Date    PROTEIN Negative 01/27/2020    GLUCOSEU Negative 01/27/2020    BLOODU Negative 01/27/2020    NITRITEU Negative 01/27/2020    LEUKOCYTESUR Negative 01/27/2020       Imaging:     Ct Chest Low Dose Wo    Result Date: 2/26/2020  Narrative: LOW-DOSE CHEST CT WITHOUT IV CONTRAST  HISTORY: Screening.  TECHNIQUE: Radiation dose reduction techniques were utilized, including automated exposure control and exposure modulation based on body size. Low-dose chest CT protocol with 2 mm  intervals.  COMPARISON: Low-dose chest CT 02/06/2019 and 03/09/2017.  FINDINGS: Cystic density lesion within the left hepatic lobe favored to represent a simple cyst. Cholelithiasis is present. Cystic density lesion within the right kidney likely represents a simple cyst. The left kidney is not visualized. There is colonic diverticulosis. Postsurgical changes from endovascular repair of an abdominal aortic aneurysm are incompletely visualized and not well evaluated.  There is a small hiatal hernia which is new since 02/09/2019 with surrounding fat stranding. The distal esophagus also appears thickened. There is no mediastinal or axillary adenopathy by size criteria.  The heart is normal in size. Moderate to extensive coronary artery calcification is present.  There is mild-to-moderate emphysema. No pulmonary consolidation, pleural effusion or pneumothorax is present. Sub-4 mm pulmonary nodule within the left upper lobe (image 53) and left lower lobe (image 93) were not definitely seen on 02/06/2019..  There are no suspicious lytic or blastic osseous lesions.      Impression: 1.  There are a few sub-4 mm pulmonary nodules within the left lung which were not definitely seen on 02/06/2019. Lung-RADS category 2. Follow-up with low-dose chest CT in 12 months is recommended. 2.  New hiatal hernia since 02/09/2019 with surrounding fat stranding and apparent esophageal thickening highly suggestive of esophagitis in the appropriate clinical context and correlation with patient history is recommended with follow-up endoscopy if clinically indicated. 3.  Other findings as above. 4.  CTDI 2.7 mGy. .9 mGycm.  This report was finalized on 2/26/2020 6:02 AM by Dr. Camilo Hess M.D.         Medical Tests:         Summary of old records / correspondence / consultant report:         Request outside records:         ALLERGIES  No Known Allergies     PFSH:     The following portions of the patient's history were reviewed and  updated as appropriate: Allergies / Current Medications / Past Medical History / Surgical History / Social History / Family History    PROBLEM LIST   Patient Active Problem List   Diagnosis   • Essential hypertension   • Cigarette nicotine dependence with nicotine-induced disorder   • Emphysema lung (CMS/HCC)   • Hyperlipidemia   • Abdominal aortic aneurysm (AAA) without rupture (CMS/HCC)   • Congenital absence of left kidney   • Diverticulosis of large intestine without hemorrhage   • Pulmonary nodules   • Atherosclerosis of native coronary artery of native heart without angina pectoris   • Calculus of gallbladder without cholecystitis without obstruction   • Hiatal hernia with gastroesophageal reflux disease and esophagitis       PAST MEDICAL HISTORY  Past Medical History:   Diagnosis Date   • Atherosclerosis of native coronary artery of native heart without angina pectoris 3/10/2020   • Colon polyp    • Diverticulosis of large intestine without hemorrhage 8/9/2017   • Emphysema lung (CMS/HCC) 2/28/2017   • Hiatal hernia with gastroesophageal reflux disease and esophagitis 3/10/2020   • Hyperlipidemia 2/28/2017   • Portal vein thrombosis 8/29/2017   • Sepsis (CMS/HCC) 8/2/2017   • Shingles 09/2010   • Skin cancer 5/31/2017   • Thrombosis, 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    coag hickey by heme neg; completed 6 months of AC and maintained on ASA       SURGICAL HISTORY  Past Surgical History:   Procedure Laterality Date   • ABDOMINAL AORTIC ANEURYSM REPAIR  05/2017   • ARTERIOGRAM AORTIC N/A 5/8/2017    Procedure:  AORTIC STENT GRAFT REPAIR W/ GORE BILATERAL FEMORAL MINI CUTDOWNS;  Surgeon: Humble Hamilton MD;  Location: [facility] HYBRID OR 18/19;  Service:    • COLONOSCOPY N/A 8/3/2017    Procedure: COLONOSCOPY to cecum;  Surgeon: Patrick Elizabeth MD;  Location: [facility] ENDOSCOPY;  Service:    • COLONOSCOPY  09/2010   • ENDOSCOPY  8/3/2017    Procedure: ESOPHAGOGASTRODUODENOSCOPY;  Surgeon: Patrick Elizabeth MD;   Location: [facility] ENDOSCOPY;  Service:    • HERNIA REPAIR  1980   • UMBILICAL HERNIA REPAIR  1975       SOCIAL HISTORY  Social History     Socioeconomic History   • Marital status:      Spouse name: Samira   • Number of children: 1   • Years of education: High School   • Highest education level: Not on file   Occupational History   • Occupation: Business: owner of metal fabrication     Employer: [employer]   Social Needs   • Financial resource strain: Not hard at all   • Food insecurity:     Worry: Never true     Inability: Not on file   • Transportation needs:     Medical: No     Non-medical: No   Tobacco Use   • Smoking status: Current Every Day Smoker     Packs/day: 1.00     Years: 42.00     Pack years: 42.00     Types: Cigarettes   • Smokeless tobacco: Never Used   • Tobacco comment: currently 1 ppd    Substance and Sexual Activity   • Alcohol use: Yes     Frequency: 4 or more times a week     Drinks per session: 1 or 2   • Drug use: No   • Sexual activity: Yes     Partners: Female   Lifestyle   • Physical activity:     Days per week: 5 days     Minutes per session: Not on file   • Stress: To some extent       FAMILY HISTORY  Family History   Problem Relation Age of Onset   • Colon cancer Father 55   • Heart attack Father 71   • Coronary artery disease Father    • Hypertension Mother    • Hyperlipidemia Mother    • Sudden death Mother 78   • Aneurysm Mother    • Heart disease Mother    • Benign prostatic hyperplasia Brother    • Hypertension Brother    • Benign prostatic hyperplasia Brother    • No Known Problems Sister    • Brain cancer Maternal Grandmother    • Heart disease Maternal Grandfather    • Sudden death Maternal Grandfather    • No Known Problems Paternal Grandmother    • No Known Problems Paternal Grandfather    • Prostate cancer Neg Hx    • Dementia Neg Hx          **Dragon Disclaimer:   Much of this encounter note is an electronic transcription/translation of spoken language to printed text.  The electronic translation of spoken language may permit erroneous, or at times, nonsensical words or phrases to be inadvertently transcribed. Although I have reviewed the note for such errors, some may still exist.       Template created by Dalila Carter MD

## 2020-03-10 NOTE — ASSESSMENT & PLAN NOTE
This is a new problem to this examiner.   Extensive coronary calcification/atherosclerosis noted on CT of chest.   · Ordered nuclear stress test for further evaluation.   · Continue ASA 81 mg and pravastatin 40 mg daily.

## 2020-03-10 NOTE — ASSESSMENT & PLAN NOTE
Discussed symptoms of cholecystitis. He does have some upper abdominal symptoms that may be related to gallstones. He defers surgical evaluation at this time. I did advise him that surgery may be needed if he becomes more symptomatic.

## 2020-03-23 ENCOUNTER — TELEPHONE (OUTPATIENT)
Dept: INTERNAL MEDICINE | Age: 65
End: 2020-03-23

## 2020-03-23 NOTE — TELEPHONE ENCOUNTER
----- Message from Patrick Mckeon sent at 3/20/2020 11:29 AM EDT -----  Regarding: Non-Urgent Medical Question  Contact: [phone]  Dr. Carter , I am scheduled for a stress test next Friday @ [facility] . Do you recommend I keep that appointment or reschedule for a later date . I do not want to add any additional stress to the health care system if not completely  needed at this time .  Thanks ,  Chidi Mckeon

## 2020-03-23 NOTE — TELEPHONE ENCOUNTER
Send as [employer] Message:    Since he is asymptomatic, this should be deferred for now until at least after May/June.

## 2020-03-27 ENCOUNTER — APPOINTMENT (OUTPATIENT)
Dept: NUCLEAR MEDICINE | Facility: HOSPITAL | Age: 65
End: 2020-03-27

## 2020-03-30 ENCOUNTER — TELEPHONE (OUTPATIENT)
Dept: INTERNAL MEDICINE | Age: 65
End: 2020-03-30

## 2020-03-30 NOTE — TELEPHONE ENCOUNTER
If it's still hurting by Wednesday let me know.   For now take Zyrtec 10 mg qHS and Flonase daily.

## 2020-03-30 NOTE — TELEPHONE ENCOUNTER
----- Message from Patrick Mckeon sent at 3/30/2020 11:04 AM EDT -----  Regarding: Non-Urgent Medical Question  Contact: [phone]  Dr. Carter , I had a bad earache Friday into Saturday and ran a slight temp. . It doesn't hurt so much now but is still stopped up and crackling and a little bit of a sore throat . Should I just see if it continues to get better in the coming days or would you be comfortable with sending an antibiotic prescription to [facility] for me ? ?      Thanks for all you are doing   Chidi Mckeon

## 2020-04-15 DIAGNOSIS — I10 ESSENTIAL HYPERTENSION: Chronic | ICD-10-CM

## 2020-04-16 RX ORDER — AMLODIPINE BESYLATE 10 MG/1
TABLET ORAL
Qty: 90 TABLET | Refills: 3 | Status: SHIPPED | OUTPATIENT
Start: 2020-04-16 | End: 2021-03-22

## 2020-07-27 ENCOUNTER — OFFICE VISIT (OUTPATIENT)
Dept: INTERNAL MEDICINE | Age: 65
End: 2020-07-27

## 2020-07-27 VITALS
HEART RATE: 98 BPM | SYSTOLIC BLOOD PRESSURE: 128 MMHG | WEIGHT: 164.4 LBS | BODY MASS INDEX: 24.92 KG/M2 | OXYGEN SATURATION: 100 % | DIASTOLIC BLOOD PRESSURE: 64 MMHG | HEIGHT: 68 IN

## 2020-07-27 DIAGNOSIS — K21.00 HIATAL HERNIA WITH GASTROESOPHAGEAL REFLUX DISEASE AND ESOPHAGITIS: Chronic | ICD-10-CM

## 2020-07-27 DIAGNOSIS — I10 ESSENTIAL HYPERTENSION: Chronic | ICD-10-CM

## 2020-07-27 DIAGNOSIS — E78.2 MIXED HYPERLIPIDEMIA: Chronic | ICD-10-CM

## 2020-07-27 DIAGNOSIS — I25.10 ATHEROSCLEROSIS OF NATIVE CORONARY ARTERY OF NATIVE HEART WITHOUT ANGINA PECTORIS: Primary | Chronic | ICD-10-CM

## 2020-07-27 DIAGNOSIS — K44.9 HIATAL HERNIA WITH GASTROESOPHAGEAL REFLUX DISEASE AND ESOPHAGITIS: Chronic | ICD-10-CM

## 2020-07-27 DIAGNOSIS — R91.8 PULMONARY NODULES: ICD-10-CM

## 2020-07-27 PROCEDURE — 99214 OFFICE O/P EST MOD 30 MIN: CPT | Performed by: INTERNAL MEDICINE

## 2020-07-27 NOTE — PATIENT INSTRUCTIONS
** IMPORTANT MESSAGE FROM DR. MEDINA **    In our office, your satisfaction is VERY important to us.     You may receive a survey from [facility] by mail or E-mail for you to provide feedback about your visit. This information is invaluable for me to know what we can do to improve our services.     I ask that you please take a few minutes to complete the survey and let us know how we are doing in serving your needs. (You may receive the survey more than once for multiple visits)    Thank You !    Dr. Medina & Staff    _________________________________________________________________________________________________________________________      ** ADDITIONAL INSTRUCTION / REMINDERS FROM DR. MEDINA **

## 2020-07-27 NOTE — ASSESSMENT & PLAN NOTE
Proceed with previously order stress test.  Denies angina.   Continue ASA and pravastatin 40 mg qd.

## 2020-07-27 NOTE — PROGRESS NOTES
[facility] [place] [facility] MEDICINE  RAIMUNDO MEDINA M.D.      Patrick Mckeon / 65 y.o. / male  07/27/2020      ASSESSMENT & PLAN:    Problem List Items Addressed This Visit        High    Atherosclerosis of native coronary artery of native heart without angina pectoris - Primary (Chronic)    Current Assessment & Plan     Proceed with previously order stress test.  Denies angina.   Continue ASA and pravastatin 40 mg qd.          Relevant Medications    amLODIPine (NORVASC) 10 MG tablet       Medium    Essential hypertension (Chronic)    Overview     Continue amlodipine and losartan         Relevant Medications    losartan (COZAAR) 25 MG tablet    amLODIPine (NORVASC) 10 MG tablet    Hyperlipidemia (Chronic)    Overview     Continue pravastatin         Relevant Medications    aspirin 81 MG EC tablet    pravastatin (PRAVACHOL) 40 MG tablet    Pulmonary nodules (Chronic)    Current Assessment & Plan     Placed order for future CT in October.   Advised to quit smoking.          Relevant Orders    CT Chest Without Contrast       Low    Hiatal hernia with gastroesophageal reflux disease and esophagitis (Chronic)    Relevant Medications    esomeprazole (nexIUM) 40 MG capsule        Orders Placed This Encounter   Procedures   • CT Chest Without Contrast     No orders of the defined types were placed in this encounter.      Summary/Discussion:      Return in about 4 months (around 11/27/2020) for Heart disease, Hypertension, Hyperlipidemia, COPD.  ____________________________________________________________________    MEDICATIONS  Current Outpatient Medications   Medication Sig Dispense Refill   • amLODIPine (NORVASC) 10 MG tablet TAKE 1 TABLET EVERY DAY 90 tablet 3   • aspirin 81 MG EC tablet Take 1 tablet by mouth Daily.     • esomeprazole (nexIUM) 40 MG capsule Take 1 capsule by mouth Every Morning Before Breakfast. 30 capsule 5   • losartan (COZAAR) 25 MG tablet TAKE 1 TABLET EVERY DAY 90 tablet 3   • pravastatin (PRAVACHOL) 40 MG tablet  "TAKE 1 TABLET EVERY DAY 90 tablet 3     No current facility-administered medications for this visit.        VITALS    Visit Vitals  /64   Pulse 98   Ht 173 cm (68.11\")   Wt 74.6 kg (164 lb 6.4 oz)   SpO2 100%   BMI 24.92 kg/m²       BP Readings from Last 3 Encounters:   07/27/20 128/64   03/10/20 110/70   01/27/20 110/70     Wt Readings from Last 3 Encounters:   07/27/20 74.6 kg (164 lb 6.4 oz)   03/10/20 76.7 kg (169 lb)   01/27/20 74.8 kg (165 lb)      Body mass index is 24.92 kg/m².    CC:  Main reason(s) for today's visit: Follow-up for hypertension and hyperlipidemia    HPI:     Chronic essential hypertension:  Since prior visit: compliant with medication(s) and denies significant problems with medication(s).  Most recent in-office blood pressure readings:   BP Readings from Last 3 Encounters:   07/27/20 128/64   03/10/20 110/70   01/27/20 110/70     Chronic hyperlipidemia:  Current therapy include pravastatin, denies problems with medication.    Most recent labs:   Lab Results   Component Value Date    LDL 58 01/27/2020    LDL 55 01/11/2019    LDL 91 05/02/2018    HDL 68 (H) 01/27/2020    HDL 62 (H) 01/11/2019    HDL 60 05/02/2018    TRIG 96 01/27/2020    CHOLHDLRATIO 2.13 01/27/2020    CHOLHDLRATIO 2.26 01/11/2019    CHOLHDLRATIO 2.78 05/02/2018     Extensive coronary atherosclerosis noted on CT coronary calcium score. Has not had stress testing due to COVID-19. No angina.     COPD: still smoking, uses inhaler, denies worsening shortness of breath, cough. No fever.     History of pulmonary nodules, needs repeat CT around October.   unfortunately still smoking. Tried NRT patches previously with some success.       Patient Care Team:  Lester Carter MD as PCP - General (Internal Medicine)  Orlin Quintero MD as Consulting Physician (Gastroenterology)  Keyur Mejia II, MD as Consulting Physician (Hematology and Oncology)  Humble Hamilton MD as Surgeon (Vascular Surgery)  Keyur Garrido MD as " Consulting Physician (Dermatology)  ____________________________________________________________________      REVIEW OF SYSTEMS    Review of Systems  As noted per HPI  Constitutional neg  Resp neg for worsening cough or shortness of breath   CV neg   Neuro neg headaches     PHYSICAL EXAMINATION    Physical Exam  Constitutional  No distress  Cardiovascular Rate  normal . Rhythm: regular . Heart sounds:  Normal  Lungs decreased breath sounds throughout without rales or wheezing   Psychiatric  Alert. Judgment and thought content normal. Mood normal    REVIEWED DATA:    Labs:   Lab Results   Component Value Date     01/27/2020    K 4.5 01/27/2020    AST 15 01/27/2020    ALT 12 01/27/2020    BUN 15 01/27/2020    CREATININE 1.21 01/27/2020    CREATININE 1.20 02/06/2019    CREATININE 1.29 (H) 01/11/2019    EGFRIFNONA 60 (L) 01/27/2020    EGFRIFAFRI 73 01/27/2020       Lab Results   Component Value Date    HGBA1C 5.60 01/27/2020    HGBA1C 5.60 01/11/2019    HGBA1C 5.10 02/21/2017    GLUCOSE 101 08/29/2017    GLUCOSE 87 08/03/2017    GLUCOSE 92 08/02/2017       Lab Results   Component Value Date    LDL 58 01/27/2020    LDL 55 01/11/2019    LDL 91 05/02/2018    HDL 68 (H) 01/27/2020    HDL 62 (H) 01/11/2019    HDL 60 05/02/2018    TRIG 96 01/27/2020    TRIG 116 01/11/2019    TRIG 81 05/02/2018    CHOLHDLRATIO 2.13 01/27/2020    CHOLHDLRATIO 2.26 01/11/2019    CHOLHDLRATIO 2.78 05/02/2018       Lab Results   Component Value Date    TSH 1.110 01/11/2019    FREET4 1.26 01/11/2019          Lab Results   Component Value Date    WBC 8.19 01/27/2020    HGB 15.4 01/27/2020    HGB 15.1 01/11/2019    HGB 15.1 09/06/2018     01/27/2020       Lab Results   Component Value Date    PROTEIN Negative 01/27/2020    GLUCOSEU Negative 01/27/2020    BLOODU Negative 01/27/2020    NITRITEU Negative 01/27/2020    LEUKOCYTESUR Negative 01/27/2020       Imaging:        Medical Tests:        Summary of old records / correspondence /  consultant report:        Request outside records:        ALLERGIES  No Known Allergies     PFSH:     The following portions of the patient's history were reviewed and updated as appropriate: Allergies / Current Medications / Past Medical History / Surgical History / Social History / Family History    PROBLEM LIST   Patient Active Problem List   Diagnosis   • Essential hypertension   • Cigarette nicotine dependence with nicotine-induced disorder   • Emphysema lung (CMS/HCC)   • Hyperlipidemia   • Abdominal aortic aneurysm (AAA) without rupture (CMS/HCC)   • Congenital absence of left kidney   • Diverticulosis of large intestine without hemorrhage   • Pulmonary nodules   • Atherosclerosis of native coronary artery of native heart without angina pectoris   • Calculus of gallbladder without cholecystitis without obstruction   • Hiatal hernia with gastroesophageal reflux disease and esophagitis       PAST MEDICAL HISTORY  Past Medical History:   Diagnosis Date   • Atherosclerosis of native coronary artery of native heart without angina pectoris 3/10/2020   • Colon polyp    • Diverticulosis of large intestine without hemorrhage 8/9/2017   • Emphysema lung (CMS/HCC) 2/28/2017   • Hiatal hernia with gastroesophageal reflux disease and esophagitis 3/10/2020   • Hyperlipidemia 2/28/2017   • Portal vein thrombosis 8/29/2017   • Sepsis (CMS/HCC) 8/2/2017   • Shingles 09/2010   • Skin cancer 5/31/2017   • Thrombosis, 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    coag ihckey by heme neg; completed 6 months of AC and maintained on ASA       SURGICAL HISTORY  Past Surgical History:   Procedure Laterality Date   • ABDOMINAL AORTIC ANEURYSM REPAIR  05/2017   • ARTERIOGRAM AORTIC N/A 5/8/2017    Procedure:  AORTIC STENT GRAFT REPAIR W/ GORE BILATERAL FEMORAL MINI CUTDOWNS;  Surgeon: Humble Hamilton MD;  Location: [facility] OR 18/19;  Service:    • COLONOSCOPY N/A 8/3/2017    Procedure: COLONOSCOPY to cecum;  Surgeon: Patrick Elizabeth MD;   Location: [facility] ENDOSCOPY;  Service:    • COLONOSCOPY  09/2010   • ENDOSCOPY  8/3/2017    Procedure: ESOPHAGOGASTRODUODENOSCOPY;  Surgeon: Patrick Elizabeth MD;  Location: [facility] ENDOSCOPY;  Service:    • HERNIA REPAIR  1980   • UMBILICAL HERNIA REPAIR  1975       SOCIAL HISTORY  Social History     Socioeconomic History   • Marital status:      Spouse name: Samira   • Number of children: 1   • Years of education: High School   • Highest education level: Not on file   Occupational History   • Occupation: Business: owner of metal fabrication     Employer: [employer]   Social Needs   • Financial resource strain: Not hard at all   • Food insecurity:     Worry: Never true     Inability: Not on file   • Transportation needs:     Medical: No     Non-medical: No   Tobacco Use   • Smoking status: Current Every Day Smoker     Packs/day: 1.00     Years: 42.00     Pack years: 42.00     Types: Cigarettes   • Smokeless tobacco: Never Used   • Tobacco comment: currently 1 ppd    Substance and Sexual Activity   • Alcohol use: Yes     Frequency: 4 or more times a week     Drinks per session: 1 or 2   • Drug use: No   • Sexual activity: Yes     Partners: Female   Lifestyle   • Physical activity:     Days per week: 5 days     Minutes per session: Not on file   • Stress: To some extent       FAMILY HISTORY  Family History   Problem Relation Age of Onset   • Colon cancer Father 55   • Heart attack Father 71   • Coronary artery disease Father    • Hypertension Mother    • Hyperlipidemia Mother    • Sudden death Mother 78   • Aneurysm Mother    • Heart disease Mother    • Benign prostatic hyperplasia Brother    • Hypertension Brother    • Benign prostatic hyperplasia Brother    • No Known Problems Sister    • Brain cancer Maternal Grandmother    • Heart disease Maternal Grandfather    • Sudden death Maternal Grandfather    • No Known Problems Paternal Grandmother    • No Known Problems Paternal Grandfather    • Prostate cancer  Neg Hx    • Dementia Neg Hx          **Dragon Disclaimer:   Much of this encounter note is an electronic transcription/translation of spoken language to printed text. The electronic translation of spoken language may permit erroneous, or at times, nonsensical words or phrases to be inadvertently transcribed. Although I have reviewed the note for such errors, some may still exist.       Template created by Dalila Carter MD

## 2020-07-30 DIAGNOSIS — K21.00 HIATAL HERNIA WITH GASTROESOPHAGEAL REFLUX DISEASE AND ESOPHAGITIS: ICD-10-CM

## 2020-07-30 DIAGNOSIS — K44.9 HIATAL HERNIA WITH GASTROESOPHAGEAL REFLUX DISEASE AND ESOPHAGITIS: ICD-10-CM

## 2020-07-30 RX ORDER — ESOMEPRAZOLE MAGNESIUM 40 MG/1
40 CAPSULE, DELAYED RELEASE ORAL
Qty: 90 CAPSULE | Refills: 3 | Status: SHIPPED | OUTPATIENT
Start: 2020-07-30 | End: 2020-12-01 | Stop reason: SINTOL

## 2020-08-12 ENCOUNTER — HOSPITAL ENCOUNTER (OUTPATIENT)
Dept: NUCLEAR MEDICINE | Facility: HOSPITAL | Age: 65
Discharge: HOME | End: 2020-08-12

## 2020-08-12 DIAGNOSIS — I25.10 ATHEROSCLEROSIS OF NATIVE CORONARY ARTERY OF NATIVE HEART WITHOUT ANGINA PECTORIS: ICD-10-CM

## 2020-08-12 LAB
BH CV STRESS BP STAGE 1: NORMAL
BH CV STRESS DURATION MIN STAGE 1: 4
BH CV STRESS DURATION SEC STAGE 1: 4
BH CV STRESS GRADE STAGE 1: 10
BH CV STRESS HR STAGE 1: 136
BH CV STRESS METS STAGE 1: 5
BH CV STRESS PROTOCOL 1: NORMAL
BH CV STRESS RECOVERY BP: NORMAL MMHG
BH CV STRESS RECOVERY HR: 90 BPM
BH CV STRESS SPEED STAGE 1: 1.7
BH CV STRESS STAGE 1: 1
LV EF NUC BP: 69 %
MAXIMAL PREDICTED HEART RATE: 154 BPM
PERCENT MAX PREDICTED HR: 89.61 %
STRESS BASELINE BP: NORMAL MMHG
STRESS BASELINE HR: 75 BPM
STRESS PERCENT HR: 105 %
STRESS POST ESTIMATED WORKLOAD: 4.6 METS
STRESS POST EXERCISE DUR MIN: 4 MIN
STRESS POST EXERCISE DUR SEC: 4 SEC
STRESS POST PEAK BP: NORMAL MMHG
STRESS POST PEAK HR: 138 BPM
STRESS TARGET HR: 131 BPM

## 2020-08-12 PROCEDURE — 93018 CV STRESS TEST I&R ONLY: CPT | Performed by: INTERNAL MEDICINE

## 2020-08-12 PROCEDURE — 0 TECHNETIUM SESTAMIBI: Performed by: INTERNAL MEDICINE

## 2020-08-12 PROCEDURE — 93016 CV STRESS TEST SUPVJ ONLY: CPT | Performed by: INTERNAL MEDICINE

## 2020-08-12 PROCEDURE — 93017 CV STRESS TEST TRACING ONLY: CPT

## 2020-08-12 PROCEDURE — 78452 HT MUSCLE IMAGE SPECT MULT: CPT | Performed by: INTERNAL MEDICINE

## 2020-08-12 PROCEDURE — A9500 TC99M SESTAMIBI: HCPCS | Performed by: INTERNAL MEDICINE

## 2020-08-12 PROCEDURE — 78452 HT MUSCLE IMAGE SPECT MULT: CPT

## 2020-08-12 RX ADMIN — TECHNETIUM TC 99M SESTAMIBI 1 DOSE: 1 INJECTION INTRAVENOUS at 09:00

## 2020-08-12 RX ADMIN — TECHNETIUM TC 99M SESTAMIBI 1 DOSE: 1 INJECTION INTRAVENOUS at 07:15

## 2020-08-12 NOTE — PROGRESS NOTES
Pedro Luis:    Patrick, here are the result(s) of your test(s):     Heart stress test was negative for ischemia.     Please do not hesitate to contact me if you have questions.

## 2020-09-25 ENCOUNTER — HOSPITAL ENCOUNTER (OUTPATIENT)
Dept: CT IMAGING | Facility: HOSPITAL | Age: 65
Discharge: HOME | End: 2020-09-25
Admitting: INTERNAL MEDICINE

## 2020-09-25 DIAGNOSIS — R91.8 PULMONARY NODULES: ICD-10-CM

## 2020-09-25 PROCEDURE — 71250 CT THORAX DX C-: CPT

## 2020-09-25 NOTE — PROGRESS NOTES
Pedro Luis:    Patrick, here are the result(s) of your test(s):     CT chest shows tiney 1-2 mm nodule in the left lower lobe. Prior noted nodules are not present on this exam.   Repeat CT in 1 year.     Incidental finding of gallstone. Nothing to do unless it causes problems.     Please do not hesitate to contact me if you have questions.

## 2020-12-01 ENCOUNTER — OFFICE VISIT (OUTPATIENT)
Dept: INTERNAL MEDICINE | Age: 65
End: 2020-12-01

## 2020-12-01 VITALS
WEIGHT: 165 LBS | HEIGHT: 68 IN | DIASTOLIC BLOOD PRESSURE: 80 MMHG | SYSTOLIC BLOOD PRESSURE: 140 MMHG | TEMPERATURE: 97.1 F | HEART RATE: 60 BPM | BODY MASS INDEX: 25.01 KG/M2 | OXYGEN SATURATION: 98 %

## 2020-12-01 DIAGNOSIS — K21.00 HIATAL HERNIA WITH GASTROESOPHAGEAL REFLUX DISEASE AND ESOPHAGITIS: Chronic | ICD-10-CM

## 2020-12-01 DIAGNOSIS — J43.9 PULMONARY EMPHYSEMA, UNSPECIFIED EMPHYSEMA TYPE (HCC): Chronic | ICD-10-CM

## 2020-12-01 DIAGNOSIS — K44.9 HIATAL HERNIA WITH GASTROESOPHAGEAL REFLUX DISEASE AND ESOPHAGITIS: Chronic | ICD-10-CM

## 2020-12-01 DIAGNOSIS — I10 ESSENTIAL HYPERTENSION: Primary | Chronic | ICD-10-CM

## 2020-12-01 DIAGNOSIS — R91.8 PULMONARY NODULES: Chronic | ICD-10-CM

## 2020-12-01 DIAGNOSIS — E78.2 MIXED HYPERLIPIDEMIA: Chronic | ICD-10-CM

## 2020-12-01 PROBLEM — K57.30 DIVERTICULOSIS OF LARGE INTESTINE WITHOUT HEMORRHAGE: Chronic | Status: RESOLVED | Noted: 2017-08-09 | Resolved: 2020-12-01

## 2020-12-01 LAB
ALBUMIN SERPL-MCNC: 4.1 G/DL (ref 3.5–5.2)
ALBUMIN/GLOB SERPL: 2.3 G/DL
ALP SERPL-CCNC: 84 U/L (ref 39–117)
ALT SERPL-CCNC: 18 U/L (ref 1–41)
AST SERPL-CCNC: 16 U/L (ref 1–40)
BILIRUB SERPL-MCNC: 0.3 MG/DL (ref 0–1.2)
BUN SERPL-MCNC: 18 MG/DL (ref 8–23)
BUN/CREAT SERPL: 16.2 (ref 7–25)
CALCIUM SERPL-MCNC: 9.1 MG/DL (ref 8.6–10.5)
CHLORIDE SERPL-SCNC: 104 MMOL/L (ref 98–107)
CHOLEST SERPL-MCNC: 129 MG/DL (ref 0–200)
CHOLEST/HDLC SERPL: 1.98 {RATIO}
CO2 SERPL-SCNC: 27 MMOL/L (ref 22–29)
CREAT SERPL-MCNC: 1.11 MG/DL (ref 0.76–1.27)
GLOBULIN SER CALC-MCNC: 1.8 GM/DL
GLUCOSE SERPL-MCNC: 79 MG/DL (ref 65–99)
HDLC SERPL-MCNC: 65 MG/DL (ref 40–60)
LDLC SERPL CALC-MCNC: 52 MG/DL (ref 0–100)
POTASSIUM SERPL-SCNC: 4.6 MMOL/L (ref 3.5–5.2)
PROT SERPL-MCNC: 5.9 G/DL (ref 6–8.5)
SODIUM SERPL-SCNC: 139 MMOL/L (ref 136–145)
TRIGL SERPL-MCNC: 53 MG/DL (ref 0–150)
VLDLC SERPL CALC-MCNC: 12 MG/DL (ref 5–40)

## 2020-12-01 PROCEDURE — 99214 OFFICE O/P EST MOD 30 MIN: CPT | Performed by: INTERNAL MEDICINE

## 2020-12-01 RX ORDER — PANTOPRAZOLE SODIUM 40 MG/1
40 TABLET, DELAYED RELEASE ORAL DAILY
Qty: 90 TABLET | Refills: 1 | Status: SHIPPED | OUTPATIENT
Start: 2020-12-01 | End: 2021-05-17

## 2020-12-01 NOTE — PROGRESS NOTES
[facility]  RAIMUNDO MEDINA M.D.      Patrick Mckeon / 65 y.o. / male  12/01/2020    CHIEF COMPLAINT     Heart Problem, Hyperlipidemia, Hypertension, and COPD      ASSESSMENT & PLAN     Problem List Items Addressed This Visit        High    Essential hypertension - Primary (Chronic)    Current Assessment & Plan     < 130 SBP at home. Continue amlodipine 10 mg and losartan 25 mg qd for now.          Relevant Medications    losartan (COZAAR) 25 MG tablet    amLODIPine (NORVASC) 10 MG tablet    Other Relevant Orders    Comprehensive Metabolic Panel       Medium    Hyperlipidemia (Chronic)    Current Assessment & Plan     Continue pravastatin 40 mg daily.          Relevant Medications    aspirin 81 MG EC tablet    pravastatin (PRAVACHOL) 40 MG tablet    Other Relevant Orders    Lipid Panel With / Chol / HDL Ratio    Comprehensive Metabolic Panel    Pulmonary nodules (Chronic)    Current Assessment & Plan     CT stable. Repeat 1 year. Discussed.   Advised to quit smoking. Smokes 1 pack per day.          Hiatal hernia with gastroesophageal reflux disease and esophagitis (Chronic)    Current Assessment & Plan     Due to diarrhea switch esomeprazole to pantoprazole.          Relevant Medications    pantoprazole (PROTONIX) 40 MG EC tablet       Low    Emphysema lung (CMS/HCC) (Chronic)    Overview     Noted on CT in 2014         Current Assessment & Plan     Smokes 1 pack per day still. Advised to quit smoking.                Orders Placed This Encounter   Procedures   • Td Vaccine Greater Than or Equal To 6yo Preservative Free IM   • Lipid Panel With / Chol / HDL Ratio   • Comprehensive Metabolic Panel     New Medications Ordered This Visit   Medications   • pantoprazole (PROTONIX) 40 MG EC tablet     Sig: Take 1 tablet by mouth Daily.     Dispense:  90 tablet     Refill:  1       Summary/Discussion:  •     Next Appointment with me: 2/1/2021    Return in about 8 months (around  "8/2/2021).    _____________________________________________________________________________________    VITAL SIGNS     Visit Vitals  /80   Pulse 60   Temp 97.1 °F (36.2 °C)   Ht 173 cm (68.11\")   Wt 74.8 kg (165 lb)   SpO2 98%   BMI 25.01 kg/m²       BP Readings from Last 3 Encounters:   12/01/20 140/80   07/27/20 128/64   03/10/20 110/70     Wt Readings from Last 3 Encounters:   12/01/20 74.8 kg (165 lb)   07/27/20 74.6 kg (164 lb 6.4 oz)   03/10/20 76.7 kg (169 lb)     Body mass index is 25.01 kg/m².      MEDICATIONS     Current Outpatient Medications   Medication Sig Dispense Refill   • amLODIPine (NORVASC) 10 MG tablet TAKE 1 TABLET EVERY DAY 90 tablet 3   • aspirin 81 MG EC tablet Take 1 tablet by mouth Daily.     • losartan (COZAAR) 25 MG tablet TAKE 1 TABLET EVERY DAY 90 tablet 3   • pravastatin (PRAVACHOL) 40 MG tablet TAKE 1 TABLET EVERY DAY 90 tablet 3   • Esomeprazole 40 MG EC tablet Take 1 tablet by mouth Daily. 90 tablet 1     No current facility-administered medications for this visit.        _____________________________________________________________________________________    HISTORY OF PRESENT ILLNESS     Hypertension: home SBP < 130 consistently. No headaches or vision change. On losartan 25 mg and amlodipine 10 mg.   Hyperlipidemia on pravastatin without problems.   COPD/smoking 1 pack per day. Last chest CT stable nodule.   GERD with HH: complains of diarrhea since Nexium.       Patient Care Team:  Lester Carter MD as PCP - General (Internal Medicine)  Orlin Quintero MD as Consulting Physician (Gastroenterology)  Keyur Mejia II, MD as Consulting Physician (Hematology and Oncology)  Humble Hamilton MD as Surgeon (Vascular Surgery)  Keyur Garrido MD as Consulting Physician (Dermatology)      REVIEW OF SYSTEMS     Review of Systems  No chest pain   No cough or shortness of breath  GI diarrhea   Negative for headaches       PHYSICAL EXAMINATION     Physical Exam  No acute distress "   Cardiovascular: Normal rate, regular rhythm.   Pulm/Chest: Effort normal, breath sounds decreased throughout without wheezing     REVIEWED DATA     Labs:     Lab Results   Component Value Date     01/27/2020    K 4.5 01/27/2020    CALCIUM 9.0 01/27/2020    AST 15 01/27/2020    ALT 12 01/27/2020    BUN 15 01/27/2020    CREATININE 1.21 01/27/2020    CREATININE 1.20 02/06/2019    CREATININE 1.29 (H) 01/11/2019    EGFRIFNONA 60 (L) 01/27/2020    EGFRIFAFRI 73 01/27/2020       Lab Results   Component Value Date    HGBA1C 5.60 01/27/2020    HGBA1C 5.60 01/11/2019    HGBA1C 5.10 02/21/2017    GLU 94 01/27/2020    GLU 90 01/11/2019    GLU 94 05/02/2018       Lab Results   Component Value Date    LDL 58 01/27/2020    LDL 55 01/11/2019    LDL 91 05/02/2018    HDL 68 (H) 01/27/2020    HDL 62 (H) 01/11/2019    HDL 60 05/02/2018    TRIG 96 01/27/2020    TRIG 116 01/11/2019    TRIG 81 05/02/2018    CHOLHDLRATIO 2.13 01/27/2020    CHOLHDLRATIO 2.26 01/11/2019    CHOLHDLRATIO 2.78 05/02/2018       Lab Results   Component Value Date    TSH 1.110 01/11/2019    FREET4 1.26 01/11/2019       Lab Results   Component Value Date    WBC 8.19 01/27/2020    HGB 15.4 01/27/2020    HGB 15.1 01/11/2019    HGB 15.1 09/06/2018     01/27/2020       Lab Results   Component Value Date    PROTEIN Negative 01/27/2020    GLUCOSEU Negative 01/27/2020    BLOODU Negative 01/27/2020    NITRITEU Negative 01/27/2020    LEUKOCYTESUR Negative 01/27/2020       Imaging:           Medical Tests:           Summary of old records / correspondence / consultant report:           Request outside records:           ALLERGIES  No Known Allergies     PFSH:     The following portions of the patient's history were reviewed and updated as appropriate: Allergies / Current Medications / Past Medical History / Surgical History / Social History / Family History    PROBLEM LIST   Patient Active Problem List   Diagnosis   • Essential hypertension   • Cigarette  nicotine dependence with nicotine-induced disorder   • Emphysema lung (CMS/HCC)   • Hyperlipidemia   • Abdominal aortic aneurysm (AAA) without rupture (CMS/HCC)   • Congenital absence of left kidney   • Pulmonary nodules   • Atherosclerosis of native coronary artery of native heart without angina pectoris   • Calculus of gallbladder without cholecystitis without obstruction   • Hiatal hernia with gastroesophageal reflux disease and esophagitis       PAST MEDICAL HISTORY  Past Medical History:   Diagnosis Date   • Atherosclerosis of native coronary artery of native heart without angina pectoris 3/10/2020   • Colon polyp    • Diverticulosis of large intestine without hemorrhage 8/9/2017   • Emphysema lung (CMS/HCC) 2/28/2017   • Hiatal hernia with gastroesophageal reflux disease and esophagitis 3/10/2020   • Hyperlipidemia 2/28/2017   • Portal vein thrombosis 8/29/2017   • Sepsis (CMS/HCC) 8/2/2017   • Shingles 09/2010   • Skin cancer 5/31/2017   • Thrombosis, 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    coag hickey by heme neg; completed 6 months of AC and maintained on ASA       SURGICAL HISTORY  Past Surgical History:   Procedure Laterality Date   • ABDOMINAL AORTIC ANEURYSM REPAIR  05/2017   • ARTERIOGRAM AORTIC N/A 5/8/2017    Procedure:  AORTIC STENT GRAFT REPAIR W/ GORE BILATERAL FEMORAL MINI CUTDOWNS;  Surgeon: Humble Hamilton MD;  Location: [facility] HYBRID OR 18/19;  Service:    • COLONOSCOPY N/A 8/3/2017    Procedure: COLONOSCOPY to cecum;  Surgeon: Patrick Elizabeth MD;  Location: [facility] ENDOSCOPY;  Service:    • COLONOSCOPY  09/2010   • ENDOSCOPY  8/3/2017    Procedure: ESOPHAGOGASTRODUODENOSCOPY;  Surgeon: Patrick Elizabeth MD;  Location: [facility] ENDOSCOPY;  Service:    • HERNIA REPAIR  1980   • UMBILICAL HERNIA REPAIR  1975       SOCIAL HISTORY  Social History     Socioeconomic History   • Marital status:      Spouse name: Samira   • Number of children: 1   • Years of education: High School   • Highest  education level: Not on file   Occupational History   • Occupation: Business: owner of metal [employer]     Employer: [employer]   Social Needs   • Financial resource strain: Not hard at all   • Food insecurity     Worry: Never true     Inability: Not on file   • Transportation needs     Medical: No     Non-medical: No   Tobacco Use   • Smoking status: Current Every Day Smoker     Packs/day: 1.00     Years: 42.00     Pack years: 42.00     Types: Cigarettes   • Smokeless tobacco: Never Used   • Tobacco comment: currently 1 ppd    Substance and Sexual Activity   • Alcohol use: Yes     Frequency: 4 or more times a week     Drinks per session: 1 or 2   • Drug use: No   • Sexual activity: Yes     Partners: Female   Lifestyle   • Physical activity     Days per week: 5 days     Minutes per session: Not on file   • Stress: To some extent       FAMILY HISTORY  Family History   Problem Relation Age of Onset   • Colon cancer Father 55   • Heart attack Father 71   • Coronary artery disease Father    • Hypertension Mother    • Hyperlipidemia Mother    • Sudden death Mother 78   • Aneurysm Mother    • Heart disease Mother    • Benign prostatic hyperplasia Brother    • Hypertension Brother    • Benign prostatic hyperplasia Brother    • No Known Problems Sister    • Brain cancer Maternal Grandmother    • Heart disease Maternal Grandfather    • Sudden death Maternal Grandfather    • No Known Problems Paternal Grandmother    • No Known Problems Paternal Grandfather    • Prostate cancer Neg Hx    • Dementia Neg Hx          Examiner was wearing KN95 mask, face shield and exam gloves during the entire duration of the visit. Patient was masked the entire time.   Minimum social distance of 6 ft maintained entire visit except if physical contact was necessary as documented.     **Mariion Disclaimer:   Much of this encounter note is an electronic transcription/translation of spoken language to printed text. The electronic translation of  spoken language may permit erroneous, or at times, nonsensical words or phrases to be inadvertently transcribed. Although I have reviewed the note for such errors, some may still exist.     Template created by Dalila Carter MD

## 2020-12-08 DIAGNOSIS — E78.5 HYPERLIPIDEMIA, UNSPECIFIED HYPERLIPIDEMIA TYPE: ICD-10-CM

## 2020-12-08 DIAGNOSIS — I10 ESSENTIAL HYPERTENSION: Chronic | ICD-10-CM

## 2020-12-09 RX ORDER — LOSARTAN POTASSIUM 25 MG/1
TABLET ORAL
Qty: 90 TABLET | Refills: 3 | Status: SHIPPED | OUTPATIENT
Start: 2020-12-09 | End: 2021-11-08

## 2020-12-09 RX ORDER — PRAVASTATIN SODIUM 40 MG
TABLET ORAL
Qty: 90 TABLET | Refills: 3 | Status: SHIPPED | OUTPATIENT
Start: 2020-12-09 | End: 2021-11-08

## 2021-02-01 ENCOUNTER — TELEPHONE (OUTPATIENT)
Dept: INTERNAL MEDICINE | Age: 66
End: 2021-02-01

## 2021-02-01 NOTE — TELEPHONE ENCOUNTER
LVM FOR PT IN REGARDS TO APPT TOMORROW BEING CHANGED TO A MYCHART VIDEO VISIT INSTEAD OF COMING IN THE OFFICE. PLEASE ADVISE.

## 2021-02-02 ENCOUNTER — TELEMEDICINE (OUTPATIENT)
Dept: INTERNAL MEDICINE | Age: 66
End: 2021-02-02

## 2021-02-02 DIAGNOSIS — J44.1 COPD WITH EXACERBATION (HCC): Primary | ICD-10-CM

## 2021-02-02 PROCEDURE — 99214 OFFICE O/P EST MOD 30 MIN: CPT | Performed by: INTERNAL MEDICINE

## 2021-02-02 RX ORDER — ALBUTEROL SULFATE 90 UG/1
2 AEROSOL, METERED RESPIRATORY (INHALATION) EVERY 4 HOURS PRN
Qty: 18 G | Refills: 5 | Status: SHIPPED | OUTPATIENT
Start: 2021-02-02

## 2021-02-02 RX ORDER — DOXYCYCLINE HYCLATE 100 MG
100 TABLET ORAL 2 TIMES DAILY
Qty: 14 TABLET | Refills: 0 | Status: SHIPPED | OUTPATIENT
Start: 2021-02-02 | End: 2021-02-09

## 2021-02-02 RX ORDER — METHYLPREDNISOLONE 4 MG/1
TABLET ORAL
Qty: 1 EACH | Refills: 0 | Status: SHIPPED | OUTPATIENT
Start: 2021-02-02 | End: 2021-08-03

## 2021-02-02 NOTE — PROGRESS NOTES
I N T E R N A L  M E D I C I N E  J U N O H  K I M,  M D      ENCOUNTER DATE:  02/02/2021    Patrick Mckeon / 65 y.o. / male      THIS WAS A MYCHART TELEHEALTH (One Month) ENCOUNTER NECESSITATED BY CURRENT COVID-19 CRISIS.      You have chosen to receive care through a telehealth visit.  Do you consent to use a video/audio connection for your medical care today? Yes      CHIEF COMPLAINT / REASON FOR OFFICE VISIT     TELEHEALTH ENCOUNTER:  Cough and COPD      ASSESSMENT & PLAN     1. COPD with exacerbation (CMS/Formerly [facility])       New Medications Ordered This Visit   Medications   • doxycycline (VIBRAMYICN) 100 MG tablet     Sig: Take 1 tablet by mouth 2 (Two) Times a Day for 7 days.     Dispense:  14 tablet     Refill:  0   • methylPREDNISolone (Medrol) 4 MG dose pack     Sig: Take as directed on package instructions.     Dispense:  1 each     Refill:  0   • albuterol sulfate HFA (Ventolin HFA) 108 (90 Base) MCG/ACT inhaler     Sig: Inhale 2 puffs Every 4 (Four) Hours As Needed for Wheezing or Shortness of Air.     Dispense:  18 g     Refill:  5     May substitute to a different brand if needed for insurance formulary reasons.       SUMMARY/DISCUSSION  •       Time spent: 15 minutes    Next Appointment with me: Visit date not found    No follow-ups on file.        HISTORY OF PRESENT ILLNESS       > 1 week of worsening chest congestion and cough. Tested for COVID-19 Saturday and returned negative. No exposure history. Has COPD and still smokes. No inhalers. Mild shortness of breath without chest pain.       REVIEWED DATA     Labs:           Imaging:     CT CHEST WITHOUT CONTRAST     HISTORY: Follow-up pulmonary nodule     TECHNIQUE: Radiation dose reduction techniques were utilized, including  automated exposure control and exposure modulation based on body size.   3 mm images were obtained through the chest without the administration  of IV contrast.      COMPARISON: 02/25/2020     FINDINGS: Background emphysema. There is  a tiny 1-2 mm nodule in the  medial left lower lobe on image 57. The nodules demonstrated on the  prior study are not apparent on today's exam and probably resolved.  There is no dense airspace consolidation. There is no suspicious  lymphadenopathy. Coronary artery calcifications. No pleural effusion.  There is some minimal subsegmental atelectasis in the anterior lingula.  Tiny hiatal hernia. Limited imaging of the upper abdomen demonstrates  cholelithiasis. Tiny presumed cyst in the liver. Partially imaged aortic  stent graft. Bone windows show degenerative changes of the thoracic  spine     IMPRESSION:  1. Tiny 1-2 mm nodule within the medial left lower lobe. Follow-up  recommended in 12 months with repeat CT scan.  2. Previously noted tiny nodules are no longer present.  3. Emphysema           Radiation dose reduction techniques were utilized, including automated  exposure control and exposure modulation based on body size.     This report was finalized on 9/25/2020      Medical Tests:           Summary of old records / correspondence / consultant report:           Request outside records:           **Dragon Disclaimer:   Much of this encounter note is an electronic transcription/translation of spoken language to printed text. The electronic translation of spoken language may permit erroneous, or at times, nonsensical words or phrases to be inadvertently transcribed. Although I have reviewed the note for such errors, some may still exist.     Template created by Dalila Carter MD

## 2021-02-17 NOTE — ASSESSMENT & PLAN NOTE
PROGRESS  NOTE    Subjective:  Earlier patient in colonoscopy and has returned to room and is resting. RT colonic ulcer and 5 cm area of colonic ulcer with narrow segment in transverse colon- biopsies taken. Patient on hyperal and heparin is resumed.      Objective: _    Vitals: Vital Signs (last 24 hrs)_____ Last Charted___________Minimum____________ Maximum____________  Temp    98.2  (APR 05 07:34) 97.8  (APR 04 18:28) 98.2  (APR 05 07:34)  Heart Rate   78  (APR 05 13:36) 78  (APR 05 13:36) 100  (APR 04 15:36)  Resp Rate       18  (APR 05 13:36) 16  (APR 05 07:34) H 28 (APR 05 11:56)  SBP    112  (APR 05 13:36) L 80 (APR 05 11:56) 137  (APR 05 11:32)  DBP    66  (APR 05 13:36) L 40 (APR 05 12:20) 80  (APR 05 11:19)      HEENT:  No lymphadenopathy.  No JVD.  No bruit.     HEART:  S1, S2.  Regular.  No gallop or murmur.     LUNGS:  Good breath sounds bilaterally equally.     ABDOMEN:  Soft, nontender.  Bowel sounds active.     EXTREMITIES:  No calf tenderness.  No leg edema.     NEUROLOGIC:  Within normal range arrangement.  No focal deficit.     SKIN:  No rash.    Imaging: X-rays as reviewed    Labs: Labs (Last four charted values)  WBC                  L 1.8 (APR 05) L 2.5 (APR 04) L 3.0 (APR 03) L 3.0 (APR 02)   Hgb                  L 9.2 (APR 05) L 10.7 (APR 04) L 8.7 (APR 03) L 8.7 (APR 02)   Hct                  L 30 (APR 05) 34 (APR 04) L 27 (APR 03) L 27 (APR 02)   Plt                  L 141 (APR 05) 154 (APR 04) L 140 (APR 03) L 124 (APR 02)   Na                   141 (APR 05) 140 (APR 04) L 133 (APR 03) L 132 (APR 02)   K                    3.8 (APR 05) 4.2 (APR 04) 4.3 (APR 03) 4.5 (APR 02)   CO2                  26 (APR 05) 28 (APR 04) 26 (APR 03) 27 (APR 02)   Cl                   107 (APR 05) 104 (APR 04) 100 (APR 03) 100 (APR 02)   Cr                   0.63 (APR 05) 0.69 (APR 04) 0.63 (APR 03) 0.65 (APR 02)   BUN                  H 31 (APR 05) H 31 (APR 04) H 37 (APR 03) H 37 (APR  Currently smokes 1/2 PPD  Duration of smokin years  Prior cessation efforts: no prior attempts  Counseled on smoking cessation for 5 minutes: advised patient to quit, discussed nicotine replacement therapy and start NRT (patch/gum)    02)   Glucose              99 (APR 05) H 105 (APR 04) H 103 (APR 03) H 101 (APR 02)   Mg                   1.9 (APR 05) 2.2 (APR 04) 1.7 (APR 03) 1.8 (APR 02)   Phos                 3.4 (APR 05) 4.5 (APR 04) H 5.4 (APR 03) 4.1 (APR 02)   Ca                   8.6 (APR 05) 9.4 (APR 04) 8.6 (APR 03) L 8.3 (APR 02)   PT                   H 13.2 (APR 05) H 12.1 (APR 04) H 12.1 (APR 03) H 14.6 (APR 02)   INR                  H 1.3 (APR 05) H 1.2 (APR 04) H 1.2 (APR 03) H 1.5 (APR 02)   PTT                  28 (APR 05) 25 (APR 05) H 61 (APR 04) H 63 (APR 03)     Assessment: As per GI - will await biopsy report.    Plan: _

## 2021-03-19 ENCOUNTER — BULK ORDERING (OUTPATIENT)
Dept: CASE MANAGEMENT | Facility: OTHER | Age: 66
End: 2021-03-19

## 2021-03-19 DIAGNOSIS — Z23 IMMUNIZATION DUE: ICD-10-CM

## 2021-03-22 DIAGNOSIS — I10 ESSENTIAL HYPERTENSION: Chronic | ICD-10-CM

## 2021-03-22 RX ORDER — AMLODIPINE BESYLATE 10 MG/1
TABLET ORAL
Qty: 90 TABLET | Refills: 3 | Status: SHIPPED | OUTPATIENT
Start: 2021-03-22 | End: 2021-11-28 | Stop reason: HOSPADM

## 2021-03-29 ENCOUNTER — OFFICE VISIT (OUTPATIENT)
Dept: INTERNAL MEDICINE | Age: 66
End: 2021-03-29

## 2021-03-29 VITALS
WEIGHT: 169 LBS | TEMPERATURE: 97.1 F | OXYGEN SATURATION: 98 % | HEIGHT: 68 IN | SYSTOLIC BLOOD PRESSURE: 132 MMHG | DIASTOLIC BLOOD PRESSURE: 74 MMHG | HEART RATE: 80 BPM | BODY MASS INDEX: 25.61 KG/M2

## 2021-03-29 DIAGNOSIS — R31.29 OTHER MICROSCOPIC HEMATURIA: Primary | ICD-10-CM

## 2021-03-29 DIAGNOSIS — R39.12 WEAK URINARY STREAM: ICD-10-CM

## 2021-03-29 LAB
ALBUMIN SERPL-MCNC: 4.2 G/DL (ref 3.5–5.2)
ALBUMIN/GLOB SERPL: 1.9 G/DL
ALP SERPL-CCNC: 90 U/L (ref 39–117)
ALT SERPL-CCNC: 12 U/L (ref 1–41)
APPEARANCE UR: CLEAR
AST SERPL-CCNC: 15 U/L (ref 1–40)
BILIRUB BLD-MCNC: NEGATIVE MG/DL
BILIRUB SERPL-MCNC: 0.3 MG/DL (ref 0–1.2)
BILIRUB UR QL STRIP: NEGATIVE
BUN SERPL-MCNC: 21 MG/DL (ref 8–23)
BUN/CREAT SERPL: 17.2 (ref 7–25)
CALCIUM SERPL-MCNC: 9.3 MG/DL (ref 8.6–10.5)
CHLORIDE SERPL-SCNC: 104 MMOL/L (ref 98–107)
CLARITY, POC: CLEAR
CO2 SERPL-SCNC: 25.2 MMOL/L (ref 22–29)
COLOR UR: YELLOW
COLOR UR: YELLOW
CREAT SERPL-MCNC: 1.22 MG/DL (ref 0.76–1.27)
GLOBULIN SER CALC-MCNC: 2.2 GM/DL
GLUCOSE SERPL-MCNC: 84 MG/DL (ref 65–99)
GLUCOSE UR QL: NEGATIVE
GLUCOSE UR STRIP-MCNC: NEGATIVE MG/DL
HGB UR QL STRIP: NEGATIVE
KETONES UR QL STRIP: NEGATIVE
KETONES UR QL: NEGATIVE
LEUKOCYTE EST, POC: NEGATIVE
LEUKOCYTE ESTERASE UR QL STRIP: NEGATIVE
NITRITE UR QL STRIP: NEGATIVE
NITRITE UR-MCNC: NEGATIVE MG/ML
PH UR STRIP: 5.5 [PH] (ref 5–8)
PH UR: 5.5 [PH] (ref 5–8)
POTASSIUM SERPL-SCNC: 4.8 MMOL/L (ref 3.5–5.2)
PROT SERPL-MCNC: 6.4 G/DL (ref 6–8.5)
PROT UR QL STRIP: NEGATIVE
PROT UR STRIP-MCNC: NEGATIVE MG/DL
PSA SERPL-MCNC: 0.94 NG/ML (ref 0–4)
RBC # UR STRIP: NEGATIVE /UL
SODIUM SERPL-SCNC: 140 MMOL/L (ref 136–145)
SP GR UR: 1.02 (ref 1–1.03)
SP GR UR: 1.02 (ref 1–1.03)
UROBILINOGEN UR QL: NORMAL
UROBILINOGEN UR STRIP-MCNC: NORMAL MG/DL

## 2021-03-29 PROCEDURE — 81003 URINALYSIS AUTO W/O SCOPE: CPT | Performed by: NURSE PRACTITIONER

## 2021-03-29 PROCEDURE — 99213 OFFICE O/P EST LOW 20 MIN: CPT | Performed by: NURSE PRACTITIONER

## 2021-03-29 NOTE — PROGRESS NOTES
"    I N T E R N A L  M E D I C I N E  CINDY ENRIQUEZ, APRN      ENCOUNTER DATE:  03/29/2021    Patrick Mckeon / 65 y.o. / male      CHIEF COMPLAINT / REASON FOR OFFICE VISIT     Blood in Urine (x1 episode, weak stream )      ASSESSMENT & PLAN     1. Other microscopic hematuria  - Comprehensive Metabolic Panel   - POCT urinalysis dipstick, automated  - Comprehensive Metabolic Panel  - Urinalysis With Microscopic If Indicated (No Culture) - Urine, Clean Catch    2. Weak urinary stream  - Comprehensive Metabolic Panel  - Urinalysis With Microscopic If Indicated (No Culture) - Urine, Clean Catch  - PSA DIAGNOSTIC    Orders Placed This Encounter   Procedures   • Comprehensive Metabolic Panel   • Urinalysis With Microscopic If Indicated (No Culture) - Urine, Clean Catch   • PSA DIAGNOSTIC   • POCT urinalysis dipstick, automated     No orders of the defined types were placed in this encounter.      SUMMARY/DISCUSSION  • Obtain labs today.  Consider CT abdomen and pelvis or referral to urology in the future dependent on results.    Next Appointment with me: Visit date not found    No follow-ups on file.      VITAL SIGNS     Visit Vitals  /74   Pulse 80   Temp 97.1 °F (36.2 °C) (Temporal)   Ht 172.7 cm (68\")   Wt 76.7 kg (169 lb)   SpO2 98%   BMI 25.70 kg/m²       Wt Readings from Last 3 Encounters:   03/29/21 76.7 kg (169 lb)   12/01/20 74.8 kg (165 lb)   07/27/20 74.6 kg (164 lb 6.4 oz)     Body mass index is 25.7 kg/m².      MEDICATIONS AT THE TIME OF OFFICE VISIT     Current Outpatient Medications on File Prior to Visit   Medication Sig   • amLODIPine (NORVASC) 10 MG tablet TAKE 1 TABLET EVERY DAY   • aspirin 81 MG EC tablet Take 1 tablet by mouth Daily.   • losartan (COZAAR) 25 MG tablet TAKE 1 TABLET EVERY DAY   • pantoprazole (PROTONIX) 40 MG EC tablet Take 1 tablet by mouth Daily.   • pravastatin (PRAVACHOL) 40 MG tablet TAKE 1 TABLET EVERY DAY   • albuterol sulfate HFA (Ventolin HFA) 108 (90 Base) MCG/ACT inhaler " Inhale 2 puffs Every 4 (Four) Hours As Needed for Wheezing or Shortness of Air.   • methylPREDNISolone (Medrol) 4 MG dose pack Take as directed on package instructions.     No current facility-administered medications on file prior to visit.         HISTORY OF PRESENT ILLNESS     Patient presents with 1 episode of bleeding with urination on Thursday night this past week.  He stated that his stream started at yellow and seem turned orange and red.  In the middle of the night he also awoke to urinate once more, but urine was again a yellow color.  He does state he has been more aware of his urinary habits and noticed that his stream has been weak for the past month.  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  PSA was checked last year which was in normal range at the time.  Is any dysuria or increased urinary frequency.  He denies any flank pain.  No fever or chills.    REVIEW OF SYSTEMS     Constitutional neg except per HPI   Resp neg  CV neg   one episode blood in urine, weak stream     PHYSICAL EXAMINATION     Physical Exam  Constitutional  No distress  Cardiovascular Rate  normal . Rhythm: regular . Heart sounds:  normal  Pulmonary/Chest  Effort normal. Breath sounds:  normal  Abd/pelvis: no tenderness with palpation  Musc neg CVA tenderness    patient declined MACKENZIE with female provider, would like lab updates first   Psychiatric  Alert. Judgment and thought content normal. Mood normal       REVIEWED DATA     Labs:   Brief Urine Lab Results  (Last result in the past 365 days)      Color   Clarity   Blood   Leuk Est   Nitrite   Protein   CREAT   Urine HCG        03/29/21 0852 Yellow Clear Negative Negative Negative Negative             Imaging:           Medical Tests:             Summary of old records / correspondence / consultant report:           Request outside records:           *Examiner was wearing medical surgical mask, face shield and exam gloves during the entire  duration of the visit. Patient was masked the entire time.   Minimum social distance of 6 ft maintained entire visit except if physical contact was necessary as documented.     **Dragon Disclaimer:   Much of this encounter note is an electronic transcription/translation of spoken language to printed text. The electronic translation of spoken language may permit erroneous, or at times, nonsensical words or phrases to be inadvertently transcribed. Although I have reviewed the note for such errors, some may still exist.

## 2021-03-30 DIAGNOSIS — R31.0 GROSS HEMATURIA: Primary | ICD-10-CM

## 2021-04-09 DIAGNOSIS — R31.0 GROSS HEMATURIA: ICD-10-CM

## 2021-04-15 LAB
APPEARANCE UR: CLEAR
BILIRUB UR QL STRIP: NEGATIVE
COLOR UR: YELLOW
GLUCOSE UR QL: NEGATIVE
HGB UR QL STRIP: NEGATIVE
KETONES UR QL STRIP: NEGATIVE
LEUKOCYTE ESTERASE UR QL STRIP: NEGATIVE
NITRITE UR QL STRIP: NEGATIVE
PH UR STRIP: 7 [PH] (ref 5–8)
PROT UR QL STRIP: NEGATIVE
SP GR UR: 1.01 (ref 1–1.03)
UROBILINOGEN UR STRIP-MCNC: NORMAL MG/DL

## 2021-05-15 DIAGNOSIS — K44.9 HIATAL HERNIA WITH GASTROESOPHAGEAL REFLUX DISEASE AND ESOPHAGITIS: Chronic | ICD-10-CM

## 2021-05-15 DIAGNOSIS — K21.00 HIATAL HERNIA WITH GASTROESOPHAGEAL REFLUX DISEASE AND ESOPHAGITIS: Chronic | ICD-10-CM

## 2021-05-17 RX ORDER — PANTOPRAZOLE SODIUM 40 MG/1
TABLET, DELAYED RELEASE ORAL
Qty: 90 TABLET | Refills: 3 | Status: SHIPPED | OUTPATIENT
Start: 2021-05-17 | End: 2022-04-19

## 2021-08-03 ENCOUNTER — OFFICE VISIT (OUTPATIENT)
Dept: INTERNAL MEDICINE | Age: 66
End: 2021-08-03

## 2021-08-03 VITALS
HEIGHT: 68 IN | DIASTOLIC BLOOD PRESSURE: 72 MMHG | BODY MASS INDEX: 25.61 KG/M2 | SYSTOLIC BLOOD PRESSURE: 132 MMHG | OXYGEN SATURATION: 97 % | HEART RATE: 93 BPM | WEIGHT: 169 LBS | TEMPERATURE: 97.7 F

## 2021-08-03 DIAGNOSIS — E78.2 MIXED HYPERLIPIDEMIA: Chronic | ICD-10-CM

## 2021-08-03 DIAGNOSIS — K21.00 GASTROESOPHAGEAL REFLUX DISEASE WITH ESOPHAGITIS WITHOUT HEMORRHAGE: Chronic | ICD-10-CM

## 2021-08-03 DIAGNOSIS — I10 ESSENTIAL HYPERTENSION: Primary | Chronic | ICD-10-CM

## 2021-08-03 DIAGNOSIS — J43.9 PULMONARY EMPHYSEMA, UNSPECIFIED EMPHYSEMA TYPE (HCC): Chronic | ICD-10-CM

## 2021-08-03 PROCEDURE — 99214 OFFICE O/P EST MOD 30 MIN: CPT | Performed by: INTERNAL MEDICINE

## 2021-08-03 NOTE — ASSESSMENT & PLAN NOTE
Still smoking 1 pack per day.   Counseled to quit smoking.   Information provided on smoking cessation, NRT, and Chantix.

## 2021-08-03 NOTE — PROGRESS NOTES
"    I N T E R N A L  M E D I C I N E  J U N O H  K I M  M D      ENCOUNTER DATE:  08/03/2021    Patrick Mckeon / 66 y.o. / male      CHIEF COMPLAINT / REASON FOR OFFICE VISIT     Hypertension, Hyperlipidemia, Atherosclerosis of native coronary artery of native heart wi, and Colonoscopy      ASSESSMENT & PLAN     Problem List Items Addressed This Visit        High    Essential hypertension - Primary (Chronic)    Overview     Continue amlodipine 10 mg and losartan 25 mg qd         Relevant Medications    losartan (COZAAR) 25 MG tablet    amLODIPine (NORVASC) 10 MG tablet    Hyperlipidemia (Chronic)    Overview     Continue pravastatin 40 mg qd.          Relevant Medications    aspirin 81 MG EC tablet    pravastatin (PRAVACHOL) 40 MG tablet       Medium    Gastroesophageal reflux disease with esophagitis without hemorrhage (Chronic)    Current Assessment & Plan     Continue pantoprazole 40 mg qd.   Last EGD 2017 showed normal esophagus without a HH.   Recommended screening EGD every 10 years.          Relevant Medications    pantoprazole (PROTONIX) 40 MG EC tablet       Low    Emphysema lung (CMS/HCC) (Chronic)    Overview     Noted on CT in 2014         Current Assessment & Plan     Still smokes 1 pack per day. Advised to quit.   Denies significant respiratory symptoms.          Relevant Medications    albuterol sulfate HFA (Ventolin HFA) 108 (90 Base) MCG/ACT inhaler        No orders of the defined types were placed in this encounter.    No orders of the defined types were placed in this encounter.      SUMMARY/DISCUSSION  •       Next Appointment with me: Visit date not found    Return in about 6 months (around 2/3/2022) for Reassess chronic medical problems, COME FASTING FOR LABS.      VITAL SIGNS     Visit Vitals  /72 (BP Location: Left arm)   Pulse 93   Temp 97.7 °F (36.5 °C)   Ht 172.7 cm (68\")   Wt 76.7 kg (169 lb)   SpO2 97%   BMI 25.70 kg/m²       BP Readings from Last 3 Encounters:   08/03/21 132/72 "   03/29/21 132/74   12/01/20 140/80     Wt Readings from Last 3 Encounters:   08/03/21 76.7 kg (169 lb)   03/29/21 76.7 kg (169 lb)   12/01/20 74.8 kg (165 lb)     Body mass index is 25.7 kg/m².      MEDICATIONS AT THE TIME OF OFFICE VISIT     Current Outpatient Medications on File Prior to Visit   Medication Sig   • amLODIPine (NORVASC) 10 MG tablet TAKE 1 TABLET EVERY DAY   • aspirin 81 MG EC tablet Take 1 tablet by mouth Daily.   • losartan (COZAAR) 25 MG tablet TAKE 1 TABLET EVERY DAY   • pantoprazole (PROTONIX) 40 MG EC tablet TAKE 1 TABLET EVERY DAY   • pravastatin (PRAVACHOL) 40 MG tablet TAKE 1 TABLET EVERY DAY   • [DISCONTINUED] methylPREDNISolone (Medrol) 4 MG dose pack Take as directed on package instructions.   • albuterol sulfate HFA (Ventolin HFA) 108 (90 Base) MCG/ACT inhaler Inhale 2 puffs Every 4 (Four) Hours As Needed for Wheezing or Shortness of Air.     No current facility-administered medications on file prior to visit.          HISTORY OF PRESENT ILLNESS     Still smoking 1 pack per day.   Hypertension is stable on medications.   GERD on PPI, last EGD 2017 was normal.   On pravastatin for hyperlipidemia.       Patient Care Team:  Lester Carter MD as PCP - General (Internal Medicine)  Orlin Quintero MD as Consulting Physician (Gastroenterology)  Keyur Mejia II, MD as Consulting Physician (Hematology and Oncology)  Humble Hamilton MD as Surgeon (Vascular Surgery)  Keyur Garrido MD as Consulting Physician (Dermatology)    REVIEW OF SYSTEMS     Review of Systems       PHYSICAL EXAMINATION     Physical Exam  General: No acute distress, alert with normal judgment   Cardiovascular Rate: normal. Rhythm: regular. Heart sounds: normal   Pulm/Chest: Effort normal, breath sounds normal.   No carotid bruit.    Psych: Normal mood and affect. Normal thougth and judgment.       REVIEWED DATA     Labs:     Lab Results   Component Value Date     03/29/2021    K 4.8 03/29/2021    CALCIUM 9.3  03/29/2021    AST 15 03/29/2021    ALT 12 03/29/2021    BUN 21 03/29/2021    CREATININE 1.22 03/29/2021    CREATININE 1.11 12/01/2020    CREATININE 1.21 01/27/2020    EGFRIFNONA 59 (L) 03/29/2021    EGFRIFAFRI 72 03/29/2021       Lab Results   Component Value Date    HGBA1C 5.60 01/27/2020    HGBA1C 5.60 01/11/2019    HGBA1C 5.10 02/21/2017       Lab Results   Component Value Date    LDL 52 12/01/2020    LDL 58 01/27/2020    HDL 65 (H) 12/01/2020    TRIG 53 12/01/2020       Lab Results   Component Value Date    TSH 1.110 01/11/2019    TSH 1.380 02/21/2017    FREET4 1.26 01/11/2019    FREET4 1.22 02/21/2017       Lab Results   Component Value Date    WBC 8.19 01/27/2020    HGB 15.4 01/27/2020     01/27/2020         Imaging:           Medical Tests:           Summary of old records / correspondence / consultant report:           Request outside records:             *Examiner was wearing [id number] mask during the entire duration of the visit. Patient was masked the entire time.   Minimum social distance of 6 ft maintained entire visit except if physical contact was necessary as documented.     **Dragon Disclaimer:   Much of this encounter note is an electronic transcription/translation of spoken language to printed text. The electronic translation of spoken language may permit erroneous, or at times, nonsensical words or phrases to be inadvertently transcribed. Although I have reviewed the note for such errors, some may still exist.     Template created by Dalila Carter MD

## 2021-08-03 NOTE — ASSESSMENT & PLAN NOTE
Continue pantoprazole 40 mg qd.   Last EGD 2017 showed normal esophagus without a HH.   Recommended screening EGD every 10 years.

## 2021-10-09 ENCOUNTER — IMMUNIZATION (OUTPATIENT)
Dept: VACCINE CLINIC | Facility: HOSPITAL | Age: 66
End: 2021-10-09

## 2021-10-09 PROCEDURE — 0001A: CPT | Performed by: INTERNAL MEDICINE

## 2021-10-09 PROCEDURE — 0004A HC ADM SARSCOV2 30MCG/0.3ML BOOSTER: CPT | Performed by: INTERNAL MEDICINE

## 2021-10-09 PROCEDURE — 91300 HC SARSCOV02 VAC 30MCG/0.3ML IM: CPT | Performed by: INTERNAL MEDICINE

## 2021-10-09 PROCEDURE — 0003A: CPT | Performed by: INTERNAL MEDICINE

## 2021-11-07 DIAGNOSIS — I10 ESSENTIAL HYPERTENSION: Chronic | ICD-10-CM

## 2021-11-07 DIAGNOSIS — E78.5 HYPERLIPIDEMIA, UNSPECIFIED HYPERLIPIDEMIA TYPE: ICD-10-CM

## 2021-11-08 RX ORDER — LOSARTAN POTASSIUM 25 MG/1
TABLET ORAL
Qty: 90 TABLET | Refills: 3 | Status: SHIPPED | OUTPATIENT
Start: 2021-11-08 | End: 2021-11-28 | Stop reason: HOSPADM

## 2021-11-08 RX ORDER — PRAVASTATIN SODIUM 40 MG
TABLET ORAL
Qty: 90 TABLET | Refills: 3 | Status: SHIPPED | OUTPATIENT
Start: 2021-11-08 | End: 2022-12-02

## 2021-11-12 ENCOUNTER — HOSPITAL ENCOUNTER (EMERGENCY)
Facility: HOSPITAL | Age: 66
Discharge: HOME | End: 2021-11-12
Attending: EMERGENCY MEDICINE | Admitting: EMERGENCY MEDICINE

## 2021-11-12 ENCOUNTER — APPOINTMENT (OUTPATIENT)
Dept: GENERAL RADIOLOGY | Facility: HOSPITAL | Age: 66
End: 2021-11-12

## 2021-11-12 VITALS
HEART RATE: 95 BPM | RESPIRATION RATE: 20 BRPM | OXYGEN SATURATION: 100 % | BODY MASS INDEX: 25.76 KG/M2 | TEMPERATURE: 97.1 F | WEIGHT: 170 LBS | SYSTOLIC BLOOD PRESSURE: 115 MMHG | HEIGHT: 68 IN | DIASTOLIC BLOOD PRESSURE: 68 MMHG

## 2021-11-12 DIAGNOSIS — E86.0 DEHYDRATION: ICD-10-CM

## 2021-11-12 DIAGNOSIS — B34.9 ACUTE VIRAL SYNDROME: Primary | ICD-10-CM

## 2021-11-12 LAB
ALBUMIN SERPL-MCNC: 3.9 G/DL (ref 3.5–5.2)
ALBUMIN/GLOB SERPL: 1.4 G/DL
ALP SERPL-CCNC: 91 U/L (ref 39–117)
ALT SERPL W P-5'-P-CCNC: 49 U/L (ref 1–41)
ANION GAP SERPL CALCULATED.3IONS-SCNC: 14.5 MMOL/L (ref 5–15)
AST SERPL-CCNC: 47 U/L (ref 1–40)
B PARAPERT DNA SPEC QL NAA+PROBE: NOT DETECTED
B PERT DNA SPEC QL NAA+PROBE: NOT DETECTED
BASOPHILS # BLD AUTO: 0.02 10*3/MM3 (ref 0–0.2)
BASOPHILS NFR BLD AUTO: 0.1 % (ref 0–1.5)
BILIRUB SERPL-MCNC: 1.4 MG/DL (ref 0–1.2)
BILIRUB UR QL STRIP: NEGATIVE
BUN SERPL-MCNC: 22 MG/DL (ref 8–23)
BUN/CREAT SERPL: 13.3 (ref 7–25)
C PNEUM DNA NPH QL NAA+NON-PROBE: NOT DETECTED
CALCIUM SPEC-SCNC: 8.2 MG/DL (ref 8.6–10.5)
CHLORIDE SERPL-SCNC: 100 MMOL/L (ref 98–107)
CLARITY UR: CLEAR
CO2 SERPL-SCNC: 19.5 MMOL/L (ref 22–29)
COLOR UR: YELLOW
CREAT SERPL-MCNC: 1.66 MG/DL (ref 0.76–1.27)
D-LACTATE SERPL-SCNC: 0.9 MMOL/L (ref 0.5–2)
DEPRECATED RDW RBC AUTO: 42.4 FL (ref 37–54)
EOSINOPHIL # BLD AUTO: 0.02 10*3/MM3 (ref 0–0.4)
EOSINOPHIL NFR BLD AUTO: 0.1 % (ref 0.3–6.2)
ERYTHROCYTE [DISTWIDTH] IN BLOOD BY AUTOMATED COUNT: 12.9 % (ref 12.3–15.4)
FLUAV SUBTYP SPEC NAA+PROBE: NOT DETECTED
FLUBV RNA ISLT QL NAA+PROBE: NOT DETECTED
GFR SERPL CREATININE-BSD FRML MDRD: 42 ML/MIN/1.73
GLOBULIN UR ELPH-MCNC: 2.7 GM/DL
GLUCOSE SERPL-MCNC: 103 MG/DL (ref 65–99)
GLUCOSE UR STRIP-MCNC: NEGATIVE MG/DL
HADV DNA SPEC NAA+PROBE: NOT DETECTED
HCOV 229E RNA SPEC QL NAA+PROBE: NOT DETECTED
HCOV HKU1 RNA SPEC QL NAA+PROBE: NOT DETECTED
HCOV NL63 RNA SPEC QL NAA+PROBE: NOT DETECTED
HCOV OC43 RNA SPEC QL NAA+PROBE: NOT DETECTED
HCT VFR BLD AUTO: 36.8 % (ref 37.5–51)
HGB BLD-MCNC: 13 G/DL (ref 13–17.7)
HGB UR QL STRIP.AUTO: NEGATIVE
HMPV RNA NPH QL NAA+NON-PROBE: NOT DETECTED
HPIV1 RNA SPEC QL NAA+PROBE: NOT DETECTED
HPIV2 RNA SPEC QL NAA+PROBE: NOT DETECTED
HPIV3 RNA NPH QL NAA+PROBE: NOT DETECTED
HPIV4 P GENE NPH QL NAA+PROBE: NOT DETECTED
IMM GRANULOCYTES # BLD AUTO: 0.11 10*3/MM3 (ref 0–0.05)
IMM GRANULOCYTES NFR BLD AUTO: 0.8 % (ref 0–0.5)
KETONES UR QL STRIP: ABNORMAL
LEUKOCYTE ESTERASE UR QL STRIP.AUTO: NEGATIVE
LYMPHOCYTES # BLD AUTO: 1.93 10*3/MM3 (ref 0.7–3.1)
LYMPHOCYTES NFR BLD AUTO: 13.4 % (ref 19.6–45.3)
M PNEUMO IGG SER IA-ACNC: NOT DETECTED
MCH RBC QN AUTO: 32.4 PG (ref 26.6–33)
MCHC RBC AUTO-ENTMCNC: 35.3 G/DL (ref 31.5–35.7)
MCV RBC AUTO: 91.8 FL (ref 79–97)
MONOCYTES # BLD AUTO: 1.2 10*3/MM3 (ref 0.1–0.9)
MONOCYTES NFR BLD AUTO: 8.3 % (ref 5–12)
NEUTROPHILS NFR BLD AUTO: 11.12 10*3/MM3 (ref 1.7–7)
NEUTROPHILS NFR BLD AUTO: 77.3 % (ref 42.7–76)
NITRITE UR QL STRIP: NEGATIVE
NRBC BLD AUTO-RTO: 0 /100 WBC (ref 0–0.2)
PH UR STRIP.AUTO: 5.5 [PH] (ref 5–8)
PLATELET # BLD AUTO: 142 10*3/MM3 (ref 140–450)
PMV BLD AUTO: 9.9 FL (ref 6–12)
POTASSIUM SERPL-SCNC: 4.1 MMOL/L (ref 3.5–5.2)
PROCALCITONIN SERPL-MCNC: 1.29 NG/ML (ref 0–0.25)
PROT SERPL-MCNC: 6.6 G/DL (ref 6–8.5)
PROT UR QL STRIP: NEGATIVE
RBC # BLD AUTO: 4.01 10*6/MM3 (ref 4.14–5.8)
RHINOVIRUS RNA SPEC NAA+PROBE: NOT DETECTED
RSV RNA NPH QL NAA+NON-PROBE: NOT DETECTED
SARS-COV-2 RNA NPH QL NAA+NON-PROBE: NOT DETECTED
SODIUM SERPL-SCNC: 134 MMOL/L (ref 136–145)
SP GR UR STRIP: 1.02 (ref 1–1.03)
UROBILINOGEN UR QL STRIP: ABNORMAL
WBC # BLD AUTO: 14.4 10*3/MM3 (ref 3.4–10.8)

## 2021-11-12 PROCEDURE — 84145 PROCALCITONIN (PCT): CPT | Performed by: PHYSICIAN ASSISTANT

## 2021-11-12 PROCEDURE — 83605 ASSAY OF LACTIC ACID: CPT | Performed by: PHYSICIAN ASSISTANT

## 2021-11-12 PROCEDURE — 0202U NFCT DS 22 TRGT SARS-COV-2: CPT | Performed by: PHYSICIAN ASSISTANT

## 2021-11-12 PROCEDURE — 87040 BLOOD CULTURE FOR BACTERIA: CPT | Performed by: PHYSICIAN ASSISTANT

## 2021-11-12 PROCEDURE — 36415 COLL VENOUS BLD VENIPUNCTURE: CPT

## 2021-11-12 PROCEDURE — 71045 X-RAY EXAM CHEST 1 VIEW: CPT

## 2021-11-12 PROCEDURE — 81003 URINALYSIS AUTO W/O SCOPE: CPT | Performed by: PHYSICIAN ASSISTANT

## 2021-11-12 PROCEDURE — 85025 COMPLETE CBC W/AUTO DIFF WBC: CPT | Performed by: PHYSICIAN ASSISTANT

## 2021-11-12 PROCEDURE — 99283 EMERGENCY DEPT VISIT LOW MDM: CPT

## 2021-11-12 PROCEDURE — 80053 COMPREHEN METABOLIC PANEL: CPT | Performed by: PHYSICIAN ASSISTANT

## 2021-11-12 RX ADMIN — SODIUM CHLORIDE 1000 ML: 9 INJECTION, SOLUTION INTRAVENOUS at 21:33

## 2021-11-12 NOTE — ED NOTES
Fatigue and fevers x2 days. 103.1 T-MAX    Patient was placed in face mask during triage process. Patient was wearing facemask when I entered the room and throughout our encounter. I wore full protective equipment throughout this patient encounter including a face mask, eye protection, and gloves. Hand hygiene was performed before donning protective equipment and again following doffing of PPE after leaving the room.       Jeny Koch, ALLISON  11/12/21 [id number]

## 2021-11-12 NOTE — ED NOTES
Pt had neg rapid covid swab at  today.  Pt had abnormal labs today of WBC of 17.4.   Negative chest xr for pna         Jeny Koch, RN  11/12/21 1826       Jeny Koch, ALLISON  11/12/21 1827

## 2021-11-13 NOTE — ED PROVIDER NOTES
Brief history of present illness: 66-year-old male presents with generalized malaise over the last 2 to 3 days with some increasing cough most notably fever with T-max of 103 today.  Tylenol and ibuprofen prior to arrival.  Some nausea but no vomiting.  Dark urine without dysuria.    Physical exam:   ED Triage Vitals [11/12/21 1828]   Temp Heart Rate Resp BP SpO2   97.1 °F (36.2 °C) 96 20 -- 100 %      Temp src Heart Rate Source Patient Position BP Location FiO2 (%)   -- -- -- -- --     Appropriate.  Not overtly toxic.  Very pleasant and in no acute distress.  No respiratory distress or tachypnea.  Pink warm well perfused.  Abdomen soft throughout.  Normal mood and affect.    MDM: Agree with plan for laboratory evaluation, chest radiography, and IV fluid resuscitation.    I have seen and personally evaluated this patient, discussed the case with the treating advanced practice provider, and reviewed their note. I was involved in the medical decision making during the evaluation, testing and disposition planning for this patient.     Filemon Davis MD  11/12/21 2055

## 2021-11-13 NOTE — DISCHARGE INSTRUCTIONS
Stay well-hydrated  Rest. Gentle activities as tolerated.  You can take tylenol and/or ibuprofen as needed for fever and body aches  Return to the ER if worse, vomiting, abdominal pain, shortness of breath, any concerns.

## 2021-11-13 NOTE — ED PROVIDER NOTES
EMERGENCY DEPARTMENT ENCOUNTER    Room Number:  05/05  Date seen:  11/12/2021  Time seen: 20:41 EST  PCP: Lester Carter MD  Historian: patient      HPI:  Chief Complaint: fever    A complete HPI/ROS/PMH/PSH/SH/FH are unobtainable due to: none    Context: Patrick Mckeon is a 66 y.o. male who presents to the ED for evaluation of 3-day history of chills and malaise with fever that began today, T-max of 103.  Symptoms are constant, moderate, made a little better by Motrin and Tylenol, nothing in particular makes it worse.  He reports a mild cough and some congestion, denies any shortness of breath or chest pain, has had some brief nausea but no vomiting, no diarrhea, no hematuria dysuria or urinary frequency.  He also denies sore throat, loss of smell or taste or headache.  He was seen in urgent care prior to arrival and had a negative rapid Covid test and a white blood cell count of 17 and was sent to the ER for further evaluation.      PAST MEDICAL HISTORY  Active Ambulatory Problems     Diagnosis Date Noted   • Essential hypertension 10/28/2016   • Cigarette nicotine dependence with nicotine-induced disorder 10/28/2016   • Emphysema lung (HCC) 02/28/2017   • Hyperlipidemia 02/28/2017   • Abdominal aortic aneurysm (AAA) without rupture (HCC) 03/13/2017   • Congenital absence of left kidney 03/13/2017   • Pulmonary nodules 03/10/2020   • Atherosclerosis of native coronary artery of native heart without angina pectoris 03/10/2020   • Calculus of gallbladder without cholecystitis without obstruction 03/10/2020   • Gastroesophageal reflux disease with esophagitis without hemorrhage 03/10/2020     Resolved Ambulatory Problems     Diagnosis Date Noted   • Diverticulosis of large intestine without hemorrhage 08/09/2017   • Thrombosis, hepatic vein (HCC) 08/09/2017   • Epigastric abdominal pain 09/06/2018     Past Medical History:   Diagnosis Date   • Colon polyp    • Hiatal hernia with gastroesophageal reflux disease and  esophagitis 3/10/2020   • Portal vein thrombosis 8/29/2017   • Sepsis (CMS/HCC) 8/2/2017   • Shingles 09/2010   • Skin cancer 5/31/2017         PAST SURGICAL HISTORY  Past Surgical History:   Procedure Laterality Date   • ABDOMINAL AORTIC ANEURYSM REPAIR  05/2017   • ARTERIOGRAM AORTIC N/A 5/8/2017    Procedure:  AORTIC STENT GRAFT REPAIR W/ GORE BILATERAL FEMORAL MINI CUTDOWNS;  Surgeon: Humble Hamilton MD;  Location: [facility] HYBRID OR 18/19;  Service:    • COLONOSCOPY N/A 8/3/2017    Procedure: COLONOSCOPY to cecum;  Surgeon: Patrick Elizabeth MD;  Location: [facility] ENDOSCOPY;  Service:    • COLONOSCOPY  09/2010   • ENDOSCOPY  8/3/2017    Procedure: ESOPHAGOGASTRODUODENOSCOPY;  Surgeon: Patrick Elizabeth MD;  Location: [facility] ENDOSCOPY;  Service:    • HERNIA REPAIR  1980   • UMBILICAL HERNIA REPAIR  1975         FAMILY HISTORY  Family History   Problem Relation Age of Onset   • Colon cancer Father 55   • Heart attack Father 71   • Coronary artery disease Father    • Hypertension Mother    • Hyperlipidemia Mother    • Sudden death Mother 78   • Aneurysm Mother    • Heart disease Mother    • Benign prostatic hyperplasia Brother    • Hypertension Brother    • Benign prostatic hyperplasia Brother    • No Known Problems Sister    • Brain cancer Maternal Grandmother    • Heart disease Maternal Grandfather    • Sudden death Maternal Grandfather    • No Known Problems Paternal Grandmother    • No Known Problems Paternal Grandfather    • Prostate cancer Neg Hx    • Dementia Neg Hx          SOCIAL HISTORY  Social History     Socioeconomic History   • Marital status:      Spouse name: Samira   • Number of children: 1   • Years of education: High School   Tobacco Use   • Smoking status: Current Every Day Smoker     Packs/day: 1.00     Years: 42.00     Pack years: 42.00     Types: Cigarettes   • Smokeless tobacco: Never Used   • Tobacco comment: currently 1 ppd    Vaping Use   • Vaping Use: Former   • Substances:  Nicotine   • Devices: Pre-filled or refillable cartridge   Substance and Sexual Activity   • Alcohol use: Yes   • Drug use: No   • Sexual activity: Yes     Partners: Female         ALLERGIES  Patient has no known allergies.        REVIEW OF SYSTEMS  Review of Systems     All systems reviewed and negative except for those discussed in HPI.       PHYSICAL EXAM  ED Triage Vitals [11/12/21 1828]   Temp Heart Rate Resp BP SpO2   97.1 °F (36.2 °C) 96 20 -- 100 %      Temp src Heart Rate Source Patient Position BP Location FiO2 (%)   -- -- -- -- --         GENERAL: Well-appearing, not distressed  HENT: atraumatic  EYES: no scleral icterus  CV: regular rhythm, regular rate  RESPIRATORY: normal effort CTA B  ABDOMEN: soft, nontender nondistended normal bowel sounds no guarding rigidity or CVA tenderness  MUSCULOSKELETAL: no deformity  NEURO: alert, moves all extremities, follows commands  SKIN: warm, dry    Vital signs and nursing notes reviewed.          LAB RESULTS  Recent Results (from the past 24 hour(s))   Comprehensive Metabolic Panel    Collection Time: 11/12/21  9:01 PM    Specimen: Blood   Result Value Ref Range    Glucose 103 (H) 65 - 99 mg/dL    BUN 22 8 - 23 mg/dL    Creatinine 1.66 (H) 0.76 - 1.27 mg/dL    Sodium 134 (L) 136 - 145 mmol/L    Potassium 4.1 3.5 - 5.2 mmol/L    Chloride 100 98 - 107 mmol/L    CO2 19.5 (L) 22.0 - 29.0 mmol/L    Calcium 8.2 (L) 8.6 - 10.5 mg/dL    Total Protein 6.6 6.0 - 8.5 g/dL    Albumin 3.90 3.50 - 5.20 g/dL    ALT (SGPT) 49 (H) 1 - 41 U/L    AST (SGOT) 47 (H) 1 - 40 U/L    Alkaline Phosphatase 91 39 - 117 U/L    Total Bilirubin 1.4 (H) 0.0 - 1.2 mg/dL    eGFR Non African Amer 42 (L) >60 mL/min/1.73    Globulin 2.7 gm/dL    A/G Ratio 1.4 g/dL    BUN/Creatinine Ratio 13.3 7.0 - 25.0    Anion Gap 14.5 5.0 - 15.0 mmol/L   Procalcitonin    Collection Time: 11/12/21  9:01 PM    Specimen: Blood   Result Value Ref Range    Procalcitonin 1.29 (H) 0.00 - 0.25 ng/mL   Lactic Acid, Plasma     Collection Time: 11/12/21  9:01 PM    Specimen: Blood   Result Value Ref Range    Lactate 0.9 0.5 - 2.0 mmol/L   CBC Auto Differential    Collection Time: 11/12/21  9:01 PM    Specimen: Blood   Result Value Ref Range    WBC 14.40 (H) 3.40 - 10.80 10*3/mm3    RBC 4.01 (L) 4.14 - 5.80 10*6/mm3    Hemoglobin 13.0 13.0 - 17.7 g/dL    Hematocrit 36.8 (L) 37.5 - 51.0 %    MCV 91.8 79.0 - 97.0 fL    MCH 32.4 26.6 - 33.0 pg    MCHC 35.3 31.5 - 35.7 g/dL    RDW 12.9 12.3 - 15.4 %    RDW-SD 42.4 37.0 - 54.0 fl    MPV 9.9 6.0 - 12.0 fL    Platelets 142 140 - 450 10*3/mm3    Neutrophil % 77.3 (H) 42.7 - 76.0 %    Lymphocyte % 13.4 (L) 19.6 - 45.3 %    Monocyte % 8.3 5.0 - 12.0 %    Eosinophil % 0.1 (L) 0.3 - 6.2 %    Basophil % 0.1 0.0 - 1.5 %    Immature Grans % 0.8 (H) 0.0 - 0.5 %    Neutrophils, Absolute 11.12 (H) 1.70 - 7.00 10*3/mm3    Lymphocytes, Absolute 1.93 0.70 - 3.10 10*3/mm3    Monocytes, Absolute 1.20 (H) 0.10 - 0.90 10*3/mm3    Eosinophils, Absolute 0.02 0.00 - 0.40 10*3/mm3    Basophils, Absolute 0.02 0.00 - 0.20 10*3/mm3    Immature Grans, Absolute 0.11 (H) 0.00 - 0.05 10*3/mm3    nRBC 0.0 0.0 - 0.2 /100 WBC   Respiratory Panel PCR w/COVID-19(SARS-CoV-2) CADEN/NAHUM/BANDAR/PAD/COR/MAD/JAIMIE In-House, NP Swab in [facility] [place], 3-4 HR TAT - Swab, Nasopharynx    Collection Time: 11/12/21  9:05 PM    Specimen: Nasopharynx; Swab   Result Value Ref Range    ADENOVIRUS, PCR Not Detected Not Detected    Coronavirus 229E Not Detected Not Detected    Coronavirus HKU1 Not Detected Not Detected    Coronavirus NL63 Not Detected Not Detected    Coronavirus OC43 Not Detected Not Detected    COVID19 Not Detected Not Detected - Ref. Range    Human Metapneumovirus Not Detected Not Detected    Human Rhinovirus/Enterovirus Not Detected Not Detected    Influenza A PCR Not Detected Not Detected    Influenza B PCR Not Detected Not Detected    Parainfluenza Virus 1 Not Detected Not Detected    Parainfluenza Virus 2 Not Detected Not Detected     Parainfluenza Virus 3 Not Detected Not Detected    Parainfluenza Virus 4 Not Detected Not Detected    RSV, PCR Not Detected Not Detected    Bordetella pertussis pcr Not Detected Not Detected    Bordetella parapertussis PCR Not Detected Not Detected    Chlamydophila pneumoniae PCR Not Detected Not Detected    Mycoplasma pneumo by PCR Not Detected Not Detected   Urinalysis With Culture If Indicated - Urine, Clean Catch    Collection Time: 11/12/21 10:09 PM    Specimen: Urine, Clean Catch   Result Value Ref Range    Color, UA Yellow Yellow, Straw    Appearance, UA Clear Clear    pH, UA 5.5 5.0 - 8.0    Specific Gravity, UA 1.025 1.005 - 1.030    Glucose, UA Negative Negative    Ketones, UA 15 mg/dL (1+) (A) Negative    Bilirubin, UA Negative Negative    Blood, UA Negative Negative    Protein, UA Negative Negative    Leuk Esterase, UA Negative Negative    Nitrite, UA Negative Negative    Urobilinogen, UA 0.2 E.U./dL 0.2 - 1.0 E.U./dL       Ordered the above labs and independently reviewed the results.        RADIOLOGY  XR Chest 1 View    Result Date: 11/12/2021  Narrative: XR CHEST 1 VW-  11/12/2021  HISTORY: Fever, cough.  The heart size is within normal limits. Lungs appear free of acute infiltrates. Lungs are well-expanded.  Bones and soft tissues are unremarkable.      Impression: . No acute process identified.  This report was finalized on 11/12/2021 9:19 PM by Dr. Jai Manzo M.D.        I ordered the above noted radiological studies. Reviewed by me and discussed with radiologist.  See dictation for official radiology interpretation.    PROCEDURES  Procedures        MEDICATIONS GIVEN IN ER  Medications   sodium chloride 0.9 % bolus 1,000 mL (0 mL Intravenous Stopped 11/12/21 2250)             PROGRESS AND CONSULTS    DDX includes but not limited to viral syndrome, pneumonia, UTI, bacteremia    ED Course as of 11/12/21 2300   Fri Nov 12, 2021 2150 Procalcitonin(!): 1.29 [KA]   2150 Lactate: 0.9 [KA]   2150  WBC(!): 14.40 [KA]   2150 My Interpretation of the chest x-ray is no acute infiltrate [KA]   2205 Creatinine(!): 1.66  1.22 seven months ago [KA]   2205 ALT (SGPT)(!): 49 [KA]   2205 AST (SGOT)(!): 47 [KA]   2224 COVID19: Not Detected [KA]   2259 I reassessed the patient, he is resting comfortably.  Feels improved, nontoxic in appearance, vitals normal on the monitor.  He is not hypoxic, a mild cough, no pneumonia on x-ray.  Urinalysis unremarkable save for a few ketones.  He is a little bit dehydrated, creatinine 1.66, he has received a liter of IV fluids here and have encouraged him to push fluids at home.  He is not immunosuppressed, no risk factors for bacteremia.  Lactic acid normal, white blood cell count elevation likely in response to a viral syndrome.  Does have a new mild elevation of his LFTs as well, also consistent with viral syndrome.  I have recommended symptomatic treatment at home, recheck with his PCP in 2 days and return to the ER for any worsening symptoms.  He is agreeable with the plan is stable for discharge. [KA]      ED Course User Index  [KA] Debra Sevilla PA             Patient was placed in face mask in first look. Patient was wearing facemask each time I entered the room and throughout our encounter. I wore protective equipment throughout this patient encounter including a face mask, eye shield, gown and gloves. Hand hygiene was performed before donning protective equipment and after removal when leaving the room.        DIAGNOSIS  Final diagnoses:   Acute viral syndrome   Dehydration         Follow Up:  Lester Carter MD  [address] [address]  [phone]    In 2 days        RX:     Medication List      No changes were made to your prescriptions during this visit.           Latest Documented Vital Signs:  As of 23:00 EST  BP- 114/70 HR- 96 Temp- 97.1 °F (36.2 °C) O2 sat- 100%       Debra Sevilla PA  11/12/21 [id number]

## 2021-11-13 NOTE — ED NOTES
Pt's wife wishes to be updated when she is able to visit pt. Cell phone number listed in chart.      Brina Rodriguez RN  11/12/21 2039

## 2021-11-17 LAB
BACTERIA SPEC AEROBE CULT: NORMAL
BACTERIA SPEC AEROBE CULT: NORMAL

## 2021-11-18 ENCOUNTER — OFFICE VISIT (OUTPATIENT)
Dept: INTERNAL MEDICINE | Age: 66
End: 2021-11-18

## 2021-11-18 VITALS
TEMPERATURE: 97.3 F | BODY MASS INDEX: 25.76 KG/M2 | HEART RATE: 93 BPM | OXYGEN SATURATION: 98 % | SYSTOLIC BLOOD PRESSURE: 120 MMHG | HEIGHT: 68 IN | WEIGHT: 170 LBS | DIASTOLIC BLOOD PRESSURE: 58 MMHG

## 2021-11-18 DIAGNOSIS — R50.9 FEVER AND CHILLS: Primary | ICD-10-CM

## 2021-11-18 DIAGNOSIS — N41.0 ACUTE PROSTATITIS: ICD-10-CM

## 2021-11-18 PROCEDURE — 99214 OFFICE O/P EST MOD 30 MIN: CPT | Performed by: INTERNAL MEDICINE

## 2021-11-18 RX ORDER — SULFAMETHOXAZOLE AND TRIMETHOPRIM 800; 160 MG/1; MG/1
1 TABLET ORAL 2 TIMES DAILY
Qty: 20 TABLET | Refills: 0 | Status: SHIPPED | OUTPATIENT
Start: 2021-11-18 | End: 2021-11-28 | Stop reason: HOSPADM

## 2021-11-18 NOTE — PROGRESS NOTES
"    I N T E R N A L  M E D I C I N E  J U N O H  K I M,  M D      ENCOUNTER DATE:  11/18/2021    Patrick Mckeon / 66 y.o. / male      CHIEF COMPLAINT / REASON FOR OFFICE VISIT     Cough (sweating,chills, sob, mild sore throat x 8 days) and Fever      ASSESSMENT & PLAN     1. Fever and chills    2. Acute prostatitis      Orders Placed This Encounter   Procedures   • Urinalysis With Culture If Indicated - Urine, Clean Catch   • CBC & Differential     New Medications Ordered This Visit   Medications   • sulfamethoxazole-trimethoprim (BACTRIM DS,SEPTRA DS) 800-160 MG per tablet     Sig: Take 1 tablet by mouth 2 (Two) Times a Day for 10 days.     Dispense:  20 tablet     Refill:  0       SUMMARY/DISCUSSION  • Possible acute prostatitis. Start Bactrim DS BID x 10 days.   • Recheck complete blood count and UA with culture today.   • He was instructed to call or return if the condition is not improving or worsening and he expressed understanding and agreed to follow above instructions.        Next Appointment with me: 11/22/2021    Return for worsening or lack of improvement, Next scheduled follow up.        VITAL SIGNS     Visit Vitals  /58 (BP Location: Left arm)   Pulse 93   Temp 97.3 °F (36.3 °C)   Ht 172.7 cm (68\")   Wt 77.1 kg (170 lb)   SpO2 98%   BMI 25.85 kg/m²       BP Readings from Last 3 Encounters:   11/18/21 120/58   11/12/21 115/68   08/03/21 132/72     Wt Readings from Last 3 Encounters:   11/18/21 77.1 kg (170 lb)   11/12/21 77.1 kg (170 lb)   08/03/21 76.7 kg (169 lb)     Body mass index is 25.85 kg/m².      MEDICATIONS AT THE TIME OF OFFICE VISIT     Current Outpatient Medications on File Prior to Visit   Medication Sig   • albuterol sulfate HFA (Ventolin HFA) 108 (90 Base) MCG/ACT inhaler Inhale 2 puffs Every 4 (Four) Hours As Needed for Wheezing or Shortness of Air.   • amLODIPine (NORVASC) 10 MG tablet TAKE 1 TABLET EVERY DAY   • aspirin 81 MG EC tablet Take 1 tablet by mouth Daily.   • losartan " (COZAAR) 25 MG tablet TAKE 1 TABLET EVERY DAY   • pantoprazole (PROTONIX) 40 MG EC tablet TAKE 1 TABLET EVERY DAY   • pravastatin (PRAVACHOL) 40 MG tablet TAKE 1 TABLET EVERY DAY     No current facility-administered medications on file prior to visit.          HISTORY OF PRESENT ILLNESS     Recently seen in ER for fever and chills. ER workup negative including viral URI PCR panel. Chest xray negative. Minimal upper respiratory tract infection symptoms. He does note some increased difficulty in urination without dysuria, urgency or frequency. No gross hematuria. UA in ER was negative. WBC was > 14 with mild left shift. Yesterday did notice some RLQ abdominal discomfort but today is better.        REVIEW OF SYSTEMS             PHYSICAL EXAMINATION     Physical Exam  No acute distress ; not toxic   Alert   Cardiovascular: Normal rate, regular rhythm.  Lungs: no rales or crackles  Abdomen: soft, nontender       REVIEWED DATA     Labs:     Lab Results   Component Value Date     (L) 11/12/2021    K 4.1 11/12/2021    CALCIUM 8.2 (L) 11/12/2021    AST 47 (H) 11/12/2021    ALT 49 (H) 11/12/2021    BUN 22 11/12/2021    CREATININE 1.66 (H) 11/12/2021    CREATININE 1.22 03/29/2021    CREATININE 1.11 12/01/2020    EGFRIFNONA 42 (L) 11/12/2021    EGFRIFAFRI 72 03/29/2021       Lab Results   Component Value Date    HGBA1C 5.60 01/27/2020    HGBA1C 5.60 01/11/2019    HGBA1C 5.10 02/21/2017       Lab Results   Component Value Date    LDL 52 12/01/2020    LDL 58 01/27/2020    HDL 65 (H) 12/01/2020    TRIG 53 12/01/2020       Lab Results   Component Value Date    TSH 1.110 01/11/2019    TSH 1.380 02/21/2017    FREET4 1.26 01/11/2019    FREET4 1.22 02/21/2017       Lab Results   Component Value Date    WBC 14.40 (H) 11/12/2021    HGB 13.0 11/12/2021     11/12/2021       Lab Results   Component Value Date    PROTEIN Negative 04/15/2021    GLUCOSEU Negative 11/12/2021    BLOODU Negative 11/12/2021    NITRITEU Negative  11/12/2021    LEUKOCYTESUR Negative 11/12/2021      No results found for: MICROALBUR        Imaging:     Chest xray negative       Medical Tests:           Summary of old records / correspondence / consultant report:           Request outside records:             *Examiner was wearing KN95 mask and eye protection during the entire duration of the visit. Patient was masked the entire time. Minimum social distance of 6 ft maintained entire visit except if physical contact was necessary as documented.     **Dragon Disclaimer: Much of this encounter note is an electronic transcription/translation of spoken language to printed text. The electronic translation of spoken language may permit erroneous, or at times, nonsensical words or phrases to be inadvertently transcribed. Although I have reviewed the note for such errors, some may still exist.       Template created by Dalila Carter MD

## 2021-11-19 ENCOUNTER — HOSPITAL ENCOUNTER (INPATIENT)
Facility: HOSPITAL | Age: 66
LOS: 9 days | Discharge: HOME | End: 2021-11-28
Attending: EMERGENCY MEDICINE | Admitting: INTERNAL MEDICINE

## 2021-11-19 ENCOUNTER — APPOINTMENT (OUTPATIENT)
Dept: CT IMAGING | Facility: HOSPITAL | Age: 66
End: 2021-11-19

## 2021-11-19 ENCOUNTER — TELEPHONE (OUTPATIENT)
Dept: INTERNAL MEDICINE | Age: 66
End: 2021-11-19

## 2021-11-19 DIAGNOSIS — D72.829 LEUKOCYTOSIS, UNSPECIFIED TYPE: ICD-10-CM

## 2021-11-19 DIAGNOSIS — R10.9 RIGHT SIDED ABDOMINAL PAIN: ICD-10-CM

## 2021-11-19 DIAGNOSIS — K75.0 LIVER ABSCESS: Primary | ICD-10-CM

## 2021-11-19 LAB
ALBUMIN SERPL-MCNC: 2.8 G/DL (ref 3.5–5.2)
ALBUMIN/GLOB SERPL: 0.9 G/DL
ALP SERPL-CCNC: 209 U/L (ref 39–117)
ALT SERPL W P-5'-P-CCNC: 85 U/L (ref 1–41)
ANION GAP SERPL CALCULATED.3IONS-SCNC: 15.8 MMOL/L (ref 5–15)
APPEARANCE UR: CLEAR
AST SERPL-CCNC: 49 U/L (ref 1–40)
BACTERIA #/AREA URNS HPF: NORMAL /[HPF]
BASOPHILS # BLD AUTO: 0.03 10*3/MM3 (ref 0–0.2)
BASOPHILS # BLD AUTO: 0.1 X10E3/UL (ref 0–0.2)
BASOPHILS NFR BLD AUTO: 0 %
BASOPHILS NFR BLD AUTO: 0.2 % (ref 0–1.5)
BILIRUB SERPL-MCNC: 0.8 MG/DL (ref 0–1.2)
BILIRUB UR QL STRIP: NEGATIVE
BILIRUB UR QL STRIP: NEGATIVE
BUN SERPL-MCNC: 22 MG/DL (ref 8–23)
BUN/CREAT SERPL: 15.1 (ref 7–25)
CALCIUM SPEC-SCNC: 7.7 MG/DL (ref 8.6–10.5)
CASTS URNS QL MICRO: NORMAL /LPF
CHLORIDE SERPL-SCNC: 102 MMOL/L (ref 98–107)
CLARITY UR: CLEAR
CO2 SERPL-SCNC: 19.2 MMOL/L (ref 22–29)
COLOR UR: ABNORMAL
COLOR UR: YELLOW
CREAT SERPL-MCNC: 1.46 MG/DL (ref 0.76–1.27)
DEPRECATED RDW RBC AUTO: 42.9 FL (ref 37–54)
EOSINOPHIL # BLD AUTO: 0.01 10*3/MM3 (ref 0–0.4)
EOSINOPHIL # BLD AUTO: 0.1 X10E3/UL (ref 0–0.4)
EOSINOPHIL NFR BLD AUTO: 0 %
EOSINOPHIL NFR BLD AUTO: 0.1 % (ref 0.3–6.2)
EPI CELLS #/AREA URNS HPF: NORMAL /HPF (ref 0–10)
ERYTHROCYTE [DISTWIDTH] IN BLOOD BY AUTOMATED COUNT: 12.5 % (ref 11.6–15.4)
ERYTHROCYTE [DISTWIDTH] IN BLOOD BY AUTOMATED COUNT: 13.2 % (ref 12.3–15.4)
GFR SERPL CREATININE-BSD FRML MDRD: 48 ML/MIN/1.73
GLOBULIN UR ELPH-MCNC: 3.1 GM/DL
GLUCOSE SERPL-MCNC: 126 MG/DL (ref 65–99)
GLUCOSE UR QL: NEGATIVE
GLUCOSE UR STRIP-MCNC: NEGATIVE MG/DL
HCT VFR BLD AUTO: 34.3 % (ref 37.5–51)
HCT VFR BLD AUTO: 35 % (ref 37.5–51)
HGB BLD-MCNC: 12.1 G/DL (ref 13–17.7)
HGB BLD-MCNC: 12.7 G/DL (ref 13–17.7)
HGB UR QL STRIP.AUTO: NEGATIVE
HGB UR QL STRIP: NEGATIVE
IMM GRANULOCYTES # BLD AUTO: 0.1 X10E3/UL (ref 0–0.1)
IMM GRANULOCYTES # BLD AUTO: 0.23 10*3/MM3 (ref 0–0.05)
IMM GRANULOCYTES NFR BLD AUTO: 1 %
IMM GRANULOCYTES NFR BLD AUTO: 1.3 % (ref 0–0.5)
KETONES UR QL STRIP: NEGATIVE
KETONES UR QL STRIP: NEGATIVE
LEUKOCYTE ESTERASE UR QL STRIP.AUTO: NEGATIVE
LEUKOCYTE ESTERASE UR QL STRIP: NEGATIVE
LIPASE SERPL-CCNC: 28 U/L (ref 13–60)
LYMPHOCYTES # BLD AUTO: 1.06 10*3/MM3 (ref 0.7–3.1)
LYMPHOCYTES # BLD AUTO: 1.5 X10E3/UL (ref 0.7–3.1)
LYMPHOCYTES NFR BLD AUTO: 5.9 % (ref 19.6–45.3)
LYMPHOCYTES NFR BLD AUTO: 8 %
MCH RBC QN AUTO: 31.4 PG (ref 26.6–33)
MCH RBC QN AUTO: 31.7 PG (ref 26.6–33)
MCHC RBC AUTO-ENTMCNC: 35.3 G/DL (ref 31.5–35.7)
MCHC RBC AUTO-ENTMCNC: 36.3 G/DL (ref 31.5–35.7)
MCV RBC AUTO: 87 FL (ref 79–97)
MCV RBC AUTO: 89.1 FL (ref 79–97)
MICRO URNS: NORMAL
MICRO URNS: NORMAL
MONOCYTES # BLD AUTO: 1.05 10*3/MM3 (ref 0.1–0.9)
MONOCYTES # BLD AUTO: 1.7 X10E3/UL (ref 0.1–0.9)
MONOCYTES NFR BLD AUTO: 5.8 % (ref 5–12)
MONOCYTES NFR BLD AUTO: 9 %
NEUTROPHILS # BLD AUTO: 15 X10E3/UL (ref 1.4–7)
NEUTROPHILS NFR BLD AUTO: 15.66 10*3/MM3 (ref 1.7–7)
NEUTROPHILS NFR BLD AUTO: 82 %
NEUTROPHILS NFR BLD AUTO: 86.7 % (ref 42.7–76)
NITRITE UR QL STRIP: NEGATIVE
NITRITE UR QL STRIP: NEGATIVE
NRBC BLD AUTO-RTO: 0 /100 WBC (ref 0–0.2)
PH UR STRIP.AUTO: 5.5 [PH] (ref 5–8)
PH UR STRIP: 5.5 [PH] (ref 5–7.5)
PLATELET # BLD AUTO: 357 X10E3/UL (ref 150–450)
PLATELET # BLD AUTO: 380 10*3/MM3 (ref 140–450)
PMV BLD AUTO: 9.8 FL (ref 6–12)
POTASSIUM SERPL-SCNC: 3.9 MMOL/L (ref 3.5–5.2)
PROT SERPL-MCNC: 5.9 G/DL (ref 6–8.5)
PROT UR QL STRIP: NEGATIVE
PROT UR QL STRIP: NEGATIVE
RBC # BLD AUTO: 3.85 10*6/MM3 (ref 4.14–5.8)
RBC # BLD AUTO: 4.01 X10E6/UL (ref 4.14–5.8)
RBC #/AREA URNS HPF: NORMAL /HPF (ref 0–2)
SODIUM SERPL-SCNC: 137 MMOL/L (ref 136–145)
SP GR UR STRIP: 1.02 (ref 1–1.03)
SP GR UR: 1.02 (ref 1–1.03)
URINALYSIS REFLEX: NORMAL
UROBILINOGEN UR QL STRIP: ABNORMAL
UROBILINOGEN UR STRIP-MCNC: 1 MG/DL (ref 0.2–1)
WBC # BLD AUTO: 18.5 X10E3/UL (ref 3.4–10.8)
WBC #/AREA URNS HPF: NORMAL /HPF (ref 0–5)
WBC NRBC COR # BLD: 18.04 10*3/MM3 (ref 3.4–10.8)

## 2021-11-19 PROCEDURE — 36415 COLL VENOUS BLD VENIPUNCTURE: CPT

## 2021-11-19 PROCEDURE — 74177 CT ABD & PELVIS W/CONTRAST: CPT

## 2021-11-19 PROCEDURE — 99284 EMERGENCY DEPT VISIT MOD MDM: CPT

## 2021-11-19 PROCEDURE — 87635 SARS-COV-2 COVID-19 AMP PRB: CPT | Performed by: HOSPITALIST

## 2021-11-19 PROCEDURE — 80053 COMPREHEN METABOLIC PANEL: CPT | Performed by: EMERGENCY MEDICINE

## 2021-11-19 PROCEDURE — 81003 URINALYSIS AUTO W/O SCOPE: CPT | Performed by: EMERGENCY MEDICINE

## 2021-11-19 PROCEDURE — 83605 ASSAY OF LACTIC ACID: CPT | Performed by: HOSPITALIST

## 2021-11-19 PROCEDURE — 83690 ASSAY OF LIPASE: CPT | Performed by: EMERGENCY MEDICINE

## 2021-11-19 PROCEDURE — 25010000002 IOPAMIDOL 61 % SOLUTION: Performed by: EMERGENCY MEDICINE

## 2021-11-19 PROCEDURE — 85025 COMPLETE CBC W/AUTO DIFF WBC: CPT | Performed by: EMERGENCY MEDICINE

## 2021-11-19 PROCEDURE — 87040 BLOOD CULTURE FOR BACTERIA: CPT | Performed by: HOSPITALIST

## 2021-11-19 RX ORDER — SODIUM CHLORIDE 0.9 % (FLUSH) 0.9 %
10 SYRINGE (ML) INJECTION AS NEEDED
Status: DISCONTINUED | OUTPATIENT
Start: 2021-11-19 | End: 2021-11-28 | Stop reason: HOSPADM

## 2021-11-19 RX ORDER — ONDANSETRON 2 MG/ML
4 INJECTION INTRAMUSCULAR; INTRAVENOUS ONCE
Status: DISCONTINUED | OUTPATIENT
Start: 2021-11-19 | End: 2021-11-28 | Stop reason: HOSPADM

## 2021-11-19 RX ORDER — MORPHINE SULFATE 2 MG/ML
4 INJECTION, SOLUTION INTRAMUSCULAR; INTRAVENOUS ONCE
Status: DISCONTINUED | OUTPATIENT
Start: 2021-11-19 | End: 2021-11-28 | Stop reason: HOSPADM

## 2021-11-19 RX ADMIN — SODIUM CHLORIDE 1000 ML: 9 INJECTION, SOLUTION INTRAVENOUS at 19:42

## 2021-11-19 RX ADMIN — IOPAMIDOL 85 ML: 612 INJECTION, SOLUTION INTRAVENOUS at 21:36

## 2021-11-19 NOTE — TELEPHONE ENCOUNTER
I called and talked with patient at 5:30 PM.  White blood count is higher today. He complains of worsening RLQ abdominal pain. Suspect possible appendicitis. I instructed him to go to ER ASAP today. Will need CT of abdomen/pelvis to rule out appendicitis. He expressed understanding and agreed to follow above instructions. He is to update me on his status Monday.

## 2021-11-20 PROBLEM — A41.9 SEPSIS (HCC): Status: ACTIVE | Noted: 2021-11-20

## 2021-11-20 PROBLEM — N18.30 CKD (CHRONIC KIDNEY DISEASE) STAGE 3, GFR 30-59 ML/MIN: Status: ACTIVE | Noted: 2021-11-20

## 2021-11-20 PROBLEM — D64.9 ANEMIA: Status: ACTIVE | Noted: 2021-11-20

## 2021-11-20 LAB
ALBUMIN SERPL-MCNC: 2.5 G/DL (ref 3.5–5.2)
ALBUMIN/GLOB SERPL: 0.8 G/DL
ALP SERPL-CCNC: 195 U/L (ref 39–117)
ALT SERPL W P-5'-P-CCNC: 69 U/L (ref 1–41)
ANION GAP SERPL CALCULATED.3IONS-SCNC: 13.8 MMOL/L (ref 5–15)
APTT PPP: 31.4 SECONDS (ref 22.7–35.4)
AST SERPL-CCNC: 35 U/L (ref 1–40)
BASOPHILS # BLD AUTO: 0.02 10*3/MM3 (ref 0–0.2)
BASOPHILS NFR BLD AUTO: 0.1 % (ref 0–1.5)
BILIRUB SERPL-MCNC: 0.7 MG/DL (ref 0–1.2)
BUN SERPL-MCNC: 21 MG/DL (ref 8–23)
BUN/CREAT SERPL: 15.6 (ref 7–25)
CALCIUM SPEC-SCNC: 7.7 MG/DL (ref 8.6–10.5)
CHLORIDE SERPL-SCNC: 105 MMOL/L (ref 98–107)
CO2 SERPL-SCNC: 17.2 MMOL/L (ref 22–29)
CREAT SERPL-MCNC: 1.35 MG/DL (ref 0.76–1.27)
D-LACTATE SERPL-SCNC: 0.8 MMOL/L (ref 0.5–2)
DEPRECATED RDW RBC AUTO: 47.1 FL (ref 37–54)
EOSINOPHIL # BLD AUTO: 0 10*3/MM3 (ref 0–0.4)
EOSINOPHIL NFR BLD AUTO: 0 % (ref 0.3–6.2)
ERYTHROCYTE [DISTWIDTH] IN BLOOD BY AUTOMATED COUNT: 13.9 % (ref 12.3–15.4)
GFR SERPL CREATININE-BSD FRML MDRD: 53 ML/MIN/1.73
GLOBULIN UR ELPH-MCNC: 3.1 GM/DL
GLUCOSE SERPL-MCNC: 97 MG/DL (ref 65–99)
HCT VFR BLD AUTO: 34.3 % (ref 37.5–51)
HGB BLD-MCNC: 11.3 G/DL (ref 13–17.7)
IMM GRANULOCYTES # BLD AUTO: 0.12 10*3/MM3 (ref 0–0.05)
IMM GRANULOCYTES NFR BLD AUTO: 0.7 % (ref 0–0.5)
INR PPP: 1.28 (ref 0.9–1.1)
LYMPHOCYTES # BLD AUTO: 1.33 10*3/MM3 (ref 0.7–3.1)
LYMPHOCYTES NFR BLD AUTO: 8 % (ref 19.6–45.3)
MCH RBC QN AUTO: 30.8 PG (ref 26.6–33)
MCHC RBC AUTO-ENTMCNC: 32.9 G/DL (ref 31.5–35.7)
MCV RBC AUTO: 93.5 FL (ref 79–97)
MONOCYTES # BLD AUTO: 1.1 10*3/MM3 (ref 0.1–0.9)
MONOCYTES NFR BLD AUTO: 6.6 % (ref 5–12)
NEUTROPHILS NFR BLD AUTO: 14 10*3/MM3 (ref 1.7–7)
NEUTROPHILS NFR BLD AUTO: 84.6 % (ref 42.7–76)
NRBC BLD AUTO-RTO: 0 /100 WBC (ref 0–0.2)
PLATELET # BLD AUTO: 357 10*3/MM3 (ref 140–450)
PMV BLD AUTO: 9.7 FL (ref 6–12)
POTASSIUM SERPL-SCNC: 3.8 MMOL/L (ref 3.5–5.2)
PROT SERPL-MCNC: 5.6 G/DL (ref 6–8.5)
PROTHROMBIN TIME: 15.8 SECONDS (ref 11.7–14.2)
RBC # BLD AUTO: 3.67 10*6/MM3 (ref 4.14–5.8)
SARS-COV-2 RNA PNL SPEC NAA+PROBE: NOT DETECTED
SODIUM SERPL-SCNC: 136 MMOL/L (ref 136–145)
WBC NRBC COR # BLD: 16.57 10*3/MM3 (ref 3.4–10.8)

## 2021-11-20 PROCEDURE — 25010000002 PIPERACILLIN SOD-TAZOBACTAM PER 1 G: Performed by: HOSPITALIST

## 2021-11-20 PROCEDURE — 36415 COLL VENOUS BLD VENIPUNCTURE: CPT | Performed by: NURSE PRACTITIONER

## 2021-11-20 PROCEDURE — 0F903ZZ DRAINAGE OF LIVER, PERCUTANEOUS APPROACH: ICD-10-PCS | Performed by: STUDENT IN AN ORGANIZED HEALTH CARE EDUCATION/TRAINING PROGRAM

## 2021-11-20 PROCEDURE — 85730 THROMBOPLASTIN TIME PARTIAL: CPT | Performed by: SURGERY

## 2021-11-20 PROCEDURE — 85025 COMPLETE CBC W/AUTO DIFF WBC: CPT | Performed by: NURSE PRACTITIONER

## 2021-11-20 PROCEDURE — 80053 COMPREHEN METABOLIC PANEL: CPT | Performed by: NURSE PRACTITIONER

## 2021-11-20 PROCEDURE — 25010000002 PIPERACILLIN SOD-TAZOBACTAM PER 1 G: Performed by: NURSE PRACTITIONER

## 2021-11-20 PROCEDURE — 85610 PROTHROMBIN TIME: CPT | Performed by: SURGERY

## 2021-11-20 PROCEDURE — 99222 1ST HOSP IP/OBS MODERATE 55: CPT | Performed by: SURGERY

## 2021-11-20 RX ORDER — NITROGLYCERIN 0.4 MG/1
0.4 TABLET SUBLINGUAL
Status: DISCONTINUED | OUTPATIENT
Start: 2021-11-20 | End: 2021-11-28 | Stop reason: HOSPADM

## 2021-11-20 RX ORDER — OXYCODONE AND ACETAMINOPHEN 7.5; 325 MG/1; MG/1
1 TABLET ORAL EVERY 4 HOURS PRN
Status: DISCONTINUED | OUTPATIENT
Start: 2021-11-20 | End: 2021-11-28 | Stop reason: HOSPADM

## 2021-11-20 RX ORDER — SODIUM CHLORIDE 0.9 % (FLUSH) 0.9 %
10 SYRINGE (ML) INJECTION AS NEEDED
Status: DISCONTINUED | OUTPATIENT
Start: 2021-11-20 | End: 2021-11-28 | Stop reason: HOSPADM

## 2021-11-20 RX ORDER — ONDANSETRON 2 MG/ML
4 INJECTION INTRAMUSCULAR; INTRAVENOUS EVERY 6 HOURS PRN
Status: DISCONTINUED | OUTPATIENT
Start: 2021-11-20 | End: 2021-11-28 | Stop reason: HOSPADM

## 2021-11-20 RX ORDER — ACETAMINOPHEN 325 MG/1
650 TABLET ORAL EVERY 4 HOURS PRN
Status: DISCONTINUED | OUTPATIENT
Start: 2021-11-20 | End: 2021-11-28 | Stop reason: HOSPADM

## 2021-11-20 RX ORDER — MORPHINE SULFATE 2 MG/ML
4 INJECTION, SOLUTION INTRAMUSCULAR; INTRAVENOUS
Status: DISCONTINUED | OUTPATIENT
Start: 2021-11-20 | End: 2021-11-28 | Stop reason: HOSPADM

## 2021-11-20 RX ORDER — MORPHINE SULFATE 2 MG/ML
2 INJECTION, SOLUTION INTRAMUSCULAR; INTRAVENOUS
Status: DISCONTINUED | OUTPATIENT
Start: 2021-11-20 | End: 2021-11-20

## 2021-11-20 RX ORDER — ONDANSETRON 4 MG/1
4 TABLET, FILM COATED ORAL EVERY 6 HOURS PRN
Status: DISCONTINUED | OUTPATIENT
Start: 2021-11-20 | End: 2021-11-28 | Stop reason: HOSPADM

## 2021-11-20 RX ORDER — SODIUM CHLORIDE, SODIUM LACTATE, POTASSIUM CHLORIDE, CALCIUM CHLORIDE 600; 310; 30; 20 MG/100ML; MG/100ML; MG/100ML; MG/100ML
100 INJECTION, SOLUTION INTRAVENOUS CONTINUOUS
Status: DISCONTINUED | OUTPATIENT
Start: 2021-11-20 | End: 2021-11-24

## 2021-11-20 RX ADMIN — ACETAMINOPHEN 650 MG: 325 TABLET, FILM COATED ORAL at 19:45

## 2021-11-20 RX ADMIN — SODIUM CHLORIDE, POTASSIUM CHLORIDE, SODIUM LACTATE AND CALCIUM CHLORIDE 100 ML/HR: 600; 310; 30; 20 INJECTION, SOLUTION INTRAVENOUS at 22:48

## 2021-11-20 RX ADMIN — TAZOBACTAM SODIUM AND PIPERACILLIN SODIUM 3.38 G: 375; 3 INJECTION, SOLUTION INTRAVENOUS at 22:49

## 2021-11-20 RX ADMIN — TAZOBACTAM SODIUM AND PIPERACILLIN SODIUM 3.38 G: 375; 3 INJECTION, SOLUTION INTRAVENOUS at 01:04

## 2021-11-20 RX ADMIN — TAZOBACTAM SODIUM AND PIPERACILLIN SODIUM 3.38 G: 375; 3 INJECTION, SOLUTION INTRAVENOUS at 06:35

## 2021-11-20 RX ADMIN — SODIUM CHLORIDE, POTASSIUM CHLORIDE, SODIUM LACTATE AND CALCIUM CHLORIDE 100 ML/HR: 600; 310; 30; 20 INJECTION, SOLUTION INTRAVENOUS at 11:30

## 2021-11-20 RX ADMIN — TAZOBACTAM SODIUM AND PIPERACILLIN SODIUM 3.38 G: 375; 3 INJECTION, SOLUTION INTRAVENOUS at 14:51

## 2021-11-20 RX ADMIN — SODIUM CHLORIDE, POTASSIUM CHLORIDE, SODIUM LACTATE AND CALCIUM CHLORIDE 100 ML/HR: 600; 310; 30; 20 INJECTION, SOLUTION INTRAVENOUS at 01:20

## 2021-11-21 ENCOUNTER — APPOINTMENT (OUTPATIENT)
Dept: GENERAL RADIOLOGY | Facility: HOSPITAL | Age: 66
End: 2021-11-21

## 2021-11-21 ENCOUNTER — APPOINTMENT (OUTPATIENT)
Dept: CT IMAGING | Facility: HOSPITAL | Age: 66
End: 2021-11-21

## 2021-11-21 LAB
ALBUMIN SERPL-MCNC: 2.4 G/DL (ref 3.5–5.2)
ALBUMIN/GLOB SERPL: 0.8 G/DL
ALP SERPL-CCNC: 194 U/L (ref 39–117)
ALT SERPL W P-5'-P-CCNC: 56 U/L (ref 1–41)
ANION GAP SERPL CALCULATED.3IONS-SCNC: 10.3 MMOL/L (ref 5–15)
APPEARANCE FLD: ABNORMAL
AST SERPL-CCNC: 41 U/L (ref 1–40)
BASOPHILS NFR FLD: NORMAL %
BILIRUB SERPL-MCNC: 0.9 MG/DL (ref 0–1.2)
BUN SERPL-MCNC: 20 MG/DL (ref 8–23)
BUN/CREAT SERPL: 13.9 (ref 7–25)
CALCIUM SPEC-SCNC: 7.9 MG/DL (ref 8.6–10.5)
CHLORIDE SERPL-SCNC: 106 MMOL/L (ref 98–107)
CO2 SERPL-SCNC: 20.7 MMOL/L (ref 22–29)
COLOR FLD: ABNORMAL
CREAT SERPL-MCNC: 1.44 MG/DL (ref 0.76–1.27)
DEPRECATED RDW RBC AUTO: 44.1 FL (ref 37–54)
EOSINOPHIL NFR FLD MANUAL: NORMAL %
ERYTHROCYTE [DISTWIDTH] IN BLOOD BY AUTOMATED COUNT: 13.3 % (ref 12.3–15.4)
GFR SERPL CREATININE-BSD FRML MDRD: 49 ML/MIN/1.73
GLOBULIN UR ELPH-MCNC: 3.2 GM/DL
GLUCOSE SERPL-MCNC: 93 MG/DL (ref 65–99)
HCT VFR BLD AUTO: 31.1 % (ref 37.5–51)
HGB BLD-MCNC: 10.9 G/DL (ref 13–17.7)
LYMPHOCYTES NFR FLD MANUAL: NORMAL %
MCH RBC QN AUTO: 31.8 PG (ref 26.6–33)
MCHC RBC AUTO-ENTMCNC: 35 G/DL (ref 31.5–35.7)
MCV RBC AUTO: 90.7 FL (ref 79–97)
METHOD: ABNORMAL
MONOCYTES NFR FLD: NORMAL %
MONOS+MACROS NFR FLD: NORMAL %
NEUTROPHILS NFR FLD MANUAL: NORMAL %
NUC CELL # FLD: ABNORMAL /MM3
PLATELET # BLD AUTO: 339 10*3/MM3 (ref 140–450)
PMV BLD AUTO: 9.6 FL (ref 6–12)
POTASSIUM SERPL-SCNC: 4.4 MMOL/L (ref 3.5–5.2)
PROT SERPL-MCNC: 5.6 G/DL (ref 6–8.5)
RBC # BLD AUTO: 3.43 10*6/MM3 (ref 4.14–5.8)
RBC # FLD AUTO: ABNORMAL /MM3
SODIUM SERPL-SCNC: 137 MMOL/L (ref 136–145)
WBC NRBC COR # BLD: 15.38 10*3/MM3 (ref 3.4–10.8)

## 2021-11-21 PROCEDURE — 87205 SMEAR GRAM STAIN: CPT | Performed by: SURGERY

## 2021-11-21 PROCEDURE — 49406 IMAGE CATH FLUID PERI/RETRO: CPT

## 2021-11-21 PROCEDURE — 87076 CULTURE ANAEROBE IDENT EACH: CPT | Performed by: SURGERY

## 2021-11-21 PROCEDURE — 87075 CULTR BACTERIA EXCEPT BLOOD: CPT | Performed by: SURGERY

## 2021-11-21 PROCEDURE — 80053 COMPREHEN METABOLIC PANEL: CPT | Performed by: INTERNAL MEDICINE

## 2021-11-21 PROCEDURE — 85027 COMPLETE CBC AUTOMATED: CPT | Performed by: INTERNAL MEDICINE

## 2021-11-21 PROCEDURE — 75989 ABSCESS DRAINAGE UNDER X-RAY: CPT

## 2021-11-21 PROCEDURE — 71045 X-RAY EXAM CHEST 1 VIEW: CPT

## 2021-11-21 PROCEDURE — 74018 RADEX ABDOMEN 1 VIEW: CPT

## 2021-11-21 PROCEDURE — 0 LIDOCAINE 1 % SOLUTION: Performed by: RADIOLOGY

## 2021-11-21 PROCEDURE — 94799 UNLISTED PULMONARY SVC/PX: CPT

## 2021-11-21 PROCEDURE — 94761 N-INVAS EAR/PLS OXIMETRY MLT: CPT

## 2021-11-21 PROCEDURE — 25010000002 MORPHINE PER 10 MG: Performed by: INTERNAL MEDICINE

## 2021-11-21 PROCEDURE — C1729 CATH, DRAINAGE: HCPCS

## 2021-11-21 PROCEDURE — 25010000002 ONDANSETRON PER 1 MG: Performed by: NURSE PRACTITIONER

## 2021-11-21 PROCEDURE — 25010000002 PIPERACILLIN SOD-TAZOBACTAM PER 1 G: Performed by: NURSE PRACTITIONER

## 2021-11-21 PROCEDURE — 89051 BODY FLUID CELL COUNT: CPT | Performed by: SURGERY

## 2021-11-21 PROCEDURE — 99231 SBSQ HOSP IP/OBS SF/LOW 25: CPT | Performed by: SURGERY

## 2021-11-21 PROCEDURE — 87070 CULTURE OTHR SPECIMN AEROBIC: CPT | Performed by: SURGERY

## 2021-11-21 RX ORDER — ACETAMINOPHEN 650 MG/1
650 SUPPOSITORY RECTAL ONCE
Status: COMPLETED | OUTPATIENT
Start: 2021-11-21 | End: 2021-11-21

## 2021-11-21 RX ORDER — LIDOCAINE HYDROCHLORIDE 10 MG/ML
20 INJECTION, SOLUTION INFILTRATION; PERINEURAL ONCE
Status: COMPLETED | OUTPATIENT
Start: 2021-11-21 | End: 2021-11-21

## 2021-11-21 RX ADMIN — SODIUM CHLORIDE, POTASSIUM CHLORIDE, SODIUM LACTATE AND CALCIUM CHLORIDE 100 ML/HR: 600; 310; 30; 20 INJECTION, SOLUTION INTRAVENOUS at 08:50

## 2021-11-21 RX ADMIN — ACETAMINOPHEN 650 MG: 650 SUPPOSITORY RECTAL at 06:58

## 2021-11-21 RX ADMIN — TAZOBACTAM SODIUM AND PIPERACILLIN SODIUM 3.38 G: 375; 3 INJECTION, SOLUTION INTRAVENOUS at 08:14

## 2021-11-21 RX ADMIN — LIDOCAINE HYDROCHLORIDE 20 ML: 10 INJECTION, SOLUTION INFILTRATION; PERINEURAL at 13:40

## 2021-11-21 RX ADMIN — MORPHINE SULFATE 4 MG: 2 INJECTION, SOLUTION INTRAMUSCULAR; INTRAVENOUS at 17:16

## 2021-11-21 RX ADMIN — TAZOBACTAM SODIUM AND PIPERACILLIN SODIUM 3.38 G: 375; 3 INJECTION, SOLUTION INTRAVENOUS at 22:50

## 2021-11-21 RX ADMIN — MORPHINE SULFATE 4 MG: 2 INJECTION, SOLUTION INTRAMUSCULAR; INTRAVENOUS at 13:27

## 2021-11-21 RX ADMIN — TAZOBACTAM SODIUM AND PIPERACILLIN SODIUM 3.38 G: 375; 3 INJECTION, SOLUTION INTRAVENOUS at 15:35

## 2021-11-21 RX ADMIN — MORPHINE SULFATE 4 MG: 2 INJECTION, SOLUTION INTRAMUSCULAR; INTRAVENOUS at 11:02

## 2021-11-21 RX ADMIN — SODIUM CHLORIDE, POTASSIUM CHLORIDE, SODIUM LACTATE AND CALCIUM CHLORIDE 100 ML/HR: 600; 310; 30; 20 INJECTION, SOLUTION INTRAVENOUS at 19:27

## 2021-11-21 RX ADMIN — ONDANSETRON 4 MG: 2 INJECTION INTRAMUSCULAR; INTRAVENOUS at 06:47

## 2021-11-21 RX ADMIN — OXYCODONE AND ACETAMINOPHEN 1 TABLET: 7.5; 325 TABLET ORAL at 22:50

## 2021-11-22 LAB
ALBUMIN SERPL-MCNC: 1.9 G/DL (ref 3.5–5.2)
ALBUMIN/GLOB SERPL: 0.7 G/DL
ALP SERPL-CCNC: 137 U/L (ref 39–117)
ALT SERPL W P-5'-P-CCNC: 47 U/L (ref 1–41)
ANION GAP SERPL CALCULATED.3IONS-SCNC: 11.3 MMOL/L (ref 5–15)
AST SERPL-CCNC: 32 U/L (ref 1–40)
BILIRUB SERPL-MCNC: 0.8 MG/DL (ref 0–1.2)
BILIRUB UR QL STRIP: NEGATIVE
BUN SERPL-MCNC: 25 MG/DL (ref 8–23)
BUN/CREAT SERPL: 16.1 (ref 7–25)
CALCIUM SPEC-SCNC: 7.6 MG/DL (ref 8.6–10.5)
CHLORIDE SERPL-SCNC: 104 MMOL/L (ref 98–107)
CLARITY UR: CLEAR
CO2 SERPL-SCNC: 21.7 MMOL/L (ref 22–29)
COLOR UR: YELLOW
CREAT SERPL-MCNC: 1.55 MG/DL (ref 0.76–1.27)
DEPRECATED RDW RBC AUTO: 43.7 FL (ref 37–54)
ERYTHROCYTE [DISTWIDTH] IN BLOOD BY AUTOMATED COUNT: 13.2 % (ref 12.3–15.4)
GFR SERPL CREATININE-BSD FRML MDRD: 45 ML/MIN/1.73
GLOBULIN UR ELPH-MCNC: 2.8 GM/DL
GLUCOSE SERPL-MCNC: 108 MG/DL (ref 65–99)
GLUCOSE UR STRIP-MCNC: NEGATIVE MG/DL
HCT VFR BLD AUTO: 28.5 % (ref 37.5–51)
HGB BLD-MCNC: 10 G/DL (ref 13–17.7)
HGB UR QL STRIP.AUTO: NEGATIVE
KETONES UR QL STRIP: NEGATIVE
LEUKOCYTE ESTERASE UR QL STRIP.AUTO: NEGATIVE
MCH RBC QN AUTO: 31.3 PG (ref 26.6–33)
MCHC RBC AUTO-ENTMCNC: 35.1 G/DL (ref 31.5–35.7)
MCV RBC AUTO: 89.1 FL (ref 79–97)
NITRITE UR QL STRIP: NEGATIVE
PH UR STRIP.AUTO: <=5 [PH] (ref 5–8)
PLATELET # BLD AUTO: 319 10*3/MM3 (ref 140–450)
PMV BLD AUTO: 9.6 FL (ref 6–12)
POTASSIUM SERPL-SCNC: 4.4 MMOL/L (ref 3.5–5.2)
PROT SERPL-MCNC: 4.7 G/DL (ref 6–8.5)
PROT UR QL STRIP: ABNORMAL
RBC # BLD AUTO: 3.2 10*6/MM3 (ref 4.14–5.8)
SODIUM SERPL-SCNC: 137 MMOL/L (ref 136–145)
SP GR UR STRIP: 1.02 (ref 1–1.03)
UROBILINOGEN UR QL STRIP: ABNORMAL
WBC NRBC COR # BLD: 16.56 10*3/MM3 (ref 3.4–10.8)

## 2021-11-22 PROCEDURE — 25010000002 ONDANSETRON PER 1 MG: Performed by: NURSE PRACTITIONER

## 2021-11-22 PROCEDURE — 25010000002 MORPHINE PER 10 MG: Performed by: INTERNAL MEDICINE

## 2021-11-22 PROCEDURE — 25010000002 PIPERACILLIN SOD-TAZOBACTAM PER 1 G: Performed by: NURSE PRACTITIONER

## 2021-11-22 PROCEDURE — 81003 URINALYSIS AUTO W/O SCOPE: CPT | Performed by: STUDENT IN AN ORGANIZED HEALTH CARE EDUCATION/TRAINING PROGRAM

## 2021-11-22 PROCEDURE — 80053 COMPREHEN METABOLIC PANEL: CPT | Performed by: INTERNAL MEDICINE

## 2021-11-22 PROCEDURE — 99231 SBSQ HOSP IP/OBS SF/LOW 25: CPT | Performed by: SURGERY

## 2021-11-22 PROCEDURE — 85027 COMPLETE CBC AUTOMATED: CPT | Performed by: INTERNAL MEDICINE

## 2021-11-22 RX ORDER — CALCIUM CARBONATE 200(500)MG
2 TABLET,CHEWABLE ORAL 3 TIMES DAILY PRN
Status: DISCONTINUED | OUTPATIENT
Start: 2021-11-22 | End: 2021-11-28 | Stop reason: HOSPADM

## 2021-11-22 RX ORDER — PANTOPRAZOLE SODIUM 40 MG/1
40 TABLET, DELAYED RELEASE ORAL
Status: DISCONTINUED | OUTPATIENT
Start: 2021-11-22 | End: 2021-11-28 | Stop reason: HOSPADM

## 2021-11-22 RX ORDER — CALCIUM CARBONATE 200(500)MG
1 TABLET,CHEWABLE ORAL 3 TIMES DAILY PRN
Status: DISCONTINUED | OUTPATIENT
Start: 2021-11-22 | End: 2021-11-22

## 2021-11-22 RX ADMIN — ONDANSETRON 4 MG: 2 INJECTION INTRAMUSCULAR; INTRAVENOUS at 20:33

## 2021-11-22 RX ADMIN — OXYCODONE AND ACETAMINOPHEN 1 TABLET: 7.5; 325 TABLET ORAL at 08:37

## 2021-11-22 RX ADMIN — MORPHINE SULFATE 4 MG: 2 INJECTION, SOLUTION INTRAMUSCULAR; INTRAVENOUS at 20:33

## 2021-11-22 RX ADMIN — TAZOBACTAM SODIUM AND PIPERACILLIN SODIUM 3.38 G: 375; 3 INJECTION, SOLUTION INTRAVENOUS at 06:41

## 2021-11-22 RX ADMIN — SODIUM CHLORIDE, POTASSIUM CHLORIDE, SODIUM LACTATE AND CALCIUM CHLORIDE 100 ML/HR: 600; 310; 30; 20 INJECTION, SOLUTION INTRAVENOUS at 04:47

## 2021-11-22 RX ADMIN — PANTOPRAZOLE SODIUM 40 MG: 40 TABLET, DELAYED RELEASE ORAL at 17:24

## 2021-11-22 RX ADMIN — SODIUM CHLORIDE, POTASSIUM CHLORIDE, SODIUM LACTATE AND CALCIUM CHLORIDE 100 ML/HR: 600; 310; 30; 20 INJECTION, SOLUTION INTRAVENOUS at 14:24

## 2021-11-22 RX ADMIN — TAZOBACTAM SODIUM AND PIPERACILLIN SODIUM 3.38 G: 375; 3 INJECTION, SOLUTION INTRAVENOUS at 23:58

## 2021-11-22 RX ADMIN — TAZOBACTAM SODIUM AND PIPERACILLIN SODIUM 3.38 G: 375; 3 INJECTION, SOLUTION INTRAVENOUS at 15:02

## 2021-11-22 RX ADMIN — ANTACID TABLETS 2 TABLET: 500 TABLET, CHEWABLE ORAL at 17:20

## 2021-11-23 ENCOUNTER — APPOINTMENT (OUTPATIENT)
Dept: GENERAL RADIOLOGY | Facility: HOSPITAL | Age: 66
End: 2021-11-23

## 2021-11-23 ENCOUNTER — APPOINTMENT (OUTPATIENT)
Dept: CT IMAGING | Facility: HOSPITAL | Age: 66
End: 2021-11-23

## 2021-11-23 LAB
ANION GAP SERPL CALCULATED.3IONS-SCNC: 9.4 MMOL/L (ref 5–15)
BUN SERPL-MCNC: 35 MG/DL (ref 8–23)
BUN/CREAT SERPL: 25.2 (ref 7–25)
BURR CELLS BLD QL SMEAR: ABNORMAL
CALCIUM SPEC-SCNC: 8 MG/DL (ref 8.6–10.5)
CHLORIDE SERPL-SCNC: 106 MMOL/L (ref 98–107)
CO2 SERPL-SCNC: 23.6 MMOL/L (ref 22–29)
CREAT SERPL-MCNC: 1.39 MG/DL (ref 0.76–1.27)
DEPRECATED RDW RBC AUTO: 38.2 FL (ref 37–54)
ERYTHROCYTE [DISTWIDTH] IN BLOOD BY AUTOMATED COUNT: 12.7 % (ref 12.3–15.4)
GFR SERPL CREATININE-BSD FRML MDRD: 51 ML/MIN/1.73
GLUCOSE SERPL-MCNC: 103 MG/DL (ref 65–99)
HCT VFR BLD AUTO: 26.3 % (ref 37.5–51)
HGB BLD-MCNC: 9.7 G/DL (ref 13–17.7)
LYMPHOCYTES # BLD MANUAL: 1.41 10*3/MM3 (ref 0.7–3.1)
MAGNESIUM SERPL-MCNC: 2 MG/DL (ref 1.6–2.4)
MCH RBC QN AUTO: 31.4 PG (ref 26.6–33)
MCHC RBC AUTO-ENTMCNC: 36.9 G/DL (ref 31.5–35.7)
MCV RBC AUTO: 85.1 FL (ref 79–97)
NEUTROPHILS # BLD AUTO: 22.17 10*3/MM3 (ref 1.7–7)
NEUTROPHILS NFR BLD MANUAL: 94 % (ref 42.7–76)
PHOSPHATE SERPL-MCNC: 4.5 MG/DL (ref 2.5–4.5)
PLAT MORPH BLD: NORMAL
PLATELET # BLD AUTO: 311 10*3/MM3 (ref 140–450)
PMV BLD AUTO: 9.9 FL (ref 6–12)
POIKILOCYTOSIS BLD QL SMEAR: ABNORMAL
POTASSIUM SERPL-SCNC: 4.3 MMOL/L (ref 3.5–5.2)
RBC # BLD AUTO: 3.09 10*6/MM3 (ref 4.14–5.8)
SODIUM SERPL-SCNC: 139 MMOL/L (ref 136–145)
TARGETS BLD QL SMEAR: ABNORMAL
VARIANT LYMPHS NFR BLD MANUAL: 6 % (ref 19.6–45.3)
WBC MORPH BLD: NORMAL
WBC NRBC COR # BLD: 23.58 10*3/MM3 (ref 3.4–10.8)

## 2021-11-23 PROCEDURE — 85007 BL SMEAR W/DIFF WBC COUNT: CPT | Performed by: STUDENT IN AN ORGANIZED HEALTH CARE EDUCATION/TRAINING PROGRAM

## 2021-11-23 PROCEDURE — 74150 CT ABDOMEN W/O CONTRAST: CPT

## 2021-11-23 PROCEDURE — 85025 COMPLETE CBC W/AUTO DIFF WBC: CPT | Performed by: STUDENT IN AN ORGANIZED HEALTH CARE EDUCATION/TRAINING PROGRAM

## 2021-11-23 PROCEDURE — 25010000002 PROCHLORPERAZINE 10 MG/2ML SOLUTION: Performed by: NURSE PRACTITIONER

## 2021-11-23 PROCEDURE — 80048 BASIC METABOLIC PNL TOTAL CA: CPT | Performed by: STUDENT IN AN ORGANIZED HEALTH CARE EDUCATION/TRAINING PROGRAM

## 2021-11-23 PROCEDURE — 25010000002 PIPERACILLIN SOD-TAZOBACTAM PER 1 G: Performed by: NURSE PRACTITIONER

## 2021-11-23 PROCEDURE — 25010000002 HEPARIN (PORCINE) PER 1000 UNITS: Performed by: STUDENT IN AN ORGANIZED HEALTH CARE EDUCATION/TRAINING PROGRAM

## 2021-11-23 PROCEDURE — 74018 RADEX ABDOMEN 1 VIEW: CPT

## 2021-11-23 PROCEDURE — 99231 SBSQ HOSP IP/OBS SF/LOW 25: CPT | Performed by: SURGERY

## 2021-11-23 PROCEDURE — 25010000002 MORPHINE PER 10 MG: Performed by: INTERNAL MEDICINE

## 2021-11-23 PROCEDURE — 94761 N-INVAS EAR/PLS OXIMETRY MLT: CPT

## 2021-11-23 PROCEDURE — 84100 ASSAY OF PHOSPHORUS: CPT | Performed by: STUDENT IN AN ORGANIZED HEALTH CARE EDUCATION/TRAINING PROGRAM

## 2021-11-23 PROCEDURE — 83735 ASSAY OF MAGNESIUM: CPT | Performed by: STUDENT IN AN ORGANIZED HEALTH CARE EDUCATION/TRAINING PROGRAM

## 2021-11-23 PROCEDURE — 94799 UNLISTED PULMONARY SVC/PX: CPT

## 2021-11-23 RX ORDER — PROCHLORPERAZINE EDISYLATE 5 MG/ML
5 INJECTION INTRAMUSCULAR; INTRAVENOUS EVERY 6 HOURS PRN
Status: DISCONTINUED | OUTPATIENT
Start: 2021-11-23 | End: 2021-11-28 | Stop reason: HOSPADM

## 2021-11-23 RX ORDER — HEPARIN SODIUM 5000 [USP'U]/ML
5000 INJECTION, SOLUTION INTRAVENOUS; SUBCUTANEOUS EVERY 8 HOURS SCHEDULED
Status: DISCONTINUED | OUTPATIENT
Start: 2021-11-23 | End: 2021-11-28 | Stop reason: HOSPADM

## 2021-11-23 RX ADMIN — OXYCODONE AND ACETAMINOPHEN 1 TABLET: 7.5; 325 TABLET ORAL at 12:33

## 2021-11-23 RX ADMIN — MORPHINE SULFATE 4 MG: 2 INJECTION, SOLUTION INTRAMUSCULAR; INTRAVENOUS at 22:25

## 2021-11-23 RX ADMIN — TAZOBACTAM SODIUM AND PIPERACILLIN SODIUM 3.38 G: 375; 3 INJECTION, SOLUTION INTRAVENOUS at 23:15

## 2021-11-23 RX ADMIN — PANTOPRAZOLE SODIUM 40 MG: 40 TABLET, DELAYED RELEASE ORAL at 06:51

## 2021-11-23 RX ADMIN — OXYCODONE AND ACETAMINOPHEN 1 TABLET: 7.5; 325 TABLET ORAL at 01:50

## 2021-11-23 RX ADMIN — PROCHLORPERAZINE EDISYLATE 5 MG: 5 INJECTION INTRAMUSCULAR; INTRAVENOUS at 00:35

## 2021-11-23 RX ADMIN — TAZOBACTAM SODIUM AND PIPERACILLIN SODIUM 3.38 G: 375; 3 INJECTION, SOLUTION INTRAVENOUS at 14:46

## 2021-11-23 RX ADMIN — SODIUM CHLORIDE, POTASSIUM CHLORIDE, SODIUM LACTATE AND CALCIUM CHLORIDE 100 ML/HR: 600; 310; 30; 20 INJECTION, SOLUTION INTRAVENOUS at 23:52

## 2021-11-23 RX ADMIN — HEPARIN SODIUM 5000 UNITS: 5000 INJECTION INTRAVENOUS; SUBCUTANEOUS at 23:15

## 2021-11-23 RX ADMIN — SODIUM CHLORIDE, POTASSIUM CHLORIDE, SODIUM LACTATE AND CALCIUM CHLORIDE 100 ML/HR: 600; 310; 30; 20 INJECTION, SOLUTION INTRAVENOUS at 14:48

## 2021-11-23 RX ADMIN — SODIUM CHLORIDE, POTASSIUM CHLORIDE, SODIUM LACTATE AND CALCIUM CHLORIDE 100 ML/HR: 600; 310; 30; 20 INJECTION, SOLUTION INTRAVENOUS at 00:35

## 2021-11-23 RX ADMIN — TAZOBACTAM SODIUM AND PIPERACILLIN SODIUM 3.38 G: 375; 3 INJECTION, SOLUTION INTRAVENOUS at 06:51

## 2021-11-24 ENCOUNTER — APPOINTMENT (OUTPATIENT)
Dept: GENERAL RADIOLOGY | Facility: HOSPITAL | Age: 66
End: 2021-11-24

## 2021-11-24 PROBLEM — R09.02 POSTOPERATIVE HYPOXEMIA: Status: ACTIVE | Noted: 2021-11-24

## 2021-11-24 PROBLEM — Z98.890 POSTOPERATIVE HYPOXEMIA: Status: ACTIVE | Noted: 2021-11-24

## 2021-11-24 LAB
ANION GAP SERPL CALCULATED.3IONS-SCNC: 9.8 MMOL/L (ref 5–15)
BACTERIA SPEC AEROBE CULT: NORMAL
BASOPHILS # BLD AUTO: 0.03 10*3/MM3 (ref 0–0.2)
BASOPHILS NFR BLD AUTO: 0.1 % (ref 0–1.5)
BUN SERPL-MCNC: 28 MG/DL (ref 8–23)
BUN/CREAT SERPL: 22.2 (ref 7–25)
CALCIUM SPEC-SCNC: 7.5 MG/DL (ref 8.6–10.5)
CHLORIDE SERPL-SCNC: 106 MMOL/L (ref 98–107)
CO2 SERPL-SCNC: 22.2 MMOL/L (ref 22–29)
CREAT SERPL-MCNC: 1.26 MG/DL (ref 0.76–1.27)
DEPRECATED RDW RBC AUTO: 41.8 FL (ref 37–54)
EOSINOPHIL # BLD AUTO: 0.06 10*3/MM3 (ref 0–0.4)
EOSINOPHIL NFR BLD AUTO: 0.3 % (ref 0.3–6.2)
ERYTHROCYTE [DISTWIDTH] IN BLOOD BY AUTOMATED COUNT: 12.9 % (ref 12.3–15.4)
FERRITIN SERPL-MCNC: 1029 NG/ML (ref 30–400)
GFR SERPL CREATININE-BSD FRML MDRD: 57 ML/MIN/1.73
GLUCOSE SERPL-MCNC: 109 MG/DL (ref 65–99)
HCT VFR BLD AUTO: 28.5 % (ref 37.5–51)
HGB BLD-MCNC: 9.9 G/DL (ref 13–17.7)
IMM GRANULOCYTES # BLD AUTO: 0.23 10*3/MM3 (ref 0–0.05)
IMM GRANULOCYTES NFR BLD AUTO: 1.1 % (ref 0–0.5)
IRON 24H UR-MRATE: 23 MCG/DL (ref 59–158)
IRON SATN MFR SERPL: 17 % (ref 20–50)
LYMPHOCYTES # BLD AUTO: 1.57 10*3/MM3 (ref 0.7–3.1)
LYMPHOCYTES NFR BLD AUTO: 7.8 % (ref 19.6–45.3)
MAGNESIUM SERPL-MCNC: 2 MG/DL (ref 1.6–2.4)
MCH RBC QN AUTO: 30.7 PG (ref 26.6–33)
MCHC RBC AUTO-ENTMCNC: 34.7 G/DL (ref 31.5–35.7)
MCV RBC AUTO: 88.5 FL (ref 79–97)
MONOCYTES # BLD AUTO: 0.31 10*3/MM3 (ref 0.1–0.9)
MONOCYTES NFR BLD AUTO: 1.5 % (ref 5–12)
NEUTROPHILS NFR BLD AUTO: 17.87 10*3/MM3 (ref 1.7–7)
NEUTROPHILS NFR BLD AUTO: 89.2 % (ref 42.7–76)
NRBC BLD AUTO-RTO: 0 /100 WBC (ref 0–0.2)
PHOSPHATE SERPL-MCNC: 3.7 MG/DL (ref 2.5–4.5)
PLATELET # BLD AUTO: 343 10*3/MM3 (ref 140–450)
PMV BLD AUTO: 10 FL (ref 6–12)
POTASSIUM SERPL-SCNC: 4 MMOL/L (ref 3.5–5.2)
RBC # BLD AUTO: 3.22 10*6/MM3 (ref 4.14–5.8)
SODIUM SERPL-SCNC: 138 MMOL/L (ref 136–145)
TIBC SERPL-MCNC: 139 MCG/DL (ref 298–536)
TRANSFERRIN SERPL-MCNC: 93 MG/DL (ref 200–360)
WBC NRBC COR # BLD: 20.07 10*3/MM3 (ref 3.4–10.8)

## 2021-11-24 PROCEDURE — 94640 AIRWAY INHALATION TREATMENT: CPT

## 2021-11-24 PROCEDURE — 99231 SBSQ HOSP IP/OBS SF/LOW 25: CPT | Performed by: SURGERY

## 2021-11-24 PROCEDURE — 83540 ASSAY OF IRON: CPT | Performed by: STUDENT IN AN ORGANIZED HEALTH CARE EDUCATION/TRAINING PROGRAM

## 2021-11-24 PROCEDURE — 83735 ASSAY OF MAGNESIUM: CPT | Performed by: STUDENT IN AN ORGANIZED HEALTH CARE EDUCATION/TRAINING PROGRAM

## 2021-11-24 PROCEDURE — 25010000002 HEPARIN (PORCINE) PER 1000 UNITS: Performed by: STUDENT IN AN ORGANIZED HEALTH CARE EDUCATION/TRAINING PROGRAM

## 2021-11-24 PROCEDURE — 82728 ASSAY OF FERRITIN: CPT | Performed by: STUDENT IN AN ORGANIZED HEALTH CARE EDUCATION/TRAINING PROGRAM

## 2021-11-24 PROCEDURE — 25010000002 PIPERACILLIN SOD-TAZOBACTAM PER 1 G: Performed by: STUDENT IN AN ORGANIZED HEALTH CARE EDUCATION/TRAINING PROGRAM

## 2021-11-24 PROCEDURE — 85025 COMPLETE CBC W/AUTO DIFF WBC: CPT | Performed by: STUDENT IN AN ORGANIZED HEALTH CARE EDUCATION/TRAINING PROGRAM

## 2021-11-24 PROCEDURE — 84466 ASSAY OF TRANSFERRIN: CPT | Performed by: STUDENT IN AN ORGANIZED HEALTH CARE EDUCATION/TRAINING PROGRAM

## 2021-11-24 PROCEDURE — 71045 X-RAY EXAM CHEST 1 VIEW: CPT

## 2021-11-24 PROCEDURE — 80048 BASIC METABOLIC PNL TOTAL CA: CPT | Performed by: STUDENT IN AN ORGANIZED HEALTH CARE EDUCATION/TRAINING PROGRAM

## 2021-11-24 PROCEDURE — 25010000002 PIPERACILLIN SOD-TAZOBACTAM PER 1 G: Performed by: NURSE PRACTITIONER

## 2021-11-24 PROCEDURE — 84100 ASSAY OF PHOSPHORUS: CPT | Performed by: STUDENT IN AN ORGANIZED HEALTH CARE EDUCATION/TRAINING PROGRAM

## 2021-11-24 PROCEDURE — 94799 UNLISTED PULMONARY SVC/PX: CPT

## 2021-11-24 RX ORDER — ALBUTEROL SULFATE 0.63 MG/3ML
0.63 SOLUTION RESPIRATORY (INHALATION) EVERY 6 HOURS PRN
Status: DISCONTINUED | OUTPATIENT
Start: 2021-11-24 | End: 2021-11-28 | Stop reason: HOSPADM

## 2021-11-24 RX ORDER — PRAVASTATIN SODIUM 40 MG
40 TABLET ORAL DAILY
Status: DISCONTINUED | OUTPATIENT
Start: 2021-11-24 | End: 2021-11-28 | Stop reason: HOSPADM

## 2021-11-24 RX ORDER — ASPIRIN 81 MG/1
81 TABLET ORAL DAILY
Status: DISCONTINUED | OUTPATIENT
Start: 2021-11-24 | End: 2021-11-28 | Stop reason: HOSPADM

## 2021-11-24 RX ORDER — IPRATROPIUM BROMIDE AND ALBUTEROL SULFATE 2.5; .5 MG/3ML; MG/3ML
3 SOLUTION RESPIRATORY (INHALATION)
Status: DISCONTINUED | OUTPATIENT
Start: 2021-11-24 | End: 2021-11-28 | Stop reason: HOSPADM

## 2021-11-24 RX ADMIN — PRAVASTATIN SODIUM 40 MG: 40 TABLET ORAL at 17:13

## 2021-11-24 RX ADMIN — HEPARIN SODIUM 5000 UNITS: 5000 INJECTION INTRAVENOUS; SUBCUTANEOUS at 22:03

## 2021-11-24 RX ADMIN — IPRATROPIUM BROMIDE AND ALBUTEROL SULFATE 3 ML: 2.5; .5 SOLUTION RESPIRATORY (INHALATION) at 20:23

## 2021-11-24 RX ADMIN — OXYCODONE AND ACETAMINOPHEN 1 TABLET: 7.5; 325 TABLET ORAL at 22:03

## 2021-11-24 RX ADMIN — OXYCODONE AND ACETAMINOPHEN 1 TABLET: 7.5; 325 TABLET ORAL at 10:11

## 2021-11-24 RX ADMIN — TAZOBACTAM SODIUM AND PIPERACILLIN SODIUM 3.38 G: 375; 3 INJECTION, SOLUTION INTRAVENOUS at 06:44

## 2021-11-24 RX ADMIN — TAZOBACTAM SODIUM AND PIPERACILLIN SODIUM 3.38 G: 375; 3 INJECTION, SOLUTION INTRAVENOUS at 23:19

## 2021-11-24 RX ADMIN — SODIUM CHLORIDE, PRESERVATIVE FREE 10 ML: 5 INJECTION INTRAVENOUS at 22:07

## 2021-11-24 RX ADMIN — TAZOBACTAM SODIUM AND PIPERACILLIN SODIUM 3.38 G: 375; 3 INJECTION, SOLUTION INTRAVENOUS at 15:29

## 2021-11-24 RX ADMIN — PANTOPRAZOLE SODIUM 40 MG: 40 TABLET, DELAYED RELEASE ORAL at 06:44

## 2021-11-24 RX ADMIN — IPRATROPIUM BROMIDE AND ALBUTEROL SULFATE 3 ML: 2.5; .5 SOLUTION RESPIRATORY (INHALATION) at 16:17

## 2021-11-24 RX ADMIN — HEPARIN SODIUM 5000 UNITS: 5000 INJECTION INTRAVENOUS; SUBCUTANEOUS at 14:23

## 2021-11-24 RX ADMIN — ASPIRIN 81 MG: 81 TABLET, COATED ORAL at 17:13

## 2021-11-24 RX ADMIN — HEPARIN SODIUM 5000 UNITS: 5000 INJECTION INTRAVENOUS; SUBCUTANEOUS at 07:03

## 2021-11-25 LAB
ANION GAP SERPL CALCULATED.3IONS-SCNC: 9.4 MMOL/L (ref 5–15)
BACTERIA SPEC AEROBE CULT: NORMAL
BASOPHILS # BLD AUTO: 0.01 10*3/MM3 (ref 0–0.2)
BASOPHILS NFR BLD AUTO: 0.1 % (ref 0–1.5)
BUN SERPL-MCNC: 25 MG/DL (ref 8–23)
BUN/CREAT SERPL: 22.9 (ref 7–25)
CALCIUM SPEC-SCNC: 7.3 MG/DL (ref 8.6–10.5)
CHLORIDE SERPL-SCNC: 105 MMOL/L (ref 98–107)
CO2 SERPL-SCNC: 22.6 MMOL/L (ref 22–29)
CREAT SERPL-MCNC: 1.09 MG/DL (ref 0.76–1.27)
DEPRECATED RDW RBC AUTO: 41.2 FL (ref 37–54)
EOSINOPHIL # BLD AUTO: 0.12 10*3/MM3 (ref 0–0.4)
EOSINOPHIL NFR BLD AUTO: 0.9 % (ref 0.3–6.2)
ERYTHROCYTE [DISTWIDTH] IN BLOOD BY AUTOMATED COUNT: 12.9 % (ref 12.3–15.4)
GFR SERPL CREATININE-BSD FRML MDRD: 67 ML/MIN/1.73
GLUCOSE SERPL-MCNC: 95 MG/DL (ref 65–99)
HCT VFR BLD AUTO: 27.7 % (ref 37.5–51)
HGB BLD-MCNC: 9.6 G/DL (ref 13–17.7)
IMM GRANULOCYTES # BLD AUTO: 0.08 10*3/MM3 (ref 0–0.05)
IMM GRANULOCYTES NFR BLD AUTO: 0.6 % (ref 0–0.5)
LYMPHOCYTES # BLD AUTO: 1.6 10*3/MM3 (ref 0.7–3.1)
LYMPHOCYTES NFR BLD AUTO: 12.4 % (ref 19.6–45.3)
MAGNESIUM SERPL-MCNC: 2.2 MG/DL (ref 1.6–2.4)
MCH RBC QN AUTO: 30.7 PG (ref 26.6–33)
MCHC RBC AUTO-ENTMCNC: 34.7 G/DL (ref 31.5–35.7)
MCV RBC AUTO: 88.5 FL (ref 79–97)
MONOCYTES # BLD AUTO: 0.49 10*3/MM3 (ref 0.1–0.9)
MONOCYTES NFR BLD AUTO: 3.8 % (ref 5–12)
NEUTROPHILS NFR BLD AUTO: 10.56 10*3/MM3 (ref 1.7–7)
NEUTROPHILS NFR BLD AUTO: 82.2 % (ref 42.7–76)
NRBC BLD AUTO-RTO: 0 /100 WBC (ref 0–0.2)
PHOSPHATE SERPL-MCNC: 3.4 MG/DL (ref 2.5–4.5)
PLATELET # BLD AUTO: 355 10*3/MM3 (ref 140–450)
PMV BLD AUTO: 9.8 FL (ref 6–12)
POTASSIUM SERPL-SCNC: 4.1 MMOL/L (ref 3.5–5.2)
RBC # BLD AUTO: 3.13 10*6/MM3 (ref 4.14–5.8)
SODIUM SERPL-SCNC: 137 MMOL/L (ref 136–145)
WBC NRBC COR # BLD: 12.86 10*3/MM3 (ref 3.4–10.8)

## 2021-11-25 PROCEDURE — 83735 ASSAY OF MAGNESIUM: CPT | Performed by: STUDENT IN AN ORGANIZED HEALTH CARE EDUCATION/TRAINING PROGRAM

## 2021-11-25 PROCEDURE — 80048 BASIC METABOLIC PNL TOTAL CA: CPT | Performed by: STUDENT IN AN ORGANIZED HEALTH CARE EDUCATION/TRAINING PROGRAM

## 2021-11-25 PROCEDURE — 84100 ASSAY OF PHOSPHORUS: CPT | Performed by: STUDENT IN AN ORGANIZED HEALTH CARE EDUCATION/TRAINING PROGRAM

## 2021-11-25 PROCEDURE — 94799 UNLISTED PULMONARY SVC/PX: CPT

## 2021-11-25 PROCEDURE — 85025 COMPLETE CBC W/AUTO DIFF WBC: CPT | Performed by: STUDENT IN AN ORGANIZED HEALTH CARE EDUCATION/TRAINING PROGRAM

## 2021-11-25 PROCEDURE — 99231 SBSQ HOSP IP/OBS SF/LOW 25: CPT | Performed by: SURGERY

## 2021-11-25 PROCEDURE — 25010000002 HEPARIN (PORCINE) PER 1000 UNITS: Performed by: STUDENT IN AN ORGANIZED HEALTH CARE EDUCATION/TRAINING PROGRAM

## 2021-11-25 PROCEDURE — 94760 N-INVAS EAR/PLS OXIMETRY 1: CPT

## 2021-11-25 PROCEDURE — 25010000002 FUROSEMIDE PER 20 MG: Performed by: STUDENT IN AN ORGANIZED HEALTH CARE EDUCATION/TRAINING PROGRAM

## 2021-11-25 PROCEDURE — 25010000002 PIPERACILLIN SOD-TAZOBACTAM PER 1 G: Performed by: STUDENT IN AN ORGANIZED HEALTH CARE EDUCATION/TRAINING PROGRAM

## 2021-11-25 RX ORDER — FUROSEMIDE 10 MG/ML
40 INJECTION INTRAMUSCULAR; INTRAVENOUS ONCE
Status: COMPLETED | OUTPATIENT
Start: 2021-11-25 | End: 2021-11-25

## 2021-11-25 RX ORDER — FUROSEMIDE 10 MG/ML
20 INJECTION INTRAMUSCULAR; INTRAVENOUS ONCE
Status: COMPLETED | OUTPATIENT
Start: 2021-11-25 | End: 2021-11-25

## 2021-11-25 RX ORDER — POTASSIUM CHLORIDE 750 MG/1
40 TABLET, FILM COATED, EXTENDED RELEASE ORAL ONCE
Status: COMPLETED | OUTPATIENT
Start: 2021-11-25 | End: 2021-11-25

## 2021-11-25 RX ADMIN — HEPARIN SODIUM 5000 UNITS: 5000 INJECTION INTRAVENOUS; SUBCUTANEOUS at 22:13

## 2021-11-25 RX ADMIN — TAZOBACTAM SODIUM AND PIPERACILLIN SODIUM 3.38 G: 375; 3 INJECTION, SOLUTION INTRAVENOUS at 15:43

## 2021-11-25 RX ADMIN — POTASSIUM CHLORIDE 40 MEQ: 750 TABLET, EXTENDED RELEASE ORAL at 22:22

## 2021-11-25 RX ADMIN — FUROSEMIDE 40 MG: 10 INJECTION, SOLUTION INTRAMUSCULAR; INTRAVENOUS at 14:25

## 2021-11-25 RX ADMIN — TAZOBACTAM SODIUM AND PIPERACILLIN SODIUM 3.38 G: 375; 3 INJECTION, SOLUTION INTRAVENOUS at 08:03

## 2021-11-25 RX ADMIN — HEPARIN SODIUM 5000 UNITS: 5000 INJECTION INTRAVENOUS; SUBCUTANEOUS at 06:27

## 2021-11-25 RX ADMIN — FUROSEMIDE 20 MG: 20 INJECTION, SOLUTION INTRAMUSCULAR; INTRAVENOUS at 22:21

## 2021-11-25 RX ADMIN — ASPIRIN 81 MG: 81 TABLET, COATED ORAL at 08:03

## 2021-11-25 RX ADMIN — IPRATROPIUM BROMIDE AND ALBUTEROL SULFATE 3 ML: 2.5; .5 SOLUTION RESPIRATORY (INHALATION) at 19:24

## 2021-11-25 RX ADMIN — PRAVASTATIN SODIUM 40 MG: 40 TABLET ORAL at 08:03

## 2021-11-25 RX ADMIN — PANTOPRAZOLE SODIUM 40 MG: 40 TABLET, DELAYED RELEASE ORAL at 06:27

## 2021-11-25 RX ADMIN — TAZOBACTAM SODIUM AND PIPERACILLIN SODIUM 3.38 G: 375; 3 INJECTION, SOLUTION INTRAVENOUS at 23:37

## 2021-11-25 RX ADMIN — HEPARIN SODIUM 5000 UNITS: 5000 INJECTION INTRAVENOUS; SUBCUTANEOUS at 14:28

## 2021-11-25 RX ADMIN — IPRATROPIUM BROMIDE AND ALBUTEROL SULFATE 3 ML: 2.5; .5 SOLUTION RESPIRATORY (INHALATION) at 15:07

## 2021-11-25 RX ADMIN — IPRATROPIUM BROMIDE AND ALBUTEROL SULFATE 3 ML: 2.5; .5 SOLUTION RESPIRATORY (INHALATION) at 08:12

## 2021-11-26 ENCOUNTER — APPOINTMENT (OUTPATIENT)
Dept: CT IMAGING | Facility: HOSPITAL | Age: 66
End: 2021-11-26

## 2021-11-26 ENCOUNTER — APPOINTMENT (OUTPATIENT)
Dept: CARDIOLOGY | Facility: HOSPITAL | Age: 66
End: 2021-11-26

## 2021-11-26 LAB
ALBUMIN SERPL-MCNC: 2.3 G/DL (ref 3.5–5.2)
ANION GAP SERPL CALCULATED.3IONS-SCNC: 8.9 MMOL/L (ref 5–15)
AORTIC ARCH: 3 CM
AORTIC DIMENSIONLESS INDEX: 0.8 (DI)
BACTERIA FLD CULT: NORMAL
BACTERIA SPEC ANAEROBE CULT: ABNORMAL
BACTERIA SPEC ANAEROBE CULT: ABNORMAL
BASOPHILS # BLD AUTO: 0.02 10*3/MM3 (ref 0–0.2)
BASOPHILS NFR BLD AUTO: 0.2 % (ref 0–1.5)
BH CV ECHO MEAS - ACS: 1.5 CM
BH CV ECHO MEAS - AO ARCH DIAM (PROXIMAL TRANS.): 3 CM
BH CV ECHO MEAS - AO MAX PG (FULL): 1.3 MMHG
BH CV ECHO MEAS - AO MAX PG: 9.7 MMHG
BH CV ECHO MEAS - AO MEAN PG (FULL): 1.8 MMHG
BH CV ECHO MEAS - AO MEAN PG: 5.9 MMHG
BH CV ECHO MEAS - AO ROOT AREA (BSA CORRECTED): 1.9
BH CV ECHO MEAS - AO ROOT AREA: 10.5 CM^2
BH CV ECHO MEAS - AO ROOT DIAM: 3.6 CM
BH CV ECHO MEAS - AO V2 MAX: 156.1 CM/SEC
BH CV ECHO MEAS - AO V2 MEAN: 113.5 CM/SEC
BH CV ECHO MEAS - AO V2 VTI: 31.8 CM
BH CV ECHO MEAS - AVA(I,A): 2.7 CM^2
BH CV ECHO MEAS - AVA(I,D): 2.7 CM^2
BH CV ECHO MEAS - AVA(V,A): 3.3 CM^2
BH CV ECHO MEAS - AVA(V,D): 3.3 CM^2
BH CV ECHO MEAS - BSA(HAYCOCK): 2 M^2
BH CV ECHO MEAS - BSA: 1.9 M^2
BH CV ECHO MEAS - BZI_BMI: 26.9 KILOGRAMS/M^2
BH CV ECHO MEAS - BZI_METRIC_HEIGHT: 172.7 CM
BH CV ECHO MEAS - BZI_METRIC_WEIGHT: 80.3 KG
BH CV ECHO MEAS - EDV(CUBED): 42 ML
BH CV ECHO MEAS - EDV(MOD-SP2): 37 ML
BH CV ECHO MEAS - EDV(MOD-SP4): 53 ML
BH CV ECHO MEAS - EDV(TEICH): 50.1 ML
BH CV ECHO MEAS - EF(CUBED): 78.3 %
BH CV ECHO MEAS - EF(MOD-BP): 58.9 %
BH CV ECHO MEAS - EF(MOD-SP2): 59.5 %
BH CV ECHO MEAS - EF(MOD-SP4): 60.4 %
BH CV ECHO MEAS - EF(TEICH): 71.5 %
BH CV ECHO MEAS - ESV(CUBED): 9.1 ML
BH CV ECHO MEAS - ESV(MOD-SP2): 15 ML
BH CV ECHO MEAS - ESV(MOD-SP4): 21 ML
BH CV ECHO MEAS - ESV(TEICH): 14.3 ML
BH CV ECHO MEAS - FS: 39.9 %
BH CV ECHO MEAS - IVS/LVPW: 1
BH CV ECHO MEAS - IVSD: 0.91 CM
BH CV ECHO MEAS - LAT PEAK E' VEL: 13.7 CM/SEC
BH CV ECHO MEAS - LV DIASTOLIC VOL/BSA (35-75): 27.3 ML/M^2
BH CV ECHO MEAS - LV MASS(C)D: 86.1 GRAMS
BH CV ECHO MEAS - LV MASS(C)DI: 44.4 GRAMS/M^2
BH CV ECHO MEAS - LV MAX PG: 8.4 MMHG
BH CV ECHO MEAS - LV MEAN PG: 4.1 MMHG
BH CV ECHO MEAS - LV SYSTOLIC VOL/BSA (12-30): 10.8 ML/M^2
BH CV ECHO MEAS - LV V1 MAX: 145 CM/SEC
BH CV ECHO MEAS - LV V1 MEAN: 92 CM/SEC
BH CV ECHO MEAS - LV V1 VTI: 24.1 CM
BH CV ECHO MEAS - LVIDD: 3.5 CM
BH CV ECHO MEAS - LVIDS: 2.1 CM
BH CV ECHO MEAS - LVLD AP2: 6.7 CM
BH CV ECHO MEAS - LVLD AP4: 6.8 CM
BH CV ECHO MEAS - LVLS AP2: 5.6 CM
BH CV ECHO MEAS - LVLS AP4: 5.9 CM
BH CV ECHO MEAS - LVOT AREA (M): 3.5 CM^2
BH CV ECHO MEAS - LVOT AREA: 3.6 CM^2
BH CV ECHO MEAS - LVOT DIAM: 2.1 CM
BH CV ECHO MEAS - LVPWD: 0.87 CM
BH CV ECHO MEAS - MED PEAK E' VEL: 9.5 CM/SEC
BH CV ECHO MEAS - MV A DUR: 0.12 SEC
BH CV ECHO MEAS - MV A MAX VEL: 85.3 CM/SEC
BH CV ECHO MEAS - MV DEC SLOPE: 386.8 CM/SEC^2
BH CV ECHO MEAS - MV DEC TIME: 180 SEC
BH CV ECHO MEAS - MV E MAX VEL: 80.1 CM/SEC
BH CV ECHO MEAS - MV E/A: 0.94
BH CV ECHO MEAS - MV MAX PG: 4.1 MMHG
BH CV ECHO MEAS - MV MEAN PG: 1.7 MMHG
BH CV ECHO MEAS - MV P1/2T MAX VEL: 104.5 CM/SEC
BH CV ECHO MEAS - MV P1/2T: 79.1 MSEC
BH CV ECHO MEAS - MV V2 MAX: 101.8 CM/SEC
BH CV ECHO MEAS - MV V2 MEAN: 61.3 CM/SEC
BH CV ECHO MEAS - MV V2 VTI: 24.6 CM
BH CV ECHO MEAS - MVA P1/2T LCG: 2.1 CM^2
BH CV ECHO MEAS - MVA(P1/2T): 2.8 CM^2
BH CV ECHO MEAS - MVA(VTI): 3.5 CM^2
BH CV ECHO MEAS - PA ACC TIME: 0.05 SEC
BH CV ECHO MEAS - PA MAX PG (FULL): 3.4 MMHG
BH CV ECHO MEAS - PA MAX PG: 5.5 MMHG
BH CV ECHO MEAS - PA PR(ACCEL): 57.6 MMHG
BH CV ECHO MEAS - PA V2 MAX: 116.7 CM/SEC
BH CV ECHO MEAS - PULM A REVS DUR: 0.14 SEC
BH CV ECHO MEAS - PULM A REVS VEL: 39 CM/SEC
BH CV ECHO MEAS - PULM DIAS VEL: 40.3 CM/SEC
BH CV ECHO MEAS - PULM S/D: 1.4
BH CV ECHO MEAS - PULM SYS VEL: 56.1 CM/SEC
BH CV ECHO MEAS - RV MAX PG: 2.1 MMHG
BH CV ECHO MEAS - RV MEAN PG: 1.3 MMHG
BH CV ECHO MEAS - RV V1 MAX: 72 CM/SEC
BH CV ECHO MEAS - RV V1 MEAN: 54 CM/SEC
BH CV ECHO MEAS - RV V1 VTI: 13.3 CM
BH CV ECHO MEAS - SI(AO): 171.2 ML/M^2
BH CV ECHO MEAS - SI(CUBED): 17 ML/M^2
BH CV ECHO MEAS - SI(LVOT): 44.5 ML/M^2
BH CV ECHO MEAS - SI(MOD-SP2): 11.3 ML/M^2
BH CV ECHO MEAS - SI(MOD-SP4): 16.5 ML/M^2
BH CV ECHO MEAS - SI(TEICH): 18.5 ML/M^2
BH CV ECHO MEAS - SV(AO): 332.2 ML
BH CV ECHO MEAS - SV(CUBED): 32.9 ML
BH CV ECHO MEAS - SV(LVOT): 86.4 ML
BH CV ECHO MEAS - SV(MOD-SP2): 22 ML
BH CV ECHO MEAS - SV(MOD-SP4): 32 ML
BH CV ECHO MEAS - SV(TEICH): 35.8 ML
BH CV ECHO MEAS - TAPSE (>1.6): 1.7 CM
BH CV ECHO MEASUREMENTS AVERAGE E/E' RATIO: 6.91
BH CV XLRA - TDI S': 21.3 CM/SEC
BUN SERPL-MCNC: 22 MG/DL (ref 8–23)
BUN/CREAT SERPL: 17.3 (ref 7–25)
CALCIUM SPEC-SCNC: 7.3 MG/DL (ref 8.6–10.5)
CHLORIDE SERPL-SCNC: 105 MMOL/L (ref 98–107)
CHLORIDE UR-SCNC: 126 MMOL/L
CO2 SERPL-SCNC: 24.1 MMOL/L (ref 22–29)
CREAT SERPL-MCNC: 1.27 MG/DL (ref 0.76–1.27)
CREAT UR-MCNC: 20.2 MG/DL
DEPRECATED RDW RBC AUTO: 40.6 FL (ref 37–54)
EOSINOPHIL # BLD AUTO: 0.08 10*3/MM3 (ref 0–0.4)
EOSINOPHIL NFR BLD AUTO: 0.7 % (ref 0.3–6.2)
ERYTHROCYTE [DISTWIDTH] IN BLOOD BY AUTOMATED COUNT: 12.8 % (ref 12.3–15.4)
GFR SERPL CREATININE-BSD FRML MDRD: 57 ML/MIN/1.73
GLUCOSE SERPL-MCNC: 99 MG/DL (ref 65–99)
GRAM STN SPEC: NORMAL
GRAM STN SPEC: NORMAL
HCT VFR BLD AUTO: 27.9 % (ref 37.5–51)
HGB BLD-MCNC: 9.6 G/DL (ref 13–17.7)
IMM GRANULOCYTES # BLD AUTO: 0.07 10*3/MM3 (ref 0–0.05)
IMM GRANULOCYTES NFR BLD AUTO: 0.6 % (ref 0–0.5)
LEFT ATRIUM VOLUME INDEX: 15.6 ML/M2
LV EF 2D ECHO EST: 59 %
LYMPHOCYTES # BLD AUTO: 1.52 10*3/MM3 (ref 0.7–3.1)
LYMPHOCYTES NFR BLD AUTO: 12.7 % (ref 19.6–45.3)
MAXIMAL PREDICTED HEART RATE: 153 BPM
MCH RBC QN AUTO: 30.2 PG (ref 26.6–33)
MCHC RBC AUTO-ENTMCNC: 34.4 G/DL (ref 31.5–35.7)
MCV RBC AUTO: 87.7 FL (ref 79–97)
MONOCYTES # BLD AUTO: 0.67 10*3/MM3 (ref 0.1–0.9)
MONOCYTES NFR BLD AUTO: 5.6 % (ref 5–12)
NEUTROPHILS NFR BLD AUTO: 80.2 % (ref 42.7–76)
NEUTROPHILS NFR BLD AUTO: 9.64 10*3/MM3 (ref 1.7–7)
NRBC BLD AUTO-RTO: 0 /100 WBC (ref 0–0.2)
NT-PROBNP SERPL-MCNC: 718.2 PG/ML (ref 0–900)
PLATELET # BLD AUTO: 432 10*3/MM3 (ref 140–450)
PMV BLD AUTO: 10.1 FL (ref 6–12)
POTASSIUM SERPL-SCNC: 3.9 MMOL/L (ref 3.5–5.2)
PROT ?TM UR-MCNC: <4 MG/DL
PROT/CREAT UR: NORMAL MG/G{CREAT}
RBC # BLD AUTO: 3.18 10*6/MM3 (ref 4.14–5.8)
SODIUM SERPL-SCNC: 138 MMOL/L (ref 136–145)
SODIUM UR-SCNC: 134 MMOL/L
STRESS TARGET HR: 130 BPM
WBC NRBC COR # BLD: 12 10*3/MM3 (ref 3.4–10.8)

## 2021-11-26 PROCEDURE — 25010000002 FUROSEMIDE PER 20 MG: Performed by: STUDENT IN AN ORGANIZED HEALTH CARE EDUCATION/TRAINING PROGRAM

## 2021-11-26 PROCEDURE — 99231 SBSQ HOSP IP/OBS SF/LOW 25: CPT | Performed by: SURGERY

## 2021-11-26 PROCEDURE — 25010000002 PIPERACILLIN SOD-TAZOBACTAM PER 1 G: Performed by: STUDENT IN AN ORGANIZED HEALTH CARE EDUCATION/TRAINING PROGRAM

## 2021-11-26 PROCEDURE — 84156 ASSAY OF PROTEIN URINE: CPT | Performed by: INTERNAL MEDICINE

## 2021-11-26 PROCEDURE — 74176 CT ABD & PELVIS W/O CONTRAST: CPT

## 2021-11-26 PROCEDURE — 93306 TTE W/DOPPLER COMPLETE: CPT | Performed by: INTERNAL MEDICINE

## 2021-11-26 PROCEDURE — 82436 ASSAY OF URINE CHLORIDE: CPT | Performed by: INTERNAL MEDICINE

## 2021-11-26 PROCEDURE — 85025 COMPLETE CBC W/AUTO DIFF WBC: CPT | Performed by: STUDENT IN AN ORGANIZED HEALTH CARE EDUCATION/TRAINING PROGRAM

## 2021-11-26 PROCEDURE — 82570 ASSAY OF URINE CREATININE: CPT | Performed by: INTERNAL MEDICINE

## 2021-11-26 PROCEDURE — 83880 ASSAY OF NATRIURETIC PEPTIDE: CPT | Performed by: STUDENT IN AN ORGANIZED HEALTH CARE EDUCATION/TRAINING PROGRAM

## 2021-11-26 PROCEDURE — 82040 ASSAY OF SERUM ALBUMIN: CPT | Performed by: STUDENT IN AN ORGANIZED HEALTH CARE EDUCATION/TRAINING PROGRAM

## 2021-11-26 PROCEDURE — 25010000002 HEPARIN (PORCINE) PER 1000 UNITS: Performed by: STUDENT IN AN ORGANIZED HEALTH CARE EDUCATION/TRAINING PROGRAM

## 2021-11-26 PROCEDURE — 80048 BASIC METABOLIC PNL TOTAL CA: CPT | Performed by: STUDENT IN AN ORGANIZED HEALTH CARE EDUCATION/TRAINING PROGRAM

## 2021-11-26 PROCEDURE — 94799 UNLISTED PULMONARY SVC/PX: CPT

## 2021-11-26 PROCEDURE — 93306 TTE W/DOPPLER COMPLETE: CPT

## 2021-11-26 PROCEDURE — 84300 ASSAY OF URINE SODIUM: CPT | Performed by: INTERNAL MEDICINE

## 2021-11-26 RX ORDER — FUROSEMIDE 10 MG/ML
40 INJECTION INTRAMUSCULAR; INTRAVENOUS EVERY 12 HOURS
Status: DISCONTINUED | OUTPATIENT
Start: 2021-11-26 | End: 2021-11-27

## 2021-11-26 RX ADMIN — FUROSEMIDE 40 MG: 10 INJECTION, SOLUTION INTRAMUSCULAR; INTRAVENOUS at 21:11

## 2021-11-26 RX ADMIN — HEPARIN SODIUM 5000 UNITS: 5000 INJECTION INTRAVENOUS; SUBCUTANEOUS at 06:08

## 2021-11-26 RX ADMIN — TAZOBACTAM SODIUM AND PIPERACILLIN SODIUM 3.38 G: 375; 3 INJECTION, SOLUTION INTRAVENOUS at 14:28

## 2021-11-26 RX ADMIN — IPRATROPIUM BROMIDE AND ALBUTEROL SULFATE 3 ML: 2.5; .5 SOLUTION RESPIRATORY (INHALATION) at 19:53

## 2021-11-26 RX ADMIN — PRAVASTATIN SODIUM 40 MG: 40 TABLET ORAL at 08:17

## 2021-11-26 RX ADMIN — SODIUM CHLORIDE, PRESERVATIVE FREE 10 ML: 5 INJECTION INTRAVENOUS at 08:17

## 2021-11-26 RX ADMIN — TAZOBACTAM SODIUM AND PIPERACILLIN SODIUM 3.38 G: 375; 3 INJECTION, SOLUTION INTRAVENOUS at 22:49

## 2021-11-26 RX ADMIN — HEPARIN SODIUM 5000 UNITS: 5000 INJECTION INTRAVENOUS; SUBCUTANEOUS at 14:28

## 2021-11-26 RX ADMIN — IPRATROPIUM BROMIDE AND ALBUTEROL SULFATE 3 ML: 2.5; .5 SOLUTION RESPIRATORY (INHALATION) at 13:26

## 2021-11-26 RX ADMIN — PANTOPRAZOLE SODIUM 40 MG: 40 TABLET, DELAYED RELEASE ORAL at 06:08

## 2021-11-26 RX ADMIN — TAZOBACTAM SODIUM AND PIPERACILLIN SODIUM 3.38 G: 375; 3 INJECTION, SOLUTION INTRAVENOUS at 13:50

## 2021-11-26 RX ADMIN — TAZOBACTAM SODIUM AND PIPERACILLIN SODIUM 3.38 G: 375; 3 INJECTION, SOLUTION INTRAVENOUS at 08:17

## 2021-11-26 RX ADMIN — HEPARIN SODIUM 5000 UNITS: 5000 INJECTION INTRAVENOUS; SUBCUTANEOUS at 21:11

## 2021-11-26 RX ADMIN — FUROSEMIDE 40 MG: 10 INJECTION, SOLUTION INTRAMUSCULAR; INTRAVENOUS at 08:17

## 2021-11-26 RX ADMIN — ASPIRIN 81 MG: 81 TABLET, COATED ORAL at 08:17

## 2021-11-27 LAB
ALBUMIN SERPL-MCNC: 2.6 G/DL (ref 3.5–5.2)
ANION GAP SERPL CALCULATED.3IONS-SCNC: 9.6 MMOL/L (ref 5–15)
BASOPHILS # BLD AUTO: 0.01 10*3/MM3 (ref 0–0.2)
BASOPHILS NFR BLD AUTO: 0.1 % (ref 0–1.5)
BUN SERPL-MCNC: 20 MG/DL (ref 8–23)
BUN/CREAT SERPL: 14.4 (ref 7–25)
CALCIUM SPEC-SCNC: 7.6 MG/DL (ref 8.6–10.5)
CHLORIDE SERPL-SCNC: 102 MMOL/L (ref 98–107)
CO2 SERPL-SCNC: 27.4 MMOL/L (ref 22–29)
CREAT SERPL-MCNC: 1.39 MG/DL (ref 0.76–1.27)
DEPRECATED RDW RBC AUTO: 43.3 FL (ref 37–54)
EOSINOPHIL # BLD AUTO: 0.1 10*3/MM3 (ref 0–0.4)
EOSINOPHIL NFR BLD AUTO: 0.9 % (ref 0.3–6.2)
ERYTHROCYTE [DISTWIDTH] IN BLOOD BY AUTOMATED COUNT: 13 % (ref 12.3–15.4)
GFR SERPL CREATININE-BSD FRML MDRD: 51 ML/MIN/1.73
GLUCOSE SERPL-MCNC: 109 MG/DL (ref 65–99)
HCT VFR BLD AUTO: 29.8 % (ref 37.5–51)
HGB BLD-MCNC: 10 G/DL (ref 13–17.7)
IMM GRANULOCYTES # BLD AUTO: 0.08 10*3/MM3 (ref 0–0.05)
IMM GRANULOCYTES NFR BLD AUTO: 0.7 % (ref 0–0.5)
LYMPHOCYTES # BLD AUTO: 1.91 10*3/MM3 (ref 0.7–3.1)
LYMPHOCYTES NFR BLD AUTO: 16.9 % (ref 19.6–45.3)
MAGNESIUM SERPL-MCNC: 2.1 MG/DL (ref 1.6–2.4)
MCH RBC QN AUTO: 30.9 PG (ref 26.6–33)
MCHC RBC AUTO-ENTMCNC: 33.6 G/DL (ref 31.5–35.7)
MCV RBC AUTO: 92 FL (ref 79–97)
MONOCYTES # BLD AUTO: 0.65 10*3/MM3 (ref 0.1–0.9)
MONOCYTES NFR BLD AUTO: 5.7 % (ref 5–12)
NEUTROPHILS NFR BLD AUTO: 75.7 % (ref 42.7–76)
NEUTROPHILS NFR BLD AUTO: 8.57 10*3/MM3 (ref 1.7–7)
NRBC BLD AUTO-RTO: 0 /100 WBC (ref 0–0.2)
PHOSPHATE SERPL-MCNC: 2.9 MG/DL (ref 2.5–4.5)
PLATELET # BLD AUTO: 478 10*3/MM3 (ref 140–450)
PMV BLD AUTO: 9.8 FL (ref 6–12)
POTASSIUM SERPL-SCNC: 3.9 MMOL/L (ref 3.5–5.2)
QT INTERVAL: 361 MS
RBC # BLD AUTO: 3.24 10*6/MM3 (ref 4.14–5.8)
SODIUM SERPL-SCNC: 139 MMOL/L (ref 136–145)
URATE SERPL-MCNC: 3.5 MG/DL (ref 3.4–7)
WBC NRBC COR # BLD: 11.32 10*3/MM3 (ref 3.4–10.8)

## 2021-11-27 PROCEDURE — 83735 ASSAY OF MAGNESIUM: CPT | Performed by: INTERNAL MEDICINE

## 2021-11-27 PROCEDURE — 85025 COMPLETE CBC W/AUTO DIFF WBC: CPT | Performed by: STUDENT IN AN ORGANIZED HEALTH CARE EDUCATION/TRAINING PROGRAM

## 2021-11-27 PROCEDURE — 80069 RENAL FUNCTION PANEL: CPT | Performed by: INTERNAL MEDICINE

## 2021-11-27 PROCEDURE — 93005 ELECTROCARDIOGRAM TRACING: CPT | Performed by: STUDENT IN AN ORGANIZED HEALTH CARE EDUCATION/TRAINING PROGRAM

## 2021-11-27 PROCEDURE — 84550 ASSAY OF BLOOD/URIC ACID: CPT | Performed by: INTERNAL MEDICINE

## 2021-11-27 PROCEDURE — 25010000002 HEPARIN (PORCINE) PER 1000 UNITS: Performed by: STUDENT IN AN ORGANIZED HEALTH CARE EDUCATION/TRAINING PROGRAM

## 2021-11-27 PROCEDURE — 93010 ELECTROCARDIOGRAM REPORT: CPT | Performed by: INTERNAL MEDICINE

## 2021-11-27 PROCEDURE — 99223 1ST HOSP IP/OBS HIGH 75: CPT | Performed by: STUDENT IN AN ORGANIZED HEALTH CARE EDUCATION/TRAINING PROGRAM

## 2021-11-27 PROCEDURE — 94799 UNLISTED PULMONARY SVC/PX: CPT

## 2021-11-27 PROCEDURE — 25010000002 PIPERACILLIN SOD-TAZOBACTAM PER 1 G: Performed by: STUDENT IN AN ORGANIZED HEALTH CARE EDUCATION/TRAINING PROGRAM

## 2021-11-27 PROCEDURE — 99231 SBSQ HOSP IP/OBS SF/LOW 25: CPT | Performed by: SURGERY

## 2021-11-27 RX ORDER — FUROSEMIDE 40 MG/1
40 TABLET ORAL DAILY
Status: DISCONTINUED | OUTPATIENT
Start: 2021-11-27 | End: 2021-11-28 | Stop reason: HOSPADM

## 2021-11-27 RX ORDER — LEVOFLOXACIN 750 MG/1
750 TABLET ORAL EVERY 24 HOURS
Status: DISCONTINUED | OUTPATIENT
Start: 2021-11-27 | End: 2021-11-28 | Stop reason: HOSPADM

## 2021-11-27 RX ORDER — METRONIDAZOLE 500 MG/1
500 TABLET ORAL EVERY 8 HOURS SCHEDULED
Status: DISCONTINUED | OUTPATIENT
Start: 2021-11-27 | End: 2021-11-28 | Stop reason: HOSPADM

## 2021-11-27 RX ADMIN — IPRATROPIUM BROMIDE AND ALBUTEROL SULFATE 3 ML: 2.5; .5 SOLUTION RESPIRATORY (INHALATION) at 14:05

## 2021-11-27 RX ADMIN — TAZOBACTAM SODIUM AND PIPERACILLIN SODIUM 3.38 G: 375; 3 INJECTION, SOLUTION INTRAVENOUS at 08:22

## 2021-11-27 RX ADMIN — HEPARIN SODIUM 5000 UNITS: 5000 INJECTION INTRAVENOUS; SUBCUTANEOUS at 22:28

## 2021-11-27 RX ADMIN — HEPARIN SODIUM 5000 UNITS: 5000 INJECTION INTRAVENOUS; SUBCUTANEOUS at 15:30

## 2021-11-27 RX ADMIN — PANTOPRAZOLE SODIUM 40 MG: 40 TABLET, DELAYED RELEASE ORAL at 06:39

## 2021-11-27 RX ADMIN — FUROSEMIDE 40 MG: 40 TABLET ORAL at 10:51

## 2021-11-27 RX ADMIN — METRONIDAZOLE 500 MG: 500 TABLET, FILM COATED ORAL at 22:28

## 2021-11-27 RX ADMIN — HEPARIN SODIUM 5000 UNITS: 5000 INJECTION INTRAVENOUS; SUBCUTANEOUS at 06:39

## 2021-11-27 RX ADMIN — TAZOBACTAM SODIUM AND PIPERACILLIN SODIUM 3.38 G: 375; 3 INJECTION, SOLUTION INTRAVENOUS at 15:31

## 2021-11-27 RX ADMIN — IPRATROPIUM BROMIDE AND ALBUTEROL SULFATE 3 ML: 2.5; .5 SOLUTION RESPIRATORY (INHALATION) at 21:24

## 2021-11-27 RX ADMIN — ASPIRIN 81 MG: 81 TABLET, COATED ORAL at 08:22

## 2021-11-27 RX ADMIN — PRAVASTATIN SODIUM 40 MG: 40 TABLET ORAL at 08:22

## 2021-11-27 RX ADMIN — IPRATROPIUM BROMIDE AND ALBUTEROL SULFATE 3 ML: 2.5; .5 SOLUTION RESPIRATORY (INHALATION) at 08:10

## 2021-11-27 RX ADMIN — LEVOFLOXACIN 750 MG: 750 TABLET, FILM COATED ORAL at 18:34

## 2021-11-28 ENCOUNTER — READMISSION MANAGEMENT (OUTPATIENT)
Dept: CALL CENTER | Facility: HOSPITAL | Age: 66
End: 2021-11-28

## 2021-11-28 VITALS
HEART RATE: 89 BPM | BODY MASS INDEX: 25.7 KG/M2 | DIASTOLIC BLOOD PRESSURE: 71 MMHG | RESPIRATION RATE: 20 BRPM | OXYGEN SATURATION: 96 % | HEIGHT: 68 IN | SYSTOLIC BLOOD PRESSURE: 109 MMHG | TEMPERATURE: 98.1 F | WEIGHT: 169.6 LBS

## 2021-11-28 LAB
ANION GAP SERPL CALCULATED.3IONS-SCNC: 11.2 MMOL/L (ref 5–15)
BASOPHILS # BLD AUTO: 0.02 10*3/MM3 (ref 0–0.2)
BASOPHILS NFR BLD AUTO: 0.2 % (ref 0–1.5)
BUN SERPL-MCNC: 19 MG/DL (ref 8–23)
BUN/CREAT SERPL: 15.6 (ref 7–25)
CALCIUM SPEC-SCNC: 7.6 MG/DL (ref 8.6–10.5)
CHLORIDE SERPL-SCNC: 102 MMOL/L (ref 98–107)
CO2 SERPL-SCNC: 23.8 MMOL/L (ref 22–29)
CREAT SERPL-MCNC: 1.22 MG/DL (ref 0.76–1.27)
DEPRECATED RDW RBC AUTO: 44.6 FL (ref 37–54)
EOSINOPHIL # BLD AUTO: 0.1 10*3/MM3 (ref 0–0.4)
EOSINOPHIL NFR BLD AUTO: 0.9 % (ref 0.3–6.2)
ERYTHROCYTE [DISTWIDTH] IN BLOOD BY AUTOMATED COUNT: 13.1 % (ref 12.3–15.4)
GFR SERPL CREATININE-BSD FRML MDRD: 59 ML/MIN/1.73
GLUCOSE SERPL-MCNC: 128 MG/DL (ref 65–99)
HCT VFR BLD AUTO: 29.4 % (ref 37.5–51)
HGB BLD-MCNC: 9.6 G/DL (ref 13–17.7)
IMM GRANULOCYTES # BLD AUTO: 0.09 10*3/MM3 (ref 0–0.05)
IMM GRANULOCYTES NFR BLD AUTO: 0.8 % (ref 0–0.5)
LYMPHOCYTES # BLD AUTO: 1.87 10*3/MM3 (ref 0.7–3.1)
LYMPHOCYTES NFR BLD AUTO: 16.8 % (ref 19.6–45.3)
MAGNESIUM SERPL-MCNC: 2 MG/DL (ref 1.6–2.4)
MCH RBC QN AUTO: 30.7 PG (ref 26.6–33)
MCHC RBC AUTO-ENTMCNC: 32.7 G/DL (ref 31.5–35.7)
MCV RBC AUTO: 93.9 FL (ref 79–97)
MONOCYTES # BLD AUTO: 0.64 10*3/MM3 (ref 0.1–0.9)
MONOCYTES NFR BLD AUTO: 5.7 % (ref 5–12)
NEUTROPHILS NFR BLD AUTO: 75.6 % (ref 42.7–76)
NEUTROPHILS NFR BLD AUTO: 8.43 10*3/MM3 (ref 1.7–7)
NRBC BLD AUTO-RTO: 0 /100 WBC (ref 0–0.2)
PLATELET # BLD AUTO: 495 10*3/MM3 (ref 140–450)
PMV BLD AUTO: 9.9 FL (ref 6–12)
POTASSIUM SERPL-SCNC: 4 MMOL/L (ref 3.5–5.2)
RBC # BLD AUTO: 3.13 10*6/MM3 (ref 4.14–5.8)
SODIUM SERPL-SCNC: 137 MMOL/L (ref 136–145)
WBC NRBC COR # BLD: 11.15 10*3/MM3 (ref 3.4–10.8)

## 2021-11-28 PROCEDURE — 85025 COMPLETE CBC W/AUTO DIFF WBC: CPT | Performed by: STUDENT IN AN ORGANIZED HEALTH CARE EDUCATION/TRAINING PROGRAM

## 2021-11-28 PROCEDURE — 94799 UNLISTED PULMONARY SVC/PX: CPT

## 2021-11-28 PROCEDURE — 83735 ASSAY OF MAGNESIUM: CPT | Performed by: STUDENT IN AN ORGANIZED HEALTH CARE EDUCATION/TRAINING PROGRAM

## 2021-11-28 PROCEDURE — 99231 SBSQ HOSP IP/OBS SF/LOW 25: CPT | Performed by: SURGERY

## 2021-11-28 PROCEDURE — 80048 BASIC METABOLIC PNL TOTAL CA: CPT | Performed by: STUDENT IN AN ORGANIZED HEALTH CARE EDUCATION/TRAINING PROGRAM

## 2021-11-28 PROCEDURE — 25010000002 HEPARIN (PORCINE) PER 1000 UNITS: Performed by: STUDENT IN AN ORGANIZED HEALTH CARE EDUCATION/TRAINING PROGRAM

## 2021-11-28 RX ORDER — ACETAMINOPHEN 325 MG/1
650 TABLET ORAL EVERY 6 HOURS PRN
Qty: 56 TABLET | Refills: 0 | Status: SHIPPED | OUTPATIENT
Start: 2021-11-28 | End: 2021-12-12

## 2021-11-28 RX ORDER — LEVOFLOXACIN 750 MG/1
750 TABLET ORAL EVERY 24 HOURS
Qty: 22 TABLET | Refills: 0 | Status: SHIPPED | OUTPATIENT
Start: 2021-11-28 | End: 2021-12-17 | Stop reason: SDUPTHER

## 2021-11-28 RX ORDER — METRONIDAZOLE 500 MG/1
500 TABLET ORAL EVERY 8 HOURS SCHEDULED
Qty: 63 TABLET | Refills: 0 | Status: SHIPPED | OUTPATIENT
Start: 2021-11-28 | End: 2021-12-17 | Stop reason: SDUPTHER

## 2021-11-28 RX ORDER — FUROSEMIDE 40 MG/1
40 TABLET ORAL DAILY
Qty: 30 TABLET | Refills: 0 | Status: SHIPPED | OUTPATIENT
Start: 2021-11-29 | End: 2022-02-01

## 2021-11-28 RX ADMIN — HEPARIN SODIUM 5000 UNITS: 5000 INJECTION INTRAVENOUS; SUBCUTANEOUS at 05:27

## 2021-11-28 RX ADMIN — IPRATROPIUM BROMIDE AND ALBUTEROL SULFATE 3 ML: 2.5; .5 SOLUTION RESPIRATORY (INHALATION) at 07:46

## 2021-11-28 RX ADMIN — FUROSEMIDE 40 MG: 40 TABLET ORAL at 08:37

## 2021-11-28 RX ADMIN — METRONIDAZOLE 500 MG: 500 TABLET, FILM COATED ORAL at 05:27

## 2021-11-28 RX ADMIN — LEVOFLOXACIN 750 MG: 750 TABLET, FILM COATED ORAL at 17:03

## 2021-11-28 RX ADMIN — PANTOPRAZOLE SODIUM 40 MG: 40 TABLET, DELAYED RELEASE ORAL at 05:27

## 2021-11-28 RX ADMIN — ASPIRIN 81 MG: 81 TABLET, COATED ORAL at 08:37

## 2021-11-28 RX ADMIN — METRONIDAZOLE 500 MG: 500 TABLET, FILM COATED ORAL at 15:46

## 2021-11-28 RX ADMIN — HEPARIN SODIUM 5000 UNITS: 5000 INJECTION INTRAVENOUS; SUBCUTANEOUS at 15:46

## 2021-11-28 RX ADMIN — IPRATROPIUM BROMIDE AND ALBUTEROL SULFATE 3 ML: 2.5; .5 SOLUTION RESPIRATORY (INHALATION) at 15:58

## 2021-11-28 RX ADMIN — PRAVASTATIN SODIUM 40 MG: 40 TABLET ORAL at 08:37

## 2021-11-28 NOTE — OUTREACH NOTE
Prep Survey      Responses   [facility] patient discharged from? [place]   Is LACE score < 7 ? No   Emergency Room discharge w/ pulse ox? No   Eligibility [facility]   Date of Admission 11/19/21   Date of Discharge 11/28/21   Discharge Disposition Home or Self Care   Discharge diagnosis Liver abscess   Does the patient have one of the following disease processes/diagnoses(primary or secondary)? Other   Does the patient have Home health ordered? No   Is there a DME ordered? No   Prep survey completed? Yes          Anna Quintero RN

## 2021-11-29 ENCOUNTER — TRANSITIONAL CARE MANAGEMENT TELEPHONE ENCOUNTER (OUTPATIENT)
Dept: CALL CENTER | Facility: HOSPITAL | Age: 66
End: 2021-11-29

## 2021-11-29 NOTE — OUTREACH NOTE
Call Center TCM Note      Responses   [facility] patient discharged from? [place]   Does the patient have one of the following disease processes/diagnoses(primary or secondary)? Other   TCM attempt successful? Yes   Discharge diagnosis Liver abscess   Is patient permission given to speak with other caregiver? Yes   Person spoke with today (if not patient) and relationship WIFE   Meds reviewed with patient/caregiver? Yes   Is the patient having any side effects they believe may be caused by any medication additions or changes? No   Does the patient have all medications ordered at discharge? Yes   Is the patient taking all medications as directed (includes completed medication regime)? Yes   Does the patient have a primary care provider?  Yes   Does the patient have an appointment with their PCP within 7 days of discharge? Greater than 7 days  [8 days post d/c ]   Comments regarding PCP TCM FWP with PCP Dr Carter has been sched for 12/06/2021.   Has the patient kept scheduled appointments due by today? N/A   Has home health visited the patient within 72 hours of discharge? N/A   Psychosocial issues? No   Did the patient receive a copy of their discharge instructions? Yes   Nursing interventions Reviewed instructions with patient   What is the patient's perception of their health status since discharge? Improving   Is the patient/caregiver able to teach back signs and symptoms related to disease process for when to call PCP? Yes   Is the patient/caregiver able to teach back signs and symptoms related to disease process for when to call 911? Yes   Is the patient/caregiver able to teach back the hierarchy of who to call/visit for symptoms/problems? PCP, Specialist, Home health nurse, Urgent Care, ED, 911 Yes   TCM call completed? Yes   Wrap up additional comments Pt sleeping, per wife he is tired but feeling better. Abdominal pain improved. All new medications (Lasix, Levaquin, Flagyl) in place. No fever, chills.  No questions right now. INF DISEASE FWP 12/17/2021. TCM FWP with PCP Dr Carter has been sched for 12/06/2021.          Samira Bonner MA    11/29/2021, 14:52 EST

## 2021-12-06 ENCOUNTER — OFFICE VISIT (OUTPATIENT)
Dept: INTERNAL MEDICINE | Age: 66
End: 2021-12-06

## 2021-12-06 VITALS
HEART RATE: 113 BPM | BODY MASS INDEX: 24.86 KG/M2 | WEIGHT: 164 LBS | DIASTOLIC BLOOD PRESSURE: 68 MMHG | HEIGHT: 68 IN | SYSTOLIC BLOOD PRESSURE: 110 MMHG | OXYGEN SATURATION: 99 % | TEMPERATURE: 97.7 F

## 2021-12-06 DIAGNOSIS — K75.0 LIVER ABSCESS: Primary | ICD-10-CM

## 2021-12-06 PROCEDURE — 99495 TRANSJ CARE MGMT MOD F2F 14D: CPT | Performed by: INTERNAL MEDICINE

## 2021-12-06 PROCEDURE — 1111F DSCHRG MED/CURRENT MED MERGE: CPT | Performed by: INTERNAL MEDICINE

## 2021-12-06 NOTE — PROGRESS NOTES
"    I N T E R N A L  M E D I C I N E  J U N O H  K I M,  M D      ENCOUNTER DATE:  12/06/2021    Patrick Mckeon / 66 y.o. / male        CC:   Transitional Care- Liver abscess (11/19/21-11/28/21)      VITALS    Visit Vitals  /68 (BP Location: Left arm)   Pulse 113   Temp 97.7 °F (36.5 °C)   Ht 172.7 cm (68\")   Wt 74.4 kg (164 lb)   SpO2 99%   BMI 24.94 kg/m²       BP Readings from Last 3 Encounters:   12/06/21 110/68   11/28/21 109/71   11/18/21 120/58     Wt Readings from Last 3 Encounters:   12/06/21 74.4 kg (164 lb)   11/28/21 76.9 kg (169 lb 9.6 oz)   11/18/21 77.1 kg (170 lb)      Body mass index is 24.94 kg/m².    HPI:     Date of admission/discharge: As noted above in CC  Hospital: [facility]  Principle Dx: Liver abscess  Secondary Dx:   History prior to hospitalization: Worsening right side abdominal pain. Directed to ER.   Evaluation/Treatment: CT showed liver abscess. Started on IV antibiotic and ID consulted. IR drain placed. Anaerobic cultures grew 2 bacterial species. Treated with FQ and Flagyl.   Course: Denies fever, chills or worsening abdominal pain. Denies nausea or vomiting. Denies diarrhea. Has follow-up later this month with infectious disease.    Patient Care Team:  Lester Carter MD as PCP - General (Internal Medicine)  Orlin Quintero MD as Consulting Physician (Gastroenterology)  Keyur Mejia II, MD as Consulting Physician (Hematology and Oncology)  Humble Hamilton MD as Surgeon (Vascular Surgery)  Radhika, Keyur Larson MD as Consulting Physician (Dermatology)  ____________________________________________________________________    ASSESSMENT & PLAN:    1. Liver abscess      No orders of the defined types were placed in this encounter.      Summary/Discussion:  • Clinically doing fine.   • Continue levothyroxine and metronidazole as instructed by ID.   • Follow-up with ID and GS as scheduled.     Return for Next scheduled follow " up.    ____________________________________________________________________    REVIEW OF SYSTEMS    Review of Systems  No fever or chills  No worsening abdominal pain, nausea/vomiting or diarrhea       PHYSICAL EXAMINATION    Physical Exam  Abdomen: Soft and nontender. Nondistended. No palpable mass.     REVIEWED DATA:    Labs:   Lab Results   Component Value Date     11/28/2021    K 4.0 11/28/2021    CALCIUM 7.6 (L) 11/28/2021    AST 32 11/22/2021    ALT 47 (H) 11/22/2021    BUN 19 11/28/2021    CREATININE 1.22 11/28/2021    CREATININE 1.39 (H) 11/27/2021    CREATININE 1.27 11/26/2021    EGFRIFNONA 59 (L) 11/28/2021    EGFRIFAFRI 72 03/29/2021       Lab Results   Component Value Date    WBC 11.15 (H) 11/28/2021    HGB 9.6 (L) 11/28/2021    HGB 10.0 (L) 11/27/2021    HGB 9.6 (L) 11/26/2021     (H) 11/28/2021          Lab Results   Component Value Date    PROTEIN Negative 11/18/2021    GLUCOSEU Negative 11/22/2021    BLOODU Negative 11/22/2021    NITRITEU Negative 11/22/2021    LEUKOCYTESUR Negative 11/22/2021     Anaerobic Culture Peptoniphilus asaccharolyticus Abnormal        Anaerococcus prevotii Abnormal               Imaging:   Adult Transthoracic Echo Complete W/ Cont if Necessary Per Protocol    Addendum Date: 11/26/2021 Addendum:   · Calculated left ventricular EF = 58.9% Estimated left ventricular EF = 59% Estimated left ventricular EF was in agreement with the calculated left ventricular EF. Left ventricular systolic function is normal. Normal left ventricular cavity size and wall thickness noted. All left ventricular wall segments contract normally. Left ventricular diastolic function was normal. · No aortic valve regurgitation or stenosis is present. The aortic valve is abnormal in structure. There is moderate calcification of the aortic valve. · There is evidence of ascites present.      Result Date: 11/26/2021  Narrative: · Calculated left ventricular EF = 58.9% Estimated left ventricular  EF = 59% Estimated left ventricular EF was in agreement with the calculated left ventricular EF. Left ventricular systolic function is normal. Normal left ventricular cavity size and wall thickness noted. All left ventricular wall segments contract normally. Left ventricular diastolic function was normal. · No aortic valve regurgitation or stenosis is present. The aortic valve is abnormal in structure. There is moderate calcification of the aortic valve.      CT Abdomen Pelvis Without Contrast    Result Date: 11/27/2021  Narrative: ABDOMEN AND PELVIS CT WITHOUT CONTRAST  HISTORY: Follow-up liver abscess with drain in place.  TECHNIQUE: Abdomen and pelvis CT was performed without contrast and is correlated with noncontrast CT 11/23/2021 and pre drainage CT 11/19/2021.  Radiation dose reduction techniques were utilized, including automated exposure control and exposure modulation based on body size.  FINDINGS: There are small, posteriorly layering bilateral pleural effusions. No pericardial effusion is present.  There is a pigtail drain within a area of abnormal low density along the posterior right hepatic lobe. Predrainage, that lesion measured about 8 x 11 cm axial. Today the low-density lesion measures about 6.5 x 9.5 cm diameter. This is minimally decreased in size in comparison to the November 23 noncontrast CT. There is no gas seen within the lesion. The liver otherwise has a normal noncontrast CT appearance. There is no abscess elsewhere in the abdomen or the pelvis. A tiny volume of free fluid is present.  Some high density material layers along the dependent portion of the gallbladder, favored to be tiny calculi. The spleen, pancreas, and the right kidney are in situ, with a midpole right renal cyst and several small renal calculi unchanged. There has been prior left nephrectomy. The adrenal glands appear normal. There is aortobiiliac stent graft material. There is also extensive diverticulosis along the  colon, predominantly at the sigmoid region. No inflammatory change suggestive of diverticulitis is present today. The urinary bladder appears normal.      Impression: Low-density lesion in the posterior right hepatic lobe, with indwelling pigtail drain, is not significantly changed in size in comparison with the November 23 CT. No acute disease is observed elsewhere in the peritoneal cavity.  This report was finalized on 11/27/2021 12:53 PM by Dr. Jakob Magaña M.D.      CT Abdomen Without Contrast    Result Date: 11/23/2021  Narrative: Patient: ALLYSON PATTEN  Time Out: 04:45 Exam(s): CT ABDOMEN Without Contrast EXAM:   CT Abdomen Without Intravenous Contrast CLINICAL HISTORY:    Reason for exam: Abdominal pain, acute, nonlocalized. TECHNIQUE:   Axial computed tomography images of the abdomen without intravenous contrast.  CTDI is 23.9 mGy and DLP is 766.3 mGy-cm.  This CT exam was performed according to the principle of ALARA (As Low As Reasonably Achievable) by using one or more of the following dose reduction techniques: automated exposure control, adjustment of the mA and or kV according to patient size, and or use of iterative reconstruction technique. COMPARISON:   November 19, 2021 FINDINGS:   Lung bases:  See below.    Pleural space:  New small right and trace left pleural effusions with mild bibasilar atelectasis.   Liver:  There is a locking drain in the right lobe liver abscess which measures 7-8 cm in diameter, slightly smaller than previous.   Gallbladder and bile ducts:  There is a 8 mm thick layer of calcified stones in a nondilated gallbladder.  No pericholecystic inflammation or biliary duct dilation is seen.   Pancreas:  Unremarkable.  No ductal dilation.   Spleen:  Unremarkable.  No splenomegaly.   Adrenals:  Unremarkable.  No mass.   Kidneys and ureters:  2.4 cm right renal cyst, unchanged.  The left kidney has been removed, unchanged.  No obstructing stones.  No hydronephrosis.   Stomach and  bowel:  Diverticulosis of the visualized portion of the left colon.  No acute focal inflammation is seen.  No obstruction.  No mucosal thickening.   Intraperitoneal space:  Unremarkable.  No free air.  No significant fluid collection.   Bones joints:  No acute fracture.  No dislocation.   Soft tissues:  Unremarkable.   Vasculature:  Previous abdominal aortic stent graft and no significant residual aneurysm sac is seen.   Lymph nodes:  Unremarkable.  No enlarged lymph nodes. IMPRESSION: 1.  There is a locking drain in the right lobe liver abscess which measures 7-8 cm in diameter, slightly smaller than previous. 2.  New small right and trace left pleural effusions with mild bibasilar atelectasis.     Impression: Electronically signed by Navarro Koch MD on 11-23-21 at 0445    CT Abdomen Pelvis With Contrast    Result Date: 11/19/2021  Narrative: Patient: ALLYSON PATTEN  Time Out: 22:32 Exam(s): CT ABDOMEN + PELVIS With Contrast EXAM:   CT Abdomen and Pelvis With Intravenous Contrast CLINICAL HISTORY:    Reason for exam: RLQ abdominal pain. TECHNIQUE:   Axial computed tomography images of the abdomen and pelvis with intravenous contrast.  CTDI is 20.40 mGy and DLP is 1029.6 mGy-cm.  This CT exam was performed according to the principle of ALARA (As Low As Reasonably Achievable) by using one or more of the following dose reduction techniques: automated exposure control, adjustment of the mA and or kV according to patient size, and or use of iterative reconstruction technique. COMPARISON:   2 6 2019 FINDINGS:   Lung bases:  See below.    Pleural space:  Trace bilateral pleural effusions.  Bibasilar atelectasis.  ABDOMEN:   Liver:  Interval development of a complex multiseptate cystic lesion in the posterior right hepatic lobe that measures 10.9 x 7.3 cm.  Similar small focus of hypoattenuation in the left hepatic lobe is too small to characterize, likely a cyst.   Gallbladder and bile ducts:  Gallstones are present in  the partially contracted gallbladder.  No biliary dilatation.   Pancreas:  No significant abnormality.   Spleen:  No significant abnormality.   Adrenals:  No significant abnormality.   Kidneys and ureters:  Small fluid attenuating right renal lesion, likely a cyst.  Atrophic left kidney.  Punctate nonobstructing right renal calculi.  No right hydronephrosis.   Stomach and bowel:  Colonic diverticulosis without focal inflammatory change.  Bowel is nondilated.  PELVIS:   Appendix:  No findings to suggest acute appendicitis.   Bladder:  No significant abnormality.   Reproductive:  Unremarkable as visualized.  ABDOMEN and PELVIS:   Intraperitoneal space:  Small amount of pelvic free fluid.   Bones joints:  No acute fracture or malalignment.   Soft tissues:  No significant abnormality.   Vasculature:  An aortobiiliac endograft is suboptimally evaluated.  Stable 3.3 cm abdominal aortic aneurysm.   Lymph nodes:  No significant abnormality. IMPRESSION: 1.  New 10.9 cm complex cystic lesion in the liver is concerning for an abscess.  Malignancy or metastasis is felt to be less likely. 2.  Small amount of free fluid.  No free air. 3.  Cholelithiasis.  No biliary location. 4.  Punctate nonobstructing right nephrolithiasis.  No hydronephrosis.     Impression: Electronically signed by Wandy Vázquez MD on 11-19-21 at 2232    XR Chest 1 View    Result Date: 11/24/2021  Narrative: PORTABLE CHEST X-RAY  HISTORY: Dyspnea.  Portable chest x-ray is provided. Correlation: Chest x-ray 11/21/2021 and abdomen CT from 1:30 AM 11/23/2021.  FINDINGS: The cardiomediastinal silhouette is normal. Tiny pleural effusions seen on CT are not evident. Atelectasis in the posterior right lower lobe also demonstrated on that CT scan is not evident. On this exam, the lungs appear clear. There is no pneumothorax.      Impression: Tiny pleural effusions and posterior right lower lobe atelectasis seen on CT yesterday are not evident on chest x-ray  today. This exam is negative.  This report was finalized on 11/24/2021 2:06 PM by Dr. Jakob Magaña M.D.      XR Chest 1 View    Result Date: 11/21/2021  Narrative: XR CHEST 1 VW-  HISTORY: Male who is 66 years-old,  dyspnea  TECHNIQUE: Frontal view of the chest  COMPARISON: 11/12/2021  FINDINGS: Heart, mediastinum and pulmonary vasculature are unremarkable. No focal pulmonary consolidation, pleural effusion, or pneumothorax. No acute osseous process.      Impression: No evidence for acute pulmonary process. Follow-up as clinical indications persist.  This report was finalized on 11/21/2021 10:45 AM by Dr. Pantera Viera M.D.      CT Guided Abscess Drain Peritoneal    Result Date: 11/21/2021  Narrative: PROCEDURE: CT guided liver abscess drain placement  HISTORY: Liver abscess; K75.0-Abscess of liver; D72.829-Elevated white blood cell count, unspecified; R10.9-Unspecified abdominal pain  TECHNIQUE: Radiation dose reduction techniques were utilized, including automated exposure control and exposure modulation based on body size.  The procedural risks, benefits, and alternatives were discussed with the patient. Informed consent was obtained.    The patient was placed in the supine position on the CT procedure table. Axial CT scan was performed to localize the abscess in right lobe of the liver. The overlying skin was prepped and draped in the usual sterile fashion. 1% buffered lidocaine was used for local anesthesia.  Next, a 5 [place] Yueh needle was advanced into the abscess under CT guidance. There was return of brown/purulent fluid. A sample was taken and sent for culture. A superstiff guidewire was advanced through the needle. The tract was serially dilated. Next a 10 [place] pigtail catheter was advanced over the wire into the abscess. About 140 mL of fluid was then manually aspirated. The catheter was sutured in place and placed to bulb suction. No immediate complications.       Impression: Technically  successful CT guided liver abscess drain placement.    Radiation dose reduction techniques were utilized, including automated exposure control and exposure modulation based on body size.  This report was finalized on 11/21/2021 1:58 PM by Dr. Navarro Davis M.D.      XR Abdomen KUB    Result Date: 11/23/2021  Narrative: Patient: ALLYSON PATTEN  Time Out: 03:23 Exam(s): FILM ABDOMEN EXAM:   XR Abdomen, 1 View CLINICAL HISTORY:    Reason for exam: nausea, pain. TECHNIQUE:   Frontal supine view of the abdomen pelvis. COMPARISON:   CT scan from November 19, 2021 FINDINGS:   Gastrointestinal tract:  Bowel loops are nondilated.   Organs:  There is a locking loop drain in the right upper quadrant likely corresponding to the liver abscess seen on the recent CT scan.   Bones joints:  Mild degenerative changes throughout the lumbar spine.   Vasculature:  There is an abdominal aortic stent graft in place.  Multiple phleboliths in the pelvis, unchanged. IMPRESSION:   There is a locking loop drain in the right upper quadrant likely corresponding to the liver abscess seen on the recent CT scan.     Impression: Electronically signed by Navarro Koch MD on 11-23-21 at 0323    XR Abdomen KUB    Result Date: 11/21/2021  Narrative: XR ABDOMEN KUB-  INDICATIONS: Nausea  TECHNIQUE: Supine views of the abdomen  COMPARISON:  image from CT from 11/19/2021  FINDINGS:   The bowel gas pattern is nonobstructive. No supine evidence for free intraperitoneal gas. Aortobiiliac stent is noted. No definite nephrolithiasis. Follow-up/further evaluation can be obtained as indications persist.        Impression:  As described.  This report was finalized on 11/21/2021 10:48 AM by Dr. Pantrea Viera M.D.         Medical Tests:        Summary of old records / correspondence / consultant report:   DC summary re: issues addressed on HPI and Consultation notes: ID re: liver abscess    Request outside records:       ALLERGIES  No Known Allergies      MEDICATIONS   Current Outpatient Medications   Medication Sig Dispense Refill   • albuterol sulfate HFA (Ventolin HFA) 108 (90 Base) MCG/ACT inhaler Inhale 2 puffs Every 4 (Four) Hours As Needed for Wheezing or Shortness of Air. 18 g 5   • aspirin 81 MG EC tablet Take 1 tablet by mouth Daily.     • furosemide (LASIX) 40 MG tablet Take 1 tablet by mouth Daily for 30 days. 30 tablet 0   • levoFLOXacin (LEVAQUIN) 750 MG tablet Take 1 tablet by mouth Daily for 22 doses. Indications: Infection Within the Abdomen 22 tablet 0   • metroNIDAZOLE (FLAGYL) 500 MG tablet Take 1 tablet by mouth Every 8 (Eight) Hours for 63 doses. Indications: Infection Within the Abdomen 63 tablet 0   • pantoprazole (PROTONIX) 40 MG EC tablet TAKE 1 TABLET EVERY DAY 90 tablet 3   • pravastatin (PRAVACHOL) 40 MG tablet TAKE 1 TABLET EVERY DAY 90 tablet 3     No current facility-administered medications for this visit.       Current outpatient and discharge medications have been reconciled for the patient.  Reviewed by: Lester Carter MD         Examiner was wearing KN95 mask and exam gloves during the entire duration of the visit. Patient was masked the entire time.   Minimum social distance of 6 ft maintained entire visit except if physical contact was necessary as documented.     **Dragon Disclaimer:   Much of this encounter note is an electronic transcription/translation of spoken language to printed text. The electronic translation of spoken language may permit erroneous, or at times, nonsensical words or phrases to be inadvertently transcribed. Although I have reviewed the note for such errors, some may still exist.       Template created by Dalila Carter MD

## 2021-12-06 NOTE — ASSESSMENT & PLAN NOTE
Complete course of metronidazole and Levaquin. Follow-up with infectious disease later this month. To have repeat imaging study at that time. Clinically patient is doing fine. Advised him to watch for diarrhea and advised him to watch for tendon rupture related to fluoroquinolone use.

## 2021-12-08 ENCOUNTER — READMISSION MANAGEMENT (OUTPATIENT)
Dept: CALL CENTER | Facility: HOSPITAL | Age: 66
End: 2021-12-08

## 2021-12-08 NOTE — OUTREACH NOTE
Medical Week 2 Survey      Responses   [facility] patient discharged from? [place]   Does the patient have one of the following disease processes/diagnoses(primary or secondary)? Other   Week 2 attempt successful? No   Unsuccessful attempts Attempt 1          Cleo Mendoza RN

## 2021-12-13 ENCOUNTER — READMISSION MANAGEMENT (OUTPATIENT)
Dept: CALL CENTER | Facility: HOSPITAL | Age: 66
End: 2021-12-13

## 2021-12-13 NOTE — OUTREACH NOTE
Medical Week 2 Survey      Responses   [facility] patient discharged from? [place]   Does the patient have one of the following disease processes/diagnoses(primary or secondary)? Other   Week 2 attempt successful? No   Unsuccessful attempts Attempt 2          Deanna Montejo RN

## 2021-12-17 ENCOUNTER — LAB (OUTPATIENT)
Dept: LAB | Facility: HOSPITAL | Age: 66
End: 2021-12-17

## 2021-12-17 ENCOUNTER — OFFICE VISIT (OUTPATIENT)
Dept: INFECTIOUS DISEASES | Facility: CLINIC | Age: 66
End: 2021-12-17

## 2021-12-17 VITALS
WEIGHT: 166.4 LBS | HEIGHT: 68 IN | BODY MASS INDEX: 25.22 KG/M2 | DIASTOLIC BLOOD PRESSURE: 78 MMHG | TEMPERATURE: 97.9 F | HEART RATE: 116 BPM | SYSTOLIC BLOOD PRESSURE: 122 MMHG | RESPIRATION RATE: 18 BRPM

## 2021-12-17 DIAGNOSIS — B96.89 LIVER ABSCESS DUE TO BACTERIA: ICD-10-CM

## 2021-12-17 DIAGNOSIS — K75.0 LIVER ABSCESS DUE TO BACTERIA: ICD-10-CM

## 2021-12-17 DIAGNOSIS — B96.89 LIVER ABSCESS DUE TO BACTERIA: Primary | ICD-10-CM

## 2021-12-17 DIAGNOSIS — K75.0 LIVER ABSCESS DUE TO BACTERIA: Primary | ICD-10-CM

## 2021-12-17 LAB
ALBUMIN SERPL-MCNC: 3.5 G/DL (ref 3.5–5.2)
ALBUMIN/GLOB SERPL: 1.1 G/DL
ALP SERPL-CCNC: 130 U/L (ref 39–117)
ALT SERPL W P-5'-P-CCNC: 8 U/L (ref 1–41)
ANION GAP SERPL CALCULATED.3IONS-SCNC: 8.5 MMOL/L (ref 5–15)
AST SERPL-CCNC: 14 U/L (ref 1–40)
BASOPHILS # BLD AUTO: 0.03 10*3/MM3 (ref 0–0.2)
BASOPHILS NFR BLD AUTO: 0.3 % (ref 0–1.5)
BILIRUB SERPL-MCNC: 0.2 MG/DL (ref 0–1.2)
BUN SERPL-MCNC: 16 MG/DL (ref 8–23)
BUN/CREAT SERPL: 13 (ref 7–25)
CALCIUM SPEC-SCNC: 9.1 MG/DL (ref 8.6–10.5)
CHLORIDE SERPL-SCNC: 100 MMOL/L (ref 98–107)
CO2 SERPL-SCNC: 27.5 MMOL/L (ref 22–29)
CREAT SERPL-MCNC: 1.23 MG/DL (ref 0.76–1.27)
DEPRECATED RDW RBC AUTO: 45.4 FL (ref 37–54)
EOSINOPHIL # BLD AUTO: 0.19 10*3/MM3 (ref 0–0.4)
EOSINOPHIL NFR BLD AUTO: 2.2 % (ref 0.3–6.2)
ERYTHROCYTE [DISTWIDTH] IN BLOOD BY AUTOMATED COUNT: 13.9 % (ref 12.3–15.4)
GFR SERPL CREATININE-BSD FRML MDRD: 59 ML/MIN/1.73
GLOBULIN UR ELPH-MCNC: 3.3 GM/DL
GLUCOSE SERPL-MCNC: 85 MG/DL (ref 65–99)
HCT VFR BLD AUTO: 37.3 % (ref 37.5–51)
HGB BLD-MCNC: 12.5 G/DL (ref 13–17.7)
IMM GRANULOCYTES # BLD AUTO: 0.03 10*3/MM3 (ref 0–0.05)
IMM GRANULOCYTES NFR BLD AUTO: 0.3 % (ref 0–0.5)
LYMPHOCYTES # BLD AUTO: 2.11 10*3/MM3 (ref 0.7–3.1)
LYMPHOCYTES NFR BLD AUTO: 24 % (ref 19.6–45.3)
MCH RBC QN AUTO: 30.3 PG (ref 26.6–33)
MCHC RBC AUTO-ENTMCNC: 33.5 G/DL (ref 31.5–35.7)
MCV RBC AUTO: 90.3 FL (ref 79–97)
MONOCYTES # BLD AUTO: 0.79 10*3/MM3 (ref 0.1–0.9)
MONOCYTES NFR BLD AUTO: 9 % (ref 5–12)
NEUTROPHILS NFR BLD AUTO: 5.66 10*3/MM3 (ref 1.7–7)
NEUTROPHILS NFR BLD AUTO: 64.2 % (ref 42.7–76)
NRBC BLD AUTO-RTO: 0 /100 WBC (ref 0–0.2)
PLATELET # BLD AUTO: 282 10*3/MM3 (ref 140–450)
PMV BLD AUTO: 9.4 FL (ref 6–12)
POTASSIUM SERPL-SCNC: 4.4 MMOL/L (ref 3.5–5.2)
PROT SERPL-MCNC: 6.8 G/DL (ref 6–8.5)
RBC # BLD AUTO: 4.13 10*6/MM3 (ref 4.14–5.8)
SODIUM SERPL-SCNC: 136 MMOL/L (ref 136–145)
WBC NRBC COR # BLD: 8.81 10*3/MM3 (ref 3.4–10.8)

## 2021-12-17 PROCEDURE — 85025 COMPLETE CBC W/AUTO DIFF WBC: CPT

## 2021-12-17 PROCEDURE — 80053 COMPREHEN METABOLIC PANEL: CPT

## 2021-12-17 PROCEDURE — 36415 COLL VENOUS BLD VENIPUNCTURE: CPT

## 2021-12-17 PROCEDURE — 99214 OFFICE O/P EST MOD 30 MIN: CPT | Performed by: STUDENT IN AN ORGANIZED HEALTH CARE EDUCATION/TRAINING PROGRAM

## 2021-12-17 RX ORDER — LEVOFLOXACIN 750 MG/1
750 TABLET ORAL EVERY 24 HOURS
Qty: 7 TABLET | Refills: 0 | Status: SHIPPED | OUTPATIENT
Start: 2021-12-17 | End: 2021-12-24

## 2021-12-17 RX ORDER — METRONIDAZOLE 500 MG/1
500 TABLET ORAL EVERY 8 HOURS SCHEDULED
Qty: 21 TABLET | Refills: 0 | Status: SHIPPED | OUTPATIENT
Start: 2021-12-17 | End: 2021-12-24

## 2021-12-17 NOTE — PROGRESS NOTES
"Chief Complaint  Follow-up    Subjective          Patrick Mckeon presents to [facility] INFECTIOUS DISEASES  History of Present Illness    Patient is a 66-year-old male with history of CKD stage III, emphysema and tobacco abuse who presented to the hospital at the end of last month with abdominal pain and found to have liver abscess on CT imaging. Cultures were obtained and patient grew Peptoniphilus and Anaerococcus and was discharged on metronidazole plus levofloxacin for 4 weeks to end on.    Patient presents today for follow up on his antibiotic therapy. He reports he is tolerating these well. He only had 1 day of 3 loose bowel movements but otherwise has not had any difficulty. He denies any fevers or chills. He denies any abdominal pain.    Reports she has not seen surgery for follow-up yet and this is planned for next week on Tuesday. States he has seen his kidney doctor since our visit and no real changes as far as that goes.    Objective   Vital Signs:   /78   Pulse 116   Temp 97.9 °F (36.6 °C)   Resp 18   Ht 172.7 cm (68\")   Wt 75.5 kg (166 lb 6.4 oz)   BMI 25.30 kg/m²     Physical Exam  Constitutional:       General: He is not in acute distress.     Appearance: Normal appearance. He is normal weight. He is not ill-appearing.   HENT:      Head: Normocephalic and atraumatic.      Nose: Nose normal. No rhinorrhea.      Mouth/Throat:      Mouth: Mucous membranes are moist.      Pharynx: No oropharyngeal exudate.   Eyes:      General: No scleral icterus.     Extraocular Movements: Extraocular movements intact.      Pupils: Pupils are equal, round, and reactive to light.   Cardiovascular:      Rate and Rhythm: Normal rate and regular rhythm.      Pulses: Normal pulses.      Heart sounds: Normal heart sounds. No murmur heard.      Pulmonary:      Effort: Pulmonary effort is normal.      Breath sounds: Normal breath sounds. No wheezing, rhonchi or rales.   Abdominal:      General: " Abdomen is flat. Bowel sounds are normal.      Palpations: Abdomen is soft.      Tenderness: There is no abdominal tenderness. There is no guarding or rebound.   Musculoskeletal:         General: No swelling or tenderness. Normal range of motion.      Cervical back: Normal range of motion and neck supple. No tenderness.      Right lower leg: No edema.      Left lower leg: No edema.   Skin:     General: Skin is warm and dry.      Findings: No rash.   Neurological:      General: No focal deficit present.      Mental Status: He is alert and oriented to person, place, and time.   Psychiatric:         Mood and Affect: Mood normal.         Behavior: Behavior normal.        Result Review :   The following data was reviewed by: Felipe Horner DO on 12/17/2021:  Common labs    Common Labsle 11/26/21 11/26/21 11/26/21 11/27/21 11/27/21 11/27/21 11/28/21 11/28/21    0450 0450 0450 0349 0349 0349 0127 0127   Glucose  99    109 (A)  128 (A)   BUN  22    20  19   Creatinine  1.27    1.39 (A)  1.22   eGFR Non  Am  57 (A)    51 (A)  59 (A)   Sodium  138    139  137   Potassium  3.9    3.9  4.0   Chloride  105    102  102   Calcium  7.3 (A)    7.6 (A)  7.6 (A)   Albumin   2.30 (A)   2.60 (A)     WBC 12.00 (A)   11.32 (A)   11.15 (A)    Hemoglobin 9.6 (A)   10.0 (A)   9.6 (A)    Hematocrit 27.9 (A)   29.8 (A)   29.4 (A)    Platelets 432   478 (A)   495 (A)    Uric Acid     3.5      (A) Abnormal value            Data reviewed: Radiologic studies CT of the abdomen and pelvis and Recent hospitalization notes From recent November admission.          Assessment and Plan    Diagnoses and all orders for this visit:    1. Liver abscess due to bacteria (Primary)  -     CBC & Differential; Future  -     Comprehensive Metabolic Panel; Future  -     CT Abdomen Pelvis With Contrast; Future    Other orders  -     metroNIDAZOLE (FLAGYL) 500 MG tablet; Take 1 tablet by mouth Every 8 (Eight) Hours for 7 days. Indications: Infection  Within the Abdomen  Dispense: 21 tablet; Refill: 0  -     levoFLOXacin (LEVAQUIN) 750 MG tablet; Take 1 tablet by mouth Daily for 7 days. Indications: Infection Within the Abdomen  Dispense: 7 tablet; Refill: 0    Discussed repeating imaging today with CT abdomen pelvis to evaluate for resolution of his liver abscess. We'll repeat monitoring labs today as well especially for kidney function. He is tolerating antibiotics well so we'll continue these until imaging is obtained. If imaging shows improvement then may be able to discontinue antibiotics at that time. Advised continued follow-up with surgery as well who may be able to evaluate this imaging if done prior to his visit.      I spent 37 minutes caring for Patrick on this date of service. This time includes time spent by me in the following activities:preparing for the visit, reviewing tests, obtaining and/or reviewing a separately obtained history, performing a medically appropriate examination and/or evaluation , counseling and educating the patient/family/caregiver, ordering medications, tests, or procedures, documenting information in the medical record, independently interpreting results and communicating that information with the patient/family/caregiver and care coordination  Follow Up   No follow-ups on file.  Patient was given instructions and counseling regarding his condition or for health maintenance advice. Please see specific information pulled into the AVS if appropriate.

## 2021-12-21 ENCOUNTER — OFFICE VISIT (OUTPATIENT)
Dept: SURGERY | Facility: CLINIC | Age: 66
End: 2021-12-21

## 2021-12-21 VITALS — WEIGHT: 167 LBS | HEIGHT: 68 IN | BODY MASS INDEX: 25.31 KG/M2

## 2021-12-21 DIAGNOSIS — K75.0 LIVER ABSCESS: Primary | ICD-10-CM

## 2021-12-21 PROCEDURE — 99213 OFFICE O/P EST LOW 20 MIN: CPT | Performed by: SURGERY

## 2021-12-22 NOTE — PROGRESS NOTES
Subjective   Patrick Mckeon is a 66 y.o. male who returns to the office after a recent hospitalization for liver abscess.    History of Present Illness     The patient was admitted on 11/19/2021 for abdominal pain.  He was found to have a mass in the liver that was consistent with an abscess.  He underwent a CT scan guided drainage of the abscess and initially had negative cultures.  Eventually the cultures grew out Peptoniphilus asaccharolyticus andAnaerococcus prevotii and he was treated with Levaquin and Flagyl for 4 weeks as directed by infectious disease.  He returns to the office feeling well.  He is having no abdominal pain.  There are no fevers nor night sweats.  His appetite is good.  He states that he had been feeling poorly for several weeks prior to hospitalization and now feels much better.  His energy level has not fully returned.  He is scheduled for a repeat CT scan of the abdomen and pelvis on 1/14/2022.    Review of Systems   Constitutional: Positive for fatigue. Negative for fever.   Respiratory: Negative for chest tightness and shortness of breath.    Cardiovascular: Negative for chest pain and palpitations.   Gastrointestinal: Negative for abdominal pain, blood in stool, constipation, diarrhea, nausea and vomiting.     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    coasara hickey by heme neg; completed 6 months of AC and maintained on ASA     Past  Surgical History:   Procedure Laterality Date   • ABDOMINAL AORTIC ANEURYSM REPAIR  05/2017   • ARTERIOGRAM AORTIC N/A 5/8/2017    Procedure:  AORTIC STENT GRAFT REPAIR W/ GORE BILATERAL FEMORAL MINI CUTDOWNS;  Surgeon: Humble Hamilton MD;  Location: [facility] HYBRID OR 18/19;  Service:    • COLONOSCOPY N/A 8/3/2017    Procedure: COLONOSCOPY to cecum;  Surgeon: Patrick Elizabeth MD;  Location: [facility] ENDOSCOPY;  Service:    • COLONOSCOPY  09/2010   • ENDOSCOPY  8/3/2017    Procedure: ESOPHAGOGASTRODUODENOSCOPY;  Surgeon: Patrick Elizabeth MD;  Location: [facility] ENDOSCOPY;  Service:    • HERNIA REPAIR  1980   • UMBILICAL HERNIA REPAIR  1975     Family History   Problem Relation Age of Onset   • Colon cancer Father 55   • Heart attack Father 71   • Coronary artery disease Father    • Hypertension Mother    • Hyperlipidemia Mother    • Sudden death Mother 78   • Aneurysm Mother    • Heart disease Mother    • Benign prostatic hyperplasia Brother    • Hypertension Brother    • Benign prostatic hyperplasia Brother    • No Known Problems Sister    • Brain cancer Maternal Grandmother    • Heart disease Maternal Grandfather    • Sudden death Maternal Grandfather    • No Known Problems Paternal Grandmother    • No Known Problems Paternal Grandfather    • Prostate cancer Neg Hx    • Dementia Neg Hx      Social History     Socioeconomic History   • Marital status:      Spouse name: Samira   • Number of children: 1   • Years of education: High School   Tobacco Use   • Smoking status: Current Every Day Smoker     Packs/day: 1.00     Years: 42.00     Pack years: 42.00     Types: Cigarettes   • Smokeless tobacco: Never Used   • Tobacco comment: currently 1 ppd    Vaping Use   • Vaping Use: Former   • Substances: Nicotine   • Devices: Pre-filled or refillable cartridge   Substance and Sexual Activity   • Alcohol use: Yes   • Drug use: No   • Sexual activity: Yes     Partners: Female       Objective   Physical  Exam  Constitutional:       Appearance: Normal appearance. He is well-developed. He is not toxic-appearing.   Eyes:      General: No scleral icterus.  Pulmonary:      Effort: Pulmonary effort is normal. No respiratory distress.   Abdominal:      Palpations: Abdomen is soft.      Tenderness: There is no abdominal tenderness.   Skin:     General: Skin is warm and dry.   Neurological:      Mental Status: He is alert and oriented to person, place, and time.   Psychiatric:         Behavior: Behavior normal.         Thought Content: Thought content normal.         Judgment: Judgment normal.         Assessment/Plan       The encounter diagnosis was Liver abscess.    The patient had a liver abscess that was drained and is being treated with antibiotics.  He feels much better and has had significant clinical improvement.  We will await the repeat CT scan of the abdomen and pelvis next month.  His follow-up will be determined by the CT scan of the abdomen and pelvis.

## 2022-01-14 ENCOUNTER — HOSPITAL ENCOUNTER (OUTPATIENT)
Dept: CT IMAGING | Facility: HOSPITAL | Age: 67
Discharge: HOME | End: 2022-01-14

## 2022-01-14 ENCOUNTER — TRANSCRIBE ORDERS (OUTPATIENT)
Dept: ADMINISTRATIVE | Facility: HOSPITAL | Age: 67
End: 2022-01-14

## 2022-01-14 ENCOUNTER — LAB (OUTPATIENT)
Dept: LAB | Facility: HOSPITAL | Age: 67
End: 2022-01-14

## 2022-01-14 DIAGNOSIS — N18.30 STAGE 3 CHRONIC KIDNEY DISEASE, UNSPECIFIED WHETHER STAGE 3A OR 3B CKD: ICD-10-CM

## 2022-01-14 DIAGNOSIS — N18.30 STAGE 3 CHRONIC KIDNEY DISEASE, UNSPECIFIED WHETHER STAGE 3A OR 3B CKD: Primary | ICD-10-CM

## 2022-01-14 DIAGNOSIS — B96.89 LIVER ABSCESS DUE TO BACTERIA: ICD-10-CM

## 2022-01-14 DIAGNOSIS — K75.0 LIVER ABSCESS DUE TO BACTERIA: ICD-10-CM

## 2022-01-14 LAB
ALBUMIN SERPL-MCNC: 4 G/DL (ref 3.5–5.2)
ALBUMIN UR-MCNC: <1.2 MG/DL
ALBUMIN/GLOB SERPL: 1.4 G/DL
ALP SERPL-CCNC: 103 U/L (ref 39–117)
ALT SERPL W P-5'-P-CCNC: 10 U/L (ref 1–41)
ANION GAP SERPL CALCULATED.3IONS-SCNC: 8 MMOL/L (ref 5–15)
AST SERPL-CCNC: 12 U/L (ref 1–40)
BILIRUB SERPL-MCNC: 0.4 MG/DL (ref 0–1.2)
BILIRUB UR QL STRIP: NEGATIVE
BUN SERPL-MCNC: 17 MG/DL (ref 8–23)
BUN/CREAT SERPL: 18.5 (ref 7–25)
CALCIUM SPEC-SCNC: 9.1 MG/DL (ref 8.6–10.5)
CHLORIDE SERPL-SCNC: 102 MMOL/L (ref 98–107)
CLARITY UR: CLEAR
CO2 SERPL-SCNC: 26 MMOL/L (ref 22–29)
COLOR UR: YELLOW
CREAT SERPL-MCNC: 0.92 MG/DL (ref 0.76–1.27)
CREAT UR-MCNC: 27.8 MG/DL
GFR SERPL CREATININE-BSD FRML MDRD: 82 ML/MIN/1.73
GLOBULIN UR ELPH-MCNC: 2.8 GM/DL
GLUCOSE SERPL-MCNC: 78 MG/DL (ref 65–99)
GLUCOSE UR STRIP-MCNC: NEGATIVE MG/DL
HGB UR QL STRIP.AUTO: NEGATIVE
KETONES UR QL STRIP: NEGATIVE
LEUKOCYTE ESTERASE UR QL STRIP.AUTO: NEGATIVE
MICROALBUMIN/CREAT UR: NORMAL MG/G{CREAT}
NITRITE UR QL STRIP: NEGATIVE
PH UR STRIP.AUTO: 7 [PH] (ref 5–8)
POTASSIUM SERPL-SCNC: 3.9 MMOL/L (ref 3.5–5.2)
PROT ?TM UR-MCNC: 4 MG/DL
PROT ?TM UR-MCNC: 4 MG/DL
PROT SERPL-MCNC: 6.8 G/DL (ref 6–8.5)
PROT UR QL STRIP: NEGATIVE
PROT/CREAT UR: 143.9 MG/G CREA (ref 0–200)
SODIUM SERPL-SCNC: 136 MMOL/L (ref 136–145)
SP GR UR STRIP: 1.02 (ref 1–1.03)
UROBILINOGEN UR QL STRIP: NORMAL

## 2022-01-14 PROCEDURE — 36415 COLL VENOUS BLD VENIPUNCTURE: CPT

## 2022-01-14 PROCEDURE — 82043 UR ALBUMIN QUANTITATIVE: CPT

## 2022-01-14 PROCEDURE — 74177 CT ABD & PELVIS W/CONTRAST: CPT

## 2022-01-14 PROCEDURE — 82565 ASSAY OF CREATININE: CPT

## 2022-01-14 PROCEDURE — 81003 URINALYSIS AUTO W/O SCOPE: CPT | Performed by: NURSE PRACTITIONER

## 2022-01-14 PROCEDURE — 80053 COMPREHEN METABOLIC PANEL: CPT

## 2022-01-14 PROCEDURE — 84156 ASSAY OF PROTEIN URINE: CPT

## 2022-01-14 PROCEDURE — 25010000002 IOPAMIDOL 61 % SOLUTION: Performed by: STUDENT IN AN ORGANIZED HEALTH CARE EDUCATION/TRAINING PROGRAM

## 2022-01-14 PROCEDURE — 82570 ASSAY OF URINE CREATININE: CPT

## 2022-01-14 RX ADMIN — IOPAMIDOL 85 ML: 612 INJECTION, SOLUTION INTRAVENOUS at 15:57

## 2022-01-15 LAB — CREAT BLDA-MCNC: 1.1 MG/DL (ref 0.6–1.3)

## 2022-01-17 ENCOUNTER — TELEPHONE (OUTPATIENT)
Dept: INFECTIOUS DISEASES | Facility: CLINIC | Age: 67
End: 2022-01-17

## 2022-01-17 NOTE — TELEPHONE ENCOUNTER
----- Message from Felipe Horner DO sent at 1/17/2022 10:13 AM EST -----  That's weird it didn't show up in my box. Thanks for the heads up. It looks great so he can stop the antibiotics I had him on (levofloxacin and flagyl) I sent him a message to this effect but if you had a minute to call him to let him know that would be great. Thanks!     ----- Message -----  From: Nathalia Dudley MA  Sent: 1/17/2022   8:10 AM EST  To: Felipe Horner DO    Pt had CT on Friday; as of this morning it does not look like it had been released.  I just wanted to make sure that you seen it.  Thank you

## 2022-02-01 ENCOUNTER — OFFICE VISIT (OUTPATIENT)
Dept: INTERNAL MEDICINE | Age: 67
End: 2022-02-01

## 2022-02-01 VITALS
SYSTOLIC BLOOD PRESSURE: 136 MMHG | TEMPERATURE: 97.5 F | BODY MASS INDEX: 24.55 KG/M2 | WEIGHT: 162 LBS | HEART RATE: 110 BPM | HEIGHT: 68 IN | OXYGEN SATURATION: 98 % | DIASTOLIC BLOOD PRESSURE: 78 MMHG

## 2022-02-01 DIAGNOSIS — J43.9 PULMONARY EMPHYSEMA, UNSPECIFIED EMPHYSEMA TYPE: ICD-10-CM

## 2022-02-01 DIAGNOSIS — I10 PRIMARY HYPERTENSION: Primary | Chronic | ICD-10-CM

## 2022-02-01 DIAGNOSIS — R91.8 PULMONARY NODULES: ICD-10-CM

## 2022-02-01 DIAGNOSIS — E78.2 MIXED HYPERLIPIDEMIA: ICD-10-CM

## 2022-02-01 PROBLEM — Z98.890 POSTOPERATIVE HYPOXEMIA: Status: RESOLVED | Noted: 2021-11-24 | Resolved: 2022-02-01

## 2022-02-01 PROBLEM — A41.9 SEPSIS (HCC): Status: RESOLVED | Noted: 2021-11-20 | Resolved: 2022-02-01

## 2022-02-01 PROBLEM — R09.02 POSTOPERATIVE HYPOXEMIA: Status: RESOLVED | Noted: 2021-11-24 | Resolved: 2022-02-01

## 2022-02-01 PROCEDURE — 99214 OFFICE O/P EST MOD 30 MIN: CPT | Performed by: INTERNAL MEDICINE

## 2022-02-01 RX ORDER — AMLODIPINE BESYLATE 10 MG/1
10 TABLET ORAL DAILY
COMMUNITY
Start: 2022-02-01 | End: 2022-04-19

## 2022-02-01 RX ORDER — LOSARTAN POTASSIUM 25 MG/1
25 TABLET ORAL DAILY
COMMUNITY
Start: 2022-02-01 | End: 2022-10-25

## 2022-02-01 NOTE — PATIENT INSTRUCTIONS
** IMPORTANT MESSAGE FROM DR. MEDINA **    In our office, your satisfaction is VERY important to us.     You may receive a survey from [facility] by mail or E-mail for you to provide feedback about your visit. This information is invaluable for me to know what we can do to improve our services.     I ask that you please take a few minutes to complete the survey and let us know how we are doing in serving your needs. (You may receive the survey more than once for multiple visits)    Thank You !    Dr. Medina    _________________________________________________________________________________________________________________________      ** ADDITIONAL INSTRUCTION / REMINDERS FROM DR. MEDINA **    Start Spiriva inhaler 2 puffs daily   Consider smoking cessation

## 2022-02-01 NOTE — ASSESSMENT & PLAN NOTE
Medications were discontinued during prior hospitalization. He recently resumed both medications due to high blood pressure. Blood pressure appears to be improving. Continue same.     BP Readings from Last 3 Encounters:   02/01/22 136/78   12/17/21 122/78   12/06/21 110/68

## 2022-02-01 NOTE — PROGRESS NOTES
I N T E R N A L  M E D I C I N E  J U N O H  K I M,  M D      ENCOUNTER DATE:  02/01/2022    Patrick Mckeon / 66 y.o. / male      CHIEF COMPLAINT / REASON FOR OFFICE VISIT     Hypertension, Hyperlipidemia, and Emphysema lung      ASSESSMENT & PLAN     Problem List Items Addressed This Visit        High    Primary hypertension - Primary (Chronic)    Overview     Continue amlodipine 10 mg and losartan 25 mg qd         Current Assessment & Plan     Medications were discontinued during prior hospitalization. He recently resumed both medications due to high blood pressure. Blood pressure appears to be improving. Continue same.     BP Readings from Last 3 Encounters:   02/01/22 136/78   12/17/21 122/78   12/06/21 110/68             Relevant Medications    losartan (COZAAR) 25 MG tablet    amLODIPine (NORVASC) 10 MG tablet       Medium    Emphysema lung (HCC) (Chronic)    Overview     Noted on CT in 2014  Moderate COPD on PFT 2017         Current Assessment & Plan     Advised to quit smoking.  Start Spiriva Respimat 2.5 2 puffs daily.            Relevant Medications    albuterol sulfate HFA (Ventolin HFA) 108 (90 Base) MCG/ACT inhaler    tiotropium bromide monohydrate (SPIRIVA RESPIMAT) 2.5 MCG/ACT aerosol solution inhaler    Hyperlipidemia (Chronic)    Overview     Continue pravastatin 40 mg qd.          Relevant Medications    aspirin 81 MG EC tablet    pravastatin (PRAVACHOL) 40 MG tablet    Other Relevant Orders    Lipid Panel With / Chol / HDL Ratio    Pulmonary nodules (Chronic)    Current Assessment & Plan     CT ordered ; advised to quit smoking.          Relevant Orders    CT Chest Without Contrast Diagnostic        Orders Placed This Encounter   Procedures   • CT Chest Without Contrast Diagnostic   • Lipid Panel With / Chol / HDL Ratio     New Medications Ordered This Visit   Medications   • tiotropium bromide monohydrate (SPIRIVA RESPIMAT) 2.5 MCG/ACT aerosol solution inhaler     Sig: Inhale 2 puffs Every  "Morning.     Dispense:  3 each     Refill:  3       SUMMARY/DISCUSSION  •       Next Appointment with me: Visit date not found    Return in about 6 months (around 8/1/2022) for COMBINED AWV AND MEDICAL F/U (15 MIN).      VITAL SIGNS     Visit Vitals  /78 (BP Location: Left arm)   Pulse 110   Temp 97.5 °F (36.4 °C)   Ht 172.7 cm (68\")   Wt 73.5 kg (162 lb)   SpO2 98%   BMI 24.63 kg/m²       BP Readings from Last 3 Encounters:   02/01/22 136/78   12/17/21 122/78   12/06/21 110/68     Wt Readings from Last 3 Encounters:   02/01/22 73.5 kg (162 lb)   12/21/21 75.8 kg (167 lb)   12/17/21 75.5 kg (166 lb 6.4 oz)     Body mass index is 24.63 kg/m².      MEDICATIONS AT THE TIME OF OFFICE VISIT     Current Outpatient Medications on File Prior to Visit   Medication Sig   • albuterol sulfate HFA (Ventolin HFA) 108 (90 Base) MCG/ACT inhaler Inhale 2 puffs Every 4 (Four) Hours As Needed for Wheezing or Shortness of Air.   • aspirin 81 MG EC tablet Take 1 tablet by mouth Daily.   • pantoprazole (PROTONIX) 40 MG EC tablet TAKE 1 TABLET EVERY DAY   • pravastatin (PRAVACHOL) 40 MG tablet TAKE 1 TABLET EVERY DAY   • amLODIPine (NORVASC) 10 MG tablet Take 1 tablet by mouth Daily.   • losartan (COZAAR) 25 MG tablet Take 1 tablet by mouth Daily.       HISTORY OF PRESENT ILLNESS     Completed antibiotics for liver abscess. Denies abdominal pain or fever.   Restarted amlodipine and losartan for hypertension recently due to blood pressure elevation. These were previously discontinued during hospitalization for liver abscess. Still smokes 1 pack per day. PFT 2017 moderate COPD. Needs surveillance CT chest for lung nodules. On pravastatin for hyperlipidemia without myalgia. GERD stable on pantoprazole.       Patient Care Team:  Lester Carter MD as PCP - General (Internal Medicine)  Orlin Quintero MD as Consulting Physician (Gastroenterology)  Keyur Mejia II, MD as Consulting Physician (Hematology and Oncology)  Humble Hamilton MD as " Surgeon (Vascular Surgery)  Keyur Garrido MD as Consulting Physician (Dermatology)    REVIEW OF SYSTEMS     Review of Systems   No fever, night sweat or wt loss  No chest pain   Morning cough with sputum; chronic dyspnea (low grade)  GI neg     PHYSICAL EXAMINATION     Physical Exam  No acute distress   Cardiovascular: HR marginally tachy, regular  Lungs: coarse BS bilaterally without wheezing or rales  Abdomen: Soft and nontender.       REVIEWED DATA     Labs:     Lab Results   Component Value Date     01/14/2022    K 3.9 01/14/2022    CALCIUM 9.1 01/14/2022    AST 12 01/14/2022    ALT 10 01/14/2022    BUN 17 01/14/2022    CREATININE 0.92 01/14/2022    CREATININE 1.10 01/14/2022    CREATININE 1.23 12/17/2021    EGFRIFNONA 82 01/14/2022    EGFRIFAFRI 72 03/29/2021       Lab Results   Component Value Date    HGBA1C 5.60 01/27/2020    HGBA1C 5.60 01/11/2019    HGBA1C 5.10 02/21/2017       Lab Results   Component Value Date    LDL 52 12/01/2020    LDL 58 01/27/2020    HDL 65 (H) 12/01/2020    TRIG 53 12/01/2020       Lab Results   Component Value Date    TSH 1.110 01/11/2019    TSH 1.380 02/21/2017    FREET4 1.26 01/11/2019    FREET4 1.22 02/21/2017       Lab Results   Component Value Date    WBC 8.81 12/17/2021    HGB 12.5 (L) 12/17/2021     12/17/2021       Lab Results   Component Value Date    MICROALBUR <1.2 01/14/2022       Lab Results   Component Value Date    PSA 0.937 03/29/2021    PSA 0.977 01/27/2020    PSA 1.090 01/11/2019        Imaging:     CT ABDOMEN AND PELVIS WITH IV CONTRAST     HISTORY: 66-year-old male with a drained liver abscess. Follow-up.     TECHNIQUE: Radiation dose reduction techniques were utilized, including  automated exposure control and exposure modulation based on body size.   3 mm images were obtained through the abdomen and pelvis after the  administration of IV contrast. Compared with previous CT from  11/26/2021.     FINDINGS: There has been interval removal of  the percutaneous drain and  there is a smaller region of remaining ill-defined low attenuation at  the posterior hepatic segment measuring approximately 5 cm, image 30.  There is an approximately 2.4 cm region of relative low attenuation  centrally, image 28, and there is a tiny sliver of perihepatic or  subcapsular fluid along the lateral margin. There is no biliary  dilatation. Tiny left hepatic lobe cyst is noted. There are tiny  gallstones within the gallbladder without cholecystitis. The spleen,  pancreas, and adrenals appear unremarkable. Solitary right kidney  appears unremarkable, other than a 2.6 cm cyst and a subcentimeter cyst.  No acute bowel abnormality is seen. There is extensive sigmoid  diverticulosis without evidence for diverticulitis. The appendix appears  normal. There is no interval change in the heterogeneous appearance of  the prostate. Aortic biiliac endovascular stent repair of an infrarenal  abdominal aortic aneurysm is noted. There is no free fluid or  lymphadenopathy.     IMPRESSION:  1. There is an approximately 5 cm region of remaining edema at the  posterior hepatic segment. The 2.4 cm region of relative low-attenuation  centrally may also represent residual edema or ill-defined phlegmon.  There is no discrete remaining fluid collection or abscess. There is a  tiny sliver of perihepatic/capsular fluid along the lateral margin of  the right hepatic lobe.  2. Extensive sigmoid diverticulosis without evidence for diverticulitis.  3. There is cholelithiasis without evidence for cholecystitis.  4. Stable heterogeneous appearance of the prostate.     This report was finalized on 1/18/2022       CT CHEST WITHOUT CONTRAST     HISTORY: Follow-up pulmonary nodule     TECHNIQUE: Radiation dose reduction techniques were utilized, including  automated exposure control and exposure modulation based on body size.   3 mm images were obtained through the chest without the administration  of IV  contrast.      COMPARISON: 02/25/2020     FINDINGS: Background emphysema. There is a tiny 1-2 mm nodule in the  medial left lower lobe on image 57. The nodules demonstrated on the  prior study are not apparent on today's exam and probably resolved.  There is no dense airspace consolidation. There is no suspicious  lymphadenopathy. Coronary artery calcifications. No pleural effusion.  There is some minimal subsegmental atelectasis in the anterior lingula.  Tiny hiatal hernia. Limited imaging of the upper abdomen demonstrates  cholelithiasis. Tiny presumed cyst in the liver. Partially imaged aortic  stent graft. Bone windows show degenerative changes of the thoracic  spine     IMPRESSION:  1. Tiny 1-2 mm nodule within the medial left lower lobe. Follow-up  recommended in 12 months with repeat CT scan.  2. Previously noted tiny nodules are no longer present.  3. Emphysema           Radiation dose reduction techniques were utilized, including automated  exposure control and exposure modulation based on body size.     This report was finalized on 9/25/2020       Medical Tests:           Summary of old records / correspondence / consultant report:           Request outside records:             *Examiner was wearing KN95 mask and eye protection during the entire duration of the visit. Patient was masked the entire time. Minimum social distance of 6 ft maintained entire visit except if physical contact was necessary as documented.       Template created by Dalila Catrer MD

## 2022-02-02 LAB
CHOLEST SERPL-MCNC: 157 MG/DL (ref 100–199)
CHOLEST/HDLC SERPL: 2.8 RATIO (ref 0–5)
HDLC SERPL-MCNC: 57 MG/DL
LDLC SERPL CALC-MCNC: 80 MG/DL (ref 0–99)
TRIGL SERPL-MCNC: 114 MG/DL (ref 0–149)
VLDLC SERPL CALC-MCNC: 20 MG/DL (ref 5–40)

## 2022-03-01 ENCOUNTER — HOSPITAL ENCOUNTER (OUTPATIENT)
Dept: CT IMAGING | Facility: HOSPITAL | Age: 67
Discharge: HOME | End: 2022-03-01
Admitting: INTERNAL MEDICINE

## 2022-03-01 PROCEDURE — 71250 CT THORAX DX C-: CPT

## 2022-04-18 DIAGNOSIS — K21.00 HIATAL HERNIA WITH GASTROESOPHAGEAL REFLUX DISEASE AND ESOPHAGITIS: Chronic | ICD-10-CM

## 2022-04-18 DIAGNOSIS — I10 PRIMARY HYPERTENSION: Chronic | ICD-10-CM

## 2022-04-18 DIAGNOSIS — K44.9 HIATAL HERNIA WITH GASTROESOPHAGEAL REFLUX DISEASE AND ESOPHAGITIS: Chronic | ICD-10-CM

## 2022-04-19 RX ORDER — AMLODIPINE BESYLATE 10 MG/1
TABLET ORAL
Qty: 90 TABLET | Refills: 3 | Status: SHIPPED | OUTPATIENT
Start: 2022-04-19

## 2022-04-19 RX ORDER — PANTOPRAZOLE SODIUM 40 MG/1
TABLET, DELAYED RELEASE ORAL
Qty: 90 TABLET | Refills: 3 | Status: SHIPPED | OUTPATIENT
Start: 2022-04-19

## 2022-06-29 ENCOUNTER — TELEPHONE (OUTPATIENT)
Dept: INTERNAL MEDICINE | Age: 67
End: 2022-06-29

## 2022-07-01 ENCOUNTER — TELEPHONE (OUTPATIENT)
Dept: INTERNAL MEDICINE | Age: 67
End: 2022-07-01

## 2022-07-01 DIAGNOSIS — Z12.11 SCREENING FOR MALIGNANT NEOPLASM OF COLON: Primary | ICD-10-CM

## 2022-07-01 NOTE — TELEPHONE ENCOUNTER
Caller: Patrick Mckeon    Relationship: Self    Best call back number: [phone]    What orders are you requesting (i.e. lab or imaging): COLONOSCOPY    In what timeframe would the patient need to come in: AS SOON AS POSSIBLE AFTER 08/03      Where will you receive your lab/imaging services: AT [facility]    Additional notes: PATIENT WOULD LIKE TO HAVE THE EARLIEST POSSIBLE APPT.

## 2022-08-18 ENCOUNTER — HOSPITAL ENCOUNTER (OUTPATIENT)
Dept: GENERAL RADIOLOGY | Facility: HOSPITAL | Age: 67
Discharge: HOME | End: 2022-08-18
Admitting: NURSE PRACTITIONER

## 2022-08-18 ENCOUNTER — OFFICE VISIT (OUTPATIENT)
Dept: INTERNAL MEDICINE | Age: 67
End: 2022-08-18

## 2022-08-18 ENCOUNTER — TELEPHONE (OUTPATIENT)
Dept: INTERNAL MEDICINE | Age: 67
End: 2022-08-18

## 2022-08-18 VITALS
HEART RATE: 96 BPM | OXYGEN SATURATION: 97 % | BODY MASS INDEX: 25.16 KG/M2 | TEMPERATURE: 98 F | DIASTOLIC BLOOD PRESSURE: 62 MMHG | HEIGHT: 68 IN | SYSTOLIC BLOOD PRESSURE: 100 MMHG | WEIGHT: 166 LBS

## 2022-08-18 DIAGNOSIS — I66.11 OCCLUSION AND STENOSIS OF RIGHT ANTERIOR CEREBRAL ARTERY: ICD-10-CM

## 2022-08-18 DIAGNOSIS — R50.9 FEVER, UNSPECIFIED FEVER CAUSE: Primary | ICD-10-CM

## 2022-08-18 PROCEDURE — 99214 OFFICE O/P EST MOD 30 MIN: CPT | Performed by: NURSE PRACTITIONER

## 2022-08-18 PROCEDURE — 71046 X-RAY EXAM CHEST 2 VIEWS: CPT

## 2022-08-18 RX ORDER — DOXYCYCLINE HYCLATE 100 MG/1
100 CAPSULE ORAL 2 TIMES DAILY
Qty: 10 CAPSULE | Refills: 0 | Status: SHIPPED | OUTPATIENT
Start: 2022-08-18 | End: 2022-08-23

## 2022-08-18 NOTE — PROGRESS NOTES
"    I N T E R N A L  M E D I C I N E  Mary Ellen Hall, APRN    ENCOUNTER DATE:  08/18/2022    Patrick Mckeon / 67 y.o. / male      CHIEF COMPLAINT / REASON FOR OFFICE VISIT     Fever (Started Monday 100 , Tuesday 99.8, yesterday 101.3 , last night 101.6 ,Chills, fatigue/Yesterday home covid test negative.)      ASSESSMENT & PLAN     Diagnoses and all orders for this visit:    1. Fever, unspecified fever cause (Primary)  -     XR Chest PA & Lateral  -     CBC w AUTO Differential  -     Comprehensive metabolic panel  -     Lipid panel    2. 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  Dispense: 10 capsule; Refill: 0         SUMMARY/DISCUSSION  • CXR CBC and CMP ordered. Instructed patient to go to the ER for worsening fever, chest pain, palpations, increased shortness of breath   • Follow up wit Dr Carter as scheduled  • I spent 25 min in direct care of this patient on this date of service. This time includes times spent by me in the following activities: Preparing for the visit, obtaining and/or reviewing a separately obtained history, performing a medically appropriate examination and/or evaluation, reviewing medical records, reviewing tests, ordering medications, tests, or procedures, counseling and educating the patient, documenting information in the medical record and reviewing office note/correspondence from other providers.     Return in about 1 week (around 8/25/2022) for Next scheduled follow up.      VITAL SIGNS     Visit Vitals  /62 (BP Location: Left arm)   Pulse 96   Temp 98 °F (36.7 °C)   Ht 172.7 cm (68\")   Wt 75.3 kg (166 lb)   SpO2 97%   BMI 25.24 kg/m²           BP Readings from Last 3 Encounters:   08/18/22 100/62   02/01/22 136/78   12/17/21 122/78     Wt Readings from Last 3 Encounters:   08/18/22 75.3 kg (166 lb)   02/01/22 73.5 kg (162 lb) "   12/21/21 75.8 kg (167 lb)     Body mass index is 25.24 kg/m².    Blood pressure readings recorded on patient flowsheet:  No flowsheet data found.          MEDICATIONS AT THE TIME OF OFFICE VISIT     Current Outpatient Medications on File Prior to Visit   Medication Sig Dispense Refill   • amLODIPine (NORVASC) 10 MG tablet TAKE 1 TABLET EVERY DAY 90 tablet 3   • aspirin 81 MG EC tablet Take 1 tablet by mouth Daily.     • losartan (COZAAR) 25 MG tablet Take 1 tablet by mouth Daily.     • pantoprazole (PROTONIX) 40 MG EC tablet TAKE 1 TABLET EVERY DAY 90 tablet 3   • pravastatin (PRAVACHOL) 40 MG tablet TAKE 1 TABLET EVERY DAY 90 tablet 3   • tiotropium bromide monohydrate (SPIRIVA RESPIMAT) 2.5 MCG/ACT aerosol solution inhaler Inhale 2 puffs Every Morning. 3 each 3   • albuterol sulfate HFA (Ventolin HFA) 108 (90 Base) MCG/ACT inhaler Inhale 2 puffs Every 4 (Four) Hours As Needed for Wheezing or Shortness of Air. 18 g 5     No current facility-administered medications on file prior to visit.        HISTORY OF PRESENT ILLNESS     67 year old male seen today for fever, chills, fatigue. States fever last evening 101.3, Negative for cough congestion, sore throat, nausea or vomiting. Possible Bacteremia and will place on preventative antibiotic. Encouraged to go to the ER if no improvement as patient concerned about Hx of Sepsis.       Patient Care Team:  Lester Carter MD as PCP - General (Internal Medicine)  Orlin Quintero MD as Consulting Physician (Gastroenterology)  Keyur Mejia II, MD as Consulting Physician (Hematology and Oncology)  Humble Hamilton MD as Surgeon (Vascular Surgery)  Keyur Garrido MD as Consulting Physician (Dermatology)    REVIEW OF SYSTEMS     Review of Systems   Constitutional: Positive for chills, fatigue and fever. Negative for appetite change.   HENT: Positive for sinus pressure and sinus pain. Negative for congestion and sore throat.    Eyes: Negative.    Respiratory: Negative for  cough and shortness of breath.    Cardiovascular: Negative.    Gastrointestinal: Negative for diarrhea, nausea and vomiting.   Genitourinary: Positive for frequency. Negative for dysuria and urgency.   Musculoskeletal: Negative.    Skin: Negative.    Neurological: Negative for dizziness and headaches.   Psychiatric/Behavioral: Negative.           PHYSICAL EXAMINATION     Physical Exam  HENT:      Mouth/Throat:      Mouth: Mucous membranes are moist.      Pharynx: Oropharynx is clear.   Cardiovascular:      Rate and Rhythm: Normal rate and regular rhythm.      Pulses: Normal pulses.      Heart sounds: Normal heart sounds.   Pulmonary:      Effort: Pulmonary effort is normal.      Breath sounds: Normal breath sounds.   Abdominal:      Palpations: Abdomen is soft.   Musculoskeletal:         General: Normal range of motion.   Skin:     General: Skin is warm and dry.   Neurological:      Mental Status: He is alert and oriented to person, place, and time.             REVIEWED DATA     Labs:     Lab Results   Component Value Date     01/14/2022    K 3.9 01/14/2022    CALCIUM 9.1 01/14/2022    AST 12 01/14/2022    ALT 10 01/14/2022    BUN 17 01/14/2022    CREATININE 0.92 01/14/2022    CREATININE 1.10 01/14/2022    CREATININE 1.23 12/17/2021    EGFRIFNONA 82 01/14/2022    EGFRIFAFRI 72 03/29/2021       Lab Results   Component Value Date    HGBA1C 5.60 01/27/2020    HGBA1C 5.60 01/11/2019    HGBA1C 5.10 02/21/2017       Lab Results   Component Value Date    LDL 80 02/01/2022    LDL 52 12/01/2020    LDL 58 01/27/2020    HDL 57 02/01/2022    HDL 65 (H) 12/01/2020    TRIG 114 02/01/2022    TRIG 53 12/01/2020       Lab Results   Component Value Date    TSH 1.110 01/11/2019    TSH 1.380 02/21/2017    FREET4 1.26 01/11/2019    FREET4 1.22 02/21/2017       Lab Results   Component Value Date    WBC 8.81 12/17/2021    HGB 12.5 (L) 12/17/2021     12/17/2021       Lab Results   Component Value Date    MALBCRERATIO   01/14/2022      Comment:      Unable to calculate          Imaging:           Medical Tests:           Summary of old records / correspondence / consultant report:           Request outside records:           CATIA Vazquez

## 2022-08-18 NOTE — TELEPHONE ENCOUNTER
Recommend the pt be evaluated in the office today.    ----- Message from Lisa Black MA sent at 8/18/2022  8:37 AM EDT -----  Regarding: FW: hi temp    ----- Message -----  From: Patrick Mckeon  Sent: 8/17/2022   9:20 PM EDT  To: Traci Arreguin [id number] Clinical Pool  Subject: hi temp                                          Dr. Carter , Monday abi I felt a little chilled , checked temp and it was 100.0 . Tues. a little tired & chilled checked and I was 99.8 . Today very tired & chilled , came home early 101.3 , slept about 3 hrs. and still 101.3 . Took an at home covid test and negative . Just checked again and 101.6 . Real concerned about bacterial infection as I've been hospitalized for that twice in about 4 years . I have a regular appt next week on Thurs. but not sure I can wait that long .   Advise  ,  Thanks   ,  Chidi Mckeon

## 2022-08-19 LAB
ALBUMIN SERPL-MCNC: 4 G/DL (ref 3.8–4.8)
ALBUMIN/GLOB SERPL: 1.7 {RATIO} (ref 1.2–2.2)
ALP SERPL-CCNC: 91 IU/L (ref 44–121)
ALT SERPL-CCNC: 13 IU/L (ref 0–44)
AST SERPL-CCNC: 18 IU/L (ref 0–40)
BASOPHILS # BLD AUTO: 0 X10E3/UL (ref 0–0.2)
BASOPHILS NFR BLD AUTO: 0 %
BILIRUB SERPL-MCNC: 0.4 MG/DL (ref 0–1.2)
BUN SERPL-MCNC: 22 MG/DL (ref 8–27)
BUN/CREAT SERPL: 16 (ref 10–24)
CALCIUM SERPL-MCNC: 9 MG/DL (ref 8.6–10.2)
CHLORIDE SERPL-SCNC: 102 MMOL/L (ref 96–106)
CHOLEST SERPL-MCNC: 130 MG/DL (ref 100–199)
CO2 SERPL-SCNC: 21 MMOL/L (ref 20–29)
CREAT SERPL-MCNC: 1.39 MG/DL (ref 0.76–1.27)
EGFRCR-CYS SERPLBLD CKD-EPI 2021: 55 ML/MIN/1.73
EOSINOPHIL # BLD AUTO: 0.1 X10E3/UL (ref 0–0.4)
EOSINOPHIL NFR BLD AUTO: 1 %
ERYTHROCYTE [DISTWIDTH] IN BLOOD BY AUTOMATED COUNT: 13.3 % (ref 11.6–15.4)
GLOBULIN SER CALC-MCNC: 2.4 G/DL (ref 1.5–4.5)
GLUCOSE SERPL-MCNC: 94 MG/DL (ref 65–99)
HCT VFR BLD AUTO: 38 % (ref 37.5–51)
HDLC SERPL-MCNC: 53 MG/DL
HGB BLD-MCNC: 13 G/DL (ref 13–17.7)
IMM GRANULOCYTES # BLD AUTO: 0 X10E3/UL (ref 0–0.1)
IMM GRANULOCYTES NFR BLD AUTO: 0 %
LDLC SERPL CALC-MCNC: 60 MG/DL (ref 0–99)
LYMPHOCYTES # BLD AUTO: 1.5 X10E3/UL (ref 0.7–3.1)
LYMPHOCYTES NFR BLD AUTO: 19 %
MCH RBC QN AUTO: 31.3 PG (ref 26.6–33)
MCHC RBC AUTO-ENTMCNC: 34.2 G/DL (ref 31.5–35.7)
MCV RBC AUTO: 92 FL (ref 79–97)
MONOCYTES # BLD AUTO: 0.8 X10E3/UL (ref 0.1–0.9)
MONOCYTES NFR BLD AUTO: 11 %
NEUTROPHILS # BLD AUTO: 5.2 X10E3/UL (ref 1.4–7)
NEUTROPHILS NFR BLD AUTO: 69 %
PLATELET # BLD AUTO: 189 X10E3/UL (ref 150–450)
POTASSIUM SERPL-SCNC: 4.7 MMOL/L (ref 3.5–5.2)
PROT SERPL-MCNC: 6.4 G/DL (ref 6–8.5)
RBC # BLD AUTO: 4.15 X10E6/UL (ref 4.14–5.8)
SODIUM SERPL-SCNC: 137 MMOL/L (ref 134–144)
TRIGL SERPL-MCNC: 87 MG/DL (ref 0–149)
VLDLC SERPL CALC-MCNC: 17 MG/DL (ref 5–40)
WBC # BLD AUTO: 7.6 X10E3/UL (ref 3.4–10.8)

## 2022-08-22 ENCOUNTER — TELEPHONE (OUTPATIENT)
Dept: INTERNAL MEDICINE | Age: 67
End: 2022-08-22

## 2022-08-22 NOTE — TELEPHONE ENCOUNTER
lvm for pt to return call if he has any questions or concerns. Included in message that I would be sending Hel[employer] message.    OKAY FOR HUB TO READ BELOW MESSAGE IF HE RETURNS CALL,THANK YOU    ----- Message from CATIA Grifftihs sent at 8/19/2022  1:48 PM EDT -----  Mr. Mckeon,    I have reviewed your recent labs.   Your CBC looks great. Your CMP shows an elevated kidney function. Would recommend that you follow up with you Nephrologist. Make sure that you are drinking at least 32 ounces of water daily.    Sincerely,  Mary Ellen BARDALES

## 2022-08-25 ENCOUNTER — OFFICE VISIT (OUTPATIENT)
Dept: INTERNAL MEDICINE | Age: 67
End: 2022-08-25

## 2022-08-25 VITALS
WEIGHT: 166 LBS | BODY MASS INDEX: 25.16 KG/M2 | SYSTOLIC BLOOD PRESSURE: 120 MMHG | HEIGHT: 68 IN | TEMPERATURE: 97.3 F | HEART RATE: 86 BPM | DIASTOLIC BLOOD PRESSURE: 76 MMHG | OXYGEN SATURATION: 97 %

## 2022-08-25 DIAGNOSIS — I10 PRIMARY HYPERTENSION: Primary | Chronic | ICD-10-CM

## 2022-08-25 DIAGNOSIS — J43.9 PULMONARY EMPHYSEMA, UNSPECIFIED EMPHYSEMA TYPE: Chronic | ICD-10-CM

## 2022-08-25 DIAGNOSIS — R79.89 INCREASE IN CREATININE: ICD-10-CM

## 2022-08-25 DIAGNOSIS — Z12.5 SCREENING PSA (PROSTATE SPECIFIC ANTIGEN): ICD-10-CM

## 2022-08-25 DIAGNOSIS — E78.2 MIXED HYPERLIPIDEMIA: Chronic | ICD-10-CM

## 2022-08-25 PROCEDURE — 99214 OFFICE O/P EST MOD 30 MIN: CPT | Performed by: INTERNAL MEDICINE

## 2022-08-25 NOTE — PROGRESS NOTES
I N T E R N A L  M E D I C I N E  J U N O H  K I M,  M D      ENCOUNTER DATE:  08/25/2022    Patrick Mckeon / 67 y.o. / male      CHIEF COMPLAINT / REASON FOR OFFICE VISIT     Hypertension, Hyperlipidemia, and Emphysema lung       ASSESSMENT & PLAN     Problem List Items Addressed This Visit        High    Primary hypertension - Primary (Chronic)    Overview     Continue amlodipine 10 mg and losartan 25 mg qd         Relevant Medications    losartan (COZAAR) 25 MG tablet    amLODIPine (NORVASC) 10 MG tablet       Medium    Emphysema lung (HCC) (Chronic)    Overview     Noted on CT in 2014  Moderate COPD on PFT 2017    Continue Spiriva daily         Current Assessment & Plan     I strongly advised him to consider smoking cessation.  Maintain annual low-dose CT chest.           Relevant Medications    albuterol sulfate HFA (Ventolin HFA) 108 (90 Base) MCG/ACT inhaler    tiotropium bromide monohydrate (SPIRIVA RESPIMAT) 2.5 MCG/ACT aerosol solution inhaler    Hyperlipidemia (Chronic)    Overview     Continue pravastatin 40 mg qd.          Current Assessment & Plan     We discussed possible benefits of switching to high intensity statin therapy with atorvastatin but he defers for now and would like to continue pravastatin 40 mg.         Relevant Medications    aspirin 81 MG EC tablet    pravastatin (PRAVACHOL) 40 MG tablet      Other Visit Diagnoses     Increase in creatinine        Relevant Orders    Basic Metabolic Panel    Screening PSA (prostate specific antigen)        Relevant Orders    PSA Screen        Orders Placed This Encounter   Procedures   • Basic Metabolic Panel   • PSA Screen     No orders of the defined types were placed in this encounter.      SUMMARY/DISCUSSION  •       Next Appointment with me: Visit date not found    Return in about 6 months (around 2/25/2023) for Reassess chronic medical problems.      VITAL SIGNS     Vitals:    08/25/22 0721   BP: 120/76   BP Location: Left arm   Pulse: 86  "  Temp: 97.3 °F (36.3 °C)   SpO2: 97%   Weight: 75.3 kg (166 lb)   Height: 172.7 cm (68\")       BP Readings from Last 3 Encounters:   08/25/22 120/76   08/18/22 100/62   02/01/22 136/78     Wt Readings from Last 3 Encounters:   08/25/22 75.3 kg (166 lb)   08/18/22 75.3 kg (166 lb)   02/01/22 73.5 kg (162 lb)     Body mass index is 25.24 kg/m².    Blood pressure readings recorded on patient flowsheet:  No flowsheet data found.       MEDICATIONS AT THE TIME OF OFFICE VISIT     Current Outpatient Medications on File Prior to Visit   Medication Sig   • albuterol sulfate HFA (Ventolin HFA) 108 (90 Base) MCG/ACT inhaler Inhale 2 puffs Every 4 (Four) Hours As Needed for Wheezing or Shortness of Air.   • amLODIPine (NORVASC) 10 MG tablet TAKE 1 TABLET EVERY DAY   • aspirin 81 MG EC tablet Take 1 tablet by mouth Daily.   • losartan (COZAAR) 25 MG tablet Take 1 tablet by mouth Daily.   • pantoprazole (PROTONIX) 40 MG EC tablet TAKE 1 TABLET EVERY DAY   • pravastatin (PRAVACHOL) 40 MG tablet TAKE 1 TABLET EVERY DAY   • tiotropium bromide monohydrate (SPIRIVA RESPIMAT) 2.5 MCG/ACT aerosol solution inhaler Inhale 2 puffs Every Morning.     No current facility-administered medications on file prior to visit.          HISTORY OF PRESENT ILLNESS     Denies any significant changes or problems.  Still smokes about 1 pack/day.  Uses Spiriva daily and denies increased cough or shortness of breath.  He denies angina or increased dyspnea on exertion.  He is on aspirin 81 mg and pravastatin 40 mg.  He is followed by vascular specialist for peripheral vascular disease and history of abdominal aortic aneurysm repair.  He was recently seen by APRN for upper respiratory infection.  He is noted to have an increase in creatinine.  He denies change or difficulty with urination.  He would like his PSA checked for prostate cancer screening.  He has deferred AWV in the past.      Patient Care Team:  Lester Carter MD as PCP - General (Internal " Medicine)  Orlin Quintero MD as Consulting Physician (Gastroenterology)  Keyur Mejia II, MD as Consulting Physician (Hematology and Oncology)  Humble Hamilton MD as Surgeon (Vascular Surgery)  Keyur Garrido MD as Consulting Physician (Dermatology)    REVIEW OF SYSTEMS     Review of Systems       PHYSICAL EXAMINATION     Physical Exam  General: No acute distress  Psych: Normal thought and judgment   Cardiovascular Rate: normal. Rhythm: regular. Heart sounds: normal.   Lungs: decreased BS bilaterally without rales or wheezing       REVIEWED DATA     Labs:     Lab Results   Component Value Date     08/18/2022    K 4.7 08/18/2022    CALCIUM 9.0 08/18/2022    AST 18 08/18/2022    ALT 13 08/18/2022    BUN 22 08/18/2022    CREATININE 1.39 (H) 08/18/2022    CREATININE 0.92 01/14/2022    CREATININE 1.10 01/14/2022    EGFRIFNONA 82 01/14/2022    EGFRIFAFRI 72 03/29/2021       Lab Results   Component Value Date    HGBA1C 5.60 01/27/2020    HGBA1C 5.60 01/11/2019    HGBA1C 5.10 02/21/2017       Lab Results   Component Value Date    LDL 60 08/18/2022    LDL 80 02/01/2022    LDL 52 12/01/2020    HDL 53 08/18/2022    HDL 57 02/01/2022    TRIG 87 08/18/2022    TRIG 114 02/01/2022       Lab Results   Component Value Date    TSH 1.110 01/11/2019    TSH 1.380 02/21/2017    FREET4 1.26 01/11/2019    FREET4 1.22 02/21/2017       Lab Results   Component Value Date    WBC 7.6 08/18/2022    HGB 13.0 08/18/2022     08/18/2022       Lab Results   Component Value Date    MALBCRERATIO  01/14/2022      Comment:      Unable to calculate      Lab Results   Component Value Date    PROTEIN Negative 11/18/2021    GLUCOSEU Negative 01/14/2022    BLOODU Negative 01/14/2022    NITRITEU Negative 01/14/2022    LEUKOCYTESUR Negative 01/14/2022      Lab Results   Component Value Date    PSA 0.937 03/29/2021    PSA 0.977 01/27/2020    PSA 1.090 01/11/2019        Imaging:     CT CHEST WO CONTRAST DIAGNOSTIC-     CLINICAL HISTORY:  Follow-up pulmonary nodule     TECHNIQUE: Spiral CT images were obtained through the chest with no oral  or IV contrast and were reconstructed in 3 mm thick slices.     Radiation dose reduction techniques were utilized, including automated  exposure control and exposure modulation based on body size.     COMPARISON: CT chest dated 09/25/2020     FINDINGS: The very tiny nodular opacity in the medial aspect of the left  lower lobe shown on the previous chest CT is no longer identified. The  lungs are currently free of any discrete nodules. There are no  parenchymal infiltrates or pleural effusions. There is no mediastinal or  hilar or axillary lymphadenopathy. Images through the upper abdomen  demonstrate numerous tiny gallstones layering posteriorly within the  gallbladder lumen and also the proximal end of an endograft within the  aorta. Multiple diverticuli are present within the splenic flexure of  the colon. There is no CT evidence of diverticulitis.     IMPRESSION:  A very tiny punctate nodular opacity in the left lower lobe  has resolved since the preceding CT dated 09/25/2020. The lungs are  currently free of infiltrates or nodules. Cholelithiasis. Colonic  diverticulosis without evidence of diverticulitis.     This report was finalized on 3/1/2022       Medical Tests:           Summary of old records / correspondence / consultant report:           Request outside records:             *Examiner was wearing KN95 mask and eye protection during the entire duration of the visit. Patient was masked the entire time. Minimum social distance of 6 ft maintained entire visit except if physical contact was necessary as documented.       Template created by Dalila Carter MD

## 2022-08-25 NOTE — ASSESSMENT & PLAN NOTE
We discussed possible benefits of switching to high intensity statin therapy with atorvastatin but he defers for now and would like to continue pravastatin 40 mg.

## 2022-08-26 ENCOUNTER — DOCUMENTATION (OUTPATIENT)
Dept: INTERNAL MEDICINE | Age: 67
End: 2022-08-26

## 2022-08-26 DIAGNOSIS — R79.89 INCREASE IN CREATININE: Primary | ICD-10-CM

## 2022-08-26 LAB
BUN SERPL-MCNC: 21 MG/DL (ref 8–27)
BUN/CREAT SERPL: 14 (ref 10–24)
CALCIUM SERPL-MCNC: 9.4 MG/DL (ref 8.6–10.2)
CHLORIDE SERPL-SCNC: 102 MMOL/L (ref 96–106)
CO2 SERPL-SCNC: 22 MMOL/L (ref 20–29)
CREAT SERPL-MCNC: 1.52 MG/DL (ref 0.76–1.27)
EGFRCR-CYS SERPLBLD CKD-EPI 2021: 50 ML/MIN/1.73
GLUCOSE SERPL-MCNC: 91 MG/DL (ref 65–99)
POTASSIUM SERPL-SCNC: 5 MMOL/L (ref 3.5–5.2)
PSA SERPL-MCNC: 1.3 NG/ML (ref 0–4)
SODIUM SERPL-SCNC: 139 MMOL/L (ref 134–144)

## 2022-08-26 NOTE — PROGRESS NOTES
CALL PATIENT with results:    1. Creatinine level is worse.   - verify if he is not taking any over-the-counter NSAIDs   - maintain hydration   - verify any change or blood in urination   - recheck BMP in 2 weeks (IMPORTANT)    2. PSA (for prostate cancer screening) is stable

## 2022-09-13 DIAGNOSIS — R79.89 INCREASE IN CREATININE: Primary | ICD-10-CM

## 2022-09-14 DIAGNOSIS — N28.9 ACUTE RENAL INSUFFICIENCY: Primary | ICD-10-CM

## 2022-09-14 DIAGNOSIS — N18.31 CHRONIC KIDNEY DISEASE, STAGE 3A: ICD-10-CM

## 2022-09-16 DIAGNOSIS — Z86.010 HISTORY OF COLON POLYPS: Primary | ICD-10-CM

## 2022-10-14 ENCOUNTER — HOSPITAL ENCOUNTER (OUTPATIENT)
Dept: ULTRASOUND IMAGING | Facility: HOSPITAL | Age: 67
Discharge: HOME | End: 2022-10-14

## 2022-10-14 ENCOUNTER — HOSPITAL ENCOUNTER (OUTPATIENT)
Dept: CARDIOLOGY | Facility: HOSPITAL | Age: 67
Discharge: HOME | End: 2022-10-14

## 2022-10-14 ENCOUNTER — PREP FOR SURGERY (OUTPATIENT)
Dept: SURGERY | Facility: SURGERY CENTER | Age: 67
End: 2022-10-14

## 2022-10-14 DIAGNOSIS — N18.31 CHRONIC KIDNEY DISEASE, STAGE 3A: ICD-10-CM

## 2022-10-14 DIAGNOSIS — Z12.11 ENCOUNTER FOR SCREENING FOR MALIGNANT NEOPLASM OF COLON: Primary | ICD-10-CM

## 2022-10-14 DIAGNOSIS — N28.9 ACUTE RENAL INSUFFICIENCY: ICD-10-CM

## 2022-10-14 LAB
BH CV VAS BP LEFT ARM: NORMAL MMHG
BH CV VAS BP RIGHT ARM: NORMAL MMHG
BH CV VAS RENAL AORTIC MID EDV: 9.26 CM/S
BH CV VAS RENAL AORTIC MID PSV: 58.7 CM/S
BH CV VAS RENAL HILUM RIGHT EDV: 10.4 CM/S
BH CV VAS RENAL HILUM RIGHT PSV: 28.6 CM/S
BH CV XLRA MEAS DIST REN A EDV RIGHT: 15.9 CM/S
BH CV XLRA MEAS DIST REN A PSV RIGHT: 71.9 CM/S
BH CV XLRA MEAS KID L RIGHT: 12 CM
BH CV XLRA MEAS KID W RIGHT: 5 CM
BH CV XLRA MEAS MID REN A EDV RIGHT: 24.9 CM/S
BH CV XLRA MEAS MID REN A PSV RIGHT: 88.2 CM/S
BH CV XLRA MEAS PROX REN A EDV RIGHT: -16.6 CM/S
BH CV XLRA MEAS PROX REN A PSV RIGHT: -76.8 CM/S
BH CV XLRA MEAS RAR RIGHT: 1.5
BH CV XLRA MEAS RENAL A ORG EDV RIGHT: 21.4 CM/S
BH CV XLRA MEAS RENAL A ORG PSV RIGHT: 87.1 CM/S
MAXIMAL PREDICTED HEART RATE: 152 BPM
RIGHT RENAL UPPER PARENCHYMA MAX: 14.1 CM/S
RIGHT RENAL UPPER PARENCHYMA MIN: 6.62 CM/S
RIGHT RENAL UPPER PARENCHYMA RI: 0.53
STRESS TARGET HR: 129 BPM

## 2022-10-14 PROCEDURE — 93975 VASCULAR STUDY: CPT

## 2022-10-14 PROCEDURE — 76775 US EXAM ABDO BACK WALL LIM: CPT

## 2022-10-14 PROCEDURE — 93976 VASCULAR STUDY: CPT

## 2022-10-14 RX ORDER — SODIUM CHLORIDE, SODIUM LACTATE, POTASSIUM CHLORIDE, CALCIUM CHLORIDE 600; 310; 30; 20 MG/100ML; MG/100ML; MG/100ML; MG/100ML
30 INJECTION, SOLUTION INTRAVENOUS CONTINUOUS PRN
Status: CANCELLED | OUTPATIENT
Start: 2023-01-05

## 2022-10-18 PROBLEM — Z12.11 ENCOUNTER FOR SCREENING FOR MALIGNANT NEOPLASM OF COLON: Status: ACTIVE | Noted: 2022-10-18

## 2022-10-25 ENCOUNTER — OFFICE VISIT (OUTPATIENT)
Dept: INTERNAL MEDICINE | Age: 67
End: 2022-10-25

## 2022-10-25 VITALS
SYSTOLIC BLOOD PRESSURE: 120 MMHG | OXYGEN SATURATION: 98 % | BODY MASS INDEX: 25.31 KG/M2 | HEART RATE: 97 BPM | WEIGHT: 167 LBS | DIASTOLIC BLOOD PRESSURE: 62 MMHG | HEIGHT: 68 IN | TEMPERATURE: 97.5 F

## 2022-10-25 DIAGNOSIS — Q60.0 CONGENITAL ABSENCE OF LEFT KIDNEY: Chronic | ICD-10-CM

## 2022-10-25 DIAGNOSIS — I10 PRIMARY HYPERTENSION: Chronic | ICD-10-CM

## 2022-10-25 DIAGNOSIS — N28.9 ACUTE RENAL INSUFFICIENCY: Primary | ICD-10-CM

## 2022-10-25 PROCEDURE — 99214 OFFICE O/P EST MOD 30 MIN: CPT | Performed by: INTERNAL MEDICINE

## 2022-10-25 NOTE — ASSESSMENT & PLAN NOTE
Most recent creatinine level has improved somewhat after holding losartan.  Renal artery study was negative for right side renal artery stenosis.  Ultrasound of the kidneys show old right side simple cyst.  Absent left kidney.  He was advised to maintain good hydration at all times.  Continue to avoid any NSAIDs and continue to hold losartan for now.  Will see about getting him in with a different nephrology group per his request.  Apparently he was not able to comprehend the nephrologist he was assigned to.  Office policies prohibited from being able to switch to a different provider in the same office.  He will be advised to contact scheduling/referral department and request a referral to be sent to a different nephrology group.    Lab Results   Component Value Date    CREATININE 1.37 (H) 10/21/2022    CREATININE 1.52 (H) 09/13/2022    CREATININE 1.52 (H) 08/25/2022    CREATININE 1.39 (H) 08/18/2022    CREATININE 0.92 01/14/2022    BUN 25 (H) 10/21/2022    EGFRIFNONA 82 01/14/2022    EGFRIFAFRI 72 03/29/2021

## 2022-10-25 NOTE — PROGRESS NOTES
I N T E R N A L  M E D I C I N E    J U N O H  K I M,  M D      ENCOUNTER DATE:  10/25/2022    Patrick Mckeon / 67 y.o. / male    CHIEF COMPLAINT / REASON FOR OFFICE VISIT     Chronic Kidney Disease and Acute renal insuff      ASSESSMENT & PLAN     Problem List Items Addressed This Visit        High    Acute renal insufficiency - Primary    Current Assessment & Plan     Most recent creatinine level has improved somewhat after holding losartan.  Renal artery study was negative for right side renal artery stenosis.  Ultrasound of the kidneys show old right side simple cyst.  Absent left kidney.  He was advised to maintain good hydration at all times.  Continue to avoid any NSAIDs and continue to hold losartan for now.  Will see about getting him in with a different nephrology group per his request.  Apparently he was not able to comprehend the nephrologist he was assigned to.  Office policies prohibited from being able to switch to a different provider in the same office.  He will be advised to contact scheduling/referral department and request a referral to be sent to a different nephrology group.    Lab Results   Component Value Date    CREATININE 1.37 (H) 10/21/2022    CREATININE 1.52 (H) 09/13/2022    CREATININE 1.52 (H) 08/25/2022    CREATININE 1.39 (H) 08/18/2022    CREATININE 0.92 01/14/2022    BUN 25 (H) 10/21/2022    EGFRIFNONA 82 01/14/2022    EGFRIFAFRI 72 03/29/2021             Relevant Orders    Urinalysis With Microscopic If Indicated (No Culture) - Urine, Clean Catch       Medium    Primary hypertension (Chronic)    Current Assessment & Plan     Previously losartan was discontinued due to elevated creatinine.  Blood pressure appears to be well controlled on amlodipine 10 mg.  Will discontinue losartan from medication list.    BP Readings from Last 3 Encounters:   10/25/22 120/62   08/25/22 120/76   08/18/22 100/62             Relevant Medications    amLODIPine (NORVASC) 10 MG tablet        "Unprioritized    Congenital absence of left kidney (Chronic)    Overview     No kidney visualized on CT (2/28/17(patient was not aware).          Orders Placed This Encounter   Procedures   • Urinalysis With Microscopic If Indicated (No Culture) - Urine, Clean Catch     No orders of the defined types were placed in this encounter.      SUMMARY/DISCUSSION  •       Next Appointment with me: 2/27/2023    Return for Next scheduled follow up.      VITAL SIGNS     Vitals:    10/25/22 0919   BP: 120/62   Pulse: 97   Temp: 97.5 °F (36.4 °C)   SpO2: 98%   Weight: 75.8 kg (167 lb)   Height: 172.7 cm (68\")       BP Readings from Last 3 Encounters:   10/25/22 120/62   08/25/22 120/76   08/18/22 100/62     Wt Readings from Last 3 Encounters:   10/25/22 75.8 kg (167 lb)   08/25/22 75.3 kg (166 lb)   08/18/22 75.3 kg (166 lb)     Body mass index is 25.39 kg/m².    Blood pressure readings recorded on patient flowsheet:  No flowsheet data found.       MEDICATIONS AT THE TIME OF OFFICE VISIT     Current Outpatient Medications on File Prior to Visit   Medication Sig   • albuterol sulfate HFA (Ventolin HFA) 108 (90 Base) MCG/ACT inhaler Inhale 2 puffs Every 4 (Four) Hours As Needed for Wheezing or Shortness of Air.   • amLODIPine (NORVASC) 10 MG tablet TAKE 1 TABLET EVERY DAY   • aspirin 81 MG EC tablet Take 1 tablet by mouth Daily.   • pantoprazole (PROTONIX) 40 MG EC tablet TAKE 1 TABLET EVERY DAY   • pravastatin (PRAVACHOL) 40 MG tablet TAKE 1 TABLET EVERY DAY   • tiotropium bromide monohydrate (SPIRIVA RESPIMAT) 2.5 MCG/ACT aerosol solution inhaler Inhale 2 puffs Every Morning.   • [DISCONTINUED] losartan (COZAAR) 25 MG tablet Take 1 tablet by mouth Daily. holding     No current facility-administered medications on file prior to visit.          HISTORY OF PRESENT ILLNESS     The patient is a 67-year-old male who presents for a follow-up for kidney related issue. His losartan was held at his last visit due to elevated creatinine " levels. His most recent creatinine levels have slightly improved since this change. He also does not take any over-the-counter NSAIDS and has been trying to drink more water. He struggles to drink enough water due to being busy at work. He did have a follow-up visit with a nephrologist he had seen while in the hospital, but states he could not understand him and would like to see someone else. He is unable to see a new provider in the same office due to their office policies. He underwent a renal US and renal artery duplex on 10/14/2022 that revealed absent left kidney with large simple right renal cyst and normal right renal artery. He denies family history of kidney disease. He denies difficulty starting urination or weak urine stream. He does have to urinate once per night. His blood pressure has been stable on amlodipine 10 mg daily.     He is up to date on all vaccinations.       Patient Care Team:  Lester Carter MD as PCP - General (Internal Medicine)  Orlin Quintero MD as Consulting Physician (Gastroenterology)  Keyur Mejia II, MD as Consulting Physician (Hematology and Oncology)  Humble Hamilton MD as Surgeon (Vascular Surgery)  Keyur Garrido MD as Consulting Physician (Dermatology)    REVIEW OF SYSTEMS     Review of Systems   Constitutional neg except per HPI   Resp neg  CV neg   GI negative   negative for gross hematuria or change in urination     PHYSICAL EXAMINATION     Physical Exam  Alert with normal thought and judgment.   No lower extremity edema       REVIEWED DATA     Labs:     Lab Results   Component Value Date     10/21/2022    K 4.6 10/21/2022    CALCIUM 9.2 10/21/2022    AST 18 08/18/2022    ALT 13 08/18/2022    BUN 25 (H) 10/21/2022    CREATININE 1.37 (H) 10/21/2022    CREATININE 1.52 (H) 09/13/2022    CREATININE 1.52 (H) 08/25/2022    EGFRIFNONA 82 01/14/2022    EGFRIFAFRI 72 03/29/2021       Lab Results   Component Value Date    HGBA1C 5.60 01/27/2020    HGBA1C 5.60  01/11/2019    HGBA1C 5.10 02/21/2017       Lab Results   Component Value Date    LDL 60 08/18/2022    LDL 80 02/01/2022    LDL 52 12/01/2020    HDL 53 08/18/2022    HDL 57 02/01/2022    TRIG 87 08/18/2022    TRIG 114 02/01/2022       Lab Results   Component Value Date    TSH 1.110 01/11/2019    TSH 1.380 02/21/2017    FREET4 1.26 01/11/2019    FREET4 1.22 02/21/2017       Lab Results   Component Value Date    WBC 7.6 08/18/2022    HGB 13.0 08/18/2022     08/18/2022       Lab Results   Component Value Date    MALBCRERATIO  01/14/2022      Comment:      Unable to calculate          Imaging:     RENAL SONOGRAM     HISTORY: Acute renal insufficiency. Unilateral kidney reportedly due to  congenital agenesis of the left kidney.     TECHNIQUE: Sonogram of the right kidney and the left renal fossa was  performed and is correlated with abdomen and pelvis CT performed with IV  contrast 01/14/2022.     FINDINGS: The right kidney measures 12.2 x 5.9 x 5.8 cm. Renal  parenchyma appears normal. There is a simple, 26 mm cyst in the midpole  right renal pelvis. No suspicious renal parenchymal lesion is present.  No hydronephrosis.     There is normal-appearing bowel in the left renal fossa. The visualized  part of the spleen appears normal.     The urinary bladder appears normal.     IMPRESSION:  Absent left kidney. Large simple right renal cyst. Otherwise  negative renal sonogram.     This report was finalized on 10/15/2022       Duplex Renal artery:    •  Patient only has right kidney  •  Normal right renal artery.    Medical Tests:           Summary of old records / correspondence / consultant report:           Request outside records:             *Examiner was wearing KN95 mask and eye protection during the entire duration of the visit. Patient was masked the entire time. Minimum social distance of 6 ft maintained entire visit except if physical contact was necessary as documented.       Template created by Dalila Carter MD      Transcribed from ambient dictation for Lester Carter MD by Collette Davis.  10/25/22   12:59 EDT    Patient or patient representative verbalized consent to the visit recording.  I have personally performed the services described in this document as transcribed by the above individual, and it is both accurate and complete.  Lester Carter MD  10/25/2022  13:15 EDT

## 2022-10-25 NOTE — ASSESSMENT & PLAN NOTE
Previously losartan was discontinued due to elevated creatinine.  Blood pressure appears to be well controlled on amlodipine 10 mg.  Will discontinue losartan from medication list.    BP Readings from Last 3 Encounters:   10/25/22 120/62   08/25/22 120/76   08/18/22 100/62

## 2022-11-09 LAB
APPEARANCE UR: CLEAR
BILIRUB UR QL STRIP: NEGATIVE
COLOR UR: YELLOW
GLUCOSE UR QL STRIP: NEGATIVE
HGB UR QL STRIP: NEGATIVE
KETONES UR QL STRIP: NEGATIVE
LEUKOCYTE ESTERASE UR QL STRIP: NEGATIVE
NITRITE UR QL STRIP: NEGATIVE
PH UR STRIP: 6.5 [PH] (ref 5–8)
PROT UR QL STRIP: NEGATIVE
SP GR UR STRIP: 1.01 (ref 1–1.03)
UROBILINOGEN UR STRIP-MCNC: NORMAL MG/DL

## 2022-12-01 DIAGNOSIS — E78.5 HYPERLIPIDEMIA, UNSPECIFIED HYPERLIPIDEMIA TYPE: ICD-10-CM

## 2022-12-02 RX ORDER — PRAVASTATIN SODIUM 40 MG
TABLET ORAL
Qty: 90 TABLET | Refills: 3 | Status: SHIPPED | OUTPATIENT
Start: 2022-12-02

## 2022-12-08 ENCOUNTER — OFFICE VISIT (OUTPATIENT)
Dept: INTERNAL MEDICINE | Age: 67
End: 2022-12-08

## 2022-12-08 VITALS
HEART RATE: 64 BPM | HEIGHT: 68 IN | TEMPERATURE: 96.8 F | WEIGHT: 168 LBS | BODY MASS INDEX: 25.46 KG/M2 | OXYGEN SATURATION: 97 % | DIASTOLIC BLOOD PRESSURE: 80 MMHG | SYSTOLIC BLOOD PRESSURE: 124 MMHG

## 2022-12-08 DIAGNOSIS — R50.9 FEVER, UNSPECIFIED FEVER CAUSE: Primary | ICD-10-CM

## 2022-12-08 LAB
ALBUMIN SERPL-MCNC: 4.5 G/DL (ref 3.5–5.2)
ALBUMIN/GLOB SERPL: 2.6 G/DL
ALP SERPL-CCNC: 82 U/L (ref 39–117)
ALT SERPL-CCNC: 22 U/L (ref 1–41)
AST SERPL-CCNC: 23 U/L (ref 1–40)
BASOPHILS # BLD AUTO: 0.02 10*3/MM3 (ref 0–0.2)
BASOPHILS NFR BLD AUTO: 0.3 % (ref 0–1.5)
BILIRUB SERPL-MCNC: 0.4 MG/DL (ref 0–1.2)
BUN SERPL-MCNC: 23 MG/DL (ref 8–23)
BUN/CREAT SERPL: 16 (ref 7–25)
CALCIUM SERPL-MCNC: 9.5 MG/DL (ref 8.6–10.5)
CHLORIDE SERPL-SCNC: 102 MMOL/L (ref 98–107)
CO2 SERPL-SCNC: 24.3 MMOL/L (ref 22–29)
CREAT SERPL-MCNC: 1.44 MG/DL (ref 0.76–1.27)
EGFRCR SERPLBLD CKD-EPI 2021: 52.9 ML/MIN/1.73
EOSINOPHIL # BLD AUTO: 0.11 10*3/MM3 (ref 0–0.4)
EOSINOPHIL NFR BLD AUTO: 1.5 % (ref 0.3–6.2)
ERYTHROCYTE [DISTWIDTH] IN BLOOD BY AUTOMATED COUNT: 13.4 % (ref 12.3–15.4)
GLOBULIN SER CALC-MCNC: 1.7 GM/DL
GLUCOSE SERPL-MCNC: 107 MG/DL (ref 65–99)
HCT VFR BLD AUTO: 42.9 % (ref 37.5–51)
HGB BLD-MCNC: 15.1 G/DL (ref 13–17.7)
IMM GRANULOCYTES # BLD AUTO: 0.02 10*3/MM3 (ref 0–0.05)
IMM GRANULOCYTES NFR BLD AUTO: 0.3 % (ref 0–0.5)
LYMPHOCYTES # BLD AUTO: 1.07 10*3/MM3 (ref 0.7–3.1)
LYMPHOCYTES NFR BLD AUTO: 14.6 % (ref 19.6–45.3)
MCH RBC QN AUTO: 30.8 PG (ref 26.6–33)
MCHC RBC AUTO-ENTMCNC: 35.2 G/DL (ref 31.5–35.7)
MCV RBC AUTO: 87.6 FL (ref 79–97)
MONOCYTES # BLD AUTO: 0.57 10*3/MM3 (ref 0.1–0.9)
MONOCYTES NFR BLD AUTO: 7.8 % (ref 5–12)
NEUTROPHILS # BLD AUTO: 5.56 10*3/MM3 (ref 1.7–7)
NEUTROPHILS NFR BLD AUTO: 75.5 % (ref 42.7–76)
NRBC BLD AUTO-RTO: 0 /100 WBC (ref 0–0.2)
PLATELET # BLD AUTO: 180 10*3/MM3 (ref 140–450)
POTASSIUM SERPL-SCNC: 4.6 MMOL/L (ref 3.5–5.2)
PROT SERPL-MCNC: 6.2 G/DL (ref 6–8.5)
RBC # BLD AUTO: 4.9 10*6/MM3 (ref 4.14–5.8)
SODIUM SERPL-SCNC: 139 MMOL/L (ref 136–145)
WBC # BLD AUTO: 7.35 10*3/MM3 (ref 3.4–10.8)

## 2022-12-08 PROCEDURE — 99213 OFFICE O/P EST LOW 20 MIN: CPT

## 2022-12-08 NOTE — PROGRESS NOTES
"    I N T E R N A L  M E D I C I N E  Radha Glasgow, APRN    ENCOUNTER DATE:  12/08/2022    Patrick Mckeon / 67 y.o. / male      CHIEF COMPLAINT / REASON FOR OFFICE VISIT     Fatigue (COVID AND FLU NEG ON 12/7/22 AT )      ASSESSMENT & PLAN     Diagnoses and all orders for this visit:    1. Fever, unspecified fever cause (Primary)  -     CBC & Differential  -     Comprehensive Metabolic Panel  -     POC Urinalysis Dipstick         SUMMARY/DISCUSSION  • Vitals are normal today.  Discussed checking CBC, CMP and POC urine at today's appointment to further investigate for cause of his infection.  • Recommended to visit ER for any chest pain, dyspnea, oxygenation <92%.    • Return for any worsening or non improving symptoms.  • Update: pt left office without providing urine sample.  He was called and asked to return to provide sample, and he reported that he decided to wait for blood tests at this time.      Next Appointment with me: Visit date not found    Return for Next scheduled follow up.      VITAL SIGNS     Visit Vitals  /80   Pulse 64   Temp 96.8 °F (36 °C)   Ht 172.7 cm (67.99\")   Wt 76.2 kg (168 lb)   SpO2 97%   BMI 25.55 kg/m²             Wt Readings from Last 3 Encounters:   12/08/22 76.2 kg (168 lb)   10/25/22 75.8 kg (167 lb)   08/25/22 75.3 kg (166 lb)     Body mass index is 25.55 kg/m².        MEDICATIONS AT THE TIME OF OFFICE VISIT     Current Outpatient Medications on File Prior to Visit   Medication Sig Dispense Refill   • albuterol sulfate HFA (Ventolin HFA) 108 (90 Base) MCG/ACT inhaler Inhale 2 puffs Every 4 (Four) Hours As Needed for Wheezing or Shortness of Air. 18 g 5   • amLODIPine (NORVASC) 10 MG tablet TAKE 1 TABLET EVERY DAY 90 tablet 3   • aspirin 81 MG EC tablet Take 1 tablet by mouth Daily.     • pantoprazole (PROTONIX) 40 MG EC tablet TAKE 1 TABLET EVERY DAY 90 tablet 3   • pravastatin (PRAVACHOL) 40 MG tablet TAKE 1 TABLET EVERY DAY 90 tablet 3   • tiotropium bromide monohydrate " (SPIRIVA RESPIMAT) 2.5 MCG/ACT aerosol solution inhaler Inhale 2 puffs Every Morning. 3 each 3     No current facility-administered medications on file prior to visit.        HISTORY OF PRESENT ILLNESS     Here today for symptoms of fatigue, fever, chills, which started on Tuesday.   He tested negative for Flu and COVID-19 at  last night.  Afebrile at today's appointment, and last fever reducing medication was 10+ hours ago.  Denies any ill contacts.  He is a smoker, and reports his cough is at baseline.  Denies any chest pain, wheezing, chest tightness, dyspnea, abdominal pain, n/v/d.  Denies all urinary complaints.  Eating, drink, urinating well.  Remains on Spiriva as prescribed.  Has not needed albuterol inhaler.    He expresses concern because the last two times he experienced similar symptoms, he reports he was hospitalized with bacterial infections (he is unable to recall what type of bacterial infections, but does report one hospitalization was for a liver abscess).        Patient Care Team:  Lester Carter MD as PCP - General (Internal Medicine)  Orlin Quintero MD as Consulting Physician (Gastroenterology)  Keyur Mejia II, MD as Consulting Physician (Hematology and Oncology)  Humble Hamilton MD as Surgeon (Vascular Surgery)  Keyur Garrido MD as Consulting Physician (Dermatology)    REVIEW OF SYSTEMS     Review of Systems   Constitutional: Positive for chills, fatigue and fever. Negative for unexpected weight change.   HENT: Negative for congestion, ear pain, rhinorrhea, sinus pressure, sinus pain and sore throat.    Respiratory: Positive for cough (At baseline). Negative for chest tightness, shortness of breath and wheezing.    Cardiovascular: Negative for chest pain, palpitations and leg swelling.   Gastrointestinal: Negative for abdominal pain, constipation, diarrhea and nausea.   Neurological: Negative for dizziness, weakness, light-headedness and headaches.   Psychiatric/Behavioral: The  patient is not nervous/anxious.           PHYSICAL EXAMINATION     Physical Exam  Vitals reviewed.   Constitutional:       General: He is not in acute distress.     Appearance: Normal appearance. He is not ill-appearing, toxic-appearing or diaphoretic.   HENT:      Head: Normocephalic and atraumatic.      Right Ear: There is impacted cerumen.      Left Ear: There is impacted cerumen.      Nose: Nose normal. No congestion or rhinorrhea.      Mouth/Throat:      Mouth: Mucous membranes are moist.      Pharynx: Oropharynx is clear. No oropharyngeal exudate or posterior oropharyngeal erythema.   Eyes:      Conjunctiva/sclera: Conjunctivae normal.   Cardiovascular:      Rate and Rhythm: Normal rate and regular rhythm.      Heart sounds: Normal heart sounds.   Pulmonary:      Effort: Pulmonary effort is normal. No respiratory distress.      Breath sounds: Normal breath sounds. No wheezing, rhonchi or rales.   Musculoskeletal:      Right lower leg: No edema.      Left lower leg: No edema.   Lymphadenopathy:      Cervical: No cervical adenopathy.   Neurological:      Mental Status: He is alert and oriented to person, place, and time. Mental status is at baseline.   Psychiatric:         Mood and Affect: Mood normal.         Behavior: Behavior normal.         Thought Content: Thought content normal.         Judgment: Judgment normal.           REVIEWED DATA     Labs:           Imaging:            Medical Tests:           Summary of old records / correspondence / consultant report:           Request outside records:

## 2023-01-31 ENCOUNTER — ANESTHESIA (OUTPATIENT)
Dept: GASTROENTEROLOGY | Facility: HOSPITAL | Age: 68
End: 2023-01-31
Payer: MEDICARE

## 2023-01-31 ENCOUNTER — HOSPITAL ENCOUNTER (OUTPATIENT)
Facility: HOSPITAL | Age: 68
Setting detail: HOSPITAL OUTPATIENT SURGERY
Discharge: HOME | End: 2023-01-31
Attending: INTERNAL MEDICINE | Admitting: INTERNAL MEDICINE
Payer: MEDICARE

## 2023-01-31 ENCOUNTER — ANESTHESIA EVENT (OUTPATIENT)
Dept: GASTROENTEROLOGY | Facility: HOSPITAL | Age: 68
End: 2023-01-31
Payer: MEDICARE

## 2023-01-31 VITALS
BODY MASS INDEX: 25.4 KG/M2 | DIASTOLIC BLOOD PRESSURE: 85 MMHG | WEIGHT: 167.6 LBS | SYSTOLIC BLOOD PRESSURE: 127 MMHG | HEIGHT: 68 IN | RESPIRATION RATE: 16 BRPM | OXYGEN SATURATION: 98 % | HEART RATE: 73 BPM

## 2023-01-31 DIAGNOSIS — Z12.11 ENCOUNTER FOR SCREENING FOR MALIGNANT NEOPLASM OF COLON: ICD-10-CM

## 2023-01-31 PROCEDURE — 88305 TISSUE EXAM BY PATHOLOGIST: CPT | Performed by: INTERNAL MEDICINE

## 2023-01-31 PROCEDURE — 45385 COLONOSCOPY W/LESION REMOVAL: CPT | Performed by: INTERNAL MEDICINE

## 2023-01-31 PROCEDURE — 45380 COLONOSCOPY AND BIOPSY: CPT | Performed by: INTERNAL MEDICINE

## 2023-01-31 PROCEDURE — 25010000002 PROPOFOL 10 MG/ML EMULSION: Performed by: NURSE ANESTHETIST, CERTIFIED REGISTERED

## 2023-01-31 RX ORDER — LIDOCAINE HYDROCHLORIDE 20 MG/ML
INJECTION, SOLUTION INFILTRATION; PERINEURAL AS NEEDED
Status: DISCONTINUED | OUTPATIENT
Start: 2023-01-31 | End: 2023-01-31 | Stop reason: SURG

## 2023-01-31 RX ORDER — SODIUM CHLORIDE, SODIUM LACTATE, POTASSIUM CHLORIDE, CALCIUM CHLORIDE 600; 310; 30; 20 MG/100ML; MG/100ML; MG/100ML; MG/100ML
30 INJECTION, SOLUTION INTRAVENOUS CONTINUOUS PRN
Status: DISCONTINUED | OUTPATIENT
Start: 2023-01-31 | End: 2023-01-31 | Stop reason: HOSPADM

## 2023-01-31 RX ORDER — PROPOFOL 10 MG/ML
VIAL (ML) INTRAVENOUS AS NEEDED
Status: DISCONTINUED | OUTPATIENT
Start: 2023-01-31 | End: 2023-01-31 | Stop reason: SURG

## 2023-01-31 RX ORDER — SODIUM CHLORIDE 0.9 % (FLUSH) 0.9 %
10 SYRINGE (ML) INJECTION EVERY 12 HOURS SCHEDULED
Status: DISCONTINUED | OUTPATIENT
Start: 2023-01-31 | End: 2023-01-31 | Stop reason: HOSPADM

## 2023-01-31 RX ORDER — SODIUM CHLORIDE 0.9 % (FLUSH) 0.9 %
10 SYRINGE (ML) INJECTION AS NEEDED
Status: DISCONTINUED | OUTPATIENT
Start: 2023-01-31 | End: 2023-01-31 | Stop reason: HOSPADM

## 2023-01-31 RX ADMIN — LIDOCAINE HYDROCHLORIDE 30 MG: 20 INJECTION, SOLUTION INFILTRATION; PERINEURAL at 12:56

## 2023-01-31 RX ADMIN — PROPOFOL 300 MCG/KG/MIN: 10 INJECTION, EMULSION INTRAVENOUS at 12:56

## 2023-01-31 RX ADMIN — SODIUM CHLORIDE, POTASSIUM CHLORIDE, SODIUM LACTATE AND CALCIUM CHLORIDE 30 ML/HR: 600; 310; 30; 20 INJECTION, SOLUTION INTRAVENOUS at 12:49

## 2023-01-31 RX ADMIN — PROPOFOL 70 MG: 10 INJECTION, EMULSION INTRAVENOUS at 12:58

## 2023-01-31 NOTE — ANESTHESIA POSTPROCEDURE EVALUATION
"Patient: Patrick Mckeon    Procedure Summary     Date: 01/31/23 [facility] /  [facility]    Anesthesia Start: 1253 Anesthesia Stop: 1327    Procedure: COLONOSCOPY INTO CECUM WITH COLD SNARE POLYPECTOMIES Diagnosis:       Encounter for screening for malignant neoplasm of colon      (Encounter for screening for malignant neoplasm of colon [Z12.11])    Surgeons: Antolin Wolfe MD Provider: Estiven Day MD    Anesthesia Type: MAC ASA Status: 3          Anesthesia Type: MAC    Vitals  Vitals Value Taken Time   /85 01/31/23 1344   Temp     Pulse 73 01/31/23 1344   Resp 16 01/31/23 1344   SpO2 98 % 01/31/23 1344           Post Anesthesia Care and Evaluation    Patient location during evaluation: bedside  Patient participation: complete - patient participated  Level of consciousness: sleepy but conscious  Pain score: 0  Pain management: adequate    Airway patency: patent  Anesthetic complications: No anesthetic complications    Cardiovascular status: acceptable  Respiratory status: acceptable  Hydration status: acceptable    Comments: /85 (BP Location: Left arm, Patient Position: Lying)   Pulse 73   Resp 16   Ht 172.7 cm (68\")   Wt 76 kg (167 lb 9.6 oz)   SpO2 98%   BMI 25.48 kg/m²         "

## 2023-01-31 NOTE — ANESTHESIA PREPROCEDURE EVALUATION
Anesthesia Evaluation     Patient summary reviewed and Nursing notes reviewed   NPO Solid Status: > 8 hours  NPO Liquid Status: > 4 hours           Airway   Mallampati: II  Neck ROM: full  No difficulty expected  Dental    (+) poor dentition    Pulmonary     breath sounds clear to auscultation  (+) a smoker Current, COPD,   Cardiovascular     Rhythm: regular    (+) hypertension, CAD, PVD, hyperlipidemia,       Neuro/Psych  (+) psychiatric history,    GI/Hepatic/Renal/Endo    (+)  GERD,  liver disease, renal disease,     Musculoskeletal     Abdominal    Substance History      OB/GYN          Other      history of cancer                    Anesthesia Plan    ASA 3     MAC     intravenous induction     Anesthetic plan, risks, benefits, and alternatives have been provided, discussed and informed consent has been obtained with: patient.        CODE STATUS:

## 2023-02-01 LAB
LAB AP CASE REPORT: NORMAL
PATH REPORT.FINAL DX SPEC: NORMAL
PATH REPORT.GROSS SPEC: NORMAL

## 2023-02-27 ENCOUNTER — OFFICE VISIT (OUTPATIENT)
Dept: INTERNAL MEDICINE | Age: 68
End: 2023-02-27
Payer: MEDICARE

## 2023-02-27 VITALS
OXYGEN SATURATION: 99 % | SYSTOLIC BLOOD PRESSURE: 136 MMHG | WEIGHT: 169 LBS | HEIGHT: 68 IN | HEART RATE: 92 BPM | TEMPERATURE: 97.3 F | BODY MASS INDEX: 25.61 KG/M2 | DIASTOLIC BLOOD PRESSURE: 82 MMHG

## 2023-02-27 DIAGNOSIS — I10 PRIMARY HYPERTENSION: Primary | Chronic | ICD-10-CM

## 2023-02-27 DIAGNOSIS — F17.219 CIGARETTE NICOTINE DEPENDENCE WITH NICOTINE-INDUCED DISORDER: Chronic | ICD-10-CM

## 2023-02-27 DIAGNOSIS — J43.9 PULMONARY EMPHYSEMA, UNSPECIFIED EMPHYSEMA TYPE: Chronic | ICD-10-CM

## 2023-02-27 DIAGNOSIS — Z12.2 ENCOUNTER FOR SCREENING FOR MALIGNANT NEOPLASM OF RESPIRATORY ORGANS: ICD-10-CM

## 2023-02-27 DIAGNOSIS — I25.10 ATHEROSCLEROSIS OF NATIVE CORONARY ARTERY OF NATIVE HEART WITHOUT ANGINA PECTORIS: Chronic | ICD-10-CM

## 2023-02-27 DIAGNOSIS — F17.219 NICOTINE DEPENDENCE, CIGARETTES, WITH UNSPECIFIED NICOTINE-INDUCED DISORDERS: ICD-10-CM

## 2023-02-27 DIAGNOSIS — N18.30 STAGE 3 CHRONIC KIDNEY DISEASE, UNSPECIFIED WHETHER STAGE 3A OR 3B CKD: ICD-10-CM

## 2023-02-27 DIAGNOSIS — E78.2 MIXED HYPERLIPIDEMIA: Chronic | ICD-10-CM

## 2023-02-27 PROBLEM — N18.2 STAGE 2 CHRONIC KIDNEY DISEASE: Chronic | Status: ACTIVE | Noted: 2022-10-25

## 2023-02-27 PROBLEM — R91.8 PULMONARY NODULES: Chronic | Status: RESOLVED | Noted: 2020-03-10 | Resolved: 2023-02-27

## 2023-02-27 PROCEDURE — 99214 OFFICE O/P EST MOD 30 MIN: CPT | Performed by: INTERNAL MEDICINE

## 2023-02-27 NOTE — ASSESSMENT & PLAN NOTE
Smoking 1 pack per day. Not committed to cessation. Counseling provided.  Placed order for future low-dose CT.

## 2023-02-27 NOTE — ASSESSMENT & PLAN NOTE
Advised him to follow-up with his nephrologist within 4 months.    Lab Results   Component Value Date    CREATININE 1.44 (H) 12/08/2022    CREATININE 1.37 (H) 10/21/2022    CREATININE 1.52 (H) 09/13/2022    CREATININE 1.52 (H) 08/25/2022    CREATININE 1.39 (H) 08/18/2022    BUN 23 12/08/2022    EGFRIFNONA 82 01/14/2022    EGFRIFAFRI 72 03/29/2021

## 2023-02-27 NOTE — ASSESSMENT & PLAN NOTE
Blood pressure appears stable.  Continue amlodipine 10 mg daily.  He was advised to discuss with his nephrologist about ACE inhibitor/ARB on his next office visit.    BP Readings from Last 3 Encounters:   02/27/23 136/82   01/31/23 127/85   12/08/22 124/80

## 2023-02-27 NOTE — ASSESSMENT & PLAN NOTE
I strongly advised him to quit smoking in light of his coronary artery disease and vascular disease.  Continue aspirin and statin therapy.

## 2023-02-27 NOTE — ASSESSMENT & PLAN NOTE
Still smoking 1 pack/day.  I strongly advised him to consider smoking cessation.  Will schedule for low-dose CT for lung cancer screening.

## 2023-02-27 NOTE — PROGRESS NOTES
I N T E R N A L  M E D I C I N E    J U N O H  K I M,  M D      ENCOUNTER DATE:  02/27/2023    Patrick Mckeon / 67 y.o. / male    CHIEF COMPLAINT / REASON FOR OFFICE VISIT     Hypertension, Hyperlipidemia, and Chronic Kidney Disease      ASSESSMENT & PLAN     Problem List Items Addressed This Visit        High    Primary hypertension - Primary (Chronic)    Current Assessment & Plan     Blood pressure appears stable.  Continue amlodipine 10 mg daily.  He was advised to discuss with his nephrologist about ACE inhibitor/ARB on his next office visit.    BP Readings from Last 3 Encounters:   02/27/23 136/82   01/31/23 127/85   12/08/22 124/80             Relevant Medications    amLODIPine (NORVASC) 10 MG tablet       Medium    Emphysema lung (HCC) (Chronic)    Overview     Noted on CT in 2014  Moderate COPD on PFT 2017    Continue Spiriva daily         Current Assessment & Plan     Smoking 1 pack per day. Not committed to cessation. Counseling provided.  Placed order for future low-dose CT.         Relevant Medications    albuterol sulfate HFA (Ventolin HFA) 108 (90 Base) MCG/ACT inhaler    tiotropium bromide monohydrate (SPIRIVA RESPIMAT) 2.5 MCG/ACT aerosol solution inhaler    Hyperlipidemia (Chronic)    Overview     Continue pravastatin 40 mg qd.          Relevant Medications    aspirin 81 MG EC tablet    pravastatin (PRAVACHOL) 40 MG tablet    Atherosclerosis of native coronary artery of native heart without angina pectoris (Chronic)    Current Assessment & Plan     I strongly advised him to quit smoking in light of his coronary artery disease and vascular disease.  Continue aspirin and statin therapy.         Relevant Medications    amLODIPine (NORVASC) 10 MG tablet    Stage 2 chronic kidney disease (Chronic)    Current Assessment & Plan     Advised him to follow-up with his nephrologist within 4 months.    Lab Results   Component Value Date    CREATININE 1.44 (H) 12/08/2022    CREATININE 1.37 (H) 10/21/2022  "   CREATININE 1.52 (H) 09/13/2022    CREATININE 1.52 (H) 08/25/2022    CREATININE 1.39 (H) 08/18/2022    BUN 23 12/08/2022    EGFRIFNONA 82 01/14/2022    EGFRIFAFRI 72 03/29/2021                 Low    Cigarette nicotine dependence with nicotine-induced disorder (Chronic)    Current Assessment & Plan     Still smoking 1 pack/day.  I strongly advised him to consider smoking cessation.  Will schedule for low-dose CT for lung cancer screening.        Other Visit Diagnoses     Nicotine dependence, cigarettes, with unspecified nicotine-induced disorders        Relevant Orders     CT Chest Low Dose Cancer Screening WO    Encounter for screening for malignant neoplasm of respiratory organs        Relevant Orders     CT Chest Low Dose Cancer Screening WO        Orders Placed This Encounter   Procedures   •  CT Chest Low Dose Cancer Screening WO     No orders of the defined types were placed in this encounter.      SUMMARY/DISCUSSION  •       Next Appointment with me: Visit date not found    Return in about 6 months (around 8/27/2023) for Reassess chronic medical problems, COME FASTING FOR LABS.      VITAL SIGNS     Vitals:    02/27/23 0803   BP: 136/82   Pulse: 92   Temp: 97.3 °F (36.3 °C)   SpO2: 99%   Weight: 76.7 kg (169 lb)   Height: 172.7 cm (68\")       BP Readings from Last 3 Encounters:   02/27/23 136/82   01/31/23 127/85   12/08/22 124/80     Wt Readings from Last 3 Encounters:   02/27/23 76.7 kg (169 lb)   01/31/23 76 kg (167 lb 9.6 oz)   12/08/22 76.2 kg (168 lb)     Body mass index is 25.7 kg/m².    Blood pressure readings recorded on patient flowsheet:  No flowsheet data found.       MEDICATIONS AT THE TIME OF OFFICE VISIT     Current Outpatient Medications on File Prior to Visit   Medication Sig   • amLODIPine (NORVASC) 10 MG tablet TAKE 1 TABLET EVERY DAY   • aspirin 81 MG EC tablet Take 1 tablet by mouth Daily.   • pantoprazole (PROTONIX) 40 MG EC tablet TAKE 1 TABLET EVERY DAY   • pravastatin (PRAVACHOL) 40 " MG tablet TAKE 1 TABLET EVERY DAY   • tiotropium bromide monohydrate (SPIRIVA RESPIMAT) 2.5 MCG/ACT aerosol solution inhaler Inhale 2 puffs Every Morning.   • albuterol sulfate HFA (Ventolin HFA) 108 (90 Base) MCG/ACT inhaler Inhale 2 puffs Every 4 (Four) Hours As Needed for Wheezing or Shortness of Air.     No current facility-administered medications on file prior to visit.          HISTORY OF PRESENT ILLNESS     Patient reports that his blood pressure is generally less than 130/80 at home.  He is taking amlodipine 10 mg.  He plans to schedule a follow-up visit with his nephrologist within the next 4 months.  His previous creatinine level was 1.44.  He continues to smoke about 1 pack/day.  He denies worsening cough or shortness of breath.  He denies angina or unusual dyspnea with exertion.  He is taking aspirin and pravastatin daily for known vascular disease and coronary atherosclerosis.  He is on pravastatin 40 mg daily and he previously deferred going on a higher intensity statin drug therapy.  His cholesterol however remains within goal.      Patient Care Team:  Lester Carter MD as PCP - General (Internal Medicine)  Orlin Quintero MD as Consulting Physician (Gastroenterology)  Keyur Mejia II, MD as Consulting Physician (Hematology and Oncology)  Humble Hamilton MD as Surgeon (Vascular Surgery)  Keyur Garrido MD as Consulting Physician (Dermatology)    REVIEW OF SYSTEMS     Review of Systems       PHYSICAL EXAMINATION     Physical Exam  General: No acute distress  Psych: Normal thought and judgment   Cardiovascular Rate: normal. Rhythm: regular. Heart sounds: normal ; No carotid bruit.    Lungs: decreased breath sounds bilaterally diffusely ; no rales or wheezing       REVIEWED DATA     Labs:     Lab Results   Component Value Date     12/08/2022    K 4.6 12/08/2022    CALCIUM 9.5 12/08/2022    AST 23 12/08/2022    ALT 22 12/08/2022    BUN 23 12/08/2022    CREATININE 1.44 (H) 12/08/2022     CREATININE 1.37 (H) 10/21/2022    CREATININE 1.52 (H) 09/13/2022    EGFRIFNONA 82 01/14/2022    EGFRIFAFRI 72 03/29/2021       Lab Results   Component Value Date    HGBA1C 5.60 01/27/2020    HGBA1C 5.60 01/11/2019    HGBA1C 5.10 02/21/2017       Lab Results   Component Value Date    LDL 60 08/18/2022    LDL 80 02/01/2022    LDL 52 12/01/2020    HDL 53 08/18/2022    HDL 57 02/01/2022    TRIG 87 08/18/2022    TRIG 114 02/01/2022       Lab Results   Component Value Date    TSH 1.110 01/11/2019    TSH 1.380 02/21/2017    FREET4 1.26 01/11/2019    FREET4 1.22 02/21/2017       Lab Results   Component Value Date    WBC 7.35 12/08/2022    HGB 15.1 12/08/2022     12/08/2022       Lab Results   Component Value Date    MALBCRERATIO  01/14/2022      Comment:      Unable to calculate          Imaging:     CT Chest Without Contrast Diagnostic (03/01/2022 07:46)    CT CHEST WO CONTRAST DIAGNOSTIC-     CLINICAL HISTORY: Follow-up pulmonary nodule     TECHNIQUE: Spiral CT images were obtained through the chest with no oral  or IV contrast and were reconstructed in 3 mm thick slices.     Radiation dose reduction techniques were utilized, including automated  exposure control and exposure modulation based on body size.     COMPARISON: CT chest dated 09/25/2020     FINDINGS: The very tiny nodular opacity in the medial aspect of the left  lower lobe shown on the previous chest CT is no longer identified. The  lungs are currently free of any discrete nodules. There are no  parenchymal infiltrates or pleural effusions. There is no mediastinal or  hilar or axillary lymphadenopathy. Images through the upper abdomen  demonstrate numerous tiny gallstones layering posteriorly within the  gallbladder lumen and also the proximal end of an endograft within the  aorta. Multiple diverticuli are present within the splenic flexure of  the colon. There is no CT evidence of diverticulitis.     IMPRESSION:  A very tiny punctate nodular opacity in  the left lower lobe  has resolved since the preceding CT dated 09/25/2020. The lungs are  currently free of infiltrates or nodules. Cholelithiasis. Colonic  diverticulosis without evidence of diverticulitis.     This report was finalized on 3/1/2022       Medical Tests:     US Renal Bilateral (10/14/2022 14:01)    Absent left kidney. Large simple right renal cyst. Otherwise  negative renal sonogram.         Summary of old records / correspondence / consultant report:           Request outside records:             *Examiner was wearing [id number] mask during the entire duration of the visit. Patient was masked the entire time. Minimum social distance of 6 ft maintained entire visit except if physical contact was necessary as documented.       Template created by Dalila Carter MD

## 2023-04-15 DIAGNOSIS — J43.9 PULMONARY EMPHYSEMA, UNSPECIFIED EMPHYSEMA TYPE: ICD-10-CM

## 2023-04-15 DIAGNOSIS — K44.9 HIATAL HERNIA WITH GASTROESOPHAGEAL REFLUX DISEASE AND ESOPHAGITIS: Chronic | ICD-10-CM

## 2023-04-15 DIAGNOSIS — K21.00 HIATAL HERNIA WITH GASTROESOPHAGEAL REFLUX DISEASE AND ESOPHAGITIS: Chronic | ICD-10-CM

## 2023-04-17 RX ORDER — PANTOPRAZOLE SODIUM 40 MG/1
TABLET, DELAYED RELEASE ORAL
Qty: 90 TABLET | Refills: 3 | Status: SHIPPED | OUTPATIENT
Start: 2023-04-17

## 2023-04-17 RX ORDER — TIOTROPIUM BROMIDE INHALATION SPRAY 3.12 UG/1
SPRAY, METERED RESPIRATORY (INHALATION)
Qty: 12 G | Refills: 3 | Status: SHIPPED | OUTPATIENT
Start: 2023-04-17

## 2023-04-28 ENCOUNTER — HOSPITAL ENCOUNTER (OUTPATIENT)
Dept: CT IMAGING | Facility: HOSPITAL | Age: 68
Discharge: HOME | End: 2023-04-28
Payer: MEDICARE

## 2023-04-28 DIAGNOSIS — Z12.2 ENCOUNTER FOR SCREENING FOR MALIGNANT NEOPLASM OF RESPIRATORY ORGANS: ICD-10-CM

## 2023-04-28 DIAGNOSIS — F17.219 NICOTINE DEPENDENCE, CIGARETTES, WITH UNSPECIFIED NICOTINE-INDUCED DISORDERS: ICD-10-CM

## 2023-04-28 PROCEDURE — 71271 CT THORAX LUNG CANCER SCR C-: CPT

## 2023-05-28 DIAGNOSIS — I10 PRIMARY HYPERTENSION: Chronic | ICD-10-CM

## 2023-05-30 RX ORDER — AMLODIPINE BESYLATE 10 MG/1
TABLET ORAL
Qty: 90 TABLET | Refills: 3 | Status: SHIPPED | OUTPATIENT
Start: 2023-05-30

## 2023-08-21 ENCOUNTER — OFFICE VISIT (OUTPATIENT)
Dept: INTERNAL MEDICINE | Age: 68
End: 2023-08-21
Payer: MEDICARE

## 2023-08-21 VITALS
TEMPERATURE: 97.1 F | BODY MASS INDEX: 25.76 KG/M2 | OXYGEN SATURATION: 95 % | HEIGHT: 68 IN | WEIGHT: 170 LBS | SYSTOLIC BLOOD PRESSURE: 124 MMHG | HEART RATE: 97 BPM | DIASTOLIC BLOOD PRESSURE: 76 MMHG

## 2023-08-21 DIAGNOSIS — E78.2 MIXED HYPERLIPIDEMIA: Primary | Chronic | ICD-10-CM

## 2023-08-21 DIAGNOSIS — I25.10 ATHEROSCLEROSIS OF NATIVE CORONARY ARTERY OF NATIVE HEART WITHOUT ANGINA PECTORIS: Chronic | ICD-10-CM

## 2023-08-21 DIAGNOSIS — Z12.5 SCREENING PSA (PROSTATE SPECIFIC ANTIGEN): ICD-10-CM

## 2023-08-21 DIAGNOSIS — K21.00 GASTROESOPHAGEAL REFLUX DISEASE WITH ESOPHAGITIS WITHOUT HEMORRHAGE: Chronic | ICD-10-CM

## 2023-08-21 DIAGNOSIS — J43.9 PULMONARY EMPHYSEMA, UNSPECIFIED EMPHYSEMA TYPE: Chronic | ICD-10-CM

## 2023-08-21 DIAGNOSIS — N18.31 STAGE 3A CHRONIC KIDNEY DISEASE: Chronic | ICD-10-CM

## 2023-08-21 DIAGNOSIS — I10 PRIMARY HYPERTENSION: Chronic | ICD-10-CM

## 2023-08-21 DIAGNOSIS — F17.219 CIGARETTE NICOTINE DEPENDENCE WITH NICOTINE-INDUCED DISORDER: Chronic | ICD-10-CM

## 2023-08-21 PROBLEM — Z12.11 ENCOUNTER FOR SCREENING FOR MALIGNANT NEOPLASM OF COLON: Status: RESOLVED | Noted: 2022-10-18 | Resolved: 2023-08-21

## 2023-08-21 PROBLEM — K75.0 LIVER ABSCESS: Status: RESOLVED | Noted: 2021-11-19 | Resolved: 2023-08-21

## 2023-08-21 PROCEDURE — 96160 PT-FOCUSED HLTH RISK ASSMT: CPT | Performed by: INTERNAL MEDICINE

## 2023-08-21 PROCEDURE — 3078F DIAST BP <80 MM HG: CPT | Performed by: INTERNAL MEDICINE

## 2023-08-21 PROCEDURE — G0439 PPPS, SUBSEQ VISIT: HCPCS | Performed by: INTERNAL MEDICINE

## 2023-08-21 PROCEDURE — 99214 OFFICE O/P EST MOD 30 MIN: CPT | Performed by: INTERNAL MEDICINE

## 2023-08-21 PROCEDURE — 3074F SYST BP LT 130 MM HG: CPT | Performed by: INTERNAL MEDICINE

## 2023-08-21 PROCEDURE — 1170F FXNL STATUS ASSESSED: CPT | Performed by: INTERNAL MEDICINE

## 2023-08-21 NOTE — ASSESSMENT & PLAN NOTE
Seen by his nephrologist and creatinine was 1.33, URINALYSIS normal.   Did not suggest/recommend ACEI/ARB.

## 2023-08-21 NOTE — ASSESSMENT & PLAN NOTE
BP Readings from Last 3 Encounters:   08/21/23 124/76   02/27/23 136/82   01/31/23 127/85      Blood pressure stable. Continue amlodipine 10 mg qd.

## 2023-08-21 NOTE — Clinical Note
His 9/2024 visit should be for combine AWV and medical follow-up (NOT physical). Please change on his schedule.

## 2023-08-21 NOTE — PROGRESS NOTES
I N T E R N A L  M E D I C I N E    J U N O H  K I M,  M D      ENCOUNTER DATE:  08/21/2023    Patrick Mckeon / 68 y.o. / male    MEDICARE ANNUAL WELLNESS VISIT       Chief Complaint:    Chief Complaint   Patient presents with    Medicare Wellness-subsequent     1/27/2020    Hypertension    Hyperlipidemia    Chronic Kidney Disease        Patient's general assessment of his health since a year ago:     - Compared to one year ago, he feels his physical health is:   [x] About the same  [] Better  [] Little worse  [] Significantly worse  []     - Compared to one year ago, he feels his mental health is   [x] About the same  [] Better  [] Little worse  [] Significantly worse  []     HPI for other active medical problems:     Seen by his nephrologist and creatinine level was slightly lower.   Hypertension remains stable.   Has notice mild increase in edema of legs (mostly gravity dependent).  On pravastatin for hyperlipidemia without problems.   Sees vascular specialist for peripheral vascular disease and is on ASA 81 mg. On Spiriva for COPD. Last lung CT was negative for suspicious lung nodule/mass. It did shows atelectasis, borderline enlargement of thoracic aorta and cholelithiasis (asymptomatic). Still smokes 1 pack per day. Retired from business.       HISTORY       Recent Hospitalizations:    Recent hospitalization?:     If YES, location, date, and diagnoses:     Location:   Date:   Principle Discharge Dx:   Secondary Dx:       Patient Care Team:    Patient Care Team:  Lester Carter MD as PCP - General (Internal Medicine)  Orlin Quintero MD as Consulting Physician (Gastroenterology)  Keyur Mejia II, MD as Consulting Physician (Hematology and Oncology)  Humble Hamilton MD as Surgeon (Vascular Surgery)  Keyur Garrido MD as Consulting Physician (Dermatology)      Allergies:  Patient has no known allergies.    Medications:  Current Outpatient Medications on File Prior to Visit   Medication Sig Dispense  Refill    amLODIPine (NORVASC) 10 MG tablet TAKE 1 TABLET EVERY DAY 90 tablet 3    aspirin 81 MG EC tablet Take 1 tablet by mouth Daily.      pantoprazole (PROTONIX) 40 MG EC tablet TAKE 1 TABLET EVERY DAY 90 tablet 3    pravastatin (PRAVACHOL) 40 MG tablet TAKE 1 TABLET EVERY DAY 90 tablet 3    Spiriva Respimat 2.5 MCG/ACT aerosol solution inhaler INHALE 2 PUFFS EVERY MORNING 12 g 3    albuterol sulfate HFA (Ventolin HFA) 108 (90 Base) MCG/ACT inhaler Inhale 2 puffs Every 4 (Four) Hours As Needed for Wheezing or Shortness of Air. (Patient not taking: Reported on 8/21/2023) 18 g 5     No current facility-administered medications on file prior to visit.        PFSH:     The following portions of the patient's history were reviewed and updated as appropriate: Allergies / Current Medications / Past Medical History / Surgical History / Social History / Family History    Problem List:  Patient Active Problem List   Diagnosis    Primary hypertension    Cigarette nicotine dependence with nicotine-induced disorder    Emphysema lung    Hyperlipidemia    Abdominal aortic aneurysm (AAA) without rupture    Congenital absence of left kidney    Atherosclerosis of native coronary artery of native heart without angina pectoris    Calculus of gallbladder without cholecystitis without obstruction    Gastroesophageal reflux disease with esophagitis without hemorrhage    CKD (chronic kidney disease) stage 3, GFR 30-59 ml/min    Anemia    Stage 3a chronic kidney disease       Past Medical History:  Past Medical History:   Diagnosis Date    Anemia 11/20/2021    Atherosclerosis of native coronary artery of native heart without angina pectoris 03/10/2020    Cigarette nicotine dependence with nicotine-induced disorder     Colon polyp     Diverticulosis of large intestine without hemorrhage 08/09/2017    Emphysema lung 02/28/2017    Hiatal hernia with gastroesophageal reflux disease and esophagitis 03/10/2020    Hyperlipidemia 02/28/2017     Liver abscess 11/19/2021    Portal vein thrombosis 08/29/2017    Sepsis 08/02/2017    Shingles 09/2010    Skin cancer 05/31/2017    Thrombosis, 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    coag hickey by ladonna velazquez; completed 6 months of AC and maintained on ASA       Past Surgical History:  Past Surgical History:   Procedure Laterality Date    ABDOMINAL AORTIC ANEURYSM REPAIR  05/2017    ARTERIOGRAM AORTIC N/A 05/08/2017    Procedure:  AORTIC STENT GRAFT REPAIR W/ GORE BILATERAL FEMORAL MINI CUTDOWNS;  Surgeon: Humble Hamilton MD;  Location: [facility] HYBRID OR 18/19;  Service:     COLONOSCOPY N/A 08/03/2017    Procedure: COLONOSCOPY to cecum;  Surgeon: Patrick Elizabeth MD;  Location: [facility] ENDOSCOPY;  Service:     COLONOSCOPY  09/2010    COLONOSCOPY N/A 1/31/2023    Procedure: COLONOSCOPY INTO CECUM WITH COLD SNARE POLYPECTOMIES;  Surgeon: Antolin Wolfe MD;  Location: [facility] ENDOSCOPY;  Service: Gastroenterology;  Laterality: N/A;  PRE; PERSONAL H/O COLON POLYPS; FAMILY H/O COLON CANCER  POST: DIVERTICULOSIS, POLYPS, HEMORRHIDS    ENDOSCOPY  08/03/2017    Procedure: ESOPHAGOGASTRODUODENOSCOPY;  Surgeon: Patrick Elizabeth MD;  Location: [facility] ENDOSCOPY;  Service:     HERNIA REPAIR  1980    UMBILICAL HERNIA REPAIR  1975       Social History:  Social History     Socioeconomic History    Marital status:      Spouse name: Samira    Number of children: 1    Years of education: High School   Tobacco Use    Smoking status: Every Day     Packs/day: 1.00     Years: 42.00     Pack years: 42.00     Types: Cigarettes    Smokeless tobacco: Never    Tobacco comments:     currently 1 ppd    Vaping Use    Vaping Use: Former    Substances: Nicotine    Devices: Pre-filled or refillable cartridge   Substance and Sexual Activity    Alcohol use: Not Currently     Comment: 6 days (1-2 days)    Drug use: No    Sexual activity: Not Currently     Partners: Female       Family History:  Family History   Problem Relation Age of Onset    Hypertension  "Mother     Hyperlipidemia Mother     Sudden death Mother 78    Aneurysm Mother     Heart disease Mother     Colon cancer Father 55    Heart attack Father 71    Coronary artery disease Father     No Known Problems Sister     Benign prostatic hyperplasia Brother     Hypertension Brother     Benign prostatic hyperplasia Brother     Brain cancer Maternal Grandmother     Heart disease Maternal Grandfather     Sudden death Maternal Grandfather     Prostate cancer Neg Hx     Dementia Neg Hx     Malig Hyperthermia Neg Hx          PATIENT ASSESSMENT     Vitals:  Vitals:    08/21/23 0759   BP: 124/76   Pulse: 97   Temp: 97.1 øF (36.2 øC)   SpO2: 95%   Weight: 77.1 kg (170 lb)   Height: 172.7 cm (68\")       BP Readings from Last 3 Encounters:   08/21/23 124/76   02/27/23 136/82   01/31/23 127/85     Wt Readings from Last 3 Encounters:   08/21/23 77.1 kg (170 lb)   02/27/23 76.7 kg (169 lb)   01/31/23 76 kg (167 lb 9.6 oz)      Body mass index is 25.85 kg/mý.    Blood pressure readings recorded on patient flowsheet:       No data to display                    Review of Systems:    Review of Systems  Constitutional neg except per HPI   Resp neg  CV neg for increased cough or shortness of breath   GI negative   negative for change  Neuro negative for change; some subtle short term memory change  No significant change in mood.     Physical Exam:    Physical Exam  General: No acute distress  Psych: Normal thought and judgment   Cardiovascular Rate: normal. Rhythm: regular. Heart sounds: normal   Lungs: decreased breath sounds bilaterally without wheezing or rales/crackles       Reviewed Data:    Labs:   Lab Results   Component Value Date     12/08/2022    K 4.6 12/08/2022    CALCIUM 9.5 12/08/2022    AST 23 12/08/2022    ALT 22 12/08/2022    BUN 23 12/08/2022    CREATININE 1.44 (H) 12/08/2022    CREATININE 1.37 (H) 10/21/2022    CREATININE 1.52 (H) 09/13/2022    EGFRIFNONA 82 01/14/2022    EGFRIFAFRI 72 03/29/2021       Lab " Results   Component Value Date    HGBA1C 5.60 01/27/2020    HGBA1C 5.60 01/11/2019    HGBA1C 5.10 02/21/2017    MICROALBUR <1.2 01/14/2022       Lab Results   Component Value Date    LDL 60 08/18/2022    LDL 80 02/01/2022    LDL 52 12/01/2020    HDL 53 08/18/2022    TRIG 87 08/18/2022    CHOLHDLRATIO 2.8 02/01/2022       Lab Results   Component Value Date    TSH 1.110 01/11/2019    FREET4 1.26 01/11/2019          Lab Results   Component Value Date    WBC 7.35 12/08/2022    HGB 15.1 12/08/2022    HGB 13.0 08/18/2022    HGB 12.5 (L) 12/17/2021     12/08/2022                   Lab Results   Component Value Date    PSA 1.3 08/25/2022    PSA 0.937 03/29/2021    PSA 0.977 01/27/2020       Imaging:          Medical Tests:          Screening for Glaucoma:  Previous screening for glaucoma?:   [] YES  [] NO  [x] Scheduled eye exam     Hearing Loss Screen:  Finger Rub Hearing Test (right ear):   [x] PASSED  [] FAILED  [] BORDERLINE  [] HAS HEARING AID  [] USING HEARING AID  [] NOT USING HEARING AID  []     Finger Rub Hearing Test (left ear):   [x] PASSED  [] FAILED  [] BORDERLINE  [] HAS HEARING AID  [] USING HEARING AID  [] NOT USING HEARING AID  []     Urinary Incontinence Screen:  Episodes of urinary incontinence? :  [] YES  [x] NO  [] N/A  [] WILL NEED FOLLOWUP  []       Depression Screen:      8/21/2023     8:04 AM   PHQ-2/PHQ-9 Depression Screening   Little Interest or Pleasure in Doing Things 0-->not at all   Feeling Down, Depressed or Hopeless 0-->not at all   PHQ-9: Brief Depression Severity Measure Score 0        PHQ-2:   [x] 0 -      NOT DEPRESSED  [] 1 -      NOT DEPRESSED  [] 2 -      NOT DEPRESSED  [] 3-6 -  DEPRESSED (PROCEED TO PHQ-9)  [] N/A - HAS DEPRESSION AND IS ON TREATMENT  [] N/A - HAS DEPRESSION AND IS NOT ON TREATMENT    PHQ-9:   [x] 0         NEGATIVE SCREENING FOR DEPRESSION  [] 1-4      MINIMAL DEPRESSION  [] 5-9      MILD DEPRESSIONNOT DEPRESSED  [] 10-14  MODERATE DEPRESSION  [] 15-19   MODERATELY SEVERE DEPRESSION  [] 20-27  SEVERE DEPRESSION    FUNCTIONAL, FALL RISK, & COGNITIVE SCREENING (Components below):    DATA:        8/21/2023     8:04 AM   Functional & Cognitive Status   Do you have difficulty preparing food and eating? No   Do you have difficulty bathing yourself, getting dressed or grooming yourself? No   Do you have difficulty using the toilet? No   Do you have difficulty moving around from place to place? No   Do you have trouble with steps or getting out of a bed or a chair? No   Current Diet Well Balanced Diet   Dental Exam Up to date   Eye Exam Up to date   Exercise (times per week) 5 times per week   Current Exercises Include No Regular Exercise;Weightlifting;Stationary Bicycling/Spin Class   Do you need help using the phone?  No   Are you deaf or do you have serious difficulty hearing?  No   Do you need help to go to places out of walking distance? No   Do you need help shopping? No   Do you need help preparing meals?  No   Do you need help with housework?  No   Do you need help with laundry? No   Do you need help taking your medications? No   Do you need help managing money? No   Do you ever drive or ride in a car without wearing a seat belt? No   Do you have difficulty concentrating, remembering or making decisions? Yes         A) Assessment of Functional Ability:  (Assessment of ability to perform ADL's (showering/bathing, using toilet, dressing, feeding self, moving self around) and IADL's (use telephone, shop, prepare food, housekeep, do laundry, transport independently, take medications independently, and handle finances)    Degree of functional impairment: (based on assessment noted above)  [] NONE  [x] MILD  [] MODERATE  [] SEVERE  [] VERY SEVERE  []     B) Assessment of Fall Risk:  [] LOW  [x] MODERATE  [] HIGH  [] VERY HIGH     Need for further evaluation of gait, strength, and balance? :  [] YES  [x] NO    Timed Up and Go (TUG):   (>= 12 seconds indicates high risk for  falling)    Observable abnormalities included:  [x] NORMAL GAIT   [] SLOW CAUTIOUS PACE  [] IMPAIRED BALANCE  [] SHORT STRIDES  [] MINIMAL ARM SWING  [] UNSTEADY (MILD)  [] UNSTEADY (MODERATE)  [] UNSTEADY (SEVERE)  [] SHUFFLING GAIT  [] USES ASSISTIVE DEVICE (CANE) PROPERLY  [] USES ASSISTIVE DEVICE (WALKER) PROPERLY  [] USES ASSISTIVE DEVICE (CANE) PROPERLY  [] DOES NOT USE ASSISTIVE DEVICE PROPERLY  [] IN WHEELCHAIR   [] NOT ABLE TO BE TESTED DUE TO SAFETY CONCERNS  []     C. Assessment of Cognitive Function:    Mini-Cog Test:     1) Registration (3 objects):     [x] YES  [] NO   [] N/A HAS DOCUMENTED DEMENTIA     2) Number of objects recalled:   [x] 3  [] 2  [] 1  [] 0     3) Clock Draw: Passed? :   [] YES  [] NO   [x] N/A  [] N/A HAS DOCUMENTED DEMENTIA     Further evaluation required? :   [] YES  [] NO   [x] CLOSE OBSERVATION NEEDED   [] SCHEDULE APPOINTMENT TO EVALUATE FURTHER   [] CONTINUE REGULAR FOLLOWUP AND MANAGEMENT   []     COUNSELING       A. Identification of Health Risk Factors:    Risk factors include: cardiovascular risk factors and tobacco use      B. Age-Appropriate Screening Schedule:  (Refer to the list below for future screening recommendations based on patient's age, sex and/or medical conditions. Orders for these recommended tests are listed in the plan section. The patient has been provided with a written plan)    Health Maintenance Topics  Health Maintenance   Topic Date Due    GLAUCOMA SCREENING  08/22/2020    LIPID PANEL  08/18/2023    COVID-19 Vaccine (7 - Pfizer series) 08/23/2023 (Originally 2/17/2023)    PROSTATE CANCER SCREENING  08/25/2023    INFLUENZA VACCINE  10/01/2023    LUNG CANCER SCREENING  04/28/2024    ANNUAL WELLNESS VISIT  08/21/2024    COLORECTAL CANCER SCREENING  01/31/2026    TDAP/TD VACCINES (3 - Td or Tdap) 12/01/2030    HEPATITIS C SCREENING  Completed    Pneumococcal Vaccine 65+  Completed    ZOSTER VACCINE  Addressed       Health Maintenance Topics Due or  Over-Due  Health Maintenance Due   Topic Date Due    GLAUCOMA SCREENING  08/22/2020    LIPID PANEL  08/18/2023         C. Advanced Care Planning:    Advance Care Planning   ACP discussion was held with the patient during this visit. Patient has an advance directive in EMR which is still valid.        D. Patient Self-Management and Personalized Health Advice:    He has been provided with personalized counseling/information (including brochures/handouts) about:     -- tobacco cessation, reducing risk for cardiac/vascular events with pre-existing disease, and evidence of cognitive problems and need for ongoing surveillance for progressive changes      He has been recommended for the following preventative services which has been performed today, will be ordered today or ordered/performed on upcoming follow-up visit:     -- SMOKING cessation counseling completed, NUTRITION counseling provided, EXERCISE counseling provided, EYE exam for glaucoma screening recommended, CARDIOVASCULAR disease risk reduction counseling performed, FALL RISK assessment / plan of care completed, URINARY incontinence assessment done, screening for prostate cancer (discussed and PSA will be performed annually), **COLORECTAL cancer screening (colonoscopy/Cologuard discussed or ordered), LUNG CANCER screening DISCUSSED, vaccination for INFLUENZA administered/ (or recommended), COVID-19 vaccination (and/or booster) recommendations provided      E. Miscellaneous Items: [id number]    -Aspirin use counseling: Taking ASA appropriately as indicated    -Discussed BMI with him. The BMI is above average; BMI management plan is completed (discussed plans for weight loss)    -Reviewed use of high risk medication in the elderly: YES    -Reviewed for potential of harmful drug interactions in the elderly: YES        WRAP UP       Assessment & Plan:    1) MEDICARE ANNUAL WELLNESS VISIT    2) OTHER MEDICAL CONDITIONS ADDRESSED TODAY:            Problem List Items  Addressed This Visit          High    Primary hypertension (Chronic)    Current Assessment & Plan     BP Readings from Last 3 Encounters:   08/21/23 124/76   02/27/23 136/82   01/31/23 127/85      Blood pressure stable. Continue amlodipine 10 mg qd.          Relevant Medications    amLODIPine (NORVASC) 10 MG tablet       Medium    Emphysema lung (Chronic)    Overview     Noted on CT in 2014  Moderate COPD on PFT 2017    Continue Spiriva daily         Current Assessment & Plan     Advised to quit smoking. Continue annual low dose CT for screening.   Reviewed most recent CT findings in detail with patient.          Relevant Medications    albuterol sulfate HFA (Ventolin HFA) 108 (90 Base) MCG/ACT inhaler    Spiriva Respimat 2.5 MCG/ACT aerosol solution inhaler    Hyperlipidemia - Primary (Chronic)    Overview     Continue pravastatin 40 mg qd.          Relevant Medications    aspirin 81 MG EC tablet    pravastatin (PRAVACHOL) 40 MG tablet    Other Relevant Orders    Comprehensive Metabolic Panel    Lipid Panel With / Chol / HDL Ratio    Atherosclerosis of native coronary artery of native heart without angina pectoris (Chronic)    Relevant Medications    amLODIPine (NORVASC) 10 MG tablet    Stage 3a chronic kidney disease (Chronic)    Current Assessment & Plan     Seen by his nephrologist and creatinine was 1.33, URINALYSIS normal.   Did not suggest/recommend ACEI/ARB.          Relevant Orders    Comprehensive Metabolic Panel       Low    Cigarette nicotine dependence with nicotine-induced disorder (Chronic)    Current Assessment & Plan     Smoking 1 pack per day. Discussed NRT, Wellbutrin and Chantix.   Advised to strongly consider committing to cessation.          Gastroesophageal reflux disease with esophagitis without hemorrhage (Chronic)    Overview     Continue pantoprazole 40 mg qd         Relevant Medications    pantoprazole (PROTONIX) 40 MG EC tablet     Other Visit Diagnoses       Screening PSA (prostate  specific antigen)        Relevant Orders    PSA Screen                      Orders Placed This Encounter   Procedures    Comprehensive Metabolic Panel    Lipid Panel With / Chol / HDL Ratio    PSA Screen       Discussion / Summary:        Medications as of TODAY:              Current Outpatient Medications   Medication Sig Dispense Refill    amLODIPine (NORVASC) 10 MG tablet TAKE 1 TABLET EVERY DAY 90 tablet 3    aspirin 81 MG EC tablet Take 1 tablet by mouth Daily.      pantoprazole (PROTONIX) 40 MG EC tablet TAKE 1 TABLET EVERY DAY 90 tablet 3    pravastatin (PRAVACHOL) 40 MG tablet TAKE 1 TABLET EVERY DAY 90 tablet 3    Spiriva Respimat 2.5 MCG/ACT aerosol solution inhaler INHALE 2 PUFFS EVERY MORNING 12 g 3    albuterol sulfate HFA (Ventolin HFA) 108 (90 Base) MCG/ACT inhaler Inhale 2 puffs Every 4 (Four) Hours As Needed for Wheezing or Shortness of Air. (Patient not taking: Reported on 8/21/2023) 18 g 5     No current facility-administered medications for this visit.         FOLLOW-UP:            Return in about 6 months (around 2/21/2024) for Reassess chronic medical problems, **SCHEDULE COMB AWV/MED FU FOR NEXT YR (30 MIN)**.              Future Appointments   Date Time Provider Department Center   8/30/2023  8:40 AM [id number]   2/21/2024  8:15 AM Lester Carter MD [facility]   9/3/2024  8:00 AM Lester Carter MD [facility]           After Visit Summary (AVS) including the Personalized Prevention  Plan Services (PPPS) was either printed and given to the patient at check-out today and/or sent to [facility] for review.

## 2023-08-21 NOTE — ASSESSMENT & PLAN NOTE
Advised to quit smoking. Continue annual low dose CT for screening.   Reviewed most recent CT findings in detail with patient.

## 2023-08-21 NOTE — ASSESSMENT & PLAN NOTE
Smoking 1 pack per day. Discussed NRT, Wellbutrin and Chantix.   Advised to strongly consider committing to cessation.

## 2024-01-31 DIAGNOSIS — K44.9 HIATAL HERNIA WITH GASTROESOPHAGEAL REFLUX DISEASE AND ESOPHAGITIS: Chronic | ICD-10-CM

## 2024-01-31 DIAGNOSIS — J43.9 PULMONARY EMPHYSEMA, UNSPECIFIED EMPHYSEMA TYPE: ICD-10-CM

## 2024-01-31 DIAGNOSIS — K21.00 HIATAL HERNIA WITH GASTROESOPHAGEAL REFLUX DISEASE AND ESOPHAGITIS: Chronic | ICD-10-CM

## 2024-01-31 RX ORDER — TIOTROPIUM BROMIDE INHALATION SPRAY 3.12 UG/1
2 SPRAY, METERED RESPIRATORY (INHALATION) EVERY MORNING
Qty: 12 G | Refills: 1 | Status: SHIPPED | OUTPATIENT
Start: 2024-01-31

## 2024-01-31 RX ORDER — PANTOPRAZOLE SODIUM 40 MG/1
40 TABLET, DELAYED RELEASE ORAL DAILY
Qty: 90 TABLET | Refills: 1 | Status: SHIPPED | OUTPATIENT
Start: 2024-01-31

## 2024-01-31 NOTE — TELEPHONE ENCOUNTER
----- Message from Patrick Mckeon sent at 1/31/2024 12:53 PM EST -----  Regarding: pharmacy  Contact: [phone]  I have changed my preferrer pharmacy to [facility] but when I look at my meds they all still say [facility] . I can no longer access [facility] . I will soon need Spiriva and Pantoprazole refilled . O K on others for a while .  Chidi Mckeon

## 2024-02-23 DIAGNOSIS — I10 PRIMARY HYPERTENSION: Chronic | ICD-10-CM

## 2024-02-23 DIAGNOSIS — E78.5 HYPERLIPIDEMIA, UNSPECIFIED HYPERLIPIDEMIA TYPE: ICD-10-CM

## 2024-02-23 RX ORDER — PRAVASTATIN SODIUM 40 MG
40 TABLET ORAL DAILY
Qty: 90 TABLET | Refills: 1 | Status: SHIPPED | OUTPATIENT
Start: 2024-02-23

## 2024-02-23 RX ORDER — AMLODIPINE BESYLATE 10 MG/1
10 TABLET ORAL DAILY
Qty: 90 TABLET | Refills: 1 | Status: SHIPPED | OUTPATIENT
Start: 2024-02-23

## 2024-03-13 ENCOUNTER — OFFICE VISIT (OUTPATIENT)
Dept: INTERNAL MEDICINE | Age: 69
End: 2024-03-13
Payer: MEDICARE

## 2024-03-13 VITALS
BODY MASS INDEX: 26.52 KG/M2 | HEIGHT: 68 IN | HEART RATE: 90 BPM | SYSTOLIC BLOOD PRESSURE: 100 MMHG | TEMPERATURE: 97.1 F | WEIGHT: 175 LBS | OXYGEN SATURATION: 95 % | DIASTOLIC BLOOD PRESSURE: 78 MMHG

## 2024-03-13 DIAGNOSIS — Z12.2 ENCOUNTER FOR SCREENING FOR MALIGNANT NEOPLASM OF RESPIRATORY ORGANS: ICD-10-CM

## 2024-03-13 DIAGNOSIS — Z87.891 HISTORY OF SMOKING 25-50 PACK YEARS: ICD-10-CM

## 2024-03-13 DIAGNOSIS — E78.2 MIXED HYPERLIPIDEMIA: Chronic | ICD-10-CM

## 2024-03-13 DIAGNOSIS — J43.9 PULMONARY EMPHYSEMA, UNSPECIFIED EMPHYSEMA TYPE: Primary | Chronic | ICD-10-CM

## 2024-03-13 DIAGNOSIS — I10 PRIMARY HYPERTENSION: Chronic | ICD-10-CM

## 2024-03-13 RX ORDER — FLUTICASONE FUROATE, UMECLIDINIUM BROMIDE AND VILANTEROL TRIFENATATE 200; 62.5; 25 UG/1; UG/1; UG/1
1 POWDER RESPIRATORY (INHALATION) DAILY
Start: 2024-03-13

## 2024-03-13 RX ORDER — ALBUTEROL SULFATE 90 UG/1
2 AEROSOL, METERED RESPIRATORY (INHALATION) EVERY 4 HOURS PRN
Qty: 18 G | Refills: 5 | Status: SHIPPED | OUTPATIENT
Start: 2024-03-13

## 2024-03-13 NOTE — ASSESSMENT & PLAN NOTE
BP Readings from Last 3 Encounters:   03/13/24 100/78   08/21/23 124/76   02/27/23 136/82      Blood pressure is low today but not so at home.   Continue amlodipine 10 mg QD.   Monitor home blood pressure readings.

## 2024-03-13 NOTE — PROGRESS NOTES
I N T E R N A L  M E D I C I N E    J U N O H  K I M,  M D      ENCOUNTER DATE:  03/13/2024    Patrick Mckeon / 68 y.o. / male    CHIEF COMPLAINT / REASON FOR OFFICE VISIT     Hypertension,  Emphysema lung, Hyperlipidemia, Coronary Artery Disease, and Chronic Kidney Disease      ASSESSMENT & PLAN     Problem List Items Addressed This Visit          High    Emphysema lung - Primary (Chronic)    Overview     Noted on CT in 2014  Moderate COPD on PFT 2017         Current Assessment & Plan     Samples of Trelegy 200. Hold Spiriva.   Advised to quit smoking.   Low dose CT ordered for May.          Relevant Medications    albuterol sulfate HFA (Ventolin HFA) 108 (90 Base) MCG/ACT inhaler    Fluticasone-Umeclidin-Vilant (Trelegy Ellipta) 200-62.5-25 MCG/ACT inhaler       Medium    Primary hypertension (Chronic)    Current Assessment & Plan     BP Readings from Last 3 Encounters:   03/13/24 100/78   08/21/23 124/76   02/27/23 136/82      Blood pressure is low today but not so at home.   Continue amlodipine 10 mg QD.   Monitor home blood pressure readings.           Relevant Medications    amLODIPine (NORVASC) 10 MG tablet    Hyperlipidemia (Chronic)    Overview     Continue pravastatin 40 mg qd.          Relevant Medications    aspirin 81 MG EC tablet    pravastatin (PRAVACHOL) 40 MG tablet     Other Visit Diagnoses       Encounter for screening for malignant neoplasm of respiratory organs        Relevant Orders     CT Chest Low Dose Cancer Screening WO    History of smoking 25-50 pack years        Relevant Orders     CT Chest Low Dose Cancer Screening WO          Orders Placed This Encounter   Procedures     CT Chest Low Dose Cancer Screening WO     New Medications Ordered This Visit   Medications    albuterol sulfate HFA (Ventolin HFA) 108 (90 Base) MCG/ACT inhaler     Sig: Inhale 2 puffs Every 4 (Four) Hours As Needed for Wheezing or Shortness of Air.     Dispense:  18 g     Refill:  5     May substitute to a  "different brand if needed for insurance formulary reasons.    Fluticasone-Umeclidin-Vilant (Trelegy Ellipta) 200-62.5-25 MCG/ACT inhaler     Sig: Inhale 1 puff Daily.       SUMMARY/DISCUSSION        Next Appointment with me: 9/3/2024    Return for Next scheduled follow up.      VITAL SIGNS     Vitals:    03/13/24 1011   BP: 100/78   Pulse: 90   Temp: 97.1 °F (36.2 °C)   SpO2: 95%   Weight: 79.4 kg (175 lb)   Height: 172.7 cm (68\")       BP Readings from Last 3 Encounters:   03/13/24 100/78   08/21/23 124/76   02/27/23 136/82     Wt Readings from Last 3 Encounters:   03/13/24 79.4 kg (175 lb)   08/21/23 77.1 kg (170 lb)   02/27/23 76.7 kg (169 lb)     Body mass index is 26.61 kg/m².    Blood pressure readings recorded on patient flowsheet:       No data to display                  MEDICATIONS AT THE TIME OF OFFICE VISIT     Current Outpatient Medications on File Prior to Visit   Medication Sig    amLODIPine (NORVASC) 10 MG tablet Take 1 tablet by mouth Daily.    aspirin 81 MG EC tablet Take 1 tablet by mouth Daily.    pantoprazole (PROTONIX) 40 MG EC tablet Take 1 tablet by mouth Daily.    pravastatin (PRAVACHOL) 40 MG tablet Take 1 tablet by mouth Daily.    [DISCONTINUED] tiotropium bromide monohydrate (Spiriva Respimat) 2.5 MCG/ACT aerosol solution inhaler Inhale 2 puffs Every Morning.    [DISCONTINUED] albuterol sulfate HFA (Ventolin HFA) 108 (90 Base) MCG/ACT inhaler Inhale 2 puffs Every 4 (Four) Hours As Needed for Wheezing or Shortness of Air. (Patient not taking: Reported on 3/13/2024)     No current facility-administered medications on file prior to visit.          HISTORY OF PRESENT ILLNESS     Complains of shortness of breath when going up the stairs carrying anything that is little heavy. Denies any exertional chest pain symptoms. On Spiriva daily for COPD. Does not use albuterol that often. Needs refill for the inhaler.     Blood pressure is low today but fine at home on amlodipine 10 mg.   On pravastatin " 40 mg for hyperlipidemia.  He is followed by vascular specialist s/p EVAR 2017 for AAA.     Still smokes 1 pack per day > 50 yrs. Inquires about scheduling his next lung cancer screening CT.       Patient Care Team:  Lester Carter MD as PCP - General (Internal Medicine)  Orlin Quintero MD as Consulting Physician (Gastroenterology)  Keyur Mejia II, MD as Consulting Physician (Hematology and Oncology)  Humble Hamilton MD as Surgeon (Vascular Surgery)  Keyur Garrido MD as Consulting Physician (Dermatology)    REVIEW OF SYSTEMS     Review of Systems       PHYSICAL EXAMINATION     Physical Exam  Alert with normal thought and judgment.   Cardiovascular: Normal rate, regular rhythm.   Lungs: decreased BS bilaterally without wheezing, crackles or rales.       REVIEWED DATA     Labs:     Lab Results   Component Value Date     08/30/2023    K 4.7 08/30/2023    CALCIUM 9.3 08/30/2023    AST 18 08/30/2023    ALT 17 08/30/2023    BUN 22 08/30/2023    CREATININE 1.20 08/30/2023    CREATININE 1.44 (H) 12/08/2022    CREATININE 1.37 (H) 10/21/2022    EGFRRESULT 65.5 08/30/2023       Lab Results   Component Value Date    HGBA1C 5.60 01/27/2020    HGBA1C 5.60 01/11/2019    HGBA1C 5.10 02/21/2017       Lab Results   Component Value Date    LDL 74 08/30/2023    LDL 60 08/18/2022    LDL 80 02/01/2022    HDL 59 08/30/2023    HDL 53 08/18/2022    TRIG 87 08/30/2023    TRIG 87 08/18/2022       Lab Results   Component Value Date    TSH 1.110 01/11/2019    TSH 1.380 02/21/2017    FREET4 1.26 01/11/2019    FREET4 1.22 02/21/2017       Lab Results   Component Value Date    WBC 7.35 12/08/2022    HGB 15.1 12/08/2022     12/08/2022       Lab Results   Component Value Date    MALBCRERATIO  01/14/2022      Comment:      Unable to calculate           Imaging:     CT Chest Low Dose Cancer Screening WO (04/28/2023 07:30)   FINDINGS: The central airways are patent. Emphysematous changes are  identified that are upper lobe  predominant. Discoid atelectasis seen  within the anterior and basilar right lower lobe new from prior imaging.  Sub-3 mm right lower lobe nodule on image 103. No pleural or pericardial  effusion. Calcified coronary artery disease is present. Dilated  ascending thoracic aorta measuring up to 3.6 cm in maximum diameter. The  visualized upper abdomen demonstrates cholelithiasis. Partial imaging of  an endovascular abdominal aortic stent. Colonic diverticulosis is  present. Partly imaged low-density lesion in the right kidney favored to  represent a parapelvic cysts. No pathological axillary lymphadenopathy..     IMPRESSION:  1.ACR lung RADS category 2. Benign appearance or behavior. Continued  annual screening with low-dose CT in 12 months.  2. CTDI 2.6 mGy. .4 mGycm.    Medical Tests:           Summary of old records / correspondence / consultant report:           Request outside records:

## 2024-05-01 ENCOUNTER — HOSPITAL ENCOUNTER (OUTPATIENT)
Facility: HOSPITAL | Age: 69
Discharge: HOME | End: 2024-05-01
Admitting: INTERNAL MEDICINE
Payer: MEDICARE

## 2024-05-01 DIAGNOSIS — Z12.2 ENCOUNTER FOR SCREENING FOR MALIGNANT NEOPLASM OF RESPIRATORY ORGANS: ICD-10-CM

## 2024-05-01 DIAGNOSIS — Z87.891 HISTORY OF SMOKING 25-50 PACK YEARS: ICD-10-CM

## 2024-05-01 PROCEDURE — 71271 CT THORAX LUNG CANCER SCR C-: CPT

## 2024-05-06 NOTE — PROGRESS NOTES
MyChart:    Here are the result(s) of your test(s):     Low dose CT chest:    No concerning pulmonary nodule noted. Repeat CT annually.   Moderate coronary artery calcification noted.   Possible healing sternal fracture noted.   Evidence of small hiatal hernia with thickened appearance of the distal esophagus. Watch for GERD/heartburn symptoms. Consider EGD (upper endoscopy) for further assessment. Will discuss on follow-up.       Please do not hesitate to contact me if you have questions.

## 2024-07-02 DIAGNOSIS — J43.9 PULMONARY EMPHYSEMA, UNSPECIFIED EMPHYSEMA TYPE: Chronic | ICD-10-CM

## 2024-07-02 RX ORDER — FLUTICASONE FUROATE, UMECLIDINIUM BROMIDE AND VILANTEROL TRIFENATATE 200; 62.5; 25 UG/1; UG/1; UG/1
1 POWDER RESPIRATORY (INHALATION) DAILY
Qty: 28 EACH | Refills: 5 | Status: SHIPPED | OUTPATIENT
Start: 2024-07-02

## 2024-07-16 ENCOUNTER — OFFICE VISIT (OUTPATIENT)
Age: 69
End: 2024-07-16
Payer: MEDICARE

## 2024-07-16 ENCOUNTER — HOSPITAL ENCOUNTER (OUTPATIENT)
Facility: HOSPITAL | Age: 69
Discharge: HOME | End: 2024-07-16
Admitting: SURGERY
Payer: MEDICARE

## 2024-07-16 VITALS
SYSTOLIC BLOOD PRESSURE: 128 MMHG | DIASTOLIC BLOOD PRESSURE: 75 MMHG | HEART RATE: 89 BPM | WEIGHT: 170 LBS | HEIGHT: 68 IN | BODY MASS INDEX: 25.76 KG/M2

## 2024-07-16 DIAGNOSIS — I71.43 INFRARENAL ABDOMINAL AORTIC ANEURYSM (AAA) WITHOUT RUPTURE: ICD-10-CM

## 2024-07-16 DIAGNOSIS — I71.43 INFRARENAL ABDOMINAL AORTIC ANEURYSM (AAA) WITHOUT RUPTURE: Primary | Chronic | ICD-10-CM

## 2024-07-16 DIAGNOSIS — I65.23 CAROTID STENOSIS, BILATERAL: ICD-10-CM

## 2024-07-16 DIAGNOSIS — I65.23 BILATERAL CAROTID ARTERY STENOSIS: ICD-10-CM

## 2024-07-16 DIAGNOSIS — F17.219 CIGARETTE NICOTINE DEPENDENCE WITH NICOTINE-INDUCED DISORDER: Chronic | ICD-10-CM

## 2024-07-16 DIAGNOSIS — Z95.828 PRESENCE OF OTHER VASCULAR IMPLANTS AND GRAFTS: ICD-10-CM

## 2024-07-16 DIAGNOSIS — Z95.828 HISTORY OF ENDOVASCULAR STENT GRAFT FOR ABDOMINAL AORTIC ANEURYSM (AAA): ICD-10-CM

## 2024-07-16 LAB
ABDOMINAL AORTA AORTIC ANASTOMOSIS PSV: 77 CM/S
ABDOMINAL AORTA LEFT DIST ANASTOMOSIS PSV: 234 CM/S
ABDOMINAL AORTA RIGHT DIST ANASTOMOSIS PSV: 108 CM/S
ABDOMINAL MID AORTA AP: 2.15 CM
ABDOMINAL MID AORTA TRANS: 2.28 CM
ABDOMINAL MID AORTA VEL: 47 CM/S
ABDOMINAL PROX AORTA AP: 2.18 CM
ABDOMINAL PROX AORTA VEL: 77 CM/S
BH CV VAS ABDOMINAL AO MID GRAFT BODY PSV: 47 CM/S
BH CV VAS ABDOMINAL AO RESIDUAL LUMEN AP MEASURE: 3.36 CM
BH CV VAS ABDOMINAL AO RESIDUAL LUMEN TRANS MEASURE: 3.35 CM
BH CV VAS ABDOMINAL AORTA DISTAL LEFT LIMB GRAFT PSV: 32 CM/S
BH CV VAS ABDOMINAL AORTA DISTAL RIGHT LIMB GRAFT PSV: 95 CM/S

## 2024-07-16 PROCEDURE — 93978 VASCULAR STUDY: CPT

## 2024-07-16 NOTE — PROGRESS NOTES
Chief Complaint  Aortic Aneurysm (ASG)    Subjective        Patrick Mckeon presents to [facility]ULAR [facility]ERY  HPI   Patrick Mckeon is a 69 y.o. male that has been followed in our office by Dr. Hamilton for an abdominal aortic aneurysm mild carotid stenosis.  He had an aortic stent graft repair in 2017.  He returns today in follow-up along with an aortic duplex. He   reports he  has been doing well without hospitalizations or surgeries. He  denies any worsening abdominal pain, back pain, or pain that radiates to his groin. He denies any claudication symptoms, rest pain, or tissue loss.    Review of Systems   Constitutional:  Negative for fever.   Eyes:  Negative for visual disturbance.   Cardiovascular:  Negative for leg swelling.   Gastrointestinal:  Negative for abdominal pain.   Musculoskeletal:  Negative for back pain.   Skin:  Negative for color change, pallor and wound.   Neurological:  Negative for dizziness, facial asymmetry, speech difficulty and weakness.        Patrick Mckeon  reports that he has been smoking cigarettes. He has a 42 pack-year smoking history. He has never used smokeless tobacco..   Tobacco Education/Cessation: Patrick Mckeon  reports that he has been smoking cigarettes. He has a 42 pack-year smoking history. He has never used smokeless tobacco. I have educated him on the risk of diseases from using tobacco products such as arterial disease.     I advised him to quit and he is not willing to quit.    I spent 3  minutes counseling the patient.           Objective   Vital Signs:  Vitals:    07/16/24 0901   BP: 128/75   Pulse: 89      Body mass index is 25.85 kg/m².           Physical Exam  Vitals reviewed.   Constitutional:       Appearance: Normal appearance.   HENT:      Head: Normocephalic.   Cardiovascular:      Rate and Rhythm: Normal rate and regular rhythm.      Pulses:           Dorsalis pedis pulses are 3+ on the right side and 3+ on the left side.         Posterior tibial pulses are 3+ on the right side and 3+ on the left side.   Pulmonary:      Effort: Pulmonary effort is normal.   Skin:     General: Skin is warm.   Neurological:      General: No focal deficit present.      Mental Status: He is alert and oriented to person, place, and time.   Psychiatric:         Mood and Affect: Mood normal.          Result Review :    Previous carotid duplex: Less than 50% stenosis bilaterally      Previous aortic duplex:3.7 x 3.7 cm.  Patent with no endoleak.    Aortic duplex done today: Duplex Aorta IVC Iliac Graft Complete CAR (07/16/2024 08:55)                     Assessment and Plan     Diagnoses and all orders for this visit:    1. Infrarenal abdominal aortic aneurysm (AAA) without rupture (Primary)    2. History of endovascular stent graft for abdominal aortic aneurysm (AAA)    3. Bilateral carotid artery stenosis    4. Carotid stenosis, bilateral    5. Cigarette nicotine dependence with nicotine-induced disorder        Patient presents today for follow up of his abdominal aortic aneurysm.  He is status post aortic stent graft in 2017.  This is patent with no endoleak seen today on imaging.  We discussed adequate blood pressure control.  He is on a statin for cholesterol control. I have recommended smoking cessation. He  will return in 1 year along with aortic  and carotid duplex.            Follow Up     Return in about 1 year (around 7/16/2025) for aortic duplex, carotid duplex.  Patient was given instructions and counseling regarding his condition or for health maintenance advice. Please see specific information pulled into the AVS if appropriate.     CATIA Osborne

## 2024-07-21 ENCOUNTER — APPOINTMENT (OUTPATIENT)
Dept: ULTRASOUND IMAGING | Facility: HOSPITAL | Age: 69
End: 2024-07-21
Payer: MEDICARE

## 2024-07-21 ENCOUNTER — HOSPITAL ENCOUNTER (EMERGENCY)
Facility: HOSPITAL | Age: 69
Discharge: HOME | End: 2024-07-21
Attending: EMERGENCY MEDICINE | Admitting: EMERGENCY MEDICINE
Payer: MEDICARE

## 2024-07-21 ENCOUNTER — APPOINTMENT (OUTPATIENT)
Dept: CT IMAGING | Facility: HOSPITAL | Age: 69
End: 2024-07-21
Payer: MEDICARE

## 2024-07-21 VITALS
WEIGHT: 170 LBS | HEART RATE: 76 BPM | RESPIRATION RATE: 18 BRPM | TEMPERATURE: 97.1 F | BODY MASS INDEX: 27.32 KG/M2 | SYSTOLIC BLOOD PRESSURE: 137 MMHG | OXYGEN SATURATION: 94 % | DIASTOLIC BLOOD PRESSURE: 71 MMHG | HEIGHT: 66 IN

## 2024-07-21 DIAGNOSIS — K80.20 CHOLELITHIASIS WITHOUT CHOLECYSTITIS: ICD-10-CM

## 2024-07-21 DIAGNOSIS — I71.43 INFRARENAL ABDOMINAL AORTIC ANEURYSM (AAA) WITHOUT RUPTURE: Primary | ICD-10-CM

## 2024-07-21 LAB
ALBUMIN SERPL-MCNC: 4.1 G/DL (ref 3.5–5.2)
ALBUMIN/GLOB SERPL: 1.5 G/DL
ALP SERPL-CCNC: 75 U/L (ref 39–117)
ALT SERPL W P-5'-P-CCNC: 12 U/L (ref 1–41)
ANION GAP SERPL CALCULATED.3IONS-SCNC: 14.6 MMOL/L (ref 5–15)
AST SERPL-CCNC: 15 U/L (ref 1–40)
BASOPHILS # BLD AUTO: 0.02 10*3/MM3 (ref 0–0.2)
BASOPHILS NFR BLD AUTO: 0.3 % (ref 0–1.5)
BILIRUB SERPL-MCNC: 0.4 MG/DL (ref 0–1.2)
BILIRUB UR QL STRIP: NEGATIVE
BUN SERPL-MCNC: 15 MG/DL (ref 8–23)
BUN/CREAT SERPL: 12.6 (ref 7–25)
CALCIUM SPEC-SCNC: 8.8 MG/DL (ref 8.6–10.5)
CHLORIDE SERPL-SCNC: 105 MMOL/L (ref 98–107)
CLARITY UR: CLEAR
CO2 SERPL-SCNC: 20.4 MMOL/L (ref 22–29)
COLOR UR: YELLOW
CREAT SERPL-MCNC: 1.19 MG/DL (ref 0.76–1.27)
DEPRECATED RDW RBC AUTO: 45.6 FL (ref 37–54)
EGFRCR SERPLBLD CKD-EPI 2021: 65.7 ML/MIN/1.73
EOSINOPHIL # BLD AUTO: 0.23 10*3/MM3 (ref 0–0.4)
EOSINOPHIL NFR BLD AUTO: 3.2 % (ref 0.3–6.2)
ERYTHROCYTE [DISTWIDTH] IN BLOOD BY AUTOMATED COUNT: 13.7 % (ref 12.3–15.4)
GLOBULIN UR ELPH-MCNC: 2.8 GM/DL
GLUCOSE SERPL-MCNC: 88 MG/DL (ref 65–99)
GLUCOSE UR STRIP-MCNC: NEGATIVE MG/DL
HCT VFR BLD AUTO: 44.4 % (ref 37.5–51)
HGB BLD-MCNC: 15.1 G/DL (ref 13–17.7)
HGB UR QL STRIP.AUTO: NEGATIVE
HOLD SPECIMEN: NORMAL
HOLD SPECIMEN: NORMAL
IMM GRANULOCYTES # BLD AUTO: 0.02 10*3/MM3 (ref 0–0.05)
IMM GRANULOCYTES NFR BLD AUTO: 0.3 % (ref 0–0.5)
KETONES UR QL STRIP: NEGATIVE
LEUKOCYTE ESTERASE UR QL STRIP.AUTO: NEGATIVE
LIPASE SERPL-CCNC: 42 U/L (ref 13–60)
LYMPHOCYTES # BLD AUTO: 1.76 10*3/MM3 (ref 0.7–3.1)
LYMPHOCYTES NFR BLD AUTO: 24.8 % (ref 19.6–45.3)
MCH RBC QN AUTO: 31.1 PG (ref 26.6–33)
MCHC RBC AUTO-ENTMCNC: 34 G/DL (ref 31.5–35.7)
MCV RBC AUTO: 91.5 FL (ref 79–97)
MONOCYTES # BLD AUTO: 0.5 10*3/MM3 (ref 0.1–0.9)
MONOCYTES NFR BLD AUTO: 7 % (ref 5–12)
NEUTROPHILS NFR BLD AUTO: 4.58 10*3/MM3 (ref 1.7–7)
NEUTROPHILS NFR BLD AUTO: 64.4 % (ref 42.7–76)
NITRITE UR QL STRIP: NEGATIVE
NRBC BLD AUTO-RTO: 0 /100 WBC (ref 0–0.2)
PH UR STRIP.AUTO: 7.5 [PH] (ref 5–8)
PLATELET # BLD AUTO: 184 10*3/MM3 (ref 140–450)
PMV BLD AUTO: 9.7 FL (ref 6–12)
POTASSIUM SERPL-SCNC: 4.1 MMOL/L (ref 3.5–5.2)
PROT SERPL-MCNC: 6.9 G/DL (ref 6–8.5)
PROT UR QL STRIP: NEGATIVE
RBC # BLD AUTO: 4.85 10*6/MM3 (ref 4.14–5.8)
SODIUM SERPL-SCNC: 140 MMOL/L (ref 136–145)
SP GR UR STRIP: 1.01 (ref 1–1.03)
TROPONIN T SERPL HS-MCNC: 11 NG/L
UROBILINOGEN UR QL STRIP: NORMAL
WBC NRBC COR # BLD AUTO: 7.11 10*3/MM3 (ref 3.4–10.8)
WHOLE BLOOD HOLD COAG: NORMAL
WHOLE BLOOD HOLD SPECIMEN: NORMAL

## 2024-07-21 PROCEDURE — 75635 CT ANGIO ABDOMINAL ARTERIES: CPT

## 2024-07-21 PROCEDURE — 76705 ECHO EXAM OF ABDOMEN: CPT

## 2024-07-21 PROCEDURE — 96374 THER/PROPH/DIAG INJ IV PUSH: CPT

## 2024-07-21 PROCEDURE — 85025 COMPLETE CBC W/AUTO DIFF WBC: CPT

## 2024-07-21 PROCEDURE — 93010 ELECTROCARDIOGRAM REPORT: CPT | Performed by: INTERNAL MEDICINE

## 2024-07-21 PROCEDURE — 99284 EMERGENCY DEPT VISIT MOD MDM: CPT | Performed by: SURGERY

## 2024-07-21 PROCEDURE — 83690 ASSAY OF LIPASE: CPT

## 2024-07-21 PROCEDURE — 93005 ELECTROCARDIOGRAM TRACING: CPT | Performed by: EMERGENCY MEDICINE

## 2024-07-21 PROCEDURE — 99285 EMERGENCY DEPT VISIT HI MDM: CPT

## 2024-07-21 PROCEDURE — 84484 ASSAY OF TROPONIN QUANT: CPT

## 2024-07-21 PROCEDURE — 25510000001 IOPAMIDOL PER 1 ML: Performed by: EMERGENCY MEDICINE

## 2024-07-21 PROCEDURE — 80053 COMPREHEN METABOLIC PANEL: CPT

## 2024-07-21 PROCEDURE — 81003 URINALYSIS AUTO W/O SCOPE: CPT | Performed by: EMERGENCY MEDICINE

## 2024-07-21 PROCEDURE — 93005 ELECTROCARDIOGRAM TRACING: CPT

## 2024-07-21 RX ORDER — ALUMINA, MAGNESIA, AND SIMETHICONE 2400; 2400; 240 MG/30ML; MG/30ML; MG/30ML
15 SUSPENSION ORAL ONCE
Status: COMPLETED | OUTPATIENT
Start: 2024-07-21 | End: 2024-07-21

## 2024-07-21 RX ORDER — FAMOTIDINE 10 MG/ML
20 INJECTION, SOLUTION INTRAVENOUS ONCE
Status: COMPLETED | OUTPATIENT
Start: 2024-07-21 | End: 2024-07-21

## 2024-07-21 RX ORDER — LIDOCAINE 50 MG/G
1 PATCH TOPICAL EVERY 24 HOURS
Qty: 6 EACH | Refills: 0 | Status: SHIPPED | OUTPATIENT
Start: 2024-07-21 | End: 2024-08-02

## 2024-07-21 RX ORDER — SODIUM CHLORIDE 0.9 % (FLUSH) 0.9 %
10 SYRINGE (ML) INJECTION AS NEEDED
Status: DISCONTINUED | OUTPATIENT
Start: 2024-07-21 | End: 2024-07-21 | Stop reason: HOSPADM

## 2024-07-21 RX ADMIN — FAMOTIDINE 20 MG: 10 INJECTION INTRAVENOUS at 14:00

## 2024-07-21 RX ADMIN — ALUMINUM HYDROXIDE, MAGNESIUM HYDROXIDE, DIMETHICONE 15 ML: 400; 400; 40 SUSPENSION ORAL at 14:02

## 2024-07-21 RX ADMIN — IOPAMIDOL 85 ML: 755 INJECTION, SOLUTION INTRAVENOUS at 09:03

## 2024-07-21 NOTE — DISCHARGE INSTRUCTIONS
You are advised to follow closely with Dr. Carter in 2-3 days for recheck, final results of lab work and imaging testing, and further testing/treatment as needed.    Follow-up with Dr. Hamilton as discussed with him in the emergency department today.    Please return to the emergency department immediately with chest pain different than usual for you, shortness of air, persistent or worsening flank/abdominal pain, persistent vomiting/fever, blood in emesis or stool, lightheadedness/fainting, problems with speech, one sided weakness/numbness, new incontinence, problems with vision,  or for worsening of symptoms or other concerns.

## 2024-07-21 NOTE — ED PROVIDER NOTES
" EMERGENCY DEPARTMENT ENCOUNTER    CHIEF COMPLAINT  Chief Complaint: Abdominal pain  History given by: Patient  History limited by: Nothing  Room Number: 25/25  PMD: Lester Carter MD  Vascular Surgeon: Dr. Hopson    HPI:  Pt is a 69 y.o. male presents complaining of epigastric and left upper quadrant abdominal pain onset approximately 5 hours prior to arrival.  Patient reports the pain is constant, \"discomfort\" in character with an occasional sharp left-sided discomfort.  Patient reports he has pain in his mid back bilaterally as well.  Patient denies exacerbating or relieving factors, no focal pleuritic component, reports his pain extends up to his the edge of his ribs, denies pain above that, denies any new shortness of air or cough, nausea, vomiting, recent injury, patient denies fever.  Patient denies changes in urinary habits.  Patient has had a recent change in one of his inhalers but otherwise no medication changes.  Patient reports the pain not affected by deep breathing, changes in position, eating or drinking.  Patient reports he has a history of AAA with stent done by Dr. Hamilton, with recent assessment that he was told was normal.  Patient denies any other abdominal surgeries      Aggravating Factors: Nothing  Alleviating Factors: Nothing  Treatment before arrival: Nothing  Chronic or social conditions impacting care: Patient is a smoker    Additional sources:  Discussed/ obtained information from independent historians: Family at bedside    External (non-ED) record review:   Patient with history of infrarenal abdominal aortic aneurysm without rupture, with history of endovascular stent graft with recent reassessment by vascular surgery 7/16/2024 without evidence of endoleak on imaging, bilateral carotid artery stenosis, nicotine dependence      PAST MEDICAL HISTORY  Active Ambulatory Problems     Diagnosis Date Noted    Primary hypertension 10/28/2016    Cigarette nicotine dependence with " nicotine-induced disorder 10/28/2016    Emphysema lung 02/28/2017    Hyperlipidemia 02/28/2017    Abdominal aortic aneurysm (AAA) without rupture 03/13/2017    Congenital absence of left kidney 03/13/2017    Atherosclerosis of native coronary artery of native heart without angina pectoris 03/10/2020    Calculus of gallbladder without cholecystitis without obstruction 03/10/2020    Gastroesophageal reflux disease with esophagitis without hemorrhage 03/10/2020    CKD (chronic kidney disease) stage 3, GFR 30-59 ml/min 11/20/2021    Anemia 11/20/2021    Stage 3a chronic kidney disease 10/25/2022    History of endovascular stent graft for abdominal aortic aneurysm (AAA) 07/16/2024     Resolved Ambulatory Problems     Diagnosis Date Noted    Diverticulosis of large intestine without hemorrhage 08/09/2017    Thrombosis, 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    Epigastric abdominal pain 09/06/2018    Pulmonary nodules 03/10/2020    Liver abscess 11/19/2021    Sepsis 11/20/2021    Postoperative hypoxemia 11/24/2021    Encounter for screening for malignant neoplasm of colon 10/18/2022     Past Medical History:   Diagnosis Date    Abdominal aortic aneurysm without rupture 06/11/2019    Colon polyp     Essential (primary) hypertension     H/O abdominal aortic aneurysm repair 06/11/2019    Hiatal hernia with gastroesophageal reflux disease and esophagitis 03/10/2020    Nicotine dependence, other tobacco product, uncomplicated 06/11/2019    Portal vein thrombosis 08/29/2017    Shingles 09/2010    Skin cancer 05/31/2017       PAST SURGICAL HISTORY  Past Surgical History:   Procedure Laterality Date    ARTERIOGRAM AORTIC N/A 05/08/2017    Procedure:  AORTIC STENT GRAFT REPAIR W/ GORE BILATERAL FEMORAL MINI CUTDOWNS;  Surgeon: Humble Hamilton MD;  Location: [facility] 18/19;  Service:     CATARACT EXTRACTION Left 10/2023    CATARACT EXTRACTION Right 11/2023    COLONOSCOPY N/A 08/03/2017    Procedure: COLONOSCOPY to cecum;  Surgeon: Patrick  MARCELINO Elizabeth MD;  Location: [facility] ENDOSCOPY;  Service:     COLONOSCOPY  09/2010    COLONOSCOPY N/A 01/31/2023    Procedure: COLONOSCOPY INTO CECUM WITH COLD SNARE POLYPECTOMIES;  Surgeon: Antolin Wolfe MD;  Location: [facility] ENDOSCOPY;  Service: Gastroenterology;  Laterality: N/A;  PRE; PERSONAL H/O COLON POLYPS; FAMILY H/O COLON CANCER  POST: DIVERTICULOSIS, POLYPS, HEMORRHIDS    ENDOSCOPY  08/03/2017    Procedure: ESOPHAGOGASTRODUODENOSCOPY;  Surgeon: Patrick Elizabeth MD;  Location: [facility] ENDOSCOPY;  Service:     HERNIA REPAIR  1980    KIDNEY SURGERY      Kidney Removal    UMBILICAL HERNIA REPAIR  1975       FAMILY HISTORY  Family History   Problem Relation Age of Onset    Hypertension Mother     Hyperlipidemia Mother     Sudden death Mother 78    Aneurysm Mother     Heart disease Mother     Stroke Mother     Colon cancer Father 55    Heart attack Father 71    Coronary artery disease Father     No Known Problems Sister     Benign prostatic hyperplasia Brother     Hypertension Brother     Benign prostatic hyperplasia Brother     Brain cancer Maternal Grandmother     Heart disease Maternal Grandfather     Sudden death Maternal Grandfather     Heart disease Other     Cancer Other         no details    Prostate cancer Neg Hx     Dementia Neg Hx     Malig Hyperthermia Neg Hx        SOCIAL HISTORY  Social History     Socioeconomic History    Marital status:      Spouse name: Samira    Number of children: 1    Years of education: High School   Tobacco Use    Smoking status: Every Day     Current packs/day: 1.00     Average packs/day: 1 pack/day for 42.0 years (42.0 ttl pk-yrs)     Types: Cigarettes    Smokeless tobacco: Never    Tobacco comments:     currently 1 ppd; Patient smokes 1 pack per day         Vaping Use    Vaping status: Former    Substances: Nicotine    Devices: Pre-filled or refillable cartridge   Substance and Sexual Activity    Alcohol use: Not Currently     Comment: 6 days (1-2 days);  Patient is a moderate drinker. He consumes 10 shots of liquor per week    Drug use: No     Comment: Drug Abuse: no drug abuse    Sexual activity: Not Currently     Partners: Female       ALLERGIES  Patient has no known allergies.    REVIEW OF SYSTEMS  Review of Systems    PHYSICAL EXAM  ED Triage Vitals   Temp Heart Rate Resp BP SpO2   07/21/24 0625 07/21/24 0625 07/21/24 0625 07/21/24 0650 07/21/24 0625   97.1 °F (36.2 °C) 109 14 137/84 97 %      Temp src Heart Rate Source Patient Position BP Location FiO2 (%)   07/21/24 0625 07/21/24 0625 07/21/24 0650 07/21/24 0650 --   Tympanic Monitor Sitting Left arm        Physical Exam  Vitals and nursing note reviewed.   Constitutional:       General: He is in acute distress.   HENT:      Head: Normocephalic and atraumatic.   Cardiovascular:      Rate and Rhythm: Normal rate and regular rhythm.      Pulses:           Posterior tibial pulses are 2+ on the right side and 2+ on the left side.      Heart sounds: Normal heart sounds. No murmur heard.  Pulmonary:      Effort: Pulmonary effort is normal. No respiratory distress.      Breath sounds: Normal breath sounds. No wheezing.   Abdominal:      General: Bowel sounds are decreased.      Palpations: Abdomen is soft.      Tenderness: There is abdominal tenderness in the epigastric area, left upper quadrant and left lower quadrant. There is no guarding or rebound.   Musculoskeletal:         General: Normal range of motion.      Cervical back: Normal range of motion.   Skin:     General: Skin is warm and dry.   Neurological:      Mental Status: He is alert and oriented to person, place, and time.   Psychiatric:         Mood and Affect: Affect normal.     EKG          EKG time: 06 33  Rhythm/Rate: Sinus tachycardia, rate 100  P waves and TX: Normal P waves, normal RADHA's  QRS, axis: Unremarkable  ST and T waves: Unremarkable    Interpreted Contemporaneously by me, independently viewed  Minimally changed compared to prior  11/27/2021      LAB RESULTS  Recent Results (from the past 24 hour(s))   ECG 12 Lead ED Triage Standing Order; Abdominal Pain (Upper)    Collection Time: 07/21/24  6:30 AM   Result Value Ref Range    QT Interval 359 ms    QTC Interval 463 ms   Comprehensive Metabolic Panel    Collection Time: 07/21/24  6:47 AM    Specimen: Arm, Left; Blood   Result Value Ref Range    Glucose 88 65 - 99 mg/dL    BUN 15 8 - 23 mg/dL    Creatinine 1.19 0.76 - 1.27 mg/dL    Sodium 140 136 - 145 mmol/L    Potassium 4.1 3.5 - 5.2 mmol/L    Chloride 105 98 - 107 mmol/L    CO2 20.4 (L) 22.0 - 29.0 mmol/L    Calcium 8.8 8.6 - 10.5 mg/dL    Total Protein 6.9 6.0 - 8.5 g/dL    Albumin 4.1 3.5 - 5.2 g/dL    ALT (SGPT) 12 1 - 41 U/L    AST (SGOT) 15 1 - 40 U/L    Alkaline Phosphatase 75 39 - 117 U/L    Total Bilirubin 0.4 0.0 - 1.2 mg/dL    Globulin 2.8 gm/dL    A/G Ratio 1.5 g/dL    BUN/Creatinine Ratio 12.6 7.0 - 25.0    Anion Gap 14.6 5.0 - 15.0 mmol/L    eGFR 65.7 >60.0 mL/min/1.73   Lipase    Collection Time: 07/21/24  6:47 AM    Specimen: Arm, Left; Blood   Result Value Ref Range    Lipase 42 13 - 60 U/L   Single High Sensitivity Troponin T    Collection Time: 07/21/24  6:47 AM    Specimen: Arm, Left; Blood   Result Value Ref Range    HS Troponin T 11 <22 ng/L   Green Top (Gel)    Collection Time: 07/21/24  6:47 AM   Result Value Ref Range    Extra Tube Hold for add-ons.    Lavender Top    Collection Time: 07/21/24  6:47 AM   Result Value Ref Range    Extra Tube hold for add-on    Gold Top - SST    Collection Time: 07/21/24  6:47 AM   Result Value Ref Range    Extra Tube Hold for add-ons.    Light Blue Top    Collection Time: 07/21/24  6:47 AM   Result Value Ref Range    Extra Tube Hold for add-ons.    CBC Auto Differential    Collection Time: 07/21/24  6:47 AM    Specimen: Arm, Left; Blood   Result Value Ref Range    WBC 7.11 3.40 - 10.80 10*3/mm3    RBC 4.85 4.14 - 5.80 10*6/mm3    Hemoglobin 15.1 13.0 - 17.7 g/dL    Hematocrit 44.4 37.5  - 51.0 %    MCV 91.5 79.0 - 97.0 fL    MCH 31.1 26.6 - 33.0 pg    MCHC 34.0 31.5 - 35.7 g/dL    RDW 13.7 12.3 - 15.4 %    RDW-SD 45.6 37.0 - 54.0 fl    MPV 9.7 6.0 - 12.0 fL    Platelets 184 140 - 450 10*3/mm3    Neutrophil % 64.4 42.7 - 76.0 %    Lymphocyte % 24.8 19.6 - 45.3 %    Monocyte % 7.0 5.0 - 12.0 %    Eosinophil % 3.2 0.3 - 6.2 %    Basophil % 0.3 0.0 - 1.5 %    Immature Grans % 0.3 0.0 - 0.5 %    Neutrophils, Absolute 4.58 1.70 - 7.00 10*3/mm3    Lymphocytes, Absolute 1.76 0.70 - 3.10 10*3/mm3    Monocytes, Absolute 0.50 0.10 - 0.90 10*3/mm3    Eosinophils, Absolute 0.23 0.00 - 0.40 10*3/mm3    Basophils, Absolute 0.02 0.00 - 0.20 10*3/mm3    Immature Grans, Absolute 0.02 0.00 - 0.05 10*3/mm3    nRBC 0.0 0.0 - 0.2 /100 WBC   Urinalysis With Microscopic If Indicated (No Culture) - Urine, Clean Catch    Collection Time: 07/21/24  8:52 AM    Specimen: Urine, Clean Catch   Result Value Ref Range    Color, UA Yellow Yellow, Straw    Appearance, UA Clear Clear    pH, UA 7.5 5.0 - 8.0    Specific Gravity, UA 1.011 1.005 - 1.030    Glucose, UA Negative Negative    Ketones, UA Negative Negative    Bilirubin, UA Negative Negative    Blood, UA Negative Negative    Protein, UA Negative Negative    Leuk Esterase, UA Negative Negative    Nitrite, UA Negative Negative    Urobilinogen, UA 1.0 E.U./dL 0.2 - 1.0 E.U./dL       I ordered the above labs and reviewed the results    RADIOLOGY  US Gallbladder    Result Date: 7/21/2024  Ultrasound gallbladder  TECHNIQUE: Grayscale and color Doppler images of the gallbladder were obtained.  HISTORY: Clinical concern for cholecystitis  COMPARISON: CT abdominal angiogram 7/21/2024  FINDINGS: Pancreas is normal where visualized. Liver measures up to 16.5 cm. Normal echogenicity and echotexture. Smooth hepatic surface cortical contour. Patent main portal vein with antegrade flow and normal venous spectral waveform. Gallbladder is nondistended. Cholelithiasis. Gallbladder wall  measures 2 mm. Negative ultrasonographic Horner sign. No pericholecystic fluid or hyperechoic fat. Common bile duct measures 6 mm. No intrahepatic bili duct dilation. Right kidney measures 12.4 cm. Few anechoic right renal cysts measuring up to 3.2 cm. Normal cortical echogenicity and thickness. No hydronephrosis.       Cholelithiasis without ultrasonographic findings to suggest acute cholecystitis.    This report was finalized on 7/21/2024 12:32 PM by Dr. Brando Mitchell M.D on Workstation: [id number]      CT Angio Abdominal Aorta Bilateral Iliofem Runoff    Result Date: 7/21/2024  CT ANGIO ABDOMINAL AORTA BILAT ILIOFEM RUNOFF-  INDICATION: Abdominal pain radiating to back.  COMPARISON: CT abdomen pelvis January 14, 2022  TECHNIQUE: CTA abdomen/pelvis with bilateral lower extremity runoff with IV contrast. Coronal and sagittal reformats. Three dimensional reconstructions. Radiation dose reduction techniques were utilized, including automated exposure control and exposure modulation based on body size.  FINDINGS:  Vasculature: Celiac axis and SMA are patent without stenosis. Right renal artery is patent, without stenosis. Aortobiiliac endovascular stent, with the aortic aneurysm sac on series 4, axial image 82, measuring 4.9 x 3.7 cm, previously measuring 4.4 x 3.1 cm, appears larger, with an endoleak present in the posterior inferior aneurysm sac, series 4, axial image 89 and questionably anterior near the SUNIL origin, series 4, axial image 87 though a noncontrast CT is needed to confirm this site.  Right-sided vasculature: Atherosclerotic disease in the common femoral artery with less than 50% stenosis. Multifocal atherosclerotic disease in the SFA, with less than 50% stenoses. Atherosclerotic disease in the popliteal artery with less than 50% stenosis. Three-vessel runoff to the ankle.  Left-sided vasculature: Atherosclerotic disease in the common femoral artery and superficial femoral artery with less than 50%  stenoses. Three-vessel runoff to the ankle.  Lung bases: Respiratory motion artifact. Small amount of subsegmental atelectasis or scarring at the bases. Small airways wall thickening at the bases. Coronary artery atherosclerotic calcifications.  ABDOMEN: Possible transient hepatic attenuation differences seen in segment 8 and segment 4 of the liver. Small cyst seen in segment 2/3 of the left paddock lobe. Cholelithiasis. No gallbladder wall thickening or surrounding inflammation. No biliary ductal dilatation. Spleen is normal in size. Mild pancreatic atrophy. No pancreatic ductal dilatation or mass seen. No adrenal nodules. Fluid attenuating right renal cysts, largest in the mid kidney, measuring 3.0 cm. Questionable tiny atrophic left kidney. No solid-appearing renal mass or hydronephrosis.  Pelvis: Prostate results in nodular mass effect on the bladder apex. Stable atrophic appearance of the right seminal vesicle. Underdistended bladder. No bladder calculus.  Bowel: Small hiatal hernia. Incidental duodenal diverticulum. Colonic diverticulosis. Normal appendix. No obstruction.  Abdominal wall: Rectus diastases.  Retroperitoneum: No lymphadenopathy.  Soft tissues: Lower calf and bilateral foot soft tissue edema.  Osseous structures: No destructive osseous lesions.       1. Aortobiiliac endovascular stent repair of abdominal aortic aneurysm with interval increase in size of aortic aneurysm sac when compared to CT abdomen pelvis January 14, 2022 with an endoleak present. Follow-up cardiovascular surgical evaluation. 2. No high-grade vessel stenosis, with bilateral lower extremity three-vessel runoff to the ankle. 3. Colonic diverticulosis. 4. Cholelithiasis 5. Prostatomegaly with nodular mass effect on the bladder apex appears stable. Recommend correlation with PSA levels.  This report was finalized on 7/21/2024 9:35 AM by Dr. Allen Juan M.D on Workstation: [id number]       I ordered the above noted  radiological studies. Interpreted by radiologist. Viewed and interpreted by me in PACS -no large free intraperitoneal air      PROCEDURES  Procedures      MEDICATIONS GIVEN IN THE EMERGENCY DEPARTMENT  Medications   iopamidol (ISOVUE-370) 76 % injection 100 mL (85 mL Intravenous Given by Other 7/21/24 0903)   famotidine (PEPCID) injection 20 mg (20 mg Intravenous Given 7/21/24 1400)   aluminum-magnesium hydroxide-simethicone (MAALOX MAX) 400-400-40 MG/5ML suspension 15 mL (15 mL Oral Given 7/21/24 1402)           MEDICAL DECISION MAKING  Results were reviewed/discussed with the patient and they were also made aware of online access. Pt also made aware that some labs, such as cultures, will not be resulted during ER visit and followup with PMD is necessary.   EKGs and labs independently viewed and interpreted by me.  Discussion below represents my analysis of pertinent findings related to patient's condition, differential diagnosis, treatment plan and final disposition.    Differential diagnosis  includes but is not limited to:  Gastritis, gastroenteritis, peptic ulcer disease, GERD, esophageal perforation, acute appendicitis, mesenteric adenitis, epiploic appendagitis, diverticulitis,  intussusception, small bowel obstruction, adhesions, ischemic bowel, perforated viscus, ileus, obstipation, biliary colic, cholecystitis, cholelithiasis, hepatitis, pancreatitis, common bile duct obstruction, cholangitis,  hernia, UTI, cystitis, prostatitis, ureterolithiasis, urinary obstruction,  AAA, DKA, medications, sickle cell, viral syndrome, zoster      Independent interpretation of labs, radiology studies, and discussions with consultants:  ED Course as of 07/21/24 1852   Sun Jul 21, 2024   1040 Discussed with Dr. Hamilton, who is aware of patient's presentation, imaging results, will come and assess patient in the ER and give further recommendations.  Patient seen and reassessed, blood pressure 130s over 70s, discomfort  stable, aware he is awaiting Dr. Hopson's assessment/recommendations [TO]   1137 Dr. Hamilton reports after assessment of the patient he does not feel there is any acute findings of aorta, recommends outpatient follow-up, also recommends further evaluation of gallbladder based on patient presentation and CT imaging findings [TO]      ED Course User Index  [TO] Judith Haskins MD         Shared decision making: Patient voices understanding of recommendation for continued eval in the observation unit.  He voices understanding of gallstones without signs of obstruction or infection, and AAA with endoleak with recommendation by Dr. Hamilton for close outpatient follow-up.  He reports his pain is much improved he does not wish to stay for observation, agrees to follow-up with PCP and Dr. Perez for recheck, further testing, treatment as needed and to return to the emergency department immediately with worsening or persistent symptoms      MDM     Amount and/or Complexity of Data Reviewed  Clinical lab tests: reviewed  Tests in the radiology section of CPT®: reviewed  Tests in the medicine section of CPT®: reviewed           DIAGNOSIS  Final diagnoses:   Infrarenal abdominal aortic aneurysm (AAA) without rupture   Cholelithiasis without cholecystitis       DISPOSITION  DISCHARGE    Patient discharged in stable condition.    Reviewed implications of results, diagnosis, meds, responsibility to follow up, warning signs and symptoms of possible worsening, potential complications and reasons to return to ER    Patient/Family voiced understanding of above instructions.    Discussed plan for discharge, as there is no emergent indication for admission. Patient referred to primary care provider for BP management due to today's BP. Pt/family is agreeable and understands need for follow up and repeat testing.  Pt is aware that discharge does not mean that nothing is wrong but it indicates no emergency is present that requires admission  and they must continue care with follow-up as given below or physician of their choice.     FOLLOW-UP  Lester Carter MD  [address] [facility]  [place] [address]  [phone]    Schedule an appointment as soon as possible for a visit in 3 days  EVEN IF WELL    Humble Hamilton MD  [address] [facility] [address]  [place] [address]  [phone]    Schedule an appointment as soon as possible for a visit in 1 month           Medication List        New Prescriptions      lidocaine 5 %  Commonly known as: LIDODERM  Place 1 patch on the skin as directed by provider Daily. Remove & Discard patch within 12 hours or as directed by MD               Where to Get Your Medications        These medications were sent to [facility]/pharmacy #[id number] - [place], [place] - [address] AT [facility] - [phone]  - [phone]   [address] [place] [id number]      Hours: 24-hours Phone: [phone]   lidocaine 5 %           Latest Documented Vital Signs:  As of 18:52 EDT  BP- 137/71 HR- 76 Temp- 97.1 °F (36.2 °C) (Tympanic) O2 sat- 94%    --      Please note that portions of this note were completed with a voice recognition program.       Note Disclaimer: At [facility], we believe that sharing information builds trust and better relationships. You are receiving this note because you are receiving care at [facility] or recently visited. It is possible you will see health information before a provider has talked with you about it. This kind of information can be easy to misunderstand. To help you fully understand what it means for your health, we urge you to discuss this note with your provider.     Judith Haskins MD  07/21/24 8703

## 2024-07-21 NOTE — CONSULTS
"[facility]   Consult Note    Patient Name: Patrick Mckeon  : 1955  MRN: [id number]  Primary Care Physician:  Lester Carter MD  Referring Physician: No ref. provider found  Date of admission: 2024    Inpatient Vascular Surgery Consult  Consult performed by: Humble Hamilton MD  Consult ordered by: Judith Haskins MD        Subjective   Subjective     Reason for Consult/ Chief Complaint: Epigastric abdominal pain evaluation and setting of prior endovascular aneurysm repair.    History of Present Illness  Patrick Mckeon is a 69 y.o. male presents complaining of epigastric and left upper quadrant abdominal pain onset approximately 5 hours prior to arrival.  Patient reports the pain is constant, \"discomfort\" in character with an occasional sharp left-sided discomfort.  Patient reports he has pain in his mid back bilaterally as well.  Patient denies exacerbating or relieving factors, no focal pleuritic component, reports his pain extends up to his the edge of his ribs, denies pain above that, denies any new shortness of air or cough, nausea, vomiting, recent injury, patient denies fever.  Patient denies changes in urinary habits.  Patient has had a recent change in one of his inhalers but otherwise no medication changes.  Patient reports the pain not affected by deep breathing, changes in position, eating or drinking.    Review of Systems     Personal History     Past Medical History:   Diagnosis Date    Abdominal aortic aneurysm without rupture 2019    Anemia 2021    Atherosclerosis of native coronary artery of native heart without angina pectoris 03/10/2020    Cigarette nicotine dependence with nicotine-induced disorder     Colon polyp     Diverticulosis of large intestine without hemorrhage 2017    Emphysema lung 2017    Essential (primary) hypertension     H/O abdominal aortic aneurysm repair 2019    History of endovascuar abodominal aortic aneursym repair    Hiatal " hernia with gastroesophageal reflux disease and esophagitis 03/10/2020    Hyperlipidemia 02/28/2017    Liver abscess 11/19/2021    Nicotine dependence, other tobacco product, uncomplicated 06/11/2019    Portal vein thrombosis 08/29/2017    Sepsis 08/02/2017    Shingles 09/2010    Skin cancer 05/31/2017    Thrombosis, 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    coag hickey by ladonna velazquez; completed 6 months of AC and maintained on ASA       Past Surgical History:   Procedure Laterality Date    ARTERIOGRAM AORTIC N/A 05/08/2017    Procedure:  AORTIC STENT GRAFT REPAIR W/ GORE BILATERAL FEMORAL MINI CUTDOWNS;  Surgeon: Humble Hamilton MD;  Location: [facility] HYBRID OR 18/19;  Service:     CATARACT EXTRACTION Left 10/2023    CATARACT EXTRACTION Right 11/2023    COLONOSCOPY N/A 08/03/2017    Procedure: COLONOSCOPY to cecum;  Surgeon: Patrick Elizabeth MD;  Location: [facility] ENDOSCOPY;  Service:     COLONOSCOPY  09/2010    COLONOSCOPY N/A 01/31/2023    Procedure: COLONOSCOPY INTO CECUM WITH COLD SNARE POLYPECTOMIES;  Surgeon: Antolin Wolfe MD;  Location: [facility] ENDOSCOPY;  Service: Gastroenterology;  Laterality: N/A;  PRE; PERSONAL H/O COLON POLYPS; FAMILY H/O COLON CANCER  POST: DIVERTICULOSIS, POLYPS, HEMORRHIDS    ENDOSCOPY  08/03/2017    Procedure: ESOPHAGOGASTRODUODENOSCOPY;  Surgeon: Patrick Elizabeth MD;  Location: [facility] ENDOSCOPY;  Service:     HERNIA REPAIR  1980    KIDNEY SURGERY      Kidney Removal    UMBILICAL HERNIA REPAIR  1975       Family History: His family history includes Aneurysm in his mother; Benign prostatic hyperplasia in his brother and brother; Brain cancer in his maternal grandmother; Cancer in an other family member; Colon cancer (age of onset: 55) in his father; Coronary artery disease in his father; Heart attack (age of onset: 71) in his father; Heart disease in his maternal grandfather, mother, and another family member; Hyperlipidemia in his mother; Hypertension in his brother and mother; No Known Problems  in his sister; Stroke in his mother; Sudden death in his maternal grandfather; Sudden death (age of onset: 78) in his mother.     Social History: He  reports that he has been smoking cigarettes. He has a 42 pack-year smoking history. He has never used smokeless tobacco. He reports that he does not currently use alcohol. He reports that he does not use drugs.    Home Medications:  Fluticasone-Umeclidin-Vilant, albuterol sulfate HFA, amLODIPine, aspirin, pantoprazole, and pravastatin    Allergies:  He has No Known Allergies.    Objective    Objective     Vitals:    Temp:  [97.1 °F (36.2 °C)] 97.1 °F (36.2 °C)  Heart Rate:  [] 76  Resp:  [14-16] 16  BP: (132-144)/(84-98) 132/90    Physical Exam  Constitutional:       Appearance: He is well-developed.   Pulmonary:      Effort: Pulmonary effort is normal. No respiratory distress.   Abdominal:      General: There is no distension.      Palpations: Abdomen is soft.   Neurological:      Mental Status: He is alert and oriented to person, place, and time.           Result Review    Result Review:  I have personally reviewed the results from the time of this admission to 7/21/2024 11:07 EDT and agree with these findings:  [x]  Laboratory list / accordion  []  Microbiology  [x]  Radiology  []  EKG/Telemetry   []  Cardiology/Vascular   []  Pathology  []  Old records  []  Other:  Most notable findings include: White blood cell count 7.11 hemoglobin 15.1 lipase 42 creatinine 1.19    CT angiogram reviewed and comparison is made to prior CT scans.  He does appear to have sac enlargement.  It still under the diameter of which the aneurysm was fixed at.  There now appears to be endoleak's that were not previously seen.  There is no aortic wall stranding or haziness.    Assessment & Plan   Assessment / Plan     Brief Patient Summary:  Patrick Mckeon is a 69 y.o. male patient with epigastric abdominal pain radiating to the left flank.  This in the setting of a previously  endovascular repaired aneurysm.    Active Hospital Problems:  There are no active hospital problems to display for this patient.      Plan:   7/21/2024: Epigastric abdominal pain of unclear etiology.  CT angiogram fails to demonstrate any acute aortic pathology.  He does have some sac enlargement with endoleak's identified.  This is something that should be followed up in 3 months with a CT angiogram.  My office will arrange these details.    VTE Prophylaxis:  No VTE prophylaxis order currently exists.         Humble Hamilton MD

## 2024-07-21 NOTE — ED TRIAGE NOTES
Pt ambulatory to triage from home with c/o upper abdominal pain since 0300, but getting worse since 0430.  Pt states pain in left chest is intermittent, but stabbing.  Pt history of abdominal aneurysm.

## 2024-07-22 ENCOUNTER — TELEPHONE (OUTPATIENT)
Dept: INTERNAL MEDICINE | Age: 69
End: 2024-07-22
Payer: MEDICARE

## 2024-07-22 DIAGNOSIS — Z95.828 HISTORY OF ENDOVASCULAR STENT GRAFT FOR ABDOMINAL AORTIC ANEURYSM: Primary | ICD-10-CM

## 2024-07-22 LAB
QT INTERVAL: 359 MS
QTC INTERVAL: 463 MS

## 2024-07-22 NOTE — TELEPHONE ENCOUNTER
"      Hub staff attempted to follow warm transfer process and was unsuccessful     Caller: Patrick Mckeon \"MU\"    Relationship to patient: Self    Best call back number:     [phone] (Mobile)       Patient is needing: HOSPITAL FOLLOW UP PLEASE       7/21/2024 (6 hours)  [facility] EMERGENCY DEPARTMENT    Infrarenal abdominal aortic aneurysm     "

## 2024-07-23 ENCOUNTER — PATIENT OUTREACH (OUTPATIENT)
Dept: CASE MANAGEMENT | Facility: OTHER | Age: 69
End: 2024-07-23
Payer: MEDICARE

## 2024-07-23 NOTE — OUTREACH NOTE
"AMBULATORY CASE MANAGEMENT NOTE    Names and Relationships of Patient/Support Persons: Contact: Patrick Mckeon \"MU\"; Relationship: Self -     Patient Outreach  RN-ACM outreach with patient. Discussed 7/21/24 ED visit regarding abdominal pain ( AAA and cholelithiasis. Patient treated and discharged.  Patient states to be compliant with ED recommendations;states symptoms have improved and has 9/25/24 PCP appointment and states to have vascular surgery appointment scheduled 10/29/24. Patient states no difficulty with chest pain; SOB; appetite or sleeping. Patient states to be compliant with medications and medical appointments. Reviewed with patient ED AVS recommendations; education; role of RN-ACM and HRCM case management services. Patient verbalized understanding. Patient states to appreciate outreach and declines needs for further outreach at this time. No further questions voiced at this time.   Social Work Assessment  Questions/Answers      Flowsheet Row Most Recent Value   People in Home spouse   Functional Status Comments Patient states to be independent with ADL's,  light meal preparation,  transportation and ambulation without assistive device.        Send Education  Questions/Answers      Flowsheet Row Most Recent Value   Annual Wellness Visit:  Patient Has Completed   Other Patient Education/Resources  24/7 [facility] Nurse Call Line, Advanced Care Planning, [facility]   24/7 Nurse Call Line Education Method Verbal   Advanced Directives: Patient Has        [facility] updated and reviewed with the patient during this program:  --     Food Insecurity: No Food Insecurity (7/23/2024)    Hunger Vital Sign     Worried About Running Out of Food in the Last Year: Never true     Ran Out of Food in the Last Year: Never true      --     Transportation Needs: No Transportation Needs (7/23/2024)    PRAPARE - Transportation     Lack of Transportation (Medical): No     Lack of Transportation (Non-Medical): No "       Education Documentation  Unresolved/Worsening Symptoms, taught by Felipa Drake, RN at 7/23/2024  3:02 PM.  Learner: Patient  Readiness: Acceptance  Method: Explanation  Response: Verbalizes Understanding    unresolved or worsening symptoms, taught by Felipa Drake, ALLISON at 7/23/2024  2:59 PM.  Learner: Patient  Readiness: Acceptance  Method: Explanation  Response: Verbalizes Understanding          Felipa SANTIAGO  Ambulatory Case Management    7/23/2024, 15:02 EDT

## 2024-07-25 ENCOUNTER — OFFICE VISIT (OUTPATIENT)
Dept: INTERNAL MEDICINE | Age: 69
End: 2024-07-25
Payer: MEDICARE

## 2024-07-25 VITALS
RESPIRATION RATE: 18 BRPM | WEIGHT: 170 LBS | OXYGEN SATURATION: 100 % | BODY MASS INDEX: 27.32 KG/M2 | DIASTOLIC BLOOD PRESSURE: 80 MMHG | TEMPERATURE: 96.7 F | SYSTOLIC BLOOD PRESSURE: 120 MMHG | HEIGHT: 66 IN | HEART RATE: 104 BPM

## 2024-07-25 DIAGNOSIS — Z95.828 HISTORY OF ENDOVASCULAR STENT GRAFT FOR ABDOMINAL AORTIC ANEURYSM (AAA): Primary | Chronic | ICD-10-CM

## 2024-07-25 DIAGNOSIS — K80.20 CALCULUS OF GALLBLADDER WITHOUT CHOLECYSTITIS WITHOUT OBSTRUCTION: Chronic | ICD-10-CM

## 2024-07-25 NOTE — PROGRESS NOTES
"    I N T E R N A L  M E D I C I N E    Adan Washington PA-C      ENCOUNTER DATE:  07/25/2024    Patrick Mckeon / 69 y.o. / male    CHIEF COMPLAINT / REASON FOR OFFICE VISIT     Hospital Follow Up Visit (Infrarenal abdominal aortic aneurysm-patient was feeling better by Monday. He said before that he felt sharp pain in the left side, he was having back pain, and mid abdomen was discomfort. They couldn't find anything per patient. )      ASSESSMENT & PLAN   Diagnoses and all orders for this visit:    1. History of endovascular stent graft for abdominal aortic aneurysm (AAA) (Primary)  Overview:  Followed by Dr. Humble Hamilton of Vascular Surgery.     Assessment & Plan:  Repeat CT angio of abdominal aorta bilateral iliofemoral runoff in 3 months.      2. Calculus of gallbladder without cholecystitis without obstruction  Overview:  7/25/2024-patient deferred referral for HIDA scan.           Next Appointment with me:   Return if symptoms worsen or fail to improve., for Follow-up with Dr. Carter on 9/03/2024. .        VITAL SIGNS     Vitals:    07/25/24 0743   BP: 120/80   BP Location: Left arm   Patient Position: Sitting   Cuff Size: Adult   Pulse: 104   Resp: 18   Temp: 96.7 °F (35.9 °C)   TempSrc: Temporal   SpO2: 100%   Weight: 77.1 kg (170 lb)   Height: 167.6 cm (65.98\")       @BP@  Wt Readings from Last 3 Encounters:   07/25/24 77.1 kg (170 lb)   07/21/24 77.1 kg (170 lb)   07/16/24 77.1 kg (170 lb)     Body mass index is 27.45 kg/m².      MEDICATIONS AT THE TIME OF OFFICE VISIT     Current Outpatient Medications on File Prior to Visit   Medication Sig    albuterol sulfate HFA (Ventolin HFA) 108 (90 Base) MCG/ACT inhaler Inhale 2 puffs Every 4 (Four) Hours As Needed for Wheezing or Shortness of Air.    amLODIPine (NORVASC) 10 MG tablet Take 1 tablet by mouth Daily.    aspirin 81 MG EC tablet Take 1 tablet by mouth Daily.    Fluticasone-Umeclidin-Vilant (Trelegy Ellipta) 200-62.5-25 MCG/ACT inhaler Inhale 1 puff " Daily.    pantoprazole (PROTONIX) 40 MG EC tablet Take 1 tablet by mouth Daily.    pravastatin (PRAVACHOL) 40 MG tablet Take 1 tablet by mouth Daily.    lidocaine (LIDODERM) 5 % Place 1 patch on the skin as directed by provider Daily. Remove & Discard patch within 12 hours or as directed by MD (Patient not taking: Reported on 7/25/2024)     No current facility-administered medications on file prior to visit.          HISTORY OF PRESENT ILLNESS     Pt is a 68y/o wm with a history of abdominal aortic aneurysm with mild carotid stenosis and aortic stent graft repair, who presents for recent follow-up to the ED because abdominal pain.    Followed by Dr. Humble Hamilton of Vascular Surgery.     On 7/21/2024 patient presented to [facility] complaining of 5 hours of left upper quadrant and epigastric abdominal pain.  He was seen in the ED by Dr. Hamilton. At that time patient reported that the pain was sharp in quality, intermittent, and mostly to the left side of the abdomen.  He also reported that he had pain to his mid back as well.  Pain was not affected by deep breathing, changes in position, eating or drinking.  He denied exacerbating or alleviating factors, no focal pleuritic component, shortness of air, cough, nausea, vomiting, recent injury, changes in urinary habits, or fever.    Ultrasound of gallbladder performed 7/21/2024 demonstrated cholelithiasis without ultrasonographic findings to suggest acute cholecystitis.  CT angio abdominal aorta bilateral iliofemoral runoff performed 7/21/2024 demonstrated an increase in size of his aortic aneurysm sac when compared to CT abdomen pelvis from 1/14/2022, with endoleak present, without high-grade vessel stenosis, with colonic diverticulosis, with cholelithiasis, and post that total megaly with nodular mass effect on the bladder apex that appears stable.    On today he denies any abdominal pain, back pain, or pain that radiates to his groin. He further denies any  shortness of breath, dizziness, dyspnea, cough, or claudication symptoms.     Following a period of 8 hours in observation, his pain subsided considerably and he was discharged.     Due to endoleak from Aortic Stent, he was scheduled to complete follow-up CT Abdomen-Pelvis in 3 months with Dr. Hamilton.     He was also recommended to undergo further evaluation of gallbladder as an outpatient, based on ED presentation and CT imaging findings.  Discussion had today surrounding the possible benefits and risks of a HIDA scan.  Patient is currently deferring a referral for HIDA scan, as he is only experienced a singular episode of abdomen pain so far.    He does not need any refills on today. Follow-up with Dr. Carter on 9/03/2024.     REVIEW OF SYSTEMS     Review of Systems   All other systems reviewed and are negative.     As per HPI      PHYSICAL EXAMINATION     Physical Exam  Vitals and nursing note reviewed.   Constitutional:       General: He is not in acute distress.     Appearance: Normal appearance.   Cardiovascular:      Rate and Rhythm: Normal rate and regular rhythm.      Pulses: Normal pulses.      Heart sounds: Normal heart sounds.   Pulmonary:      Effort: Pulmonary effort is normal.      Breath sounds: Normal breath sounds.   Abdominal:      General: Bowel sounds are normal. There is no distension.      Palpations: Abdomen is soft.      Tenderness: There is no abdominal tenderness. There is no guarding or rebound.   Neurological:      Mental Status: He is alert.             REVIEWED DATA     Labs:           Imaging:   Result Date: 7/21/2024  Ultrasound gallbladder  TECHNIQUE: Grayscale and color Doppler images of the gallbladder were obtained.  HISTORY: Clinical concern for cholecystitis  COMPARISON: CT abdominal angiogram 7/21/2024  FINDINGS: Pancreas is normal where visualized. Liver measures up to 16.5 cm. Normal echogenicity and echotexture. Smooth hepatic surface cortical contour. Patent main portal  vein with antegrade flow and normal venous spectral waveform. Gallbladder is nondistended. Cholelithiasis. Gallbladder wall measures 2 mm. Negative ultrasonographic Horner sign. No pericholecystic fluid or hyperechoic fat. Common bile duct measures 6 mm. No intrahepatic bili duct dilation. Right kidney measures 12.4 cm. Few anechoic right renal cysts measuring up to 3.2 cm. Normal cortical echogenicity and thickness. No hydronephrosis.        Cholelithiasis without ultrasonographic findings to suggest acute cholecystitis.    This report was finalized on 7/21/2024 12:32 PM by Dr. Brando Mitchell M.D on Workstation: [id number]       CT Angio Abdominal Aorta Bilateral Iliofem Runoff     Result Date: 7/21/2024  CT ANGIO ABDOMINAL AORTA BILAT ILIOFEM RUNOFF-  INDICATION: Abdominal pain radiating to back.  COMPARISON: CT abdomen pelvis January 14, 2022  TECHNIQUE: CTA abdomen/pelvis with bilateral lower extremity runoff with IV contrast. Coronal and sagittal reformats. Three dimensional reconstructions. Radiation dose reduction techniques were utilized, including automated exposure control and exposure modulation based on body size.  FINDINGS:  Vasculature: Celiac axis and SMA are patent without stenosis. Right renal artery is patent, without stenosis. Aortobiiliac endovascular stent, with the aortic aneurysm sac on series 4, axial image 82, measuring 4.9 x 3.7 cm, previously measuring 4.4 x 3.1 cm, appears larger, with an endoleak present in the posterior inferior aneurysm sac, series 4, axial image 89 and questionably anterior near the SUNIL origin, series 4, axial image 87 though a noncontrast CT is needed to confirm this site.  Right-sided vasculature: Atherosclerotic disease in the common femoral artery with less than 50% stenosis. Multifocal atherosclerotic disease in the SFA, with less than 50% stenoses. Atherosclerotic disease in the popliteal artery with less than 50% stenosis. Three-vessel runoff to the ankle.   Left-sided vasculature: Atherosclerotic disease in the common femoral artery and superficial femoral artery with less than 50% stenoses. Three-vessel runoff to the ankle.  Lung bases: Respiratory motion artifact. Small amount of subsegmental atelectasis or scarring at the bases. Small airways wall thickening at the bases. Coronary artery atherosclerotic calcifications.  ABDOMEN: Possible transient hepatic attenuation differences seen in segment 8 and segment 4 of the liver. Small cyst seen in segment 2/3 of the left paddock lobe. Cholelithiasis. No gallbladder wall thickening or surrounding inflammation. No biliary ductal dilatation. Spleen is normal in size. Mild pancreatic atrophy. No pancreatic ductal dilatation or mass seen. No adrenal nodules. Fluid attenuating right renal cysts, largest in the mid kidney, measuring 3.0 cm. Questionable tiny atrophic left kidney. No solid-appearing renal mass or hydronephrosis.  Pelvis: Prostate results in nodular mass effect on the bladder apex. Stable atrophic appearance of the right seminal vesicle. Underdistended bladder. No bladder calculus.  Bowel: Small hiatal hernia. Incidental duodenal diverticulum. Colonic diverticulosis. Normal appendix. No obstruction.  Abdominal wall: Rectus diastases.  Retroperitoneum: No lymphadenopathy.  Soft tissues: Lower calf and bilateral foot soft tissue edema.  Osseous structures: No destructive osseous lesions.        1. Aortobiiliac endovascular stent repair of abdominal aortic aneurysm with interval increase in size of aortic aneurysm sac when compared to CT abdomen pelvis January 14, 2022 with an endoleak present. Follow-up cardiovascular surgical evaluation. 2. No high-grade vessel stenosis, with bilateral lower extremity three-vessel runoff to the ankle. 3. Colonic diverticulosis. 4. Cholelithiasis 5. Prostatomegaly with nodular mass effect on the bladder apex appears stable. Recommend correlation with PSA levels.  This report was  finalized on 7/21/2024 9:35 AM by Dr. Allen Juan M.D on Workstation: [id number]        I ordered the above noted radiological studies. Interpreted by radiologist. Viewed and interpreted by me in PACS -no large free intraperitoneal air        Medical Tests:           Summary of old records / correspondence / consultant report:           Request outside records:

## 2024-07-26 ENCOUNTER — TELEPHONE (OUTPATIENT)
Dept: INTERNAL MEDICINE | Age: 69
End: 2024-07-26

## 2024-07-26 ENCOUNTER — HOSPITAL ENCOUNTER (OUTPATIENT)
Facility: HOSPITAL | Age: 69
Discharge: HOME | End: 2024-07-29
Attending: EMERGENCY MEDICINE | Admitting: SURGERY
Payer: MEDICARE

## 2024-07-26 ENCOUNTER — APPOINTMENT (OUTPATIENT)
Dept: CT IMAGING | Facility: HOSPITAL | Age: 69
End: 2024-07-26
Payer: MEDICARE

## 2024-07-26 DIAGNOSIS — K81.0 ACUTE CHOLECYSTITIS: ICD-10-CM

## 2024-07-26 DIAGNOSIS — R10.9 INTRACTABLE ABDOMINAL PAIN: Primary | ICD-10-CM

## 2024-07-26 LAB
ALBUMIN SERPL-MCNC: 4.2 G/DL (ref 3.5–5.2)
ALBUMIN/GLOB SERPL: 1.4 G/DL
ALP SERPL-CCNC: 89 U/L (ref 39–117)
ALT SERPL W P-5'-P-CCNC: 13 U/L (ref 1–41)
ANION GAP SERPL CALCULATED.3IONS-SCNC: 11.6 MMOL/L (ref 5–15)
AST SERPL-CCNC: 22 U/L (ref 1–40)
BASOPHILS # BLD AUTO: 0.01 10*3/MM3 (ref 0–0.2)
BASOPHILS NFR BLD AUTO: 0.1 % (ref 0–1.5)
BILIRUB SERPL-MCNC: 0.5 MG/DL (ref 0–1.2)
BILIRUB UR QL STRIP: NEGATIVE
BUN SERPL-MCNC: 12 MG/DL (ref 8–23)
BUN/CREAT SERPL: 9.2 (ref 7–25)
CALCIUM SPEC-SCNC: 9.3 MG/DL (ref 8.6–10.5)
CHLORIDE SERPL-SCNC: 104 MMOL/L (ref 98–107)
CLARITY UR: CLEAR
CO2 SERPL-SCNC: 22.4 MMOL/L (ref 22–29)
COLOR UR: YELLOW
CREAT SERPL-MCNC: 1.31 MG/DL (ref 0.76–1.27)
DEPRECATED RDW RBC AUTO: 46.3 FL (ref 37–54)
EGFRCR SERPLBLD CKD-EPI 2021: 58.6 ML/MIN/1.73
EOSINOPHIL # BLD AUTO: 0.09 10*3/MM3 (ref 0–0.4)
EOSINOPHIL NFR BLD AUTO: 1.2 % (ref 0.3–6.2)
ERYTHROCYTE [DISTWIDTH] IN BLOOD BY AUTOMATED COUNT: 13.8 % (ref 12.3–15.4)
GLOBULIN UR ELPH-MCNC: 3 GM/DL
GLUCOSE SERPL-MCNC: 110 MG/DL (ref 65–99)
GLUCOSE UR STRIP-MCNC: NEGATIVE MG/DL
HCT VFR BLD AUTO: 45.9 % (ref 37.5–51)
HGB BLD-MCNC: 15.7 G/DL (ref 13–17.7)
HGB UR QL STRIP.AUTO: NEGATIVE
IMM GRANULOCYTES # BLD AUTO: 0.02 10*3/MM3 (ref 0–0.05)
IMM GRANULOCYTES NFR BLD AUTO: 0.3 % (ref 0–0.5)
KETONES UR QL STRIP: ABNORMAL
LEUKOCYTE ESTERASE UR QL STRIP.AUTO: NEGATIVE
LIPASE SERPL-CCNC: 34 U/L (ref 13–60)
LYMPHOCYTES # BLD AUTO: 1.44 10*3/MM3 (ref 0.7–3.1)
LYMPHOCYTES NFR BLD AUTO: 19 % (ref 19.6–45.3)
MCH RBC QN AUTO: 31.2 PG (ref 26.6–33)
MCHC RBC AUTO-ENTMCNC: 34.2 G/DL (ref 31.5–35.7)
MCV RBC AUTO: 91.1 FL (ref 79–97)
MONOCYTES # BLD AUTO: 0.42 10*3/MM3 (ref 0.1–0.9)
MONOCYTES NFR BLD AUTO: 5.5 % (ref 5–12)
NEUTROPHILS NFR BLD AUTO: 5.6 10*3/MM3 (ref 1.7–7)
NEUTROPHILS NFR BLD AUTO: 73.9 % (ref 42.7–76)
NITRITE UR QL STRIP: NEGATIVE
NRBC BLD AUTO-RTO: 0 /100 WBC (ref 0–0.2)
PH UR STRIP.AUTO: 7.5 [PH] (ref 5–8)
PLATELET # BLD AUTO: 177 10*3/MM3 (ref 140–450)
PMV BLD AUTO: 9.5 FL (ref 6–12)
POTASSIUM SERPL-SCNC: 4.7 MMOL/L (ref 3.5–5.2)
PROT SERPL-MCNC: 7.2 G/DL (ref 6–8.5)
PROT UR QL STRIP: NEGATIVE
RBC # BLD AUTO: 5.04 10*6/MM3 (ref 4.14–5.8)
SODIUM SERPL-SCNC: 138 MMOL/L (ref 136–145)
SP GR UR STRIP: 1.03 (ref 1–1.03)
UROBILINOGEN UR QL STRIP: ABNORMAL
WBC NRBC COR # BLD AUTO: 7.58 10*3/MM3 (ref 3.4–10.8)

## 2024-07-26 PROCEDURE — 83690 ASSAY OF LIPASE: CPT | Performed by: EMERGENCY MEDICINE

## 2024-07-26 PROCEDURE — G0378 HOSPITAL OBSERVATION PER HR: HCPCS

## 2024-07-26 PROCEDURE — 96375 TX/PRO/DX INJ NEW DRUG ADDON: CPT

## 2024-07-26 PROCEDURE — 99285 EMERGENCY DEPT VISIT HI MDM: CPT

## 2024-07-26 PROCEDURE — 85025 COMPLETE CBC W/AUTO DIFF WBC: CPT | Performed by: EMERGENCY MEDICINE

## 2024-07-26 PROCEDURE — 96376 TX/PRO/DX INJ SAME DRUG ADON: CPT

## 2024-07-26 PROCEDURE — 99214 OFFICE O/P EST MOD 30 MIN: CPT | Performed by: SURGERY

## 2024-07-26 PROCEDURE — 25010000002 ONDANSETRON PER 1 MG: Performed by: EMERGENCY MEDICINE

## 2024-07-26 PROCEDURE — 96374 THER/PROPH/DIAG INJ IV PUSH: CPT

## 2024-07-26 PROCEDURE — 81003 URINALYSIS AUTO W/O SCOPE: CPT | Performed by: EMERGENCY MEDICINE

## 2024-07-26 PROCEDURE — 94640 AIRWAY INHALATION TREATMENT: CPT

## 2024-07-26 PROCEDURE — 94799 UNLISTED PULMONARY SVC/PX: CPT

## 2024-07-26 PROCEDURE — 80053 COMPREHEN METABOLIC PANEL: CPT | Performed by: EMERGENCY MEDICINE

## 2024-07-26 PROCEDURE — 74177 CT ABD & PELVIS W/CONTRAST: CPT

## 2024-07-26 PROCEDURE — 25810000003 SODIUM CHLORIDE 0.9 % SOLUTION: Performed by: HOSPITALIST

## 2024-07-26 PROCEDURE — 25010000002 HYDROMORPHONE PER 4 MG: Performed by: HOSPITALIST

## 2024-07-26 PROCEDURE — 96361 HYDRATE IV INFUSION ADD-ON: CPT

## 2024-07-26 PROCEDURE — 25510000001 IOPAMIDOL 61 % SOLUTION: Performed by: EMERGENCY MEDICINE

## 2024-07-26 PROCEDURE — 25010000002 HYDROMORPHONE PER 4 MG: Performed by: EMERGENCY MEDICINE

## 2024-07-26 RX ORDER — PRAVASTATIN SODIUM 40 MG
40 TABLET ORAL DAILY
Status: DISCONTINUED | OUTPATIENT
Start: 2024-07-26 | End: 2024-07-29 | Stop reason: HOSPADM

## 2024-07-26 RX ORDER — ALBUTEROL SULFATE 2.5 MG/3ML
2.5 SOLUTION RESPIRATORY (INHALATION) EVERY 6 HOURS PRN
Status: DISCONTINUED | OUTPATIENT
Start: 2024-07-26 | End: 2024-07-29 | Stop reason: HOSPADM

## 2024-07-26 RX ORDER — SODIUM CHLORIDE 9 MG/ML
40 INJECTION, SOLUTION INTRAVENOUS AS NEEDED
Status: DISCONTINUED | OUTPATIENT
Start: 2024-07-26 | End: 2024-07-29 | Stop reason: HOSPADM

## 2024-07-26 RX ORDER — ONDANSETRON 2 MG/ML
4 INJECTION INTRAMUSCULAR; INTRAVENOUS EVERY 6 HOURS PRN
Status: DISCONTINUED | OUTPATIENT
Start: 2024-07-26 | End: 2024-07-29 | Stop reason: HOSPADM

## 2024-07-26 RX ORDER — SODIUM CHLORIDE 0.9 % (FLUSH) 0.9 %
10 SYRINGE (ML) INJECTION AS NEEDED
Status: DISCONTINUED | OUTPATIENT
Start: 2024-07-26 | End: 2024-07-29 | Stop reason: HOSPADM

## 2024-07-26 RX ORDER — PANTOPRAZOLE SODIUM 40 MG/1
40 TABLET, DELAYED RELEASE ORAL
Status: DISCONTINUED | OUTPATIENT
Start: 2024-07-26 | End: 2024-07-29 | Stop reason: HOSPADM

## 2024-07-26 RX ORDER — ACETAMINOPHEN 650 MG/1
650 SUPPOSITORY RECTAL EVERY 4 HOURS PRN
Status: DISCONTINUED | OUTPATIENT
Start: 2024-07-26 | End: 2024-07-29 | Stop reason: HOSPADM

## 2024-07-26 RX ORDER — ONDANSETRON 2 MG/ML
4 INJECTION INTRAMUSCULAR; INTRAVENOUS ONCE
Status: COMPLETED | OUTPATIENT
Start: 2024-07-26 | End: 2024-07-26

## 2024-07-26 RX ORDER — ONDANSETRON 4 MG/1
4 TABLET, ORALLY DISINTEGRATING ORAL EVERY 6 HOURS PRN
Status: DISCONTINUED | OUTPATIENT
Start: 2024-07-26 | End: 2024-07-29 | Stop reason: HOSPADM

## 2024-07-26 RX ORDER — HYDROCODONE BITARTRATE AND ACETAMINOPHEN 5; 325 MG/1; MG/1
1 TABLET ORAL EVERY 6 HOURS PRN
Status: DISCONTINUED | OUTPATIENT
Start: 2024-07-26 | End: 2024-07-29 | Stop reason: HOSPADM

## 2024-07-26 RX ORDER — AMOXICILLIN 250 MG
2 CAPSULE ORAL 2 TIMES DAILY PRN
Status: DISCONTINUED | OUTPATIENT
Start: 2024-07-26 | End: 2024-07-29 | Stop reason: HOSPADM

## 2024-07-26 RX ORDER — AMLODIPINE BESYLATE 10 MG/1
10 TABLET ORAL DAILY
Status: DISCONTINUED | OUTPATIENT
Start: 2024-07-26 | End: 2024-07-29 | Stop reason: HOSPADM

## 2024-07-26 RX ORDER — ACETAMINOPHEN 325 MG/1
650 TABLET ORAL EVERY 4 HOURS PRN
Status: DISCONTINUED | OUTPATIENT
Start: 2024-07-26 | End: 2024-07-29 | Stop reason: HOSPADM

## 2024-07-26 RX ORDER — SODIUM CHLORIDE 0.9 % (FLUSH) 0.9 %
10 SYRINGE (ML) INJECTION EVERY 12 HOURS SCHEDULED
Status: DISCONTINUED | OUTPATIENT
Start: 2024-07-26 | End: 2024-07-29 | Stop reason: HOSPADM

## 2024-07-26 RX ORDER — SODIUM CHLORIDE 9 MG/ML
100 INJECTION, SOLUTION INTRAVENOUS CONTINUOUS
Status: DISCONTINUED | OUTPATIENT
Start: 2024-07-26 | End: 2024-07-29 | Stop reason: HOSPADM

## 2024-07-26 RX ORDER — HYDROMORPHONE HYDROCHLORIDE 1 MG/ML
0.5 INJECTION, SOLUTION INTRAMUSCULAR; INTRAVENOUS; SUBCUTANEOUS ONCE
Status: COMPLETED | OUTPATIENT
Start: 2024-07-26 | End: 2024-07-26

## 2024-07-26 RX ORDER — BISACODYL 10 MG
10 SUPPOSITORY, RECTAL RECTAL DAILY PRN
Status: DISCONTINUED | OUTPATIENT
Start: 2024-07-26 | End: 2024-07-29 | Stop reason: HOSPADM

## 2024-07-26 RX ORDER — POLYETHYLENE GLYCOL 3350 17 G/17G
17 POWDER, FOR SOLUTION ORAL DAILY PRN
Status: DISCONTINUED | OUTPATIENT
Start: 2024-07-26 | End: 2024-07-29 | Stop reason: HOSPADM

## 2024-07-26 RX ORDER — HYDROMORPHONE HYDROCHLORIDE 1 MG/ML
0.5 INJECTION, SOLUTION INTRAMUSCULAR; INTRAVENOUS; SUBCUTANEOUS EVERY 4 HOURS PRN
Status: DISCONTINUED | OUTPATIENT
Start: 2024-07-26 | End: 2024-07-29 | Stop reason: HOSPADM

## 2024-07-26 RX ORDER — ASPIRIN 81 MG/1
81 TABLET ORAL DAILY
Status: DISCONTINUED | OUTPATIENT
Start: 2024-07-26 | End: 2024-07-29 | Stop reason: HOSPADM

## 2024-07-26 RX ORDER — NITROGLYCERIN 0.4 MG/1
0.4 TABLET SUBLINGUAL
Status: DISCONTINUED | OUTPATIENT
Start: 2024-07-26 | End: 2024-07-29 | Stop reason: HOSPADM

## 2024-07-26 RX ORDER — AMLODIPINE BESYLATE 10 MG/1
10 TABLET ORAL
Status: DISCONTINUED | OUTPATIENT
Start: 2024-07-26 | End: 2024-07-26

## 2024-07-26 RX ORDER — BISACODYL 5 MG/1
5 TABLET, DELAYED RELEASE ORAL DAILY PRN
Status: DISCONTINUED | OUTPATIENT
Start: 2024-07-26 | End: 2024-07-29 | Stop reason: HOSPADM

## 2024-07-26 RX ORDER — ACETAMINOPHEN 160 MG/5ML
650 SOLUTION ORAL EVERY 4 HOURS PRN
Status: DISCONTINUED | OUTPATIENT
Start: 2024-07-26 | End: 2024-07-29 | Stop reason: HOSPADM

## 2024-07-26 RX ORDER — BUDESONIDE AND FORMOTEROL FUMARATE DIHYDRATE 160; 4.5 UG/1; UG/1
2 AEROSOL RESPIRATORY (INHALATION)
Status: DISCONTINUED | OUTPATIENT
Start: 2024-07-26 | End: 2024-07-29 | Stop reason: HOSPADM

## 2024-07-26 RX ADMIN — HYDROMORPHONE HYDROCHLORIDE 0.5 MG: 1 INJECTION, SOLUTION INTRAMUSCULAR; INTRAVENOUS; SUBCUTANEOUS at 10:58

## 2024-07-26 RX ADMIN — ASPIRIN 81 MG: 81 TABLET, COATED ORAL at 18:51

## 2024-07-26 RX ADMIN — AMLODIPINE BESYLATE 10 MG: 5 TABLET ORAL at 13:17

## 2024-07-26 RX ADMIN — BUDESONIDE AND FORMOTEROL FUMARATE DIHYDRATE 2 PUFF: 160; 4.5 AEROSOL RESPIRATORY (INHALATION) at 20:47

## 2024-07-26 RX ADMIN — SODIUM CHLORIDE 100 ML/HR: 9 INJECTION, SOLUTION INTRAVENOUS at 18:51

## 2024-07-26 RX ADMIN — Medication 10 ML: at 21:58

## 2024-07-26 RX ADMIN — HYDROMORPHONE HYDROCHLORIDE 0.5 MG: 1 INJECTION, SOLUTION INTRAMUSCULAR; INTRAVENOUS; SUBCUTANEOUS at 13:42

## 2024-07-26 RX ADMIN — HYDROCODONE BITARTRATE AND ACETAMINOPHEN 1 TABLET: 5; 325 TABLET ORAL at 17:43

## 2024-07-26 RX ADMIN — ONDANSETRON 4 MG: 2 INJECTION INTRAMUSCULAR; INTRAVENOUS at 10:58

## 2024-07-26 RX ADMIN — PANTOPRAZOLE SODIUM 40 MG: 40 TABLET, DELAYED RELEASE ORAL at 18:51

## 2024-07-26 RX ADMIN — PRAVASTATIN SODIUM 40 MG: 40 TABLET ORAL at 21:57

## 2024-07-26 RX ADMIN — HYDROMORPHONE HYDROCHLORIDE 0.5 MG: 1 INJECTION, SOLUTION INTRAMUSCULAR; INTRAVENOUS; SUBCUTANEOUS at 21:58

## 2024-07-26 RX ADMIN — IOPAMIDOL 95 ML: 612 INJECTION, SOLUTION INTRAVENOUS at 11:41

## 2024-07-26 NOTE — TELEPHONE ENCOUNTER
"     Caller: Patrick Mckeon \"MU\"    Relationship to patient: Self    Best call back number: [phone]    Patient is needing: PATIENT STATES THAT HIS PAIN IS GETTING WORSE AND IS ON HIS WAY TO [facility] ER.           "

## 2024-07-26 NOTE — ED NOTES
Pt to er from home due to abd pain that radiates to his back that started at 5 am. Pt was seen Sunday for the same thing, pain resolved and came back today even worse. Pt states he has an aortic stent. Pt states pain is from his stereum down, and radiates to his back. Pt states he has some nausea but denies any vomiting.

## 2024-07-26 NOTE — TELEPHONE ENCOUNTER
"  Caller: Patrick Mckeon \"MU\"    Relationship: Self    Best call back number: [phone]    What is the best time to reach you: ANYTIME     Who are you requesting to speak with (clinical staff, provider,  specific staff member): JUSTUS DE LA CRUZ     Do you know the name of the person who called: PATIENT     What was the call regarding: THE PAIN HE WAS HAVING IN HIS ABDOMIN HAS RETURNED    Is it okay if the provider responds through MyChart: NO        "

## 2024-07-26 NOTE — PLAN OF CARE
Goal Outcome Evaluation:  Plan of Care Reviewed With: patient        Progress: no change  Outcome Evaluation: vss. telemetry monitor, sr. alert and oriented x4, room air. up ad ruel. transfer from [facility]. iv fluids started. full liquid diet. no complaints of pain.

## 2024-07-26 NOTE — H&P
HISTORY AND PHYSICAL   [facility]        Patient Identification:  Name: Patrick Mckeon  Age: 69 y.o.  Sex: male  :  1955  MRN: [id number]                     Primary Care Physician: Lester Carter MD    Chief Complaint: Abdominal pain    History of Present Illness:   Pleasant 69-year-old gentleman with atherosclerotic vascular disease with a history abdominal aortic aneurysm that underwent stent placement in 2017.  She presents with recurrence of abdominal pain.  He was seen in emergency room with the new onset of abdominal pain on 2024.  He states that at that time it was generalized but also developed a stabbing left flank pain associated with this.  CT of the abdomen at that time showed the previous procedure with an interval increase in size.  He was seen by his vascular surgeon in the ER.  He was discharged in good condition and he states the pain abated.  He was feeling well until about 5:00 this morning when he woke up with recurrence of the pain.  Initially stated it was epigastric and very sharp but very quickly generalized abdominal pain which she stated was severe but he could not characterize the type.  He states that he then developed sharp right flank pain which is since abated.  Overall his pain is improved at present but he did receive a dose of Dilaudid approximately 2:00 this afternoon.  His last bowel movement was yesterday and aside from being somewhat small was normal.  There is been no nausea or vomiting associated with this.  No fever sweats or chills.  CT scan was repeated in the emergency room and shows no changes.    Past Medical History:  Past Medical History:   Diagnosis Date    Abdominal aortic aneurysm without rupture 2019    Anemia 2021    Atherosclerosis of native coronary artery of native heart without angina pectoris 03/10/2020    Cigarette nicotine dependence with nicotine-induced disorder     Colon polyp     Diverticulosis of large intestine  without hemorrhage 08/09/2017    Emphysema lung 02/28/2017    Essential (primary) hypertension     H/O abdominal aortic aneurysm repair 06/11/2019    History of endovascuar abodominal aortic aneursym repair    Hiatal hernia with gastroesophageal reflux disease and esophagitis 03/10/2020    Hyperlipidemia 02/28/2017    Liver abscess 11/19/2021    Nicotine dependence, other tobacco product, uncomplicated 06/11/2019    Portal vein thrombosis 08/29/2017    Sepsis 08/02/2017    Shingles 09/2010    Skin cancer 05/31/2017    Thrombosis, 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    coag hickey by ladonna neg; completed 6 months of AC and maintained on ASA     Past Surgical History:  Past Surgical History:   Procedure Laterality Date    ARTERIOGRAM AORTIC N/A 05/08/2017    Procedure:  AORTIC STENT GRAFT REPAIR W/ GORE BILATERAL FEMORAL MINI CUTDOWNS;  Surgeon: Humble Hamilton MD;  Location: [facility] HYBRID OR 18/19;  Service:     CATARACT EXTRACTION Left 10/2023    CATARACT EXTRACTION Right 11/2023    COLONOSCOPY N/A 08/03/2017    Procedure: COLONOSCOPY to cecum;  Surgeon: Patrick Elizabeth MD;  Location: [facility] ENDOSCOPY;  Service:     COLONOSCOPY  09/2010    COLONOSCOPY N/A 01/31/2023    Procedure: COLONOSCOPY INTO CECUM WITH COLD SNARE POLYPECTOMIES;  Surgeon: Antolin Wolfe MD;  Location: [facility] ENDOSCOPY;  Service: Gastroenterology;  Laterality: N/A;  PRE; PERSONAL H/O COLON POLYPS; FAMILY H/O COLON CANCER  POST: DIVERTICULOSIS, POLYPS, HEMORRHIDS    ENDOSCOPY  08/03/2017    Procedure: ESOPHAGOGASTRODUODENOSCOPY;  Surgeon: Patrick Elizabeth MD;  Location: [facility] ENDOSCOPY;  Service:     HERNIA REPAIR  1980    KIDNEY SURGERY      Kidney Removal    UMBILICAL HERNIA REPAIR  1975      Home Meds:  Medications Prior to Admission   Medication Sig Dispense Refill Last Dose    albuterol sulfate HFA (Ventolin HFA) 108 (90 Base) MCG/ACT inhaler Inhale 2 puffs Every 4 (Four) Hours As Needed for Wheezing or Shortness of Air. 18 g 5 7/25/2024     amLODIPine (NORVASC) 10 MG tablet Take 1 tablet by mouth Daily. 90 tablet 1 7/25/2024    aspirin 81 MG EC tablet Take 1 tablet by mouth Daily.   7/25/2024    Fluticasone-Umeclidin-Vilant (Trelegy Ellipta) 200-62.5-25 MCG/ACT inhaler Inhale 1 puff Daily. 28 each 5 7/25/2024    pantoprazole (PROTONIX) 40 MG EC tablet Take 1 tablet by mouth Daily. 90 tablet 1 7/25/2024    pravastatin (PRAVACHOL) 40 MG tablet Take 1 tablet by mouth Daily. 90 tablet 1 7/25/2024    lidocaine (LIDODERM) 5 % Place 1 patch on the skin as directed by provider Daily. Remove & Discard patch within 12 hours or as directed by MD (Patient not taking: Reported on 7/25/2024) 6 each 0        Allergies:  No Known Allergies  Immunizations:  Immunization History   Administered Date(s) Administered    Arexvy (RSV, Adults 60+ yrs) 10/03/2023    COVID-19 (PFIZER) Purple Cap Monovalent 03/01/2021, 03/22/2021, 10/09/2021, 07/11/2022, 09/26/2023    COVID-19 F23 (PFIZER) 12YRS+ (COMIRNATY) 09/26/2023    Covid-19 (Pfizer) Gray Cap Monovalent 07/11/2022    Flu Vaccine Quad PF 6-35MO 10/02/2017, 09/13/2018    Flu Vaccine Quad PF >36MO 10/02/2017    Fluzone (or Fluarix & Flulaval for VFC) >6mos 09/13/2018    Fluzone High Dose =>65 Years (Vaxcare ONLY) 09/19/2019, 09/27/2020, 09/20/2021, 10/10/2022, 10/09/2023    Fluzone High-Dose 65+yrs 09/28/2020, 09/21/2021, 10/10/2022, 10/09/2023    Fluzone Quad >6mos (Multi-dose) 10/28/2016    Hepatitis A 05/02/2018, 01/17/2019    Influenza Split Preservative Free ID 09/19/2012    Influenza TIV (IM) 09/19/2014    Influenza, Unspecified 10/09/2023    Pneumococcal Conjugate 13-Valent (PCV13) 02/28/2017    Pneumococcal Polysaccharide (PPSV23) 05/02/2018, 01/27/2020    RSV, unspecified (Vaccine or MAB) 10/03/2023    Shingrix 05/02/2018, 01/17/2019    TD Preservative Free (Tenivac) 12/01/2020    Tdap 09/21/2011    Zostavax 05/02/2018, 01/17/2019     Social History:   Social History     Social History Narrative    Not on file      Social History     Tobacco Use    Smoking status: Every Day     Current packs/day: 1.00     Average packs/day: 1 pack/day for 42.0 years (42.0 ttl pk-yrs)     Types: Cigarettes    Smokeless tobacco: Never    Tobacco comments:     currently 1 ppd; Patient smokes 1 pack per day         Substance Use Topics    Alcohol use: Not Currently     Comment: 6 days (1-2 days); Patient is a moderate drinker. He consumes 10 shots of liquor per week     Family History:  Family History   Problem Relation Age of Onset    Hypertension Mother     Hyperlipidemia Mother     Sudden death Mother 78    Aneurysm Mother     Heart disease Mother     Stroke Mother     Colon cancer Father 55    Heart attack Father 71    Coronary artery disease Father     No Known Problems Sister     Benign prostatic hyperplasia Brother     Hypertension Brother     Benign prostatic hyperplasia Brother     Brain cancer Maternal Grandmother     Heart disease Maternal Grandfather     Sudden death Maternal Grandfather     Heart disease Other     Cancer Other         no details    Prostate cancer Neg Hx     Dementia Neg Hx     Malig Hyperthermia Neg Hx         Review of Systems  Review of Systems   Constitutional: Negative.    HENT: Negative.     Eyes: Negative.    Respiratory: Negative.     Cardiovascular: Negative.    Gastrointestinal:         As per history of present illness.   Endocrine: Negative.    Genitourinary: Negative.    Musculoskeletal: Negative.    Skin: Negative.    Allergic/Immunologic: Negative.    Neurological: Negative.    Hematological: Negative.    Psychiatric/Behavioral: Negative.         Objective:  T Max 24 hrs: Temp (24hrs), Av.7 °F (36.5 °C), Min:97.7 °F (36.5 °C), Max:97.7 °F (36.5 °C)    Vitals Ranges:   Temp:  [97.7 °F (36.5 °C)] 97.7 °F (36.5 °C)  Heart Rate:  [] 87  Resp:  [22] 22  BP: (132-156)/(85-99) 132/96      Exam:  Physical Exam  Constitutional:       General: He is not in acute distress.     Appearance: Normal  appearance. He is not ill-appearing, toxic-appearing or diaphoretic.   HENT:      Head: Normocephalic and atraumatic.      Right Ear: External ear normal.      Left Ear: External ear normal.      Nose: Nose normal.      Mouth/Throat:      Mouth: Mucous membranes are dry.   Eyes:      General: No scleral icterus.        Right eye: No discharge.         Left eye: No discharge.      Conjunctiva/sclera: Conjunctivae normal.   Cardiovascular:      Rate and Rhythm: Normal rate and regular rhythm.      Heart sounds:      No friction rub. No gallop.   Pulmonary:      Effort: Pulmonary effort is normal.      Breath sounds: Normal breath sounds.   Abdominal:      General: There is no distension.      Palpations: Abdomen is soft. There is no mass.      Tenderness: There is abdominal tenderness. There is no guarding or rebound.      Comments: Obese.  Bowel sounds hypoactive side.  There is mild generalized tenderness.  Some degree of apprehension on palpation is present.  No guarding or rebound.   Musculoskeletal:      Cervical back: Neck supple.      Right lower leg: No edema.      Left lower leg: No edema.   Skin:     General: Skin is warm and dry.   Neurological:      General: No focal deficit present.      Mental Status: He is alert and oriented to person, place, and time.   Psychiatric:         Mood and Affect: Mood normal.         Behavior: Behavior normal.         Thought Content: Thought content normal.         Judgment: Judgment normal.         Data Review:  All labs and radiology reviewed.    Assessment:  Recurrent abdominal pain: Uncertain etiology.  His vascular history still the most concerning part of the workup so far.  No acute changes noted concerning the abdominal aortic aneurysm and history of stent placement.  I do wonder about the possibility of abdominal angina.  Seriously doubt this would be a gallbladder issue or gastritis.  I will request reevaluation from his vascular surgeon.  Allow full liquid diet  for the night and monitor his response closely.  Noted that there is also a history of hepatic vein and portal vein thrombosis which does not appear to be an active issue at present.  Hypertension: Monitoring continue home meds.  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  Monitor closely.  Hydrate status post CT scan.  Monitor.  COPD: Clinically stable.  Monitor.  Continue bronchodilators.  CAD: Clinically stable: Continue home meds.  Monitor.      Plan:  Please see above.  Discussed with patient and wife at bedside.  Discussed with ER provider    Delano Rivers MD  7/26/2024  16:31 EDT    EMR Dragon/Transcription disclaimer:   Much of this encounter note is an electronic transcription/translation of spoken language to printed text. The electronic translation of spoken language may permit erroneous, or at times, nonsensical words or phrases to be inadvertently transcribed; Although I have reviewed the note for such errors, some may still exist.

## 2024-07-26 NOTE — ED NOTES
Nursing report ED to floor  Patrick Mckeon  69 y.o.  male    HPI :  HPI (Adult)  Stated Reason for Visit: Abd/back pain  History Obtained From: patient  Duration (Days): 1    Chief Complaint  Chief Complaint   Patient presents with    Abdominal Pain    Back Pain       Admitting doctor:   Delano Rivers MD    Admitting diagnosis:   The encounter diagnosis was Intractable abdominal pain.    Code status:   Current Code Status       Date Active Code Status Order ID Comments User Context       Prior            Allergies:   Patient has no known allergies.    Isolation:   No active isolations    Intake and Output  No intake or output data in the 24 hours ending 07/26/24 1405    Weight:   There were no vitals filed for this visit.    Most recent vitals:   Vitals:    07/26/24 1102 07/26/24 1153 07/26/24 1231 07/26/24 1312   BP: 153/95 155/96 156/99 155/94   BP Location:  Left arm     Patient Position:  Lying     Pulse: 96 93 87 89   Resp:       Temp:       TempSrc:       SpO2: 100% 99% 95% 98%       Active LDAs/IV Access:   Lines, Drains & Airways       Active LDAs       Name Placement date Placement time Site Days    Peripheral IV 07/26/24 1050 Right Antecubital 07/26/24  1050  Antecubital  less than 1                    Labs (abnormal labs have a star):   Labs Reviewed   COMPREHENSIVE METABOLIC PANEL - Abnormal; Notable for the following components:       Result Value    Glucose 110 (*)     Creatinine 1.31 (*)     eGFR 58.6 (*)     All other components within normal limits    Narrative:     GFR Normal >60  Chronic Kidney Disease <60  Kidney Failure <15     URINALYSIS W/ MICROSCOPIC IF INDICATED (NO CULTURE) - Abnormal; Notable for the following components:    Ketones, UA Trace (*)     All other components within normal limits    Narrative:     Urine microscopic not indicated.   CBC WITH AUTO DIFFERENTIAL - Abnormal; Notable for the following components:    Lymphocyte % 19.0 (*)     All other components within normal  limits   LIPASE - Normal   CBC AND DIFFERENTIAL    Narrative:     The following orders were created for panel order CBC & Differential.  Procedure                               Abnormality         Status                     ---------                               -----------         ------                     CBC Auto Differential[[id number]]        Abnormal            Final result                 Please view results for these tests on the individual orders.       EKG:   No orders to display       Meds given in ED:   Medications   sodium chloride 0.9 % flush 10 mL (has no administration in time range)   amLODIPine (NORVASC) tablet 10 mg (10 mg Oral Given 7/26/24 1317)   HYDROmorphone (DILAUDID) injection 0.5 mg (0.5 mg Intravenous Given 7/26/24 1058)   ondansetron (ZOFRAN) injection 4 mg (4 mg Intravenous Given 7/26/24 1058)   iopamidol (ISOVUE-300) 61 % injection 100 mL (95 mL Intravenous Given 7/26/24 1141)   HYDROmorphone (DILAUDID) injection 0.5 mg (0.5 mg Intravenous Given 7/26/24 1342)       Imaging results:  CT Abdomen Pelvis With Contrast    Result Date: 7/26/2024  1. Cholelithiasis and there may be a small amount of hyperdense bile in the gallbladder. 2. Aortobiiliac stent graft is unchanged from the 07/21/2024 study with dilatation of the abdominal aorta also stable. 3. Colonic diverticulosis. 4. No acute findings are seen to explain patient's reported abdominal pain.    Radiation dose reduction techniques were utilized, including automated exposure control and exposure modulation based on body size.        Ambulatory status:   - up at [facility]    Social issues:   Social History     Socioeconomic History    Marital status:      Spouse name: Samira    Number of children: 1    Years of education: High School   Tobacco Use    Smoking status: Every Day     Current packs/day: 1.00     Average packs/day: 1 pack/day for 42.0 years (42.0 ttl pk-yrs)     Types: Cigarettes    Smokeless tobacco: Never    Tobacco  comments:     currently 1 ppd; Patient smokes 1 pack per day         Vaping Use    Vaping status: Former    Substances: Nicotine   Substance and Sexual Activity    Alcohol use: Not Currently     Comment: 6 days (1-2 days); Patient is a moderate drinker. He consumes 10 shots of liquor per week    Drug use: No     Comment: Drug Abuse: no drug abuse    Sexual activity: Not Currently     Partners: Female       Peripheral Neurovascular  Peripheral Neurovascular (Adult)  Peripheral Neurovascular WDL: WDL    Neuro Cognitive  Neuro Cognitive (Adult)  Cognitive/Neuro/Behavioral WDL: WDL    Learning  Learning Assessment (Adult)  Learning Readiness and Ability: no barriers identified    Respiratory  Respiratory WDL  Respiratory WDL: all  Rhythm/Pattern, Respiratory: shortness of breath    Abdominal Pain       Pain Assessments  Pain (Adult)  (0-10) Pain Rating: Rest: 5 (pt states he has intermittent RLQ abd pain thats 7/10)  (0-10) Pain Rating: Activity: 5  Pain Location: abdomen  Pain Description: intermittent  Response to Pain Interventions: interventions effective per patient    NIH Stroke Scale       sIela Allen RN  07/26/24 14:05 EDT

## 2024-07-26 NOTE — ED PROVIDER NOTES
EMERGENCY DEPARTMENT ENCOUNTER    Room Number:  28/28  PCP: Lester Carter MD  Historian: Patient      HPI:  Chief Complaint: Abdominal pain  A complete HPI/ROS/PMH/PSH/SH/FH are unobtainable due to: None  Context: Patrick Mckeon is a 69 y.o. male who presents to the ED c/o abdominal pain radiation into his back..  Patient states symptoms started last weekend.  Patient had CT scan that showed endoleak but no rupture.  Patient states symptoms resolved and he was pain-free Monday through Thursday.  Patient states pain started again last night.  Pain seems to be more severe.  Radiating through to his back.  Is had nausea with no vomiting.  Is had no fevers or chills.  No chest pain pressure tightness.  Patient is had no black stools or dark tarry stools.            PAST MEDICAL HISTORY  Active Ambulatory Problems     Diagnosis Date Noted    Primary hypertension 10/28/2016    Cigarette nicotine dependence with nicotine-induced disorder 10/28/2016    Emphysema lung 02/28/2017    Hyperlipidemia 02/28/2017    Abdominal aortic aneurysm (AAA) without rupture 03/13/2017    Congenital absence of left kidney 03/13/2017    Atherosclerosis of native coronary artery of native heart without angina pectoris 03/10/2020    Calculus of gallbladder without cholecystitis without obstruction 03/10/2020    Gastroesophageal reflux disease with esophagitis without hemorrhage 03/10/2020    CKD (chronic kidney disease) stage 3, GFR 30-59 ml/min 11/20/2021    Anemia 11/20/2021    Stage 3a chronic kidney disease 10/25/2022    History of endovascular stent graft for abdominal aortic aneurysm (AAA) 07/16/2024     Resolved Ambulatory Problems     Diagnosis Date Noted    Diverticulosis of large intestine without hemorrhage 08/09/2017    Thrombosis, 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    Epigastric abdominal pain 09/06/2018    Pulmonary nodules 03/10/2020    Liver abscess 11/19/2021    Sepsis 11/20/2021    Postoperative hypoxemia 11/24/2021    Encounter for  screening for malignant neoplasm of colon 10/18/2022     Past Medical History:   Diagnosis Date    Abdominal aortic aneurysm without rupture 06/11/2019    Colon polyp     Essential (primary) hypertension     H/O abdominal aortic aneurysm repair 06/11/2019    Hiatal hernia with gastroesophageal reflux disease and esophagitis 03/10/2020    Nicotine dependence, other tobacco product, uncomplicated 06/11/2019    Portal vein thrombosis 08/29/2017    Shingles 09/2010    Skin cancer 05/31/2017         PAST SURGICAL HISTORY  Past Surgical History:   Procedure Laterality Date    ARTERIOGRAM AORTIC N/A 05/08/2017    Procedure:  AORTIC STENT GRAFT REPAIR W/ GORE BILATERAL FEMORAL MINI CUTDOWNS;  Surgeon: Humble Hamilton MD;  Location: [facility] HYBRID OR 18/19;  Service:     CATARACT EXTRACTION Left 10/2023    CATARACT EXTRACTION Right 11/2023    COLONOSCOPY N/A 08/03/2017    Procedure: COLONOSCOPY to cecum;  Surgeon: Patrick Elizabeth MD;  Location: [facility] ENDOSCOPY;  Service:     COLONOSCOPY  09/2010    COLONOSCOPY N/A 01/31/2023    Procedure: COLONOSCOPY INTO CECUM WITH COLD SNARE POLYPECTOMIES;  Surgeon: Antolin Wolfe MD;  Location: [facility] ENDOSCOPY;  Service: Gastroenterology;  Laterality: N/A;  PRE; PERSONAL H/O COLON POLYPS; FAMILY H/O COLON CANCER  POST: DIVERTICULOSIS, POLYPS, HEMORRHIDS    ENDOSCOPY  08/03/2017    Procedure: ESOPHAGOGASTRODUODENOSCOPY;  Surgeon: Patrick Elizabeth MD;  Location: [facility] ENDOSCOPY;  Service:     HERNIA REPAIR  1980    KIDNEY SURGERY      Kidney Removal    UMBILICAL HERNIA REPAIR  1975         FAMILY HISTORY  Family History   Problem Relation Age of Onset    Hypertension Mother     Hyperlipidemia Mother     Sudden death Mother 78    Aneurysm Mother     Heart disease Mother     Stroke Mother     Colon cancer Father 55    Heart attack Father 71    Coronary artery disease Father     No Known Problems Sister     Benign prostatic hyperplasia Brother     Hypertension Brother     Benign  prostatic hyperplasia Brother     Brain cancer Maternal Grandmother     Heart disease Maternal Grandfather     Sudden death Maternal Grandfather     Heart disease Other     Cancer Other         no details    Prostate cancer Neg Hx     Dementia Neg Hx     Malig Hyperthermia Neg Hx          SOCIAL HISTORY  Social History     Socioeconomic History    Marital status:      Spouse name: Samira    Number of children: 1    Years of education: High School   Tobacco Use    Smoking status: Every Day     Current packs/day: 1.00     Average packs/day: 1 pack/day for 42.0 years (42.0 ttl pk-yrs)     Types: Cigarettes    Smokeless tobacco: Never    Tobacco comments:     currently 1 ppd; Patient smokes 1 pack per day         Vaping Use    Vaping status: Former    Substances: Nicotine   Substance and Sexual Activity    Alcohol use: Not Currently     Comment: 6 days (1-2 days); Patient is a moderate drinker. He consumes 10 shots of liquor per week    Drug use: No     Comment: Drug Abuse: no drug abuse    Sexual activity: Not Currently     Partners: Female         ALLERGIES  Patient has no known allergies.        REVIEW OF SYSTEMS  Review of Systems   Abdominal pain and back pain      PHYSICAL EXAM  ED Triage Vitals [07/26/24 1034]   Temp Heart Rate Resp BP SpO2   97.7 °F (36.5 °C) 109 22 -- 96 %      Temp src Heart Rate Source Patient Position BP Location FiO2 (%)   Tympanic Monitor -- -- --       Physical Exam      GENERAL: no acute distress  HENT: nares patent  EYES: no scleral icterus  CV: regular rhythm, normal rate  RESPIRATORY: normal effort  ABDOMEN: soft.  Diffuse tenderness.  MUSCULOSKELETAL: no deformity  NEURO: alert, moves all extremities, follows commands  PSYCH:  calm, cooperative  SKIN: warm, dry    Vital signs and nursing notes reviewed.          LAB RESULTS  Recent Results (from the past 24 hour(s))   Comprehensive Metabolic Panel    Collection Time: 07/26/24 10:51 AM    Specimen: Blood   Result Value Ref Range     Glucose 110 (H) 65 - 99 mg/dL    BUN 12 8 - 23 mg/dL    Creatinine 1.31 (H) 0.76 - 1.27 mg/dL    Sodium 138 136 - 145 mmol/L    Potassium 4.7 3.5 - 5.2 mmol/L    Chloride 104 98 - 107 mmol/L    CO2 22.4 22.0 - 29.0 mmol/L    Calcium 9.3 8.6 - 10.5 mg/dL    Total Protein 7.2 6.0 - 8.5 g/dL    Albumin 4.2 3.5 - 5.2 g/dL    ALT (SGPT) 13 1 - 41 U/L    AST (SGOT) 22 1 - 40 U/L    Alkaline Phosphatase 89 39 - 117 U/L    Total Bilirubin 0.5 0.0 - 1.2 mg/dL    Globulin 3.0 gm/dL    A/G Ratio 1.4 g/dL    BUN/Creatinine Ratio 9.2 7.0 - 25.0    Anion Gap 11.6 5.0 - 15.0 mmol/L    eGFR 58.6 (L) >60.0 mL/min/1.73   Lipase    Collection Time: 07/26/24 10:51 AM    Specimen: Blood   Result Value Ref Range    Lipase 34 13 - 60 U/L   CBC Auto Differential    Collection Time: 07/26/24 10:51 AM    Specimen: Blood   Result Value Ref Range    WBC 7.58 3.40 - 10.80 10*3/mm3    RBC 5.04 4.14 - 5.80 10*6/mm3    Hemoglobin 15.7 13.0 - 17.7 g/dL    Hematocrit 45.9 37.5 - 51.0 %    MCV 91.1 79.0 - 97.0 fL    MCH 31.2 26.6 - 33.0 pg    MCHC 34.2 31.5 - 35.7 g/dL    RDW 13.8 12.3 - 15.4 %    RDW-SD 46.3 37.0 - 54.0 fl    MPV 9.5 6.0 - 12.0 fL    Platelets 177 140 - 450 10*3/mm3    Neutrophil % 73.9 42.7 - 76.0 %    Lymphocyte % 19.0 (L) 19.6 - 45.3 %    Monocyte % 5.5 5.0 - 12.0 %    Eosinophil % 1.2 0.3 - 6.2 %    Basophil % 0.1 0.0 - 1.5 %    Immature Grans % 0.3 0.0 - 0.5 %    Neutrophils, Absolute 5.60 1.70 - 7.00 10*3/mm3    Lymphocytes, Absolute 1.44 0.70 - 3.10 10*3/mm3    Monocytes, Absolute 0.42 0.10 - 0.90 10*3/mm3    Eosinophils, Absolute 0.09 0.00 - 0.40 10*3/mm3    Basophils, Absolute 0.01 0.00 - 0.20 10*3/mm3    Immature Grans, Absolute 0.02 0.00 - 0.05 10*3/mm3    nRBC 0.0 0.0 - 0.2 /100 WBC   Urinalysis With Microscopic If Indicated (No Culture) - Urine, Clean Catch    Collection Time: 07/26/24 12:40 PM    Specimen: Urine, Clean Catch   Result Value Ref Range    Color, UA Yellow Yellow, Straw    Appearance, UA Clear Clear     pH, UA 7.5 5.0 - 8.0    Specific Gravity, UA 1.026 1.005 - 1.030    Glucose, UA Negative Negative    Ketones, UA Trace (A) Negative    Bilirubin, UA Negative Negative    Blood, UA Negative Negative    Protein, UA Negative Negative    Leuk Esterase, UA Negative Negative    Nitrite, UA Negative Negative    Urobilinogen, UA 0.2 E.U./dL 0.2 - 1.0 E.U./dL       Ordered the above labs and reviewed the results.        RADIOLOGY  CT Abdomen Pelvis With Contrast    Result Date: 7/26/2024  CT OF THE ABDOMEN AND PELVIS WITH CONTRAST 07/26/2024  HISTORY: Abdominal pain.  Axial images were obtained from the lung bases to the symphysis pubis after intravenous contrast.  Mildly prominent vessel is seen in the medial aspect of the right lower lobe such as on axial images 11 through 15 stable since the 7/21/2024 study.  The liver, spleen and pancreas appear unremarkable. Small amount of hyperdense bile or tiny gallstones are seen in the gallbladder also seen on the 07/21/2024 study. No gallbladder inflammatory changes are seen. Adrenal glands appear unremarkable. Left kidney is absent. Right renal cyst is seen. Aortobiiliac stent graft is seen. Stable native aorta seen measuring up to approximately 4.9 cm x 3.1 cm on axial image 48 similar to the 7/21/2024 study.  There is colonic diverticulosis. There is mild prostate gland enlargement. Urinary bladder is unremarkable.      1. Cholelithiasis and there may be a small amount of hyperdense bile in the gallbladder. 2. Aortobiiliac stent graft is unchanged from the 07/21/2024 study with dilatation of the abdominal aorta also stable. 3. Colonic diverticulosis. 4. No acute findings are seen to explain patient's reported abdominal pain.    Radiation dose reduction techniques were utilized, including automated exposure control and exposure modulation based on body size.        Ordered the above noted radiological studies.  CT scan independently interpreted by me and shows no evidence of  obstruction          PROCEDURES  Procedures          MEDICATIONS GIVEN IN ER  Medications   sodium chloride 0.9 % flush 10 mL (has no administration in time range)   amLODIPine (NORVASC) tablet 10 mg (10 mg Oral Given 7/26/24 1317)   HYDROmorphone (DILAUDID) injection 0.5 mg (0.5 mg Intravenous Given 7/26/24 1058)   ondansetron (ZOFRAN) injection 4 mg (4 mg Intravenous Given 7/26/24 1058)   iopamidol (ISOVUE-300) 61 % injection 100 mL (95 mL Intravenous Given 7/26/24 1141)   HYDROmorphone (DILAUDID) injection 0.5 mg (0.5 mg Intravenous Given 7/26/24 1342)                   MEDICAL DECISION MAKING, PROGRESS, and CONSULTS    All labs have been independently reviewed by me.  All radiology studies have been reviewed by me and I have also reviewed the radiology report.   EKG's independently viewed and interpreted by me.  Discussion below represents my analysis of pertinent findings related to patient's condition, differential diagnosis, treatment plan and final disposition.      Additional sources:  - Discussed/ obtained information from independent historians: None    - External (non-ED) record review: Epic reviewed patient seen by primary provider yesterday for ER follow-up of abdominal pain    - Chronic or social conditions impacting care: None    - Shared decision making: None      Orders placed during this visit:  Orders Placed This Encounter   Procedures    CT Abdomen Pelvis With Contrast    Comprehensive Metabolic Panel    Lipase    Urinalysis With Microscopic If Indicated (No Culture) - Urine, Clean Catch    CBC Auto Differential    [facility] (on-call MD unless specified) Details    Insert Peripheral IV    Initiate Observation Status    CBC & Differential         Additional orders considered but not ordered:  None        Differential diagnosis includes but is not limited to:    Cholecystitis, reflux, gastritis      Independent interpretation of labs, radiology studies, and discussions with consultants:  ED Course as of  07/26/24 1410   Fri Jul 26, 2024   1355 13:55 EDT  Patient presents again for abdominal pain of unclear etiology.  CT scan shows gallstones but no evidence of cholecystitis.  Patient's aneurysm shows no change.  Continuing to have abdominal pain.  Given 2 rounds of pain medication with continued pain.  Uncomfortable with discharge.  Patient has been discussed with Dr. Rivers who will admit for further eval. [SL]      ED Course User Index  [SL] Hudson Caballero MD                 DIAGNOSIS  Final diagnoses:   Intractable abdominal pain         DISPOSITION  admit            Latest Documented Vital Signs:  As of 14:10 EDT  BP- 155/94 HR- 89 Temp- 97.7 °F (36.5 °C) (Tympanic) O2 sat- 98%              --    Please note that portions of this were completed with a voice recognition program.       Note Disclaimer: At [facility], we believe that sharing information builds trust and better relationships. You are receiving this note because you are receiving care at [facility] or recently visited. It is possible you will see health information before a provider has talked with you about it. This kind of information can be easy to misunderstand. To help you fully understand what it means for your health, we urge you to discuss this note with your provider.            Hudson Caballero MD  07/26/24 7873

## 2024-07-27 LAB
ALBUMIN SERPL-MCNC: 3.7 G/DL (ref 3.5–5.2)
ALBUMIN/GLOB SERPL: 1.4 G/DL
ALP SERPL-CCNC: 89 U/L (ref 39–117)
ALT SERPL W P-5'-P-CCNC: 28 U/L (ref 1–41)
ANION GAP SERPL CALCULATED.3IONS-SCNC: 10.8 MMOL/L (ref 5–15)
AST SERPL-CCNC: 38 U/L (ref 1–40)
BASOPHILS # BLD AUTO: 0.02 10*3/MM3 (ref 0–0.2)
BASOPHILS NFR BLD AUTO: 0.3 % (ref 0–1.5)
BILIRUB SERPL-MCNC: 0.7 MG/DL (ref 0–1.2)
BUN SERPL-MCNC: 10 MG/DL (ref 8–23)
BUN/CREAT SERPL: 9.4 (ref 7–25)
CALCIUM SPEC-SCNC: 8.7 MG/DL (ref 8.6–10.5)
CHLORIDE SERPL-SCNC: 103 MMOL/L (ref 98–107)
CO2 SERPL-SCNC: 23.2 MMOL/L (ref 22–29)
CREAT SERPL-MCNC: 1.06 MG/DL (ref 0.76–1.27)
DEPRECATED RDW RBC AUTO: 46.5 FL (ref 37–54)
EGFRCR SERPLBLD CKD-EPI 2021: 75.5 ML/MIN/1.73
EOSINOPHIL # BLD AUTO: 0.22 10*3/MM3 (ref 0–0.4)
EOSINOPHIL NFR BLD AUTO: 3 % (ref 0.3–6.2)
ERYTHROCYTE [DISTWIDTH] IN BLOOD BY AUTOMATED COUNT: 13.9 % (ref 12.3–15.4)
GLOBULIN UR ELPH-MCNC: 2.6 GM/DL
GLUCOSE SERPL-MCNC: 88 MG/DL (ref 65–99)
HCT VFR BLD AUTO: 41.6 % (ref 37.5–51)
HGB BLD-MCNC: 14 G/DL (ref 13–17.7)
IMM GRANULOCYTES # BLD AUTO: 0.02 10*3/MM3 (ref 0–0.05)
IMM GRANULOCYTES NFR BLD AUTO: 0.3 % (ref 0–0.5)
LIPASE SERPL-CCNC: 24 U/L (ref 13–60)
LYMPHOCYTES # BLD AUTO: 1.72 10*3/MM3 (ref 0.7–3.1)
LYMPHOCYTES NFR BLD AUTO: 23.4 % (ref 19.6–45.3)
MCH RBC QN AUTO: 31 PG (ref 26.6–33)
MCHC RBC AUTO-ENTMCNC: 33.7 G/DL (ref 31.5–35.7)
MCV RBC AUTO: 92 FL (ref 79–97)
MONOCYTES # BLD AUTO: 0.67 10*3/MM3 (ref 0.1–0.9)
MONOCYTES NFR BLD AUTO: 9.1 % (ref 5–12)
NEUTROPHILS NFR BLD AUTO: 4.7 10*3/MM3 (ref 1.7–7)
NEUTROPHILS NFR BLD AUTO: 63.9 % (ref 42.7–76)
NRBC BLD AUTO-RTO: 0 /100 WBC (ref 0–0.2)
PLATELET # BLD AUTO: 172 10*3/MM3 (ref 140–450)
PMV BLD AUTO: 9.8 FL (ref 6–12)
POTASSIUM SERPL-SCNC: 4 MMOL/L (ref 3.5–5.2)
PROT SERPL-MCNC: 6.3 G/DL (ref 6–8.5)
RBC # BLD AUTO: 4.52 10*6/MM3 (ref 4.14–5.8)
SODIUM SERPL-SCNC: 137 MMOL/L (ref 136–145)
WBC NRBC COR # BLD AUTO: 7.35 10*3/MM3 (ref 3.4–10.8)

## 2024-07-27 PROCEDURE — 94799 UNLISTED PULMONARY SVC/PX: CPT

## 2024-07-27 PROCEDURE — G0378 HOSPITAL OBSERVATION PER HR: HCPCS

## 2024-07-27 PROCEDURE — 80053 COMPREHEN METABOLIC PANEL: CPT | Performed by: HOSPITALIST

## 2024-07-27 PROCEDURE — 96361 HYDRATE IV INFUSION ADD-ON: CPT

## 2024-07-27 PROCEDURE — 83690 ASSAY OF LIPASE: CPT | Performed by: HOSPITALIST

## 2024-07-27 PROCEDURE — 99214 OFFICE O/P EST MOD 30 MIN: CPT | Performed by: SURGERY

## 2024-07-27 PROCEDURE — 94761 N-INVAS EAR/PLS OXIMETRY MLT: CPT

## 2024-07-27 PROCEDURE — 94760 N-INVAS EAR/PLS OXIMETRY 1: CPT

## 2024-07-27 PROCEDURE — 94664 DEMO&/EVAL PT USE INHALER: CPT

## 2024-07-27 PROCEDURE — 25810000003 SODIUM CHLORIDE 0.9 % SOLUTION: Performed by: HOSPITALIST

## 2024-07-27 PROCEDURE — 85025 COMPLETE CBC W/AUTO DIFF WBC: CPT | Performed by: HOSPITALIST

## 2024-07-27 RX ORDER — POLYETHYLENE GLYCOL 3350 17 G/17G
17 POWDER, FOR SOLUTION ORAL 2 TIMES DAILY
Status: DISCONTINUED | OUTPATIENT
Start: 2024-07-27 | End: 2024-07-29 | Stop reason: HOSPADM

## 2024-07-27 RX ADMIN — PANTOPRAZOLE SODIUM 40 MG: 40 TABLET, DELAYED RELEASE ORAL at 17:24

## 2024-07-27 RX ADMIN — ASPIRIN 81 MG: 81 TABLET, COATED ORAL at 09:06

## 2024-07-27 RX ADMIN — BUDESONIDE AND FORMOTEROL FUMARATE DIHYDRATE 2 PUFF: 160; 4.5 AEROSOL RESPIRATORY (INHALATION) at 11:22

## 2024-07-27 RX ADMIN — TIOTROPIUM BROMIDE INHALATION SPRAY 2 PUFF: 3.12 SPRAY, METERED RESPIRATORY (INHALATION) at 11:18

## 2024-07-27 RX ADMIN — PRAVASTATIN SODIUM 40 MG: 40 TABLET ORAL at 09:05

## 2024-07-27 RX ADMIN — BUDESONIDE AND FORMOTEROL FUMARATE DIHYDRATE 2 PUFF: 160; 4.5 AEROSOL RESPIRATORY (INHALATION) at 20:15

## 2024-07-27 RX ADMIN — AMLODIPINE BESYLATE 10 MG: 10 TABLET ORAL at 09:06

## 2024-07-27 RX ADMIN — SODIUM CHLORIDE 100 ML/HR: 9 INJECTION, SOLUTION INTRAVENOUS at 05:11

## 2024-07-27 RX ADMIN — POLYETHYLENE GLYCOL 3350 17 G: 17 POWDER, FOR SOLUTION ORAL at 20:14

## 2024-07-27 RX ADMIN — PANTOPRAZOLE SODIUM 40 MG: 40 TABLET, DELAYED RELEASE ORAL at 06:52

## 2024-07-27 RX ADMIN — Medication 10 ML: at 09:06

## 2024-07-27 RX ADMIN — Medication 10 ML: at 20:38

## 2024-07-27 NOTE — PLAN OF CARE
Goal Outcome Evaluation:  Plan of Care Reviewed With: patient        Progress: improving  Outcome Evaluation: IVF continues. PRN pain med given as needed. Rested well. VSS.

## 2024-07-27 NOTE — CONSULTS
General Surgery Consultation    Consulting Physician: Danelle Brown MD      Reason for consultation: Abdominal pain    CC: Recurrent abdominal pain    HPI:   The patient is a very pleasant 69 y.o. male who presented to the hospital with a recurrent episode of severe upper abdominal pain that radiates inferiorly and towards his right side and back.  He reports the pain began Friday morning around 5 AM.  He does report eating at [employer] the night prior.  He reports similar pain although it radiated to the left side about a week ago and workup was negative.  He denies nausea, vomiting or diarrhea.  His history is significant for AAA repaired via [facility].  He does have a small endoleak per vascular surgery but nothing that is worrisome.         Past Medical History:    Past Medical History:   Diagnosis Date    Abdominal aortic aneurysm without rupture 06/11/2019    Anemia 11/20/2021    Atherosclerosis of native coronary artery of native heart without angina pectoris 03/10/2020    Cigarette nicotine dependence with nicotine-induced disorder     Colon polyp     Diverticulosis of large intestine without hemorrhage 08/09/2017    Emphysema lung 02/28/2017    Essential (primary) hypertension     H/O abdominal aortic aneurysm repair 06/11/2019    History of endovascuar abodominal aortic aneursym repair    Hiatal hernia with gastroesophageal reflux disease and esophagitis 03/10/2020    Hyperlipidemia 02/28/2017    Liver abscess 11/19/2021    Nicotine dependence, other tobacco product, uncomplicated 06/11/2019    Portal vein thrombosis 08/29/2017    Sepsis 08/02/2017    Shingles 09/2010    Skin cancer 05/31/2017    Thrombosis, 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    coag hickey by heme neg; completed 6 months of AC and maintained on ASA       Past Surgical History:  Abdominal surgical history significant for umbilical hernia repair and inguinal hernia repair  Past Surgical History:   Procedure Laterality Date    ARTERIOGRAM AORTIC N/A  05/08/2017    Procedure:  AORTIC STENT GRAFT REPAIR W/ GORE BILATERAL FEMORAL MINI CUTDOWNS;  Surgeon: Humble Hamilton MD;  Location: [facility] HYBRID OR 18/19;  Service:     CATARACT EXTRACTION Left 10/2023    CATARACT EXTRACTION Right 11/2023    COLONOSCOPY N/A 08/03/2017    Procedure: COLONOSCOPY to cecum;  Surgeon: Patrick Elizabeth MD;  Location: [facility] ENDOSCOPY;  Service:     COLONOSCOPY  09/2010    COLONOSCOPY N/A 01/31/2023    Procedure: COLONOSCOPY INTO CECUM WITH COLD SNARE POLYPECTOMIES;  Surgeon: Antolin Wolfe MD;  Location: [facility] ENDOSCOPY;  Service: Gastroenterology;  Laterality: N/A;  PRE; PERSONAL H/O COLON POLYPS; FAMILY H/O COLON CANCER  POST: DIVERTICULOSIS, POLYPS, HEMORRHIDS    ENDOSCOPY  08/03/2017    Procedure: ESOPHAGOGASTRODUODENOSCOPY;  Surgeon: Patrick Elizabeth MD;  Location: [facility] ENDOSCOPY;  Service:     HERNIA REPAIR  1980    KIDNEY SURGERY      Kidney Removal    UMBILICAL HERNIA REPAIR  1975       Medications:  Medications Prior to Admission   Medication Sig Dispense Refill Last Dose    albuterol sulfate HFA (Ventolin HFA) 108 (90 Base) MCG/ACT inhaler Inhale 2 puffs Every 4 (Four) Hours As Needed for Wheezing or Shortness of Air. 18 g 5 7/25/2024    amLODIPine (NORVASC) 10 MG tablet Take 1 tablet by mouth Daily. 90 tablet 1 7/26/2024    aspirin 81 MG EC tablet Take 1 tablet by mouth Daily.   7/25/2024    Fluticasone-Umeclidin-Vilant (Trelegy Ellipta) 200-62.5-25 MCG/ACT inhaler Inhale 1 puff Daily. 28 each 5 7/25/2024    pantoprazole (PROTONIX) 40 MG EC tablet Take 1 tablet by mouth Daily. 90 tablet 1 7/25/2024    pravastatin (PRAVACHOL) 40 MG tablet Take 1 tablet by mouth Daily. 90 tablet 1 7/25/2024    lidocaine (LIDODERM) 5 % Place 1 patch on the skin as directed by provider Daily. Remove & Discard patch within 12 hours or as directed by MD (Patient not taking: Reported on 7/25/2024) 6 each 0        Allergies: No Known Allergies    Social History:     Social History      Socioeconomic History    Marital status:      Spouse name: Samira    Number of children: 1    Years of education: High School   Tobacco Use    Smoking status: Every Day     Current packs/day: 1.00     Average packs/day: 1 pack/day for 42.0 years (42.0 ttl pk-yrs)     Types: Cigarettes    Smokeless tobacco: Never    Tobacco comments:     currently 1 ppd; Patient smokes 1 pack per day         Vaping Use    Vaping status: Former    Substances: Nicotine   Substance and Sexual Activity    Alcohol use: Not Currently     Comment: 6 days (1-2 days); Patient is a moderate drinker. He consumes 10 shots of liquor per week    Drug use: No     Comment: Drug Abuse: no drug abuse    Sexual activity: Not Currently     Partners: Female       Family History:     Family History   Problem Relation Age of Onset    Hypertension Mother     Hyperlipidemia Mother     Sudden death Mother 78    Aneurysm Mother     Heart disease Mother     Stroke Mother     Colon cancer Father 55    Heart attack Father 71    Coronary artery disease Father     No Known Problems Sister     Benign prostatic hyperplasia Brother     Hypertension Brother     Benign prostatic hyperplasia Brother     Brain cancer Maternal Grandmother     Heart disease Maternal Grandfather     Sudden death Maternal Grandfather     Heart disease Other     Cancer Other         no details    Prostate cancer Neg Hx     Dementia Neg Hx     Malig Hyperthermia Neg Hx        Review of Systems:  Constitutional: denies any weight changes, fatigue, or weakness  Eyes: denies blurred/double vision or scleral icterus  Cardiovascular: denies chest pain, palpitations, or edemas  Respiratory: denies cough, sputum, or dyspnea  Gastrointestinal: Reports abdominal pain, denies nausea or vomiting  Genitourinary: denies dysuria or hematuria  Endocrine: denies cold intolerance, lethargy, or flushing  Hematologic: denies excessive bruising or bleeding  Musculoskeletal: denies weakness, joint  swelling, joint pain, or stiffness  Neurologic: denies seizures, CVA, paresthesia, or peripheral neuropathy  Skin: denies change in nevi, rashes, masses, or jaundice     All other systems reviewed and were negative.    Physical Exam:   Vitals:    07/27/24 1338   BP: 131/84   Pulse: 82   Resp: 18   Temp: 97.5 °F (36.4 °C)   SpO2: 96%     Height: 66 inches  Weight: 77 kg  BMI: 27.4  GENERAL: awake and alert, no acute distress, oriented to person, place, and time  HEENT: normocephalic, atraumatic, no scleral icterus, moist mucous membranes  NECK: Supple, there is no thyromegaly or lymphadenopathy  RESPIRATORY: clear to auscultation, no wheezes, rales or rhonchi, symmetric air entry  CARDIOVASCULAR: regular rate and rhythm    GASTROINTESTINAL: Soft, nondistended, nontender to palpation  MUSCULOSKELETAL: no cyanosis, clubbing, or edema   NEUROLOGIC: alert and oriented, normal speech, cranial nerves 2-12 grossly intact, no focal deficits   SKIN: Moist, warm, no rashes, no jaundice      Diagnostic work-up:     Pertinent labs I personally reviewed:   Results from last 7 days   Lab Units 07/27/24  0632 07/26/24  1051 07/21/24  0647   WBC 10*3/mm3 7.35 7.58 7.11   HEMOGLOBIN g/dL 14.0 15.7 15.1   HEMATOCRIT % 41.6 45.9 44.4   PLATELETS 10*3/mm3 172 177 184     Results from last 7 days   Lab Units 07/27/24  0632 07/26/24  1051 07/21/24  0647   SODIUM mmol/L 137 138 140   POTASSIUM mmol/L 4.0 4.7 4.1   CHLORIDE mmol/L 103 104 105   CO2 mmol/L 23.2 22.4 20.4*   BUN mg/dL 10 12 15   CREATININE mg/dL 1.06 1.31* 1.19   CALCIUM mg/dL 8.7 9.3 8.8   BILIRUBIN mg/dL 0.7 0.5 0.4   ALK PHOS U/L 89 89 75   ALT (SGPT) U/L 28 13 12   AST (SGOT) U/L 38 22 15   GLUCOSE mg/dL 88 110* 88       Imaging:  I personally reviewed the gallbladder ultrasound and CT abdomen and pelvis.  Both show cholelithiasis without evidence of acute cholecystitis.  He does have diverticulosis and stool within the sigmoid colon on his CT scan.  I do not see any  inflammation to suggest diverticulitis.    Assessment and plan:   The patient is a 69 y.o. male with upper abdominal pain and cholelithiasis found on imaging.  I discussed the findings with the patient and explained that his symptoms could be related to gallstones, although his presentation is not the textbook symptoms of biliary colic.  I explained the typical treatment for symptomatic cholelithiasis, which is laparoscopic cholecystectomy.  Given that his pain has resolved today, he was not ready to commit to surgery.  I agree with obtaining HIDA scan tomorrow morning, and if he has biliary dyskinesia or signs of cholecystitis then I will discuss cholecystectomy with him again.  I also recommended increasing his bowel regimen to improve the stool burden within his sigmoid colon and decrease risk of diverticulitis.  Okay for diet today.  N.p.o. after midnight for HIDA scan.    Danelle Brown MD  General, Robotic, and Endoscopic Surgery  [facility]     [address], [address]  [place], [place], [address]  P: [phone]  F: [phone]

## 2024-07-27 NOTE — CONSULTS
Patient Name: Patrick Mckeon Account #: [id number]    MRN: [id number] Admission Date: 7/26/2024      Consulting Service: Vascular Surgery Date of Evaluation: July 26, 2024    Requesting Provider: AL Rivers MD    CHIEF COMPLAINT: Abdominal pain  HPI: Patrick Mckeon is a 69 y.o. male is being seen for a consultation and evaluation/management of recurrent abdominal pain in the face of repaired abdominal aortic aneurysm with stent graft.  Dr. Chaudhry fix this aneurysm which was 5.1 cm in 2017 and it is shrunk to 4.4 cm but is now reexpanded to 4.9 cm.  Remained stable over serial CTs in the last 2 weeks.  He is in for recurrent abdominal pain that does not appear to be related at all to the aneurysm.  He does have a small type II endoleak from posterior lumbar vessels based on my review of the CT.  This will be restudied in 3 months per her plan from his last consult.    PAST MEDICAL HISTORY:   Past Medical History:   Diagnosis Date    Abdominal aortic aneurysm without rupture 06/11/2019    Anemia 11/20/2021    Atherosclerosis of native coronary artery of native heart without angina pectoris 03/10/2020    Cigarette nicotine dependence with nicotine-induced disorder     Colon polyp     Diverticulosis of large intestine without hemorrhage 08/09/2017    Emphysema lung 02/28/2017    Essential (primary) hypertension     H/O abdominal aortic aneurysm repair 06/11/2019    History of endovascuar abodominal aortic aneursym repair    Hiatal hernia with gastroesophageal reflux disease and esophagitis 03/10/2020    Hyperlipidemia 02/28/2017    Liver abscess 11/19/2021    Nicotine dependence, other tobacco product, uncomplicated 06/11/2019    Portal vein thrombosis 08/29/2017    Sepsis 08/02/2017    Shingles 09/2010    Skin cancer 05/31/2017    Thrombosis, 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    coag hickey by heme neg; completed 6 months of AC and maintained on ASA      PAST SURGICAL HISTORY:   Past Surgical History:   Procedure Laterality  Date    ARTERIOGRAM AORTIC N/A 05/08/2017    Procedure:  AORTIC STENT GRAFT REPAIR W/ GORE BILATERAL FEMORAL MINI CUTDOWNS;  Surgeon: Humble Hamilton MD;  Location: [facility] HYBRID OR 18/19;  Service:     CATARACT EXTRACTION Left 10/2023    CATARACT EXTRACTION Right 11/2023    COLONOSCOPY N/A 08/03/2017    Procedure: COLONOSCOPY to cecum;  Surgeon: Patrick Elizabeth MD;  Location: [facility] ENDOSCOPY;  Service:     COLONOSCOPY  09/2010    COLONOSCOPY N/A 01/31/2023    Procedure: COLONOSCOPY INTO CECUM WITH COLD SNARE POLYPECTOMIES;  Surgeon: Antolin Wolfe MD;  Location: [facility] ENDOSCOPY;  Service: Gastroenterology;  Laterality: N/A;  PRE; PERSONAL H/O COLON POLYPS; FAMILY H/O COLON CANCER  POST: DIVERTICULOSIS, POLYPS, HEMORRHIDS    ENDOSCOPY  08/03/2017    Procedure: ESOPHAGOGASTRODUODENOSCOPY;  Surgeon: Patrick Elizabeth MD;  Location: [facility] ENDOSCOPY;  Service:     HERNIA REPAIR  1980    KIDNEY SURGERY      Kidney Removal    UMBILICAL HERNIA REPAIR  1975      FAMILY HISTORY:   Family History   Problem Relation Age of Onset    Hypertension Mother     Hyperlipidemia Mother     Sudden death Mother 78    Aneurysm Mother     Heart disease Mother     Stroke Mother     Colon cancer Father 55    Heart attack Father 71    Coronary artery disease Father     No Known Problems Sister     Benign prostatic hyperplasia Brother     Hypertension Brother     Benign prostatic hyperplasia Brother     Brain cancer Maternal Grandmother     Heart disease Maternal Grandfather     Sudden death Maternal Grandfather     Heart disease Other     Cancer Other         no details    Prostate cancer Neg Hx     Dementia Neg Hx     Malig Hyperthermia Neg Hx       SOCIAL HISTORY:   Social History     Tobacco Use    Smoking status: Every Day     Current packs/day: 1.00     Average packs/day: 1 pack/day for 42.0 years (42.0 ttl pk-yrs)     Types: Cigarettes    Smokeless tobacco: Never    Tobacco comments:     currently 1 ppd; Patient smokes 1  "pack per day         Vaping Use    Vaping status: Former    Substances: Nicotine   Substance Use Topics    Alcohol use: Not Currently     Comment: 6 days (1-2 days); Patient is a moderate drinker. He consumes 10 shots of liquor per week    Drug use: No     Comment: Drug Abuse: no drug abuse      MEDICATIONS:   No current facility-administered medications on file prior to encounter.     Current Outpatient Medications on File Prior to Encounter   Medication Sig Dispense Refill    albuterol sulfate HFA (Ventolin HFA) 108 (90 Base) MCG/ACT inhaler Inhale 2 puffs Every 4 (Four) Hours As Needed for Wheezing or Shortness of Air. 18 g 5    amLODIPine (NORVASC) 10 MG tablet Take 1 tablet by mouth Daily. 90 tablet 1    aspirin 81 MG EC tablet Take 1 tablet by mouth Daily.      Fluticasone-Umeclidin-Vilant (Trelegy Ellipta) 200-62.5-25 MCG/ACT inhaler Inhale 1 puff Daily. 28 each 5    pantoprazole (PROTONIX) 40 MG EC tablet Take 1 tablet by mouth Daily. 90 tablet 1    pravastatin (PRAVACHOL) 40 MG tablet Take 1 tablet by mouth Daily. 90 tablet 1    lidocaine (LIDODERM) 5 % Place 1 patch on the skin as directed by provider Daily. Remove & Discard patch within 12 hours or as directed by MD (Patient not taking: Reported on 7/25/2024) 6 each 0             ALLERGIES: Patient has no known allergies.   COMPLETE REVIEW OF SYSTEMS:     ENT ROS: negative  Cardiovascular ROS: no chest pain or dyspnea on exertion  Respiratory ROS: no cough, shortness of breath, or wheezing  Gastrointestinal ROS: positive for - abdominal pain  Neurological ROS: no TIA or stroke symptoms  Genito-Urinary ROS: no dysuria, trouble voiding, or hematuria  Musculoskeletal ROS: negative  Dermatological ROS: negative  Psychological ROS: negative      PHYSICAL EXAM:   Patient Vitals for the past 24 hrs:   BP Temp Temp src Pulse Resp SpO2 Height Weight   07/26/24 1855 120/88 98 °F (36.7 °C) Oral 91 18 96 % 167.6 cm (65.98\") 77.1 kg (170 lb)   07/26/24 1705 149/84 -- " -- 98 16 96 % -- --   07/26/24 1520 143/90 97.9 °F (36.6 °C) -- 94 18 96 % -- --   07/26/24 1436 132/96 -- -- 87 -- 98 % -- --   07/26/24 1400 (!) 156/101 97.6 °F (36.4 °C) Oral 84 18 94 % -- --   07/26/24 1312 155/94 -- -- 89 -- 98 % -- --   07/26/24 1231 156/99 -- -- 87 -- 95 % -- --   07/26/24 1153 155/96 -- -- 93 -- 99 % -- --   07/26/24 1102 153/95 -- -- 96 -- 100 % -- --   07/26/24 1040 156/85 -- -- 111 -- 96 % -- --   07/26/24 1034 -- 97.7 °F (36.5 °C) Tympanic 109 22 96 % -- --        General appearance: alert, well appearing, and in no distress.  Neurological exam reveals alert, oriented, normal speech, no focal findings or movement disorder noted.  ENT exam reveals - ENT exam normal, no neck nodes or sinus tenderness.  CVS exam: normal rate, regular rhythm, normal S1, S2, no murmurs, rubs, clicks or gallops.  Chest: clear to auscultation, no wheezes, rales or rhonchi, symmetric air entry.  Abdominal exam: tenderness noted diffusely no rebound or guarding.  Examination of the feet reveals warm, good capillary refill.        LABS:      Results Review:       I reviewed the patient's new clinical results.  Results from last 7 days   Lab Units 07/26/24  1051 07/21/24  0647   WBC 10*3/mm3 7.58 7.11   HEMOGLOBIN g/dL 15.7 15.1   PLATELETS 10*3/mm3 177 184     Results from last 7 days   Lab Units 07/26/24  1051 07/21/24  0647   SODIUM mmol/L 138 140   POTASSIUM mmol/L 4.7 4.1   CHLORIDE mmol/L 104 105   CO2 mmol/L 22.4 20.4*   BUN mg/dL 12 15   CREATININE mg/dL 1.31* 1.19   GLUCOSE mg/dL 110* 88   Estimated Creatinine Clearance: 51.3 mL/min (A) (by C-G formula based on SCr of 1.31 mg/dL (H)).  Results from last 7 days   Lab Units 07/26/24  1051 07/21/24  0647   CALCIUM mg/dL 9.3 8.8   ALBUMIN g/dL 4.2 4.1           The following radiologic or non-invasive studies have been reviewed by me: CT scan from a week ago reviewed and the CT scan from now reviewed with no real change.  Type II endoleak noted but otherwise  no stranding no evidence of infection no aunts evidence of actual aneurysm bleed or rupture.    Active Hospital Problems    Diagnosis  POA    **Intractable abdominal pain [R10.9]  Yes      Resolved Hospital Problems   No resolved problems to display.         ASSESSMENT/PLAN: 69 y.o. male with stable repaired abdominal aortic aneurysm with Almo stent graft from 2017.  Patient has a type II endoleak and very mild growth over that period of time.  It is still smaller than the baseline aneurysm and when it was fixed.  At this point in time it has not changed in the last week and will likely not change significantly for up to 3 months.  That is why his follow-up in 3 months is more important than the current scan.  We would recommend he keep that follow-up and he will do so.  He agrees that the pain he is having does not seem to be related to the aneurysm.  We will sign off and have him keep his appointment.  Please call for issues      I discussed the plan with the patient and his son and they are  agreeable to the plan of care at this point. Thank you for this consult.     Brando Cavazos MD   07/26/24

## 2024-07-27 NOTE — PROGRESS NOTES
Name: Patrick Mckeon ADMIT: 2024   : 1955  PCP: Lester Carter MD    MRN: [id number] LOS: 0 days   AGE/SEX: 69 y.o. male  ROOM: [facility]     Subjective   Subjective   No acute events overnight.  Patient states that he is feeling much better this morning.  Abdominal pain has resolved.  No chest pain or shortness of breath.  Family at bedside.    Objective   Objective     Vital Signs  Temp:  [97.6 °F (36.4 °C)-98.2 °F (36.8 °C)] 97.9 °F (36.6 °C)  Heart Rate:  [78-98] 78  Resp:  [16-20] 20  BP: (120-156)/() 132/79  SpO2:  [94 %-98 %] 94 %  on   ;   Device (Oxygen Therapy): room air  Body mass index is 27.45 kg/m².    Physical Exam  General: Alert, no acute distress.  Sitting in chair at bedside.  Very pleasant and conversive.  ENT: No conjunctival injection or scleral icterus. Moist mucous membranes.  Neuro: Eyes open and moving in all directions, strength normal in all extremities, no focal deficits.   Lungs: Clear to auscultation bilaterally. No wheeze or crackles. No distress.   Heart: RRR, no murmurs. No edema.  Abdomen: Soft, non-distended. Normal bowel sounds.  Mild tenderness to palpation, mostly in epigastric region.  No rebound or guarding.  Ext: Warm and well-perfused. No edema.   Skin: No rashes or lesions. IV site without swelling or erythema.     Results Review     I reviewed the patient's new clinical results:  Results from last 7 days   Lab Units 24  0632 24  1051 24  0647   WBC 10*3/mm3 7.35 7.58 7.11   HEMOGLOBIN g/dL 14.0 15.7 15.1   PLATELETS 10*3/mm3 172 177 184     Results from last 7 days   Lab Units 24  0632 24  1051 24  0647   SODIUM mmol/L 137 138 140   POTASSIUM mmol/L 4.0 4.7 4.1   CHLORIDE mmol/L 103 104 105   CO2 mmol/L 23.2 22.4 20.4*   BUN mg/dL 10 12 15   CREATININE mg/dL 1.06 1.31* 1.19   GLUCOSE mg/dL 88 110* 88   EGFR mL/min/1.73 75.5 58.6* 65.7     Results from last 7 days   Lab Units 24  0632 24  1053  "07/21/24  0647   ALBUMIN g/dL 3.7 4.2 4.1   BILIRUBIN mg/dL 0.7 0.5 0.4   ALK PHOS U/L 89 89 75   AST (SGOT) U/L 38 22 15   ALT (SGPT) U/L 28 13 12     Results from last 7 days   Lab Units 07/27/24  0632 07/26/24  1051 07/21/24  0647   CALCIUM mg/dL 8.7 9.3 8.8   ALBUMIN g/dL 3.7 4.2 4.1       No results found for: \"HGBA1C\", \"POCGLU\"    CT Abdomen Pelvis With Contrast    Result Date: 7/26/2024  1. Cholelithiasis and there may be a small amount of hyperdense bile in the gallbladder. 2. Aortobiiliac stent graft is unchanged from the 07/21/2024 study with dilatation of the abdominal aorta also stable. 3. Colonic diverticulosis. 4. No acute findings are seen to explain patient's reported abdominal pain. 5. Absent left kidney.    Radiation dose reduction techniques were utilized, including automated exposure control and exposure modulation based on body size.   This report was finalized on 7/26/2024 2:10 PM by Dr. Jai Manzo M.D on Workstation: [id number]       I have personally reviewed all medications:  Scheduled Medications  amLODIPine, 10 mg, Oral, Daily  aspirin, 81 mg, Oral, Daily  budesonide-formoterol, 2 puff, Inhalation, BID - RT   And  tiotropium bromide monohydrate, 2 puff, Inhalation, Daily - RT  pantoprazole, 40 mg, Oral, BID AC  pravastatin, 40 mg, Oral, Daily  sodium chloride, 10 mL, Intravenous, Q12H    Infusions  sodium chloride, 100 mL/hr, Last Rate: 100 mL/hr (07/27/24 1236)    Diet  Diet: Liquid; Full Liquid; Fluid Consistency: Thin (IDDSI 0)    No intake or output data in the 24 hours ending 07/27/24 1357    Assessment/Plan     Active Hospital Problems    Diagnosis  POA    **Intractable abdominal pain [R10.9]  Yes      Resolved Hospital Problems   No resolved problems to display.       69 y.o. male with Intractable abdominal pain.    Abdominal pain  -Second episode of pain presenting like this, has had a pretty extensive workup  -CT abdomen/pelvis yesterday revealing cholelithiasis and " hyperdense bile in the gallbladder  -Gallbladder ultrasound earlier this week revealed cholelithiasis, otherwise unremarkable  -History seems consistent with gallbladder dysfunction, especially with the episodic nature of symptoms.  Patient does have cholelithiasis as above.  -Transaminases are normal, lipase normal  -Check HIDA scan  -Consult general surgery  -Pending results of HIDA scan, could also consider GI consult for possible EGD as patient does endorse epigastric pain.  Surgery may also consider EGD.  Appreciate their input.    Aortic stent repair  -Scans from earlier this week showed increased size of abdominal aortic aneurysm sac  -Vascular surgery evaluated, no interventions  -Follow-up with vascular surgery as previously planned    Hypertension  -Blood pressure acceptable at this time  -Continue current meds, titrate as needed based on trends    CKD stage III  -Associated with congenitally absent left kidney  -Creatinine consistent with baseline  -Avoid nephrotoxic agents including NSAIDs and contrast dyes  -Repeat BMP with morning labs    COPD  -Patient did cough in the room, but no wheezing on exam.    -No evidence of COPD exacerbation.  -Monitor clinically for now    SCDs for DVT prophylaxis.  Full code.  Discussed with patient, family, and nursing staff.  Anticipate discharge home timing yet to be determined.      Elizabeth Cavanaugh MD  [facility]  07/27/24  13:57 EDT

## 2024-07-28 ENCOUNTER — APPOINTMENT (OUTPATIENT)
Dept: GENERAL RADIOLOGY | Facility: HOSPITAL | Age: 69
End: 2024-07-28
Payer: MEDICARE

## 2024-07-28 ENCOUNTER — APPOINTMENT (OUTPATIENT)
Dept: NUCLEAR MEDICINE | Facility: HOSPITAL | Age: 69
End: 2024-07-28
Payer: MEDICARE

## 2024-07-28 ENCOUNTER — ANESTHESIA (OUTPATIENT)
Dept: PERIOP | Facility: HOSPITAL | Age: 69
End: 2024-07-28
Payer: MEDICARE

## 2024-07-28 ENCOUNTER — ANESTHESIA EVENT (OUTPATIENT)
Dept: PERIOP | Facility: HOSPITAL | Age: 69
End: 2024-07-28
Payer: MEDICARE

## 2024-07-28 PROBLEM — K81.0 ACUTE CHOLECYSTITIS: Status: ACTIVE | Noted: 2024-07-26

## 2024-07-28 PROCEDURE — 25010000002 HYDROMORPHONE PER 4 MG: Performed by: STUDENT IN AN ORGANIZED HEALTH CARE EDUCATION/TRAINING PROGRAM

## 2024-07-28 PROCEDURE — 25810000003 SODIUM CHLORIDE 0.9 % SOLUTION: Performed by: HOSPITALIST

## 2024-07-28 PROCEDURE — 96375 TX/PRO/DX INJ NEW DRUG ADDON: CPT

## 2024-07-28 PROCEDURE — 25810000003 LACTATED RINGERS PER 1000 ML: Performed by: STUDENT IN AN ORGANIZED HEALTH CARE EDUCATION/TRAINING PROGRAM

## 2024-07-28 PROCEDURE — 25010000002 MIDAZOLAM PER 1 MG: Performed by: STUDENT IN AN ORGANIZED HEALTH CARE EDUCATION/TRAINING PROGRAM

## 2024-07-28 PROCEDURE — 94761 N-INVAS EAR/PLS OXIMETRY MLT: CPT

## 2024-07-28 PROCEDURE — 25010000002 FENTANYL CITRATE (PF) 50 MCG/ML SOLUTION: Performed by: STUDENT IN AN ORGANIZED HEALTH CARE EDUCATION/TRAINING PROGRAM

## 2024-07-28 PROCEDURE — 74300 X-RAY BILE DUCTS/PANCREAS: CPT

## 2024-07-28 PROCEDURE — 94799 UNLISTED PULMONARY SVC/PX: CPT

## 2024-07-28 PROCEDURE — G0378 HOSPITAL OBSERVATION PER HR: HCPCS

## 2024-07-28 PROCEDURE — 25810000003 SODIUM CHLORIDE PER 500 ML: Performed by: SURGERY

## 2024-07-28 PROCEDURE — 47563 LAPARO CHOLECYSTECTOMY/GRAPH: CPT | Performed by: SURGERY

## 2024-07-28 PROCEDURE — 94664 DEMO&/EVAL PT USE INHALER: CPT

## 2024-07-28 PROCEDURE — 25010000002 CEFAZOLIN PER 500 MG: Performed by: SURGERY

## 2024-07-28 PROCEDURE — 25010000002 PROPOFOL 200 MG/20ML EMULSION: Performed by: STUDENT IN AN ORGANIZED HEALTH CARE EDUCATION/TRAINING PROGRAM

## 2024-07-28 PROCEDURE — 25010000002 ONDANSETRON PER 1 MG: Performed by: STUDENT IN AN ORGANIZED HEALTH CARE EDUCATION/TRAINING PROGRAM

## 2024-07-28 PROCEDURE — 78227 HEPATOBIL SYST IMAGE W/DRUG: CPT

## 2024-07-28 PROCEDURE — 99215 OFFICE O/P EST HI 40 MIN: CPT | Performed by: SURGERY

## 2024-07-28 PROCEDURE — 47563 LAPARO CHOLECYSTECTOMY/GRAPH: CPT | Performed by: SPECIALIST/TECHNOLOGIST, OTHER

## 2024-07-28 PROCEDURE — 25810000003 SODIUM CHLORIDE 0.9 % SOLUTION: Performed by: SURGERY

## 2024-07-28 PROCEDURE — 25010000002 MORPHINE PER 10 MG: Performed by: RADIOLOGY

## 2024-07-28 PROCEDURE — C1758 CATHETER, URETERAL: HCPCS | Performed by: SURGERY

## 2024-07-28 PROCEDURE — 25010000002 INDOCYANINE GREEN 25 MG RECONSTITUTED SOLUTION: Performed by: SURGERY

## 2024-07-28 PROCEDURE — 88304 TISSUE EXAM BY PATHOLOGIST: CPT | Performed by: SURGERY

## 2024-07-28 PROCEDURE — 25010000002 DEXAMETHASONE SODIUM PHOSPHATE 20 MG/5ML SOLUTION: Performed by: STUDENT IN AN ORGANIZED HEALTH CARE EDUCATION/TRAINING PROGRAM

## 2024-07-28 PROCEDURE — 25010000002 SUGAMMADEX 200 MG/2ML SOLUTION: Performed by: STUDENT IN AN ORGANIZED HEALTH CARE EDUCATION/TRAINING PROGRAM

## 2024-07-28 PROCEDURE — 0 TECHNETIUM TC 99M MEBROFENIN KIT: Performed by: HOSPITALIST

## 2024-07-28 PROCEDURE — A9537 TC99M MEBROFENIN: HCPCS | Performed by: HOSPITALIST

## 2024-07-28 PROCEDURE — 25810000003 LACTATED RINGERS PER 1000 ML: Performed by: SURGERY

## 2024-07-28 PROCEDURE — 25510000001 IOPAMIDOL 61 % SOLUTION: Performed by: SURGERY

## 2024-07-28 DEVICE — MEDIUM-LARGE LIGATION CLIPS 6 CLIPS/CART
Type: IMPLANTABLE DEVICE | Site: ABDOMEN | Status: FUNCTIONAL
Brand: VAS-Q-CLIP

## 2024-07-28 RX ORDER — LABETALOL HYDROCHLORIDE 5 MG/ML
5 INJECTION, SOLUTION INTRAVENOUS
Status: DISCONTINUED | OUTPATIENT
Start: 2024-07-28 | End: 2024-07-28 | Stop reason: HOSPADM

## 2024-07-28 RX ORDER — INDOCYANINE GREEN AND WATER 25 MG
5 KIT INJECTION ONCE
Status: DISCONTINUED | OUTPATIENT
Start: 2024-07-28 | End: 2024-07-28

## 2024-07-28 RX ORDER — FAMOTIDINE 10 MG/ML
20 INJECTION, SOLUTION INTRAVENOUS ONCE
Status: COMPLETED | OUTPATIENT
Start: 2024-07-28 | End: 2024-07-28

## 2024-07-28 RX ORDER — PROPOFOL 10 MG/ML
INJECTION, EMULSION INTRAVENOUS AS NEEDED
Status: DISCONTINUED | OUTPATIENT
Start: 2024-07-28 | End: 2024-07-28 | Stop reason: SURG

## 2024-07-28 RX ORDER — FENTANYL CITRATE 50 UG/ML
50 INJECTION, SOLUTION INTRAMUSCULAR; INTRAVENOUS ONCE AS NEEDED
Status: DISCONTINUED | OUTPATIENT
Start: 2024-07-28 | End: 2024-07-28 | Stop reason: HOSPADM

## 2024-07-28 RX ORDER — LIDOCAINE HYDROCHLORIDE 20 MG/ML
INJECTION, SOLUTION INFILTRATION; PERINEURAL AS NEEDED
Status: DISCONTINUED | OUTPATIENT
Start: 2024-07-28 | End: 2024-07-28 | Stop reason: SURG

## 2024-07-28 RX ORDER — BUPIVACAINE HYDROCHLORIDE AND EPINEPHRINE 5; 5 MG/ML; UG/ML
INJECTION, SOLUTION EPIDURAL; INTRACAUDAL; PERINEURAL AS NEEDED
Status: DISCONTINUED | OUTPATIENT
Start: 2024-07-28 | End: 2024-07-28 | Stop reason: HOSPADM

## 2024-07-28 RX ORDER — DROPERIDOL 2.5 MG/ML
0.62 INJECTION, SOLUTION INTRAMUSCULAR; INTRAVENOUS
Status: DISCONTINUED | OUTPATIENT
Start: 2024-07-28 | End: 2024-07-28 | Stop reason: HOSPADM

## 2024-07-28 RX ORDER — HYDRALAZINE HYDROCHLORIDE 20 MG/ML
5 INJECTION INTRAMUSCULAR; INTRAVENOUS
Status: DISCONTINUED | OUTPATIENT
Start: 2024-07-28 | End: 2024-07-28 | Stop reason: HOSPADM

## 2024-07-28 RX ORDER — FENTANYL CITRATE 50 UG/ML
50 INJECTION, SOLUTION INTRAMUSCULAR; INTRAVENOUS
Status: DISCONTINUED | OUTPATIENT
Start: 2024-07-28 | End: 2024-07-28 | Stop reason: HOSPADM

## 2024-07-28 RX ORDER — ROCURONIUM BROMIDE 10 MG/ML
INJECTION, SOLUTION INTRAVENOUS AS NEEDED
Status: DISCONTINUED | OUTPATIENT
Start: 2024-07-28 | End: 2024-07-28 | Stop reason: SURG

## 2024-07-28 RX ORDER — HYDROMORPHONE HYDROCHLORIDE 1 MG/ML
0.5 INJECTION, SOLUTION INTRAMUSCULAR; INTRAVENOUS; SUBCUTANEOUS
Status: DISCONTINUED | OUTPATIENT
Start: 2024-07-28 | End: 2024-07-28 | Stop reason: HOSPADM

## 2024-07-28 RX ORDER — SODIUM CHLORIDE 0.9 % (FLUSH) 0.9 %
3 SYRINGE (ML) INJECTION EVERY 12 HOURS SCHEDULED
Status: DISCONTINUED | OUTPATIENT
Start: 2024-07-28 | End: 2024-07-28 | Stop reason: HOSPADM

## 2024-07-28 RX ORDER — NALOXONE HCL 0.4 MG/ML
0.2 VIAL (ML) INJECTION AS NEEDED
Status: DISCONTINUED | OUTPATIENT
Start: 2024-07-28 | End: 2024-07-28 | Stop reason: HOSPADM

## 2024-07-28 RX ORDER — HYDROCODONE BITARTRATE AND ACETAMINOPHEN 5; 325 MG/1; MG/1
1 TABLET ORAL ONCE AS NEEDED
Status: DISCONTINUED | OUTPATIENT
Start: 2024-07-28 | End: 2024-07-28 | Stop reason: HOSPADM

## 2024-07-28 RX ORDER — ONDANSETRON 2 MG/ML
INJECTION INTRAMUSCULAR; INTRAVENOUS AS NEEDED
Status: DISCONTINUED | OUTPATIENT
Start: 2024-07-28 | End: 2024-07-28 | Stop reason: SURG

## 2024-07-28 RX ORDER — SODIUM CHLORIDE 9 MG/ML
INJECTION, SOLUTION INTRAVENOUS AS NEEDED
Status: DISCONTINUED | OUTPATIENT
Start: 2024-07-28 | End: 2024-07-28 | Stop reason: HOSPADM

## 2024-07-28 RX ORDER — INDOCYANINE GREEN AND WATER 25 MG
5 KIT INJECTION ONCE
Status: COMPLETED | OUTPATIENT
Start: 2024-07-28 | End: 2024-07-28

## 2024-07-28 RX ORDER — KIT FOR THE PREPARATION OF TECHNETIUM TC 99M MEBROFENIN 45 MG/10ML
1 INJECTION, POWDER, LYOPHILIZED, FOR SOLUTION INTRAVENOUS
Status: COMPLETED | OUTPATIENT
Start: 2024-07-28 | End: 2024-07-28

## 2024-07-28 RX ORDER — EPHEDRINE SULFATE 50 MG/ML
5 INJECTION, SOLUTION INTRAVENOUS ONCE AS NEEDED
Status: DISCONTINUED | OUTPATIENT
Start: 2024-07-28 | End: 2024-07-28 | Stop reason: HOSPADM

## 2024-07-28 RX ORDER — PROMETHAZINE HYDROCHLORIDE 25 MG/1
25 TABLET ORAL ONCE AS NEEDED
Status: DISCONTINUED | OUTPATIENT
Start: 2024-07-28 | End: 2024-07-28 | Stop reason: HOSPADM

## 2024-07-28 RX ORDER — FENTANYL CITRATE 50 UG/ML
INJECTION, SOLUTION INTRAMUSCULAR; INTRAVENOUS AS NEEDED
Status: DISCONTINUED | OUTPATIENT
Start: 2024-07-28 | End: 2024-07-28 | Stop reason: SURG

## 2024-07-28 RX ORDER — MIDAZOLAM HYDROCHLORIDE 1 MG/ML
0.5 INJECTION INTRAMUSCULAR; INTRAVENOUS
Status: DISCONTINUED | OUTPATIENT
Start: 2024-07-28 | End: 2024-07-28 | Stop reason: HOSPADM

## 2024-07-28 RX ORDER — OXYCODONE AND ACETAMINOPHEN 7.5; 325 MG/1; MG/1
1 TABLET ORAL EVERY 4 HOURS PRN
Status: DISCONTINUED | OUTPATIENT
Start: 2024-07-28 | End: 2024-07-28 | Stop reason: HOSPADM

## 2024-07-28 RX ORDER — ONDANSETRON 2 MG/ML
4 INJECTION INTRAMUSCULAR; INTRAVENOUS ONCE AS NEEDED
Status: COMPLETED | OUTPATIENT
Start: 2024-07-28 | End: 2024-07-28

## 2024-07-28 RX ORDER — LIDOCAINE HYDROCHLORIDE 10 MG/ML
0.5 INJECTION, SOLUTION INFILTRATION; PERINEURAL ONCE AS NEEDED
Status: DISCONTINUED | OUTPATIENT
Start: 2024-07-28 | End: 2024-07-28 | Stop reason: HOSPADM

## 2024-07-28 RX ORDER — DEXAMETHASONE SODIUM PHOSPHATE 4 MG/ML
INJECTION, SOLUTION INTRA-ARTICULAR; INTRALESIONAL; INTRAMUSCULAR; INTRAVENOUS; SOFT TISSUE AS NEEDED
Status: DISCONTINUED | OUTPATIENT
Start: 2024-07-28 | End: 2024-07-28 | Stop reason: SURG

## 2024-07-28 RX ORDER — SODIUM CHLORIDE, SODIUM LACTATE, POTASSIUM CHLORIDE, CALCIUM CHLORIDE 600; 310; 30; 20 MG/100ML; MG/100ML; MG/100ML; MG/100ML
9 INJECTION, SOLUTION INTRAVENOUS CONTINUOUS
Status: DISCONTINUED | OUTPATIENT
Start: 2024-07-28 | End: 2024-07-29 | Stop reason: HOSPADM

## 2024-07-28 RX ORDER — MORPHINE SULFATE 10 MG/ML
3.1 INJECTION INTRAMUSCULAR; INTRAVENOUS; SUBCUTANEOUS
Status: COMPLETED | OUTPATIENT
Start: 2024-07-28 | End: 2024-07-28

## 2024-07-28 RX ORDER — PROMETHAZINE HYDROCHLORIDE 25 MG/1
25 SUPPOSITORY RECTAL ONCE AS NEEDED
Status: DISCONTINUED | OUTPATIENT
Start: 2024-07-28 | End: 2024-07-28 | Stop reason: HOSPADM

## 2024-07-28 RX ORDER — DIPHENHYDRAMINE HYDROCHLORIDE 50 MG/ML
12.5 INJECTION INTRAMUSCULAR; INTRAVENOUS
Status: DISCONTINUED | OUTPATIENT
Start: 2024-07-28 | End: 2024-07-28 | Stop reason: HOSPADM

## 2024-07-28 RX ORDER — FLUMAZENIL 0.1 MG/ML
0.2 INJECTION INTRAVENOUS AS NEEDED
Status: DISCONTINUED | OUTPATIENT
Start: 2024-07-28 | End: 2024-07-28 | Stop reason: HOSPADM

## 2024-07-28 RX ORDER — IPRATROPIUM BROMIDE AND ALBUTEROL SULFATE 2.5; .5 MG/3ML; MG/3ML
3 SOLUTION RESPIRATORY (INHALATION) ONCE AS NEEDED
Status: DISCONTINUED | OUTPATIENT
Start: 2024-07-28 | End: 2024-07-28 | Stop reason: HOSPADM

## 2024-07-28 RX ORDER — SODIUM CHLORIDE 0.9 % (FLUSH) 0.9 %
3-10 SYRINGE (ML) INJECTION AS NEEDED
Status: DISCONTINUED | OUTPATIENT
Start: 2024-07-28 | End: 2024-07-28 | Stop reason: HOSPADM

## 2024-07-28 RX ADMIN — INDOCYANINE GREEN AND WATER 5 MG: KIT at 12:47

## 2024-07-28 RX ADMIN — ONDANSETRON 4 MG: 2 INJECTION INTRAMUSCULAR; INTRAVENOUS at 16:12

## 2024-07-28 RX ADMIN — ONDANSETRON 4 MG: 2 INJECTION INTRAMUSCULAR; INTRAVENOUS at 13:29

## 2024-07-28 RX ADMIN — ROCURONIUM BROMIDE 50 MG: 10 INJECTION, SOLUTION INTRAVENOUS at 13:29

## 2024-07-28 RX ADMIN — FENTANYL CITRATE 50 MCG: 50 INJECTION, SOLUTION INTRAMUSCULAR; INTRAVENOUS at 14:13

## 2024-07-28 RX ADMIN — HYDROCODONE BITARTRATE AND ACETAMINOPHEN 1 TABLET: 5; 325 TABLET ORAL at 18:34

## 2024-07-28 RX ADMIN — FENTANYL CITRATE 50 MCG: 50 INJECTION, SOLUTION INTRAMUSCULAR; INTRAVENOUS at 13:47

## 2024-07-28 RX ADMIN — SODIUM CHLORIDE 100 ML/HR: 9 INJECTION, SOLUTION INTRAVENOUS at 02:08

## 2024-07-28 RX ADMIN — POLYETHYLENE GLYCOL 3350 17 G: 17 POWDER, FOR SOLUTION ORAL at 10:58

## 2024-07-28 RX ADMIN — POLYETHYLENE GLYCOL 3350 17 G: 17 POWDER, FOR SOLUTION ORAL at 21:47

## 2024-07-28 RX ADMIN — MIDAZOLAM 0.5 MG: 1 INJECTION INTRAMUSCULAR; INTRAVENOUS at 12:47

## 2024-07-28 RX ADMIN — DEXAMETHASONE SODIUM PHOSPHATE 8 MG: 4 INJECTION, SOLUTION INTRAMUSCULAR; INTRAVENOUS at 13:29

## 2024-07-28 RX ADMIN — SODIUM CHLORIDE, POTASSIUM CHLORIDE, SODIUM LACTATE AND CALCIUM CHLORIDE 9 ML/HR: 600; 310; 30; 20 INJECTION, SOLUTION INTRAVENOUS at 12:47

## 2024-07-28 RX ADMIN — Medication 10 ML: at 21:47

## 2024-07-28 RX ADMIN — SUGAMMADEX 200 MG: 100 INJECTION, SOLUTION INTRAVENOUS at 15:13

## 2024-07-28 RX ADMIN — Medication 10 ML: at 10:59

## 2024-07-28 RX ADMIN — SODIUM CHLORIDE 2000 MG: 900 INJECTION INTRAVENOUS at 13:18

## 2024-07-28 RX ADMIN — FAMOTIDINE 20 MG: 10 INJECTION INTRAVENOUS at 12:47

## 2024-07-28 RX ADMIN — MORPHINE SULFATE 3.1 MG: 10 INJECTION INTRAVENOUS at 09:57

## 2024-07-28 RX ADMIN — ASPIRIN 81 MG: 81 TABLET, COATED ORAL at 10:58

## 2024-07-28 RX ADMIN — PANTOPRAZOLE SODIUM 40 MG: 40 TABLET, DELAYED RELEASE ORAL at 10:58

## 2024-07-28 RX ADMIN — PROPOFOL 140 MG: 10 INJECTION, EMULSION INTRAVENOUS at 13:29

## 2024-07-28 RX ADMIN — BUDESONIDE AND FORMOTEROL FUMARATE DIHYDRATE 2 PUFF: 160; 4.5 AEROSOL RESPIRATORY (INHALATION) at 08:12

## 2024-07-28 RX ADMIN — SODIUM CHLORIDE 100 ML/HR: 9 INJECTION, SOLUTION INTRAVENOUS at 18:34

## 2024-07-28 RX ADMIN — TIOTROPIUM BROMIDE INHALATION SPRAY 2 PUFF: 3.12 SPRAY, METERED RESPIRATORY (INHALATION) at 08:13

## 2024-07-28 RX ADMIN — PRAVASTATIN SODIUM 40 MG: 40 TABLET ORAL at 10:59

## 2024-07-28 RX ADMIN — MEBROFENIN 1 DOSE: 45 INJECTION, POWDER, LYOPHILIZED, FOR SOLUTION INTRAVENOUS at 08:30

## 2024-07-28 RX ADMIN — LIDOCAINE HYDROCHLORIDE 60 MG: 20 INJECTION, SOLUTION INFILTRATION; PERINEURAL at 13:29

## 2024-07-28 RX ADMIN — AMLODIPINE BESYLATE 10 MG: 10 TABLET ORAL at 10:58

## 2024-07-28 RX ADMIN — HYDROMORPHONE HYDROCHLORIDE 0.5 MG: 1 INJECTION, SOLUTION INTRAMUSCULAR; INTRAVENOUS; SUBCUTANEOUS at 16:14

## 2024-07-28 RX ADMIN — BUDESONIDE AND FORMOTEROL FUMARATE DIHYDRATE 2 PUFF: 160; 4.5 AEROSOL RESPIRATORY (INHALATION) at 19:51

## 2024-07-28 NOTE — ANESTHESIA PROCEDURE NOTES
Airway  Urgency: elective    Date/Time: 7/28/2024 1:33 PM  Airway not difficult    General Information and Staff    Patient location during procedure: OR  Anesthesiologist: Estiven Hatch MD    Indications and Patient Condition  Indications for airway management: airway protection    Preoxygenated: yes  MILS maintained throughout  Mask difficulty assessment: 2 - vent by mask + OA or adjuvant +/- NMBA    Final Airway Details  Final airway type: endotracheal airway      Successful airway: ETT  Cuffed: yes   Successful intubation technique: direct laryngoscopy  Endotracheal tube insertion site: oral  Blade: Ce  Blade size: 4  ETT size (mm): 7.5  Cormack-Lehane Classification: grade I - full view of glottis  Placement verified by: chest auscultation and capnometry   Measured from: teeth  ETT/EBT  to teeth (cm): 22  Number of attempts at approach: 1  Assessment: lips, teeth, and gum same as pre-op and atraumatic intubation

## 2024-07-28 NOTE — PLAN OF CARE
Goal Outcome Evaluation:      Patient A&Ox4. Lap-tommy today. 4 lap sites. PRN norco given once for pain. NS infusing at 100 per order. VSS.

## 2024-07-28 NOTE — ANESTHESIA PREPROCEDURE EVALUATION
Anesthesia Evaluation     Patient summary reviewed and Nursing notes reviewed                Airway   Mallampati: III  TM distance: >3 FB  Neck ROM: full  Comment: Blade: Ce  Blade size: #4  ETT size: 7.5 mm  Cormack-Lehane Classification: grade I - full view of glottis  Placement verified by: chest auscultation and capnometry   Measured from: lips  ETT to lips (cm): 22    Dental - normal exam     Pulmonary    (+) COPD,  Cardiovascular     ECG reviewed    (+) hypertension, PVD (AAA s/p EVAR with small endoleak), hyperlipidemia    ROS comment: · Calculated left ventricular EF = 58.9% Estimated left ventricular EF = 59% Estimated left ventricular EF was in agreement with the calculated left ventricular EF. Left ventricular systolic function is normal. Normal left ventricular cavity size and wall thickness noted. All left ventricular wall segments contract normally. Left ventricular diastolic function was normal.  · No aortic valve regurgitation or stenosis is present. The aortic valve is abnormal in structure. There is moderate calcification of the aortic valve.  · There is evidence of ascites present.      Neuro/Psych  (+) psychiatric history  GI/Hepatic/Renal/Endo    (+) hiatal hernia, GERD, renal disease- CRI    Musculoskeletal     Abdominal    Substance History      OB/GYN          Other                          Anesthesia Plan    ASA 3     general     (I have reviewed the patient's history with the patient and the chart, including all pertinent laboratory results and imaging. I have explained the risks of anesthesia including but not limited to dental damage, corneal abrasion, nerve injury, MI, stroke, and death. Questions asked and answered. Anesthetic plan discussed with patient and team as indicated. Patient expressed understanding of the above.  )  intravenous induction     Anesthetic plan, risks, benefits, and alternatives have been provided, discussed and informed consent has been obtained with:  patient.        CODE STATUS:    Code Status (Patient has no pulse and is not breathing): CPR (Attempt to Resuscitate)  Medical Interventions (Patient has pulse or is breathing): Full Support

## 2024-07-28 NOTE — PROGRESS NOTES
"General Surgery Progress Note        S: Patient reports feeling well without significant abdominal pain.  HIDA scan completed this morning did not show uptake into the gallbladder consistent with occlusion of cystic duct and concerning for acute cholecystitis.  He reports having only sips of ginger ale after HIDA scan was completed.    O:/70 (BP Location: Left arm, Patient Position: Lying)   Pulse 74   Temp 97.7 °F (36.5 °C) (Oral)   Resp 20   Ht 167.6 cm (65.98\")   Wt 77.1 kg (170 lb)   SpO2 97%   BMI 27.45 kg/m²     Intake & Output (last day)       None            GENERAL: awake, alert, no apparent distress  HEENT: normochephalic, atraumatic, moist mucus membranes, clear sclera   CHEST: clear to auscultation, no wheezes, no increased work of breathing  CARDIAC: regular rate and rhythm    ABDOMEN: Soft, nondistended, nontender  EXTREMITIES: no cyanosis, clubbing or edema    SKIN: Warm and moist, no rashes    LABS REVIEWED:   Results from last 7 days   Lab Units 07/27/24  0632 07/26/24  1051   WBC 10*3/mm3 7.35 7.58   HEMOGLOBIN g/dL 14.0 15.7   HEMATOCRIT % 41.6 45.9   PLATELETS 10*3/mm3 172 177     Results from last 7 days   Lab Units 07/27/24  0632 07/26/24  1051   SODIUM mmol/L 137 138   POTASSIUM mmol/L 4.0 4.7   CHLORIDE mmol/L 103 104   CO2 mmol/L 23.2 22.4   BUN mg/dL 10 12   CREATININE mg/dL 1.06 1.31*   CALCIUM mg/dL 8.7 9.3   BILIRUBIN mg/dL 0.7 0.5   ALK PHOS U/L 89 89   ALT (SGPT) U/L 28 13   AST (SGOT) U/L 38 22   GLUCOSE mg/dL 88 110*           IMAGING:  I reviewed the images from his HIDA scan.  I do not see radiotracer uptake into the gallbladder.    A/P: 69 y.o. male with acute cholecystitis.  I recommended proceeding with da Kushal assisted laparoscopic cholecystectomy with intraoperative cholangiogram.  I explained the procedure in detail to the patient and his wife.  All risks (bleeding, infection, damage to surrounding structures such as the liver, common bile duct, stomach and " intestines), benefits and alternatives were discussed.  He verbalized understanding and was agreeable with proceeding.      SHELTON STAFFORD MD  General, Robotic and Endoscopic Surgery  [facility]    [address], [address]  [place], [place], [address]  P: [phone]  F: [phone]

## 2024-07-28 NOTE — PROGRESS NOTES
"DAILY PROGRESS NOTE  [facility]    Patient Identification:  Name: Patrick Mckeon  Age: 69 y.o.  Sex: male  :  1955  MRN: [id number]         Primary Care Physician: Lester Carter MD    Subjective:  Interval History: He states that his abdominal pain is much better today.  HIDA scan was positive for nonvisualization of gallbladder.    Objective:    Scheduled Meds:amLODIPine, 10 mg, Oral, Daily  aspirin, 81 mg, Oral, Daily  budesonide-formoterol, 2 puff, Inhalation, BID - RT   And  tiotropium bromide monohydrate, 2 puff, Inhalation, Daily - RT  ceFAZolin, 2,000 mg, Intravenous, Once  indocyanine green, 5 mg, Intravenous, Once  pantoprazole, 40 mg, Oral, BID AC  polyethylene glycol, 17 g, Oral, BID  pravastatin, 40 mg, Oral, Daily  sincalide (KINEVAC) 1.5 mcg in sodium chloride 0.9 % 101.5 mL IVPB, 0.02 mcg/kg, Intravenous, Once  sodium chloride, 10 mL, Intravenous, Q12H      Continuous Infusions:sodium chloride, 100 mL/hr, Last Rate: 100 mL/hr (24 1126)        Vital signs in last 24 hours:  Temp:  [97.5 °F (36.4 °C)-98.4 °F (36.9 °C)] 97.7 °F (36.5 °C)  Heart Rate:  [74-92] 74  Resp:  [18-20] 20  BP: (122-140)/(70-92) 130/70    Intake/Output:  No intake or output data in the 24 hours ending 24 1225    Exam:  /70 (BP Location: Left arm, Patient Position: Lying)   Pulse 74   Temp 97.7 °F (36.5 °C) (Oral)   Resp 20   Ht 167.6 cm (65.98\")   Wt 77.1 kg (170 lb)   SpO2 97%   BMI 27.45 kg/m²     General Appearance:    Alert, cooperative, no distress   Head:    Normocephalic, without obvious abnormality, atraumatic   Eyes:       Throat:   Lips, tongue, gums normal   Neck:   Supple, symmetrical, trachea midline, no JVD   Lungs:     Clear to auscultation bilaterally, respirations unlabored   Chest Wall:    No tenderness or deformity    Heart:    Regular rate and rhythm, S1 and S2 normal, no murmur,no  rub or gallop   Abdomen:     Soft, nontender, bowel sounds active, no masses, " no organomegaly    Extremities:   Extremities normal, atraumatic, no cyanosis or edema   Pulses:      Skin:   Skin is warm and dry,  no rashes or palpable lesions   Neurologic:   no focal deficits noted      Lab Results (last 72 hours)       Procedure Component Value Units Date/Time    Comprehensive Metabolic Panel [[id number]] Collected: 07/27/24 0632    Specimen: Blood Updated: 07/27/24 0734     Glucose 88 mg/dL      BUN 10 mg/dL      Creatinine 1.06 mg/dL      Sodium 137 mmol/L      Potassium 4.0 mmol/L      Chloride 103 mmol/L      CO2 23.2 mmol/L      Calcium 8.7 mg/dL      Total Protein 6.3 g/dL      Albumin 3.7 g/dL      ALT (SGPT) 28 U/L      AST (SGOT) 38 U/L      Alkaline Phosphatase 89 U/L      Total Bilirubin 0.7 mg/dL      Globulin 2.6 gm/dL      A/G Ratio 1.4 g/dL      BUN/Creatinine Ratio 9.4     Anion Gap 10.8 mmol/L      eGFR 75.5 mL/min/1.73     Narrative:      GFR Normal >60  Chronic Kidney Disease <60  Kidney Failure <15      Lipase [[id number]]  (Normal) Collected: 07/27/24 0632    Specimen: Blood Updated: 07/27/24 0734     Lipase 24 U/L     CBC Auto Differential [[id number]]  (Normal) Collected: 07/27/24 0632    Specimen: Blood Updated: 07/27/24 0726     WBC 7.35 10*3/mm3      RBC 4.52 10*6/mm3      Hemoglobin 14.0 g/dL      Hematocrit 41.6 %      MCV 92.0 fL      MCH 31.0 pg      MCHC 33.7 g/dL      RDW 13.9 %      RDW-SD 46.5 fl      MPV 9.8 fL      Platelets 172 10*3/mm3      Neutrophil % 63.9 %      Lymphocyte % 23.4 %      Monocyte % 9.1 %      Eosinophil % 3.0 %      Basophil % 0.3 %      Immature Grans % 0.3 %      Neutrophils, Absolute 4.70 10*3/mm3      Lymphocytes, Absolute 1.72 10*3/mm3      Monocytes, Absolute 0.67 10*3/mm3      Eosinophils, Absolute 0.22 10*3/mm3      Basophils, Absolute 0.02 10*3/mm3      Immature Grans, Absolute 0.02 10*3/mm3      nRBC 0.0 /100 WBC     Urinalysis With Microscopic If Indicated (No Culture) - Urine, Clean Catch [[id number]]  (Abnormal) Collected:  07/26/24 1240    Specimen: Urine, Clean Catch Updated: 07/26/24 1254     Color, UA Yellow     Appearance, UA Clear     pH, UA 7.5     Specific Gravity, UA 1.026     Glucose, UA Negative     Ketones, UA Trace     Bilirubin, UA Negative     Blood, UA Negative     Protein, UA Negative     Leuk Esterase, UA Negative     Nitrite, UA Negative     Urobilinogen, UA 0.2 E.U./dL    Narrative:      Urine microscopic not indicated.    Lipase [[id number]]  (Normal) Collected: 07/26/24 1051    Specimen: Blood Updated: 07/26/24 1126     Lipase 34 U/L      Comment: Specimen hemolyzed.  Results may be falsely decreased.       Comprehensive Metabolic Panel [[id number]]  (Abnormal) Collected: 07/26/24 1051    Specimen: Blood Updated: 07/26/24 1126     Glucose 110 mg/dL      BUN 12 mg/dL      Creatinine 1.31 mg/dL      Sodium 138 mmol/L      Potassium 4.7 mmol/L      Comment: Specimen hemolyzed.  Result may be falsely elevated.        Chloride 104 mmol/L      CO2 22.4 mmol/L      Calcium 9.3 mg/dL      Total Protein 7.2 g/dL      Albumin 4.2 g/dL      ALT (SGPT) 13 U/L      Comment: Specimen hemolyzed.  Result may  be falsely elevated.        AST (SGOT) 22 U/L      Comment: Specimen hemolyzed.  Result may be falsely elevated.        Alkaline Phosphatase 89 U/L      Total Bilirubin 0.5 mg/dL      Globulin 3.0 gm/dL      A/G Ratio 1.4 g/dL      BUN/Creatinine Ratio 9.2     Anion Gap 11.6 mmol/L      eGFR 58.6 mL/min/1.73     Narrative:      GFR Normal >60  Chronic Kidney Disease <60  Kidney Failure <15      CBC & Differential [[id number]]  (Abnormal) Collected: 07/26/24 1051    Specimen: Blood Updated: 07/26/24 1106    Narrative:      The following orders were created for panel order CBC & Differential.  Procedure                               Abnormality         Status                     ---------                               -----------         ------                     CBC Auto Differential[[id number]]        Abnormal            Final  "result                 Please view results for these tests on the individual orders.    CBC Auto Differential [[id number]]  (Abnormal) Collected: 07/26/24 1051    Specimen: Blood Updated: 07/26/24 1106     WBC 7.58 10*3/mm3      RBC 5.04 10*6/mm3      Hemoglobin 15.7 g/dL      Hematocrit 45.9 %      MCV 91.1 fL      MCH 31.2 pg      MCHC 34.2 g/dL      RDW 13.8 %      RDW-SD 46.3 fl      MPV 9.5 fL      Platelets 177 10*3/mm3      Neutrophil % 73.9 %      Lymphocyte % 19.0 %      Monocyte % 5.5 %      Eosinophil % 1.2 %      Basophil % 0.1 %      Immature Grans % 0.3 %      Neutrophils, Absolute 5.60 10*3/mm3      Lymphocytes, Absolute 1.44 10*3/mm3      Monocytes, Absolute 0.42 10*3/mm3      Eosinophils, Absolute 0.09 10*3/mm3      Basophils, Absolute 0.01 10*3/mm3      Immature Grans, Absolute 0.02 10*3/mm3      nRBC 0.0 /100 WBC           Data Review:  Results from last 7 days   Lab Units 07/27/24  0632 07/26/24  1051   SODIUM mmol/L 137 138   POTASSIUM mmol/L 4.0 4.7   CHLORIDE mmol/L 103 104   CO2 mmol/L 23.2 22.4   BUN mg/dL 10 12   CREATININE mg/dL 1.06 1.31*   GLUCOSE mg/dL 88 110*   CALCIUM mg/dL 8.7 9.3     Results from last 7 days   Lab Units 07/27/24  0632 07/26/24  1051   WBC 10*3/mm3 7.35 7.58   HEMOGLOBIN g/dL 14.0 15.7   HEMATOCRIT % 41.6 45.9   PLATELETS 10*3/mm3 172 177             Lab Results   Lab Value Date/Time    TROPONINT 11 07/21/2024 0647         Results from last 7 days   Lab Units 07/27/24  0632 07/26/24  1051   ALK PHOS U/L 89 89   BILIRUBIN mg/dL 0.7 0.5   ALT (SGPT) U/L 28 13   AST (SGOT) U/L 38 22             No results found for: \"POCGLU\"        Past Medical History:   Diagnosis Date    Abdominal aortic aneurysm without rupture 06/11/2019    Anemia 11/20/2021    Atherosclerosis of native coronary artery of native heart without angina pectoris 03/10/2020    Cigarette nicotine dependence with nicotine-induced disorder     Colon polyp     Diverticulosis of large intestine without " hemorrhage 08/09/2017    Emphysema lung 02/28/2017    Essential (primary) hypertension     H/O abdominal aortic aneurysm repair 06/11/2019    History of endovascuar abodominal aortic aneursym repair    Hiatal hernia with gastroesophageal reflux disease and esophagitis 03/10/2020    Hyperlipidemia 02/28/2017    Liver abscess 11/19/2021    Nicotine dependence, other tobacco product, uncomplicated 06/11/2019    Portal vein thrombosis 08/29/2017    Sepsis 08/02/2017    Shingles 09/2010    Skin cancer 05/31/2017    Thrombosis, 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       Plan:  Continue with current medications.  Patient appears medically stable for cholecystectomy planned for later today.  Follow-up labs.    Brian Trinidad MD  7/28/2024  12:25 EDT

## 2024-07-28 NOTE — OP NOTE
OPERATIVE REPORT     Patrick Mckeon  1955    Procedure Date: 07/28/24    Pre-op Diagnosis:  Acute calculus cholecystitis    Post-op Diagnosis:  Acute calculus cholecystitis with hydrops of the gallbladder    Procedure:   DaVinci Assisted Laparoscopic Cholecystectomy with Intraoperative Cholangiogram    Surgeon: Danelle rBown MD    Assistant:  Latoya Early CSA was responsible for performing the following activities: Suturing, Closing, Placing Dressing, Held/Positioned Camera, and exchange of robotic instruments  and their skilled assistance was necessary for the success of this case.    Anesthesia: General    Specimen: Gallbladder    Complications: None    Estimated Blood Loss: 100ml    Indications for Operation: Patrick Mckeon is a 69 y.o. year old male with acute cholecystitis.  I recommended proceeding with da Kushal assisted laparoscopic cholecystectomy with intraoperative cholangiogram.  All risks (including bleeding, infection, damage to surrounding structures), benefits, and alternatives were explained to the patient and he agreed and wished to proceed.  Informed consent was obtained.    Description of Procedure: The patient was taken to the operating room, transferred onto the operating room table, and underwent general endotracheal anesthesia without incident. The patient was prepped and draped in the usual sterile fashion.  Preoperative antibiotics were given, and a timeout was performed.  Half percent Marcaine with epinephrine was injected into the skin and subcutaneous tissues.  An incision was made and the Veress needle was used to initiate pneumoperitoneum.  A robotic 8mm optiview trocar with a 5mm 0 scope was inserted through each layer of the abdominal wall individually and into the abdomen.  The area under insertion was inspected and no injuries were noted.  Three additional 8mm ports were placed under direct visualization in a line across the mid abdomen. The da Kushal Xi robotic arms  were sterilely connected to the trochars and the instruments were passed into the abdomen under direct visualization.  There were adhesions between the omentum and the gallbladder as well as the falciform ligament.  These were taken down with the hook electrocautery.  The gallbladder appeared edematous and chronically inflamed with a thick rind around it.    The gallbladder was retracted cranially and laterally to allow for adequate visualization.  Using intraoperative ICG cholangiography to assist in safe dissection, the area around the infundibulum was dissected until the cystic duct and artery were identified. The critical view was obtained.  During the dissection, a hole was inadvertently made in the gallbladder and pus followed by clear fluid was encountered, consistent with hydrops.  The dissection was difficult due to the chronic scarring around the infundibulum. The cystic duct was very short, but I was able to get adequate length to place clips and make a ductotomy. A cholangiogram was done by placing a clip on the cystic duct and incising it just distal to this.  A 4Fr tapered ureteral catheter was introduced with an Angiocath needle and directed into the cystic duct.  A clip was applied.  With fluoroscopy, contrast was injected and confirmed the anatomy and that there were no stones present.  After placing the patient in some trendelenburg, contrast was seen going into the right and left hepatic ducts as well as the duodenum.  The cholangiogram catheter was then removed.  The cystic duct had 2 clips placed on the bile duct side and was transected.  The same was done for the cystic artery.  Bovie electrocautery was then used to remove the gallbladder from the liver bed.  The gallbladder was placed into a bag.  The area was then irrigated and inspected for hemostasis.  All stones were collected using the robotic suction . There was no bleeding or bile noted.  The right upper quadrant was copiously  irrigated until the effluent was clear. The gallbladder was then removed through the left middle port.  The fascia of the extraction port was closed with 0 Vicryl suture using M close device. The ports were removed under direct visualization. The incisions were then closed with 4-0 Monocryl sutures and Dermabond.  All needle and lap counts were correct at the end of the case.  The patient was awoken from general endotracheal anesthesia and taken to the recovery area for further monitoring.      SHELTON STAFFORD M.D.  General, Robotic, and Endoscopic Surgery  [facility]    [address] [place] [address], [address]  [place], [place], [address]  P: [phone]  F: [phone]

## 2024-07-28 NOTE — PLAN OF CARE
Problem: Adult Inpatient Plan of Care  Goal: Plan of Care Review  Outcome: Ongoing, Progressing  Flowsheets (Taken 7/28/2024 0432)  Progress: improving  Plan of Care Reviewed With: patient  Outcome Evaluation: vss. no c/o pain. ivfs. npo for HIDA scan. resting well throughout shift.  Goal: Patient-Specific Goal (Individualized)  Outcome: Ongoing, Progressing  Goal: Absence of Hospital-Acquired Illness or Injury  Outcome: Ongoing, Progressing  Intervention: Identify and Manage Fall Risk  Recent Flowsheet Documentation  Taken 7/28/2024 0427 by Octavia Gardiner, RN  Safety Promotion/Fall Prevention: safety round/check completed  Taken 7/28/2024 0210 by Octavia Gardiner, RN  Safety Promotion/Fall Prevention: safety round/check completed  Taken 7/28/2024 0035 by Octavia Gardiner, RN  Safety Promotion/Fall Prevention: safety round/check completed  Taken 7/27/2024 2227 by Octavia Gardiner, RN  Safety Promotion/Fall Prevention: safety round/check completed  Taken 7/27/2024 2014 by Octavia Gardiner, RN  Safety Promotion/Fall Prevention: safety round/check completed  Goal: Optimal Comfort and Wellbeing  Outcome: Ongoing, Progressing  Intervention: Provide Person-Centered Care  Recent Flowsheet Documentation  Taken 7/27/2024 2014 by Octavia Gardiner, RN  Trust Relationship/Rapport:   care explained   choices provided   emotional support provided   empathic listening provided   questions answered   questions encouraged  Goal: Readiness for Transition of Care  Outcome: Ongoing, Progressing     Problem: Adjustment to Illness (Sepsis/Septic Shock)  Goal: Optimal Coping  Outcome: Ongoing, Progressing     Problem: Bleeding (Sepsis/Septic Shock)  Goal: Absence of Bleeding  Outcome: Ongoing, Progressing     Problem: Glycemic Control Impaired (Sepsis/Septic Shock)  Goal: Blood Glucose Level Within Desired Range  Outcome: Ongoing, Progressing     Problem: Infection Progression (Sepsis/Septic Shock)  Goal: Absence of Infection  Signs and Symptoms  Outcome: Ongoing, Progressing     Problem: Nutrition Impaired (Sepsis/Septic Shock)  Goal: Optimal Nutrition Intake  Outcome: Ongoing, Progressing   Goal Outcome Evaluation:  Plan of Care Reviewed With: patient        Progress: improving  Outcome Evaluation: vss. no c/o pain. ivfs. npo for HIDA scan. resting well throughout shift.

## 2024-07-28 NOTE — ANESTHESIA POSTPROCEDURE EVALUATION
"Patient: Patrick Mckeon    Procedure Summary       Date: 07/28/24 Room / Location: [facility] OR [address] [place] [address] MAIN OR    Anesthesia Start: 1326 Anesthesia Stop: 1529    Procedure: CHOLECYSTECTOMY LAPAROSCOPIC INTRAOPERATIVE CHOLANGIOGRAM WITH BuddyINCI ROBOT (Abdomen) Diagnosis:       Acute cholecystitis      (Acute cholecystitis [K81.0])    Surgeons: Danelle Brown MD Provider: Estiven Hatch MD    Anesthesia Type: general ASA Status: 3            Anesthesia Type: general    Vitals  Vitals Value Taken Time   /79 07/28/24 1525   Temp     Pulse 86 07/28/24 1527   Resp     SpO2 100 % 07/28/24 1527   Vitals shown include unfiled device data.        Post Anesthesia Care and Evaluation    Patient location during evaluation: PACU  Patient participation: complete - patient participated  Level of consciousness: awake and alert  Pain management: adequate    Airway patency: patent  Anesthetic complications: No anesthetic complications  PONV Status: controlled  Cardiovascular status: acceptable and hemodynamically stable  Respiratory status: acceptable  Hydration status: acceptable    Comments: /85 (BP Location: Right arm, Patient Position: Lying)   Pulse 97   Temp 36.9 °C (98.4 °F) (Oral)   Resp 18   Ht 167.6 cm (65.98\")   Wt 77.1 kg (170 lb)   SpO2 96%   BMI 27.45 kg/m²     "

## 2024-07-29 ENCOUNTER — TELEPHONE (OUTPATIENT)
Dept: SURGERY | Facility: CLINIC | Age: 69
End: 2024-07-29
Payer: MEDICARE

## 2024-07-29 ENCOUNTER — READMISSION MANAGEMENT (OUTPATIENT)
Dept: CALL CENTER | Facility: HOSPITAL | Age: 69
End: 2024-07-29
Payer: MEDICARE

## 2024-07-29 VITALS
OXYGEN SATURATION: 94 % | SYSTOLIC BLOOD PRESSURE: 134 MMHG | DIASTOLIC BLOOD PRESSURE: 88 MMHG | HEART RATE: 80 BPM | WEIGHT: 170 LBS | RESPIRATION RATE: 18 BRPM | HEIGHT: 66 IN | TEMPERATURE: 98.6 F | BODY MASS INDEX: 27.32 KG/M2

## 2024-07-29 LAB
ALBUMIN SERPL-MCNC: 3.5 G/DL (ref 3.5–5.2)
ALBUMIN/GLOB SERPL: 1.3 G/DL
ALP SERPL-CCNC: 161 U/L (ref 39–117)
ALT SERPL W P-5'-P-CCNC: 60 U/L (ref 1–41)
ANION GAP SERPL CALCULATED.3IONS-SCNC: 10.9 MMOL/L (ref 5–15)
AST SERPL-CCNC: 67 U/L (ref 1–40)
BASOPHILS # BLD AUTO: 0.01 10*3/MM3 (ref 0–0.2)
BASOPHILS NFR BLD AUTO: 0.1 % (ref 0–1.5)
BILIRUB SERPL-MCNC: 0.4 MG/DL (ref 0–1.2)
BUN SERPL-MCNC: 13 MG/DL (ref 8–23)
BUN/CREAT SERPL: 10.7 (ref 7–25)
CALCIUM SPEC-SCNC: 8.2 MG/DL (ref 8.6–10.5)
CHLORIDE SERPL-SCNC: 106 MMOL/L (ref 98–107)
CO2 SERPL-SCNC: 20.1 MMOL/L (ref 22–29)
CREAT SERPL-MCNC: 1.22 MG/DL (ref 0.76–1.27)
DEPRECATED RDW RBC AUTO: 45.9 FL (ref 37–54)
EGFRCR SERPLBLD CKD-EPI 2021: 63.8 ML/MIN/1.73
EOSINOPHIL # BLD AUTO: 0 10*3/MM3 (ref 0–0.4)
EOSINOPHIL NFR BLD AUTO: 0 % (ref 0.3–6.2)
ERYTHROCYTE [DISTWIDTH] IN BLOOD BY AUTOMATED COUNT: 13.6 % (ref 12.3–15.4)
GLOBULIN UR ELPH-MCNC: 2.6 GM/DL
GLUCOSE SERPL-MCNC: 118 MG/DL (ref 65–99)
HCT VFR BLD AUTO: 39.3 % (ref 37.5–51)
HGB BLD-MCNC: 13.3 G/DL (ref 13–17.7)
IMM GRANULOCYTES # BLD AUTO: 0.03 10*3/MM3 (ref 0–0.05)
IMM GRANULOCYTES NFR BLD AUTO: 0.3 % (ref 0–0.5)
LYMPHOCYTES # BLD AUTO: 0.83 10*3/MM3 (ref 0.7–3.1)
LYMPHOCYTES NFR BLD AUTO: 8.5 % (ref 19.6–45.3)
MCH RBC QN AUTO: 30.9 PG (ref 26.6–33)
MCHC RBC AUTO-ENTMCNC: 33.8 G/DL (ref 31.5–35.7)
MCV RBC AUTO: 91.4 FL (ref 79–97)
MONOCYTES # BLD AUTO: 0.51 10*3/MM3 (ref 0.1–0.9)
MONOCYTES NFR BLD AUTO: 5.3 % (ref 5–12)
NEUTROPHILS NFR BLD AUTO: 8.33 10*3/MM3 (ref 1.7–7)
NEUTROPHILS NFR BLD AUTO: 85.8 % (ref 42.7–76)
NRBC BLD AUTO-RTO: 0 /100 WBC (ref 0–0.2)
PLATELET # BLD AUTO: 167 10*3/MM3 (ref 140–450)
PMV BLD AUTO: 10 FL (ref 6–12)
POTASSIUM SERPL-SCNC: 4.8 MMOL/L (ref 3.5–5.2)
PROT SERPL-MCNC: 6.1 G/DL (ref 6–8.5)
RBC # BLD AUTO: 4.3 10*6/MM3 (ref 4.14–5.8)
SODIUM SERPL-SCNC: 137 MMOL/L (ref 136–145)
WBC NRBC COR # BLD AUTO: 9.71 10*3/MM3 (ref 3.4–10.8)

## 2024-07-29 PROCEDURE — 94799 UNLISTED PULMONARY SVC/PX: CPT

## 2024-07-29 PROCEDURE — G0378 HOSPITAL OBSERVATION PER HR: HCPCS

## 2024-07-29 PROCEDURE — 94664 DEMO&/EVAL PT USE INHALER: CPT

## 2024-07-29 PROCEDURE — 85025 COMPLETE CBC W/AUTO DIFF WBC: CPT | Performed by: SURGERY

## 2024-07-29 PROCEDURE — 94760 N-INVAS EAR/PLS OXIMETRY 1: CPT

## 2024-07-29 PROCEDURE — 99024 POSTOP FOLLOW-UP VISIT: CPT | Performed by: SURGERY

## 2024-07-29 PROCEDURE — 80053 COMPREHEN METABOLIC PANEL: CPT | Performed by: SURGERY

## 2024-07-29 PROCEDURE — 25810000003 SODIUM CHLORIDE 0.9 % SOLUTION: Performed by: SURGERY

## 2024-07-29 RX ORDER — HYDROCODONE BITARTRATE AND ACETAMINOPHEN 5; 325 MG/1; MG/1
1 TABLET ORAL EVERY 6 HOURS PRN
Qty: 10 TABLET | Refills: 0 | Status: SHIPPED | OUTPATIENT
Start: 2024-07-29

## 2024-07-29 RX ADMIN — BUDESONIDE AND FORMOTEROL FUMARATE DIHYDRATE 2 PUFF: 160; 4.5 AEROSOL RESPIRATORY (INHALATION) at 07:56

## 2024-07-29 RX ADMIN — PANTOPRAZOLE SODIUM 40 MG: 40 TABLET, DELAYED RELEASE ORAL at 09:06

## 2024-07-29 RX ADMIN — ASPIRIN 81 MG: 81 TABLET, COATED ORAL at 09:06

## 2024-07-29 RX ADMIN — HYDROCODONE BITARTRATE AND ACETAMINOPHEN 1 TABLET: 5; 325 TABLET ORAL at 12:45

## 2024-07-29 RX ADMIN — PRAVASTATIN SODIUM 40 MG: 40 TABLET ORAL at 09:06

## 2024-07-29 RX ADMIN — POLYETHYLENE GLYCOL 3350 17 G: 17 POWDER, FOR SOLUTION ORAL at 09:06

## 2024-07-29 RX ADMIN — TIOTROPIUM BROMIDE INHALATION SPRAY 2 PUFF: 3.12 SPRAY, METERED RESPIRATORY (INHALATION) at 07:55

## 2024-07-29 RX ADMIN — HYDROCODONE BITARTRATE AND ACETAMINOPHEN 1 TABLET: 5; 325 TABLET ORAL at 00:10

## 2024-07-29 RX ADMIN — HYDROCODONE BITARTRATE AND ACETAMINOPHEN 1 TABLET: 5; 325 TABLET ORAL at 06:01

## 2024-07-29 RX ADMIN — SODIUM CHLORIDE 100 ML/HR: 9 INJECTION, SOLUTION INTRAVENOUS at 03:54

## 2024-07-29 RX ADMIN — AMLODIPINE BESYLATE 10 MG: 10 TABLET ORAL at 09:06

## 2024-07-29 NOTE — PROGRESS NOTES
Postop day 1 status post da Kushal assisted laparoscopic cholecystectomy with intraoperative cholangiogram    Patient reports he is doing well.  Minimal pain from his incisions.  No nausea or vomiting.    Labs reviewed.  Slight elevation in AST, ALT and alk phos which is normal after IOC.  No leukocytosis.    Exam:  Well-appearing, no apparent distress  Abdomen soft, nondistended, appropriately tender, incisions clean dry and intact    Plan:  Okay for discharge.  I will send a prescription for Norco for postoperative pain.  Follow-up with me in 2 weeks.    Danelle Brown MD  General, Robotic and Endoscopic Surgery  [facility]     [address], [address]  [place], [place], [address]  P: [phone]  F: [phone]

## 2024-07-29 NOTE — TELEPHONE ENCOUNTER
Left detailed message notifying patient of results and instructions on cell phone. -MP   Left voice mail on phone with patient post-op appointment for 08/13 at 10:00 am with Dr. Brown.

## 2024-07-29 NOTE — DISCHARGE SUMMARY
PHYSICIAN DISCHARGE SUMMARY                                                                        [facility]    Patient Identification:  Name: Patrick Mckeon  Age: 69 y.o.  Sex: male  :  1955  MRN: [id number]  Primary Care Physician: Lester Carter MD    Admit date: 2024  Discharge date and time:2024  Discharged Condition: good    Discharge Diagnoses:  Active Hospital Problems    Diagnosis  POA    **Intractable abdominal pain [R10.9]  Yes    Acute cholecystitis [K81.0]  Unknown    History of endovascular stent graft for abdominal aortic aneurysm (AAA) [Z95.828]  Not Applicable    Hyperlipidemia [E78.5]  Yes    Emphysema lung [J43.9]  Yes    Primary hypertension [I10]  Yes      Resolved Hospital Problems   No resolved problems to display.          PMHX:   Past Medical History:   Diagnosis Date    Abdominal aortic aneurysm without rupture 2019    Anemia 2021    Atherosclerosis of native coronary artery of native heart without angina pectoris 03/10/2020    Cigarette nicotine dependence with nicotine-induced disorder     Colon polyp     Diverticulosis of large intestine without hemorrhage 2017    Emphysema lung 2017    Essential (primary) hypertension     H/O abdominal aortic aneurysm repair 2019    History of endovascuar abodominal aortic aneursym repair    Hiatal hernia with gastroesophageal reflux disease and esophagitis 03/10/2020    Hyperlipidemia 2017    Liver abscess 2021    Nicotine dependence, other tobacco product, uncomplicated 2019    Portal vein thrombosis 2017    Sepsis 2017    Shingles 2010    Skin cancer 2017    Thrombosis, hepatic vein 2017    coag hickey by heme neg; completed 6 months of AC and maintained on ASA     PSHX:   Past Surgical History:   Procedure Laterality Date    ARTERIOGRAM AORTIC N/A 2017    Procedure:  AORTIC  STENT GRAFT REPAIR W/ GORE BILATERAL FEMORAL MINI CUTDOWNS;  Surgeon: Humble Hamilton MD;  Location: [facility] HYBRID OR 18/19;  Service:     CATARACT EXTRACTION Left 10/2023    CATARACT EXTRACTION Right 11/2023    CHOLECYSTECTOMY WITH INTRAOPERATIVE CHOLANGIOGRAM N/A 7/28/2024    Procedure: CHOLECYSTECTOMY LAPAROSCOPIC INTRAOPERATIVE CHOLANGIOGRAM WITH DAVINCI ROBOT;  Surgeon: Danelle Brown MD;  Location: [facility] MAIN OR;  Service: Robotics - DaVinci;  Laterality: N/A;    COLONOSCOPY N/A 08/03/2017    Procedure: COLONOSCOPY to cecum;  Surgeon: Patrick Elizabeth MD;  Location: [facility] ENDOSCOPY;  Service:     COLONOSCOPY  09/2010    COLONOSCOPY N/A 01/31/2023    Procedure: COLONOSCOPY INTO CECUM WITH COLD SNARE POLYPECTOMIES;  Surgeon: Antolin Wolfe MD;  Location: [facility] ENDOSCOPY;  Service: Gastroenterology;  Laterality: N/A;  PRE; PERSONAL H/O COLON POLYPS; FAMILY H/O COLON CANCER  POST: DIVERTICULOSIS, POLYPS, HEMORRHIDS    ENDOSCOPY  08/03/2017    Procedure: ESOPHAGOGASTRODUODENOSCOPY;  Surgeon: Patrick Elizabeth MD;  Location: [facility] ENDOSCOPY;  Service:     HERNIA REPAIR  1980    KIDNEY SURGERY      Kidney Removal    UMBILICAL HERNIA REPAIR  1975       Hospital Course: Patrick Mckeon  69-year-old gentleman with atherosclerotic vascular disease with a history abdominal aortic aneurysm that underwent stent placement in 2017. She presents with recurrence of abdominal pain. He was seen in emergency room with the new onset of abdominal pain on 7/21/2024. He states that at that time it was generalized but also developed a stabbing left flank pain associated with this. CT of the abdomen at that time showed the previous procedure with an interval increase in size. He was seen by his vascular surgeon in the ER. He was discharged in good condition and he states the pain abated. He was feeling well until about 5:00 this morning when he woke up with recurrence of the pain. Initially stated it was epigastric and  very sharp but very quickly generalized abdominal pain which she stated was severe but he could not characterize the type. He states that he then developed sharp right flank pain which is since abated. Overall his pain is improved at present but he did receive a dose of Dilaudid approximately 2:00 this afternoon. His last bowel movement was yesterday and aside from being somewhat small was normal. There is been no nausea or vomiting associated with this. No fever sweats or chills. CT scan was repeated in the emergency room and shows no changes.  The patient was admitted to the hospital and seen by vascular surgery and general surgery.  Patient had HIDA scan which showed nonvisualization of the gallbladder and it was felt that likely symptoms related to acute cholecystitis..  The patient underwent laparoscopic cholecystectomy and did well postoperatively.  He looked well enough to go home and he will follow-up with his surgeon for postop check and also follow-up primary care in 1 week.    Consults:     Consults       Date and Time Order Name Status Description    7/27/2024 12:53 PM Inpatient General Surgery Consult Completed     7/26/2024  5:01 PM Inpatient Vascular Surgery Consult Completed     7/26/2024  1:35 PM [facility] (on-call MD unless specified) Details      7/21/2024 10:13 AM Inpatient Vascular Surgery Consult Completed           Results from last 7 days   Lab Units 07/29/24  0533   WBC 10*3/mm3 9.71   HEMOGLOBIN g/dL 13.3   HEMATOCRIT % 39.3   PLATELETS 10*3/mm3 167     Results from last 7 days   Lab Units 07/29/24  0533   SODIUM mmol/L 137   POTASSIUM mmol/L 4.8   CHLORIDE mmol/L 106   CO2 mmol/L 20.1*   BUN mg/dL 13   CREATININE mg/dL 1.22   GLUCOSE mg/dL 118*   CALCIUM mg/dL 8.2*     Significant Diagnostic Studies:   WBC   Date Value Ref Range Status   07/29/2024 9.71 3.40 - 10.80 10*3/mm3 Final     Hemoglobin   Date Value Ref Range Status   07/29/2024 13.3 13.0 - 17.7 g/dL Final     Hematocrit   Date Value  "Ref Range Status   07/29/2024 39.3 37.5 - 51.0 % Final     Platelets   Date Value Ref Range Status   07/29/2024 167 140 - 450 10*3/mm3 Final     Sodium   Date Value Ref Range Status   07/29/2024 137 136 - 145 mmol/L Final     Potassium   Date Value Ref Range Status   07/29/2024 4.8 3.5 - 5.2 mmol/L Final     Comment:     Slight hemolysis detected by analyzer. Result may be falsely elevated.     Chloride   Date Value Ref Range Status   07/29/2024 106 98 - 107 mmol/L Final     CO2   Date Value Ref Range Status   07/29/2024 20.1 (L) 22.0 - 29.0 mmol/L Final     BUN   Date Value Ref Range Status   07/29/2024 13 8 - 23 mg/dL Final     Creatinine   Date Value Ref Range Status   07/29/2024 1.22 0.76 - 1.27 mg/dL Final     Glucose   Date Value Ref Range Status   07/29/2024 118 (H) 65 - 99 mg/dL Final     Calcium   Date Value Ref Range Status   07/29/2024 8.2 (L) 8.6 - 10.5 mg/dL Final     AST (SGOT)   Date Value Ref Range Status   07/29/2024 67 (H) 1 - 40 U/L Final     ALT (SGPT)   Date Value Ref Range Status   07/29/2024 60 (H) 1 - 41 U/L Final     Alkaline Phosphatase   Date Value Ref Range Status   07/29/2024 161 (H) 39 - 117 U/L Final     No results found for: \"APTT\", \"INR\"  No results found for: \"COLORU\", \"CLARITYU\", \"SPECGRAV\", \"PHUR\", \"PROTEINUR\", \"GLUCOSEU\", \"KETONESU\", \"BLOODU\", \"NITRITE\", \"LEUKOCYTESUR\", \"BILIRUBINUR\", \"UROBILINOGEN\", \"RBCUA\", \"WBCUA\", \"BACTERIA\", \"UACOMMENT\"  No results found for: \"TROPONINT\", \"TROPONINI\", \"BNP\"  No components found for: \"HGBA1C;2\"  No components found for: \"TSH;2\"  Imaging Results (All)       Procedure Component Value Units Date/Time    FL Cholangiogram Operative [[id number]] Collected: 07/28/24 1526     Updated: 07/28/24 2301    Narrative:      INTRAOPERATIVE CHOLANGIOGRAM 07/28/2024     HISTORY: Laparoscopic cholecystectomy with possible stones.     Contrast is seen in the intrahepatic, common hepatic and common bile  ducts. The common hepatic duct is incompletely distended " compared to the  common duct. No definite bile duct stones or biliary dilatation is seen.  There is free spill of contrast into the duodenum.     FLUOROSCOPY TIME: Forty-six seconds, 12 mGy,  308 images.     This report was finalized on 7/28/2024 10:58 PM by Dr. Jai Manzo M.D on Workstation: [id number]       NM HIDA SCAN WITH PHARMACOLOGICAL INTERVENTION [[id number]] Collected: 07/28/24 1057     Updated: 07/28/24 1101    Narrative:      NM HIDA SCAN WITH PHARMACOLOGICAL INTERVENTION-7/28/2024     HISTORY: Abdominal pain.     5.9 mCi technetium Choletec was given intravenously. Imaging was  performed through 1 hour. After 1 hour there is liver biliary and small  bowel activity. No gallbladder activity is seen. After 1 hour,  intravenous morphine was given. Additional images were obtained through  approximately 40 minutes. More small bowel activity is seen. Still no  gallbladder activity is seen.       Impression:      1. Absent gallbladder activity is consistent with acute cholecystitis.        This report was finalized on 7/28/2024 10:58 AM by Dr. Jai Manzo M.D on Workstation: [id number]       CT Abdomen Pelvis With Contrast [[id number]] Collected: 07/26/24 1230     Updated: 07/26/24 1413    Narrative:      CT OF THE ABDOMEN AND PELVIS WITH CONTRAST 07/26/2024     HISTORY: Abdominal pain.     Axial images were obtained from the lung bases to the symphysis pubis  after intravenous contrast.     Mildly prominent vessel is seen in the medial aspect of the right lower  lobe such as on axial images 11 through 15 stable since the 7/21/2024  study.     The liver, spleen and pancreas appear unremarkable. Small amount of  hyperdense bile or tiny gallstones are seen in the gallbladder also seen  on the 07/21/2024 study. No gallbladder inflammatory changes are seen.  Adrenal glands appear unremarkable. Left kidney is absent. Right renal  cyst is seen. Aortobiiliac stent graft is seen. Stable native aorta  seen  measuring up to approximately 4.9 cm x 3.1 cm on axial image 48 similar  to the 7/21/2024 study.     There is colonic diverticulosis. There is mild prostate gland  enlargement. Small low-density collection is seen near the posterior  prostate gland. This finding appears stable since the 7/21/2024 study.  Urinary bladder is unremarkable.       Impression:      1. Cholelithiasis and there may be a small amount of hyperdense bile in  the gallbladder.  2. Aortobiiliac stent graft is unchanged from the 07/21/2024 study with  dilatation of the abdominal aorta also stable.  3. Colonic diverticulosis.  4. No acute findings are seen to explain patient's reported abdominal  pain.  5. Absent left kidney.           Radiation dose reduction techniques were utilized, including automated  exposure control and exposure modulation based on body size.        This report was finalized on 7/26/2024 2:10 PM by Dr. Jai Manzo M.D on Workstation: [id number]             Lab Results (last 7 days)       Procedure Component Value Units Date/Time    Comprehensive Metabolic Panel [[id number]]  (Abnormal) Collected: 07/29/24 0533    Specimen: Blood Updated: 07/29/24 0646     Glucose 118 mg/dL      BUN 13 mg/dL      Creatinine 1.22 mg/dL      Sodium 137 mmol/L      Potassium 4.8 mmol/L      Comment: Slight hemolysis detected by analyzer. Result may be falsely elevated.        Chloride 106 mmol/L      CO2 20.1 mmol/L      Calcium 8.2 mg/dL      Total Protein 6.1 g/dL      Albumin 3.5 g/dL      ALT (SGPT) 60 U/L      AST (SGOT) 67 U/L      Alkaline Phosphatase 161 U/L      Total Bilirubin 0.4 mg/dL      Globulin 2.6 gm/dL      A/G Ratio 1.3 g/dL      BUN/Creatinine Ratio 10.7     Anion Gap 10.9 mmol/L      eGFR 63.8 mL/min/1.73     Narrative:      GFR Normal >60  Chronic Kidney Disease <60  Kidney Failure <15      Tissue Pathology Exam [[id number]] Collected: 07/28/24 1454    Specimen: Tissue from Gallbladder Updated: 07/29/24 0641     CBC & Differential [[id number]]  (Abnormal) Collected: 07/29/24 0533    Specimen: Blood Updated: 07/29/24 0622    Narrative:      The following orders were created for panel order CBC & Differential.  Procedure                               Abnormality         Status                     ---------                               -----------         ------                     CBC Auto Differential[[id number]]        Abnormal            Final result                 Please view results for these tests on the individual orders.    CBC Auto Differential [[id number]]  (Abnormal) Collected: 07/29/24 0533    Specimen: Blood Updated: 07/29/24 0622     WBC 9.71 10*3/mm3      RBC 4.30 10*6/mm3      Hemoglobin 13.3 g/dL      Hematocrit 39.3 %      MCV 91.4 fL      MCH 30.9 pg      MCHC 33.8 g/dL      RDW 13.6 %      RDW-SD 45.9 fl      MPV 10.0 fL      Platelets 167 10*3/mm3      Neutrophil % 85.8 %      Lymphocyte % 8.5 %      Monocyte % 5.3 %      Eosinophil % 0.0 %      Basophil % 0.1 %      Immature Grans % 0.3 %      Neutrophils, Absolute 8.33 10*3/mm3      Lymphocytes, Absolute 0.83 10*3/mm3      Monocytes, Absolute 0.51 10*3/mm3      Eosinophils, Absolute 0.00 10*3/mm3      Basophils, Absolute 0.01 10*3/mm3      Immature Grans, Absolute 0.03 10*3/mm3      nRBC 0.0 /100 WBC     Comprehensive Metabolic Panel [[id number]] Collected: 07/27/24 0632    Specimen: Blood Updated: 07/27/24 0734     Glucose 88 mg/dL      BUN 10 mg/dL      Creatinine 1.06 mg/dL      Sodium 137 mmol/L      Potassium 4.0 mmol/L      Chloride 103 mmol/L      CO2 23.2 mmol/L      Calcium 8.7 mg/dL      Total Protein 6.3 g/dL      Albumin 3.7 g/dL      ALT (SGPT) 28 U/L      AST (SGOT) 38 U/L      Alkaline Phosphatase 89 U/L      Total Bilirubin 0.7 mg/dL      Globulin 2.6 gm/dL      A/G Ratio 1.4 g/dL      BUN/Creatinine Ratio 9.4     Anion Gap 10.8 mmol/L      eGFR 75.5 mL/min/1.73     Narrative:      GFR Normal >60  Chronic Kidney Disease <60  Kidney  Failure <15      Lipase [[id number]]  (Normal) Collected: 07/27/24 0632    Specimen: Blood Updated: 07/27/24 0734     Lipase 24 U/L     CBC Auto Differential [[id number]]  (Normal) Collected: 07/27/24 0632    Specimen: Blood Updated: 07/27/24 0726     WBC 7.35 10*3/mm3      RBC 4.52 10*6/mm3      Hemoglobin 14.0 g/dL      Hematocrit 41.6 %      MCV 92.0 fL      MCH 31.0 pg      MCHC 33.7 g/dL      RDW 13.9 %      RDW-SD 46.5 fl      MPV 9.8 fL      Platelets 172 10*3/mm3      Neutrophil % 63.9 %      Lymphocyte % 23.4 %      Monocyte % 9.1 %      Eosinophil % 3.0 %      Basophil % 0.3 %      Immature Grans % 0.3 %      Neutrophils, Absolute 4.70 10*3/mm3      Lymphocytes, Absolute 1.72 10*3/mm3      Monocytes, Absolute 0.67 10*3/mm3      Eosinophils, Absolute 0.22 10*3/mm3      Basophils, Absolute 0.02 10*3/mm3      Immature Grans, Absolute 0.02 10*3/mm3      nRBC 0.0 /100 WBC     Urinalysis With Microscopic If Indicated (No Culture) - Urine, Clean Catch [[id number]]  (Abnormal) Collected: 07/26/24 1240    Specimen: Urine, Clean Catch Updated: 07/26/24 1254     Color, UA Yellow     Appearance, UA Clear     pH, UA 7.5     Specific Gravity, UA 1.026     Glucose, UA Negative     Ketones, UA Trace     Bilirubin, UA Negative     Blood, UA Negative     Protein, UA Negative     Leuk Esterase, UA Negative     Nitrite, UA Negative     Urobilinogen, UA 0.2 E.U./dL    Narrative:      Urine microscopic not indicated.    Lipase [[id number]]  (Normal) Collected: 07/26/24 1051    Specimen: Blood Updated: 07/26/24 1126     Lipase 34 U/L      Comment: Specimen hemolyzed.  Results may be falsely decreased.       Comprehensive Metabolic Panel [[id number]]  (Abnormal) Collected: 07/26/24 1051    Specimen: Blood Updated: 07/26/24 1126     Glucose 110 mg/dL      BUN 12 mg/dL      Creatinine 1.31 mg/dL      Sodium 138 mmol/L      Potassium 4.7 mmol/L      Comment: Specimen hemolyzed.  Result may be falsely elevated.        Chloride 104 mmol/L       CO2 22.4 mmol/L      Calcium 9.3 mg/dL      Total Protein 7.2 g/dL      Albumin 4.2 g/dL      ALT (SGPT) 13 U/L      Comment: Specimen hemolyzed.  Result may  be falsely elevated.        AST (SGOT) 22 U/L      Comment: Specimen hemolyzed.  Result may be falsely elevated.        Alkaline Phosphatase 89 U/L      Total Bilirubin 0.5 mg/dL      Globulin 3.0 gm/dL      A/G Ratio 1.4 g/dL      BUN/Creatinine Ratio 9.2     Anion Gap 11.6 mmol/L      eGFR 58.6 mL/min/1.73     Narrative:      GFR Normal >60  Chronic Kidney Disease <60  Kidney Failure <15      CBC & Differential [[id number]]  (Abnormal) Collected: 07/26/24 1051    Specimen: Blood Updated: 07/26/24 1106    Narrative:      The following orders were created for panel order CBC & Differential.  Procedure                               Abnormality         Status                     ---------                               -----------         ------                     CBC Auto Differential[[id number]]        Abnormal            Final result                 Please view results for these tests on the individual orders.    CBC Auto Differential [[id number]]  (Abnormal) Collected: 07/26/24 1051    Specimen: Blood Updated: 07/26/24 1106     WBC 7.58 10*3/mm3      RBC 5.04 10*6/mm3      Hemoglobin 15.7 g/dL      Hematocrit 45.9 %      MCV 91.1 fL      MCH 31.2 pg      MCHC 34.2 g/dL      RDW 13.8 %      RDW-SD 46.3 fl      MPV 9.5 fL      Platelets 177 10*3/mm3      Neutrophil % 73.9 %      Lymphocyte % 19.0 %      Monocyte % 5.5 %      Eosinophil % 1.2 %      Basophil % 0.1 %      Immature Grans % 0.3 %      Neutrophils, Absolute 5.60 10*3/mm3      Lymphocytes, Absolute 1.44 10*3/mm3      Monocytes, Absolute 0.42 10*3/mm3      Eosinophils, Absolute 0.09 10*3/mm3      Basophils, Absolute 0.01 10*3/mm3      Immature Grans, Absolute 0.02 10*3/mm3      nRBC 0.0 /100 WBC           /88 (BP Location: Left arm, Patient Position: Lying)   Pulse 80   Temp 98.6 °F (37 °C)  "(Oral)   Resp 18   Ht 167.6 cm (65.98\")   Wt 77.1 kg (170 lb)   SpO2 94%   BMI 27.45 kg/m²     Discharge Exam:  General Appearance:    Alert, cooperative, no distress                          Head:    Normocephalic, without obvious abnormality, atraumatic                          Eyes:                            Throat:   Lips, tongue, gums normal                          Neck:   Supple, symmetrical, trachea midline, no JVD                        Lungs:     Clear to auscultation bilaterally, respirations unlabored                Chest Wall:    No tenderness or deformity                        Heart:    Regular rate and rhythm, S1 and S2 normal, no murmur,no  Rub  or gallop                  Abdomen:     Soft, non-tender, bowel sounds active, no masses, no                                                        organomegaly                  Extremities:   Extremities normal, atraumatic, no cyanosis or edema                             Skin:   Skin is warm and dry,  no rashes or palpable lesions                  Neurologic:   no focal deficits noted     Disposition:  Home    Activity as tolerated    Diet as tolerated  Diet Order   Procedures    Diet: Regular/House; Fluid Consistency: Thin (IDDSI 0)       Patient Instructions:      Discharge Medications        New Medications        Instructions Start Date   HYDROcodone-acetaminophen 5-325 MG per tablet  Commonly known as: Norco   1 tablet, Oral, Every 6 Hours PRN             Continue These Medications        Instructions Start Date   albuterol sulfate  (90 Base) MCG/ACT inhaler  Commonly known as: Ventolin HFA   2 puffs, Inhalation, Every 4 Hours PRN      amLODIPine 10 MG tablet  Commonly known as: NORVASC   10 mg, Oral, Daily      aspirin 81 MG EC tablet   81 mg, Oral, Daily      lidocaine 5 %  Commonly known as: LIDODERM   1 patch, Transdermal, Every 24 Hours, Remove & Discard patch within 12 hours or as directed by MD      pantoprazole 40 MG EC " tablet  Commonly known as: PROTONIX   40 mg, Oral, Daily      pravastatin 40 MG tablet  Commonly known as: PRAVACHOL   40 mg, Oral, Daily      Trelegy Ellipta 200-62.5-25 MCG/ACT inhaler  Generic drug: Fluticasone-Umeclidin-Vilant   1 puff, Inhalation, Daily             Future Appointments   Date Time Provider Department Center   8/13/2024 10:00 AM Danelle Brown MD [facility]   9/3/2024  8:00 AM Lester Carter MD [facility]   10/21/2024 12:15 PM [facility] CT 2 [facility]   10/29/2024 10:00 AM Humble Hamilton MD [facility]      Follow-up Information       Danelle Brown MD Follow up on 8/13/2024.    Specialty: General Surgery  Why: at 10:00 am  Contact information:  [address] [facility] [address]  [place] [id number]  [phone]               Lester Carter MD Follow up in 1 week(s).    Specialty: Internal Medicine  Contact information:  [address] [facility] [address]  [place] [id number]  [phone]                           Discharge Order (From admission, onward)       Start     Ordered    07/29/24 1147  Discharge patient  Once        Expected Discharge Date: 07/29/24   Discharge Disposition: Home or Self Care   Physician of Record for Attribution - Please select from Treatment Team: JUSTYN TRINIDAD [[id number]]   Review needed by CMO to determine Physician of Record: No      Question Answer Comment   Physician of Record for Attribution - Please select from Treatment Team JUSTYN TRINIDAD    Review needed by CMO to determine Physician of Record No        07/29/24 1147                    Total time spent discharging patient including evaluation,post hospitalization follow up,  medication and post hospitalization instructions and education total time exceeds 30 minutes.    Signed:  Justyn Trinidad MD  7/29/2024  11:48 EDT

## 2024-07-29 NOTE — OUTREACH NOTE
Prep Survey      Flowsheet Row Responses   [facility] patient discharged from? [facility]   Is LACE score < 7 ? Yes   Eligibility [facility]   Date of Admission 07/26/24   Date of Discharge 07/29/24   Discharge diagnosis CHOLECYSTECTOMY LAPAROSCOPIC   Does the patient have one of the following disease processes/diagnoses(primary or secondary)? General Surgery   Prep survey completed? Yes            Arminda KUMAR - Registered Nurse

## 2024-07-29 NOTE — PROGRESS NOTES
Discharge Planning Assessment  [facility]     Patient Name: Patrick Mckeon  MRN: [id number]  Today's Date: 7/29/2024    Admit Date: 7/26/2024    Plan: Return home with spouse   Discharge Needs Assessment       Row Name 07/29/24 1037       Living Environment    People in Home spouse    Current Living Arrangements home    Potentially Unsafe Housing Conditions none    Primary Care Provided by self    Provides Primary Care For no one    Family Caregiver if Needed spouse    Family Caregiver Names Wife Samira [phone]    Quality of Family Relationships helpful    Able to Return to Prior Arrangements yes       Resource/Environmental Concerns    Resource/Environmental Concerns none    Transportation Concerns none       Transition Planning    Patient/Family Anticipates Transition to home with family    Patient/Family Anticipated Services at Transition none    Transportation Anticipated family or friend will provide       Discharge Needs Assessment    Readmission Within the Last 30 Days no previous admission in last 30 days    Equipment Currently Used at Home nebulizer    Concerns to be Addressed denies needs/concerns at this time    Anticipated Changes Related to Illness none    Equipment Needed After Discharge none                   Discharge Plan       Row Name 07/29/24 1039       Plan    Plan Return home with spouse    Patient/Family in Agreement with Plan yes    Plan Comments Spoke with patient and wife Samira [phone] at bedside.  Patient is IADL, has a nebulizer, has never used HH or been to SNf.  PCP is Dr. Lester Carter and pharmacy is [facility] on [facility].  Patient plans to return home at DC and does not anticipate any DC needs.  CCP will follow as needed.  Dianne COOPER                  Continued Care and Services - Admitted Since 7/26/2024    No active coordination exists for this encounter.       Selected Continued Care - Episodes Includes continued care and service providers with selected services from  the active episodes listed below      High Risk Care Management Episode start date: 7/22/2024   There are no active outsourced providers for this episode.                 Expected Discharge Date and Time       Expected Discharge Date Expected Discharge Time    Jul 29, 2024            Demographic Summary       Row Name 07/29/24 1036       General Information    Admission Type observation    Arrived From home    Referral Source admission list    Reason for Consult discharge planning    Preferred Language English                   Functional Status       Row Name 07/29/24 1036       Functional Status    Usual Activity Tolerance good    Current Activity Tolerance moderate       Functional Status, IADL    Medications independent    Meal Preparation independent;assistive person  Wife    Housekeeping assistive person;independent    Laundry independent;assistive person    Shopping assistive person;independent       Mental Status    General Appearance WDL WDL       Mental Status Summary    Recent Changes in Mental Status/Cognitive Functioning no changes                            Becky S. [place], RN

## 2024-07-29 NOTE — PLAN OF CARE
Goal Outcome Evaluation:                      Patient discharging home per MD order.  Patient spouse providing transport

## 2024-07-29 NOTE — PROGRESS NOTES
Case Management Discharge Note      Final Note: Dc'd home              Selected Continued Care - Episodes Includes continued care and service providers with selected services from the active episodes listed below      High Risk Care Management Episode start date: 7/22/2024   There are no active outsourced providers for this episode.                 Transportation Services  Private: Car    Final Discharge Disposition Code: 01 - home or self-care

## 2024-07-29 NOTE — PLAN OF CARE
Goal Outcome Evaluation:              Outcome Evaluation: Pt A&Ox4. VSS on RA. Pt c/o abdominal pain, relieved by PRN po medication. Pt denies nausea. Pt tolerating regular diet. IV fluids continue. Plan of care continues.

## 2024-07-29 NOTE — DISCHARGE INSTRUCTIONS
Dr. Danelle Brown  [address]  [place] [address]  [phone]    Discharge Instructions: Gall Bladder Surgery    Go home, rest and take it easy today; however, you should get up and move about several times today to reduce the risk of developing a blood clot in your legs.     You may experience some dizziness or memory loss from the anesthesia. This may last for the next 24 hours. Someone should plan on staying with you for the first 24 hours for your safety.     Do not make any important legal decisions or sign any legal papers for the next 24 hours.     Eat and drink lightly today. Start off with liquids, jello, soup, crackers or other bland foods at first. You may advance your diet tomorrow as tolerated as long as you do not experience any nausea or vomiting.     Your incisions are covered with skin glue. They will fall off on their own in 1-2 weeks. Do not worry if they come off sooner.     You may notice some bleeding/drainage on your outer dressings. A little bloody drainage is normal. If the bleeding/drainage is such that the bandage cannot absorb it, remove the dressing, apply clean gauze and apply firm pressure for a full 15 minutes. If the bleeding continues, please call me.     You may shower 24hr after surgery. No tub baths until your incisions are completely healed.     You have received a prescription for a narcotic pain medicine, as you will have some pain following surgery.  You will not be totally pain free, but your pain medicine should make the pain tolerable. Please take your pain medicine as prescribed and always take your pills with food to prevent nausea. If you are having severe pain that cannot be controlled by the pain medicine, please contact me.     Narcotic pain medicine can cause constipation. Take an over the counter stool softener, like Miaralax or docusate to prevent constipation while taking narcotics.    You have also received a prescription for an anti-nausea  medicine. Please take this as prescribed for any nausea or vomiting. Nausea could be a result of the anesthesia or a result of the narcotic pain medicine. If you experience severe nausea and vomiting that cannot be controlled by the nausea medicine, please call me.     It is not unusual to experience pain/discomfort in your shoulders or your ribs after surgery. It is from the gas used during the laparoscopic procedure and usually lasts 1-3 days. The prescription pain medicine is used to treat the surgical pain and does not typically alleviate this “gassy” pain.     No driving for 24 hours and for as long as you are taking your prescription pain medicine.     No lifting more than 10 lbs for 2 weeks. Limit lifting to 20 lbs from weeks 2-4 after surgery.     I will have my office schedule a follow up appointment and call you. Call the office at [phone] for any questions or concerns.    Remember to contact me for any of the following:   Fever > 101 degrees  Severe pain that cannot be controlled by taking your pain pills  Severe nausea or vomiting that cannot be controlled by taking your nausea pills  Significant bleeding of your incisions  Drainage that has a bad smell or is yellow or green in appearance  Any other questions or concerns

## 2024-07-30 ENCOUNTER — TRANSITIONAL CARE MANAGEMENT TELEPHONE ENCOUNTER (OUTPATIENT)
Dept: CALL CENTER | Facility: HOSPITAL | Age: 69
End: 2024-07-30
Payer: MEDICARE

## 2024-07-30 LAB
LAB AP CASE REPORT: NORMAL
PATH REPORT.FINAL DX SPEC: NORMAL
PATH REPORT.GROSS SPEC: NORMAL

## 2024-07-30 NOTE — OUTREACH NOTE
Call Center TCM Note      Flowsheet Row Responses   [facility] patient discharged from? [facility]   Does the patient have one of the following disease processes/diagnoses(primary or secondary)? General Surgery   TCM attempt successful? Yes   Call start time 1012   Call end time 1015   Discharge diagnosis CHOLECYSTECTOMY LAPAROSCOPIC   Person spoke with today (if not patient) and relationship pt   Meds reviewed with patient/caregiver? Yes   Is the patient having any side effects they believe may be caused by any medication additions or changes? No   Does the patient have all medications related to this admission filled (includes all antibiotics, pain medications, etc.) Yes   Is the patient taking all medications as directed (includes completed medication regime)? Yes   Comments Post-op 8/13/24   Does the patient have an appointment with their PCP within 7-14 days of discharge? Yes  [8/2/2024 at 3:15 PM]   Psychosocial issues? No   Did the patient receive a copy of their discharge instructions? Yes   Nursing interventions Reviewed instructions with patient   What is the patient's perception of their health status since discharge? Improving   Nursing interventions Nurse provided patient education   Is the patient /caregiver able to teach back basic post-op care? No tub bath, swimming, or hot tub until instructed by MD, Take showers only when approved by MD-sponge bathe until then, Keep incision areas clean,dry and protected, Lifting as instructed by MD in discharge instructions   Is the patient/caregiver able to teach back signs and symptoms of incisional infection? Increased redness, swelling or pain at the incisonal site, Increased drainage or bleeding, Incisional warmth, Pus or odor from incision, Fever   Is the patient/caregiver able to teach back steps to recovery at home? Rest and rebuild strength, gradually increase activity, Eat a well-balance diet   Is the patient/caregiver able to teach back the  hierarchy of who to call/visit for symptoms/problems? PCP, Specialist, Home health nurse, Urgent Care, ED, 911 Yes   TCM call completed? Yes   Wrap up additional comments Pt denies any fever, or increased redness, swelling, drainage at incisional site. Reviewed AVS/meds/instructions with pt. Pt verified PCP/post-op appts   Call end time 1015   Would this patient benefit from a Referral to [facility] Social Work? No   Is the patient interested in additional calls from an ambulatory ? No            Jeanette Blackwood RN    7/30/2024, 10:16 EDT

## 2024-08-02 ENCOUNTER — OFFICE VISIT (OUTPATIENT)
Dept: INTERNAL MEDICINE | Age: 69
End: 2024-08-02
Payer: MEDICARE

## 2024-08-02 VITALS
OXYGEN SATURATION: 96 % | WEIGHT: 170 LBS | SYSTOLIC BLOOD PRESSURE: 120 MMHG | HEART RATE: 94 BPM | HEIGHT: 66 IN | TEMPERATURE: 97.3 F | DIASTOLIC BLOOD PRESSURE: 80 MMHG | BODY MASS INDEX: 27.32 KG/M2

## 2024-08-02 DIAGNOSIS — R39.11 URINARY HESITANCY: ICD-10-CM

## 2024-08-02 DIAGNOSIS — Z09 HOSPITAL DISCHARGE FOLLOW-UP: Primary | ICD-10-CM

## 2024-08-02 DIAGNOSIS — J43.9 PULMONARY EMPHYSEMA, UNSPECIFIED EMPHYSEMA TYPE: Chronic | ICD-10-CM

## 2024-08-02 DIAGNOSIS — K80.00 ACUTE CHOLECYSTITIS DUE TO BILIARY CALCULUS: ICD-10-CM

## 2024-08-02 NOTE — PROGRESS NOTES
"    I N T E R N A L  M E D I C I N E    J U N O H  K I M,  M D      ENCOUNTER DATE:  08/02/2024    Patrick Mckeon / 69 y.o. / male      CC:   (Transitional Care Follow Up Visit)  TCM-Intractable abdominal pain (7/26/24-7/29/24 s/p lap tommy)        Within 48 business hours after discharge our office contacted him via telephone to coordinate his care and needs.      I reviewed and discussed the details of that call along with the discharge summary, hospital problems, inpatient lab results, inpatient diagnostic studies, and consultation reports with the patient.         7/29/2024     6:10 PM   Date of TCM Phone Call   [facility]   Date of Admission 7/26/2024   Date of Discharge 7/29/2024       Risk for Readmission (LACE) Score: 9 (7/29/2024  6:00 AM)          VITALS    Vitals:    08/02/24 1509   BP: 120/80   Pulse: 94   Temp: 97.3 °F (36.3 °C)   SpO2: 96%   Weight: 77.1 kg (170 lb)   Height: 167.6 cm (65.98\")       BP Readings from Last 3 Encounters:   08/02/24 120/80   07/29/24 134/88   07/25/24 120/80     Wt Readings from Last 3 Encounters:   08/02/24 77.1 kg (170 lb)   07/26/24 77.1 kg (170 lb)   07/25/24 77.1 kg (170 lb)      Body mass index is 27.46 kg/m².    HPI:     Date of admission/discharge: As noted above in CC  Hospital: [facility]   Principle Dx: Acute cholecystitis due to gallstone  Secondary Dx: COPD  History prior to hospitalization: Current upper abdominal pain  Evaluation/Treatment: Patient was admitted to the hospital for recurrent upper abdominal pain.  He was evaluated by vascular surgery and general surgery.  He underwent HIDA scan which showed nonexistent gallbladder function.  CT of the abdomen revealed cholelithiasis. He underwent uneventful lap cholecystectomy.  Course: Denies worsening pain, fever, cough or shortness of breath. Unfortunately he has emphysema but still smokes. Has not been using incentive spirometer. Took hydrocodone for pain yesterday and " denies significant constipation. He has had increased urinary hesitancy symptoms but is able to urinate.     Patient Care Team:  Lester Carter MD as PCP - General (Internal Medicine)  Orlin Quintero MD as Consulting Physician (Gastroenterology)  Keyur Mejia II, MD as Consulting Physician (Hematology and Oncology)  Humble Hamilton MD as Surgeon (Vascular Surgery)  Radhika, Keyur Larson MD as Consulting Physician (Dermatology)  Felipa Drake RN as Ambulatory  ([facility])  ____________________________________________________________________    ASSESSMENT & PLAN:    1. Hospital discharge follow-up    2. Acute cholecystitis due to biliary calculus    3. Urinary hesitancy    4. Pulmonary emphysema, unspecified emphysema type      No orders of the defined types were placed in this encounter.      Summary/Discussion:  Follow-up with general surgery as direct.   Use incentive spirometer and ambulate as tolerated  Watch for constipation.   Consider tamsulosin for urinary hesitancy symptoms (he defers at this time)  Continue inhalers, advised to quit smoking     Return for Next scheduled followup for AWV.    ____________________________________________________________________    REVIEW OF SYSTEMS    Review of Systems  No fever or chills  Denies worsening cough, wheezing or shortness of breath   Denies chest pain , PND/orthopnea   Abdomen: postop abdominal discomfort/pain, denies constipation, nausea/vomiting   : urinary hesitancy     PHYSICAL EXAMINATION    Physical Exam  Constitutional:       General: He is not in acute distress.     Appearance: He is not ill-appearing.   Eyes:      General: No scleral icterus.  Cardiovascular:      Rate and Rhythm: Normal rate and regular rhythm.   Pulmonary:      Effort: Pulmonary effort is normal. No respiratory distress.      Breath sounds: No wheezing or rales.   Abdominal:      General: Bowel sounds are normal. There is no distension.      Palpations:  Abdomen is soft.      Tenderness: There is abdominal tenderness (no unusual ttp). There is no guarding or rebound.      Hernia: No hernia is present.   Skin:     Coloration: Skin is not jaundiced.   Neurological:      Mental Status: He is alert.           REVIEWED DATA:    Labs:   Lab Results   Component Value Date     07/29/2024    K 4.8 07/29/2024    CALCIUM 8.2 (L) 07/29/2024    AST 67 (H) 07/29/2024    ALT 60 (H) 07/29/2024    BUN 13 07/29/2024    CREATININE 1.22 07/29/2024    CREATININE 1.06 07/27/2024    CREATININE 1.31 (H) 07/26/2024    EGFRIFNONA 82 01/14/2022    EGFRIFAFRI 72 03/29/2021       Lab Results   Component Value Date    WBC 9.71 07/29/2024    HGB 13.3 07/29/2024    HGB 14.0 07/27/2024    HGB 15.7 07/26/2024     07/29/2024       Lab Results   Component Value Date    PROTEIN Negative 11/09/2022    GLUCOSEU Negative 07/26/2024    BLOODU Negative 07/26/2024    NITRITEU Negative 07/26/2024    LEUKOCYTESUR Negative 07/26/2024           Imaging:   FL Cholangiogram Operative    Result Date: 7/28/2024  Narrative: INTRAOPERATIVE CHOLANGIOGRAM 07/28/2024  HISTORY: Laparoscopic cholecystectomy with possible stones.  Contrast is seen in the intrahepatic, common hepatic and common bile ducts. The common hepatic duct is incompletely distended compared to the common duct. No definite bile duct stones or biliary dilatation is seen. There is free spill of contrast into the duodenum.  FLUOROSCOPY TIME: Forty-six seconds, 12 mGy,  308 images.  This report was finalized on 7/28/2024 10:58 PM by Dr. Jai Manzo M.D on Workstation: [id number]      NM HIDA SCAN WITH PHARMACOLOGICAL INTERVENTION    Result Date: 7/28/2024  Narrative: NM HIDA SCAN WITH PHARMACOLOGICAL INTERVENTION-7/28/2024  HISTORY: Abdominal pain.  5.9 mCi technetium Choletec was given intravenously. Imaging was performed through 1 hour. After 1 hour there is liver biliary and small bowel activity. No gallbladder activity is seen. After  1 hour, intravenous morphine was given. Additional images were obtained through approximately 40 minutes. More small bowel activity is seen. Still no gallbladder activity is seen.      Impression: 1. Absent gallbladder activity is consistent with acute cholecystitis.   This report was finalized on 7/28/2024 10:58 AM by Dr. Jai Manzo M.D on Workstation: [id number]      CT Abdomen Pelvis With Contrast    Result Date: 7/26/2024  Narrative: CT OF THE ABDOMEN AND PELVIS WITH CONTRAST 07/26/2024  HISTORY: Abdominal pain.  Axial images were obtained from the lung bases to the symphysis pubis after intravenous contrast.  Mildly prominent vessel is seen in the medial aspect of the right lower lobe such as on axial images 11 through 15 stable since the 7/21/2024 study.  The liver, spleen and pancreas appear unremarkable. Small amount of hyperdense bile or tiny gallstones are seen in the gallbladder also seen on the 07/21/2024 study. No gallbladder inflammatory changes are seen. Adrenal glands appear unremarkable. Left kidney is absent. Right renal cyst is seen. Aortobiiliac stent graft is seen. Stable native aorta seen measuring up to approximately 4.9 cm x 3.1 cm on axial image 48 similar to the 7/21/2024 study.  There is colonic diverticulosis. There is mild prostate gland enlargement. Small low-density collection is seen near the posterior prostate gland. This finding appears stable since the 7/21/2024 study. Urinary bladder is unremarkable.      Impression: 1. Cholelithiasis and there may be a small amount of hyperdense bile in the gallbladder. 2. Aortobiiliac stent graft is unchanged from the 07/21/2024 study with dilatation of the abdominal aorta also stable. 3. Colonic diverticulosis. 4. No acute findings are seen to explain patient's reported abdominal pain. 5. Absent left kidney.    Radiation dose reduction techniques were utilized, including automated exposure control and exposure modulation based on body  size.   This report was finalized on 7/26/2024 2:10 PM by Dr. Jai Manzo M.D on Workstation: [id number]      US Gallbladder    Result Date: 7/21/2024  Narrative: Ultrasound gallbladder  TECHNIQUE: Grayscale and color Doppler images of the gallbladder were obtained.  HISTORY: Clinical concern for cholecystitis  COMPARISON: CT abdominal angiogram 7/21/2024  FINDINGS: Pancreas is normal where visualized. Liver measures up to 16.5 cm. Normal echogenicity and echotexture. Smooth hepatic surface cortical contour. Patent main portal vein with antegrade flow and normal venous spectral waveform. Gallbladder is nondistended. Cholelithiasis. Gallbladder wall measures 2 mm. Negative ultrasonographic Horner sign. No pericholecystic fluid or hyperechoic fat. Common bile duct measures 6 mm. No intrahepatic bili duct dilation. Right kidney measures 12.4 cm. Few anechoic right renal cysts measuring up to 3.2 cm. Normal cortical echogenicity and thickness. No hydronephrosis.       Impression: Cholelithiasis without ultrasonographic findings to suggest acute cholecystitis.    This report was finalized on 7/21/2024 12:32 PM by Dr. Brando Mitchell M.D on Workstation: [id number]      CT Angio Abdominal Aorta Bilateral Iliofem Runoff    Result Date: 7/21/2024  Narrative: CT ANGIO ABDOMINAL AORTA BILAT ILIOFEM RUNOFF-  INDICATION: Abdominal pain radiating to back.  COMPARISON: CT abdomen pelvis January 14, 2022  TECHNIQUE: CTA abdomen/pelvis with bilateral lower extremity runoff with IV contrast. Coronal and sagittal reformats. Three dimensional reconstructions. Radiation dose reduction techniques were utilized, including automated exposure control and exposure modulation based on body size.  FINDINGS:  Vasculature: Celiac axis and SMA are patent without stenosis. Right renal artery is patent, without stenosis. Aortobiiliac endovascular stent, with the aortic aneurysm sac on series 4, axial image 82, measuring 4.9 x 3.7 cm,  previously measuring 4.4 x 3.1 cm, appears larger, with an endoleak present in the posterior inferior aneurysm sac, series 4, axial image 89 and questionably anterior near the SUNIL origin, series 4, axial image 87 though a noncontrast CT is needed to confirm this site.  Right-sided vasculature: Atherosclerotic disease in the common femoral artery with less than 50% stenosis. Multifocal atherosclerotic disease in the SFA, with less than 50% stenoses. Atherosclerotic disease in the popliteal artery with less than 50% stenosis. Three-vessel runoff to the ankle.  Left-sided vasculature: Atherosclerotic disease in the common femoral artery and superficial femoral artery with less than 50% stenoses. Three-vessel runoff to the ankle.  Lung bases: Respiratory motion artifact. Small amount of subsegmental atelectasis or scarring at the bases. Small airways wall thickening at the bases. Coronary artery atherosclerotic calcifications.  ABDOMEN: Possible transient hepatic attenuation differences seen in segment 8 and segment 4 of the liver. Small cyst seen in segment 2/3 of the left paddock lobe. Cholelithiasis. No gallbladder wall thickening or surrounding inflammation. No biliary ductal dilatation. Spleen is normal in size. Mild pancreatic atrophy. No pancreatic ductal dilatation or mass seen. No adrenal nodules. Fluid attenuating right renal cysts, largest in the mid kidney, measuring 3.0 cm. Questionable tiny atrophic left kidney. No solid-appearing renal mass or hydronephrosis.  Pelvis: Prostate results in nodular mass effect on the bladder apex. Stable atrophic appearance of the right seminal vesicle. Underdistended bladder. No bladder calculus.  Bowel: Small hiatal hernia. Incidental duodenal diverticulum. Colonic diverticulosis. Normal appendix. No obstruction.  Abdominal wall: Rectus diastases.  Retroperitoneum: No lymphadenopathy.  Soft tissues: Lower calf and bilateral foot soft tissue edema.  Osseous structures: No  destructive osseous lesions.      Impression:  1. Aortobiiliac endovascular stent repair of abdominal aortic aneurysm with interval increase in size of aortic aneurysm sac when compared to CT abdomen pelvis January 14, 2022 with an endoleak present. Follow-up cardiovascular surgical evaluation. 2. No high-grade vessel stenosis, with bilateral lower extremity three-vessel runoff to the ankle. 3. Colonic diverticulosis. 4. Cholelithiasis 5. Prostatomegaly with nodular mass effect on the bladder apex appears stable. Recommend correlation with PSA levels.  This report was finalized on 7/21/2024 9:35 AM by Dr. Allen Juan M.D on Workstation: [id number]      Duplex Aorta IVC Iliac Graft Complete CAR    Result Date: 7/16/2024  Narrative:   Patent abdominal aortic stent graft with exclusion identified.  Stable sac size measuring 3.4 cm.                   Medical Tests:                  Summary of old records / correspondence / consultant report:   DC summary re: issues addressed on HPI              Request outside records:         MEDICATIONS   Current Outpatient Medications   Medication Sig Dispense Refill    albuterol sulfate HFA (Ventolin HFA) 108 (90 Base) MCG/ACT inhaler Inhale 2 puffs Every 4 (Four) Hours As Needed for Wheezing or Shortness of Air. 18 g 5    amLODIPine (NORVASC) 10 MG tablet Take 1 tablet by mouth Daily. 90 tablet 1    aspirin 81 MG EC tablet Take 1 tablet by mouth Daily.      Fluticasone-Umeclidin-Vilant (Trelegy Ellipta) 200-62.5-25 MCG/ACT inhaler Inhale 1 puff Daily. 28 each 5    HYDROcodone-acetaminophen (Norco) 5-325 MG per tablet Take 1 tablet by mouth Every 6 (Six) Hours As Needed for Moderate Pain or Severe Pain. 10 tablet 0    pantoprazole (PROTONIX) 40 MG EC tablet Take 1 tablet by mouth Daily. 90 tablet 1    pravastatin (PRAVACHOL) 40 MG tablet Take 1 tablet by mouth Daily. 90 tablet 1     No current facility-administered medications for this visit.       Current outpatient and  discharge medications have been reconciled for the patient.  Reviewed by: Lester Carter MD

## 2024-08-09 DIAGNOSIS — E78.5 HYPERLIPIDEMIA, UNSPECIFIED HYPERLIPIDEMIA TYPE: ICD-10-CM

## 2024-08-09 DIAGNOSIS — K44.9 HIATAL HERNIA WITH GASTROESOPHAGEAL REFLUX DISEASE AND ESOPHAGITIS: Chronic | ICD-10-CM

## 2024-08-09 DIAGNOSIS — I10 PRIMARY HYPERTENSION: Chronic | ICD-10-CM

## 2024-08-09 DIAGNOSIS — K21.00 HIATAL HERNIA WITH GASTROESOPHAGEAL REFLUX DISEASE AND ESOPHAGITIS: Chronic | ICD-10-CM

## 2024-08-12 RX ORDER — PRAVASTATIN SODIUM 40 MG
40 TABLET ORAL DAILY
Qty: 90 TABLET | Refills: 1 | Status: SHIPPED | OUTPATIENT
Start: 2024-08-12

## 2024-08-12 RX ORDER — AMLODIPINE BESYLATE 10 MG/1
10 TABLET ORAL DAILY
Qty: 90 TABLET | Refills: 1 | Status: SHIPPED | OUTPATIENT
Start: 2024-08-12

## 2024-08-12 RX ORDER — PANTOPRAZOLE SODIUM 40 MG/1
40 TABLET, DELAYED RELEASE ORAL DAILY
Qty: 90 TABLET | Refills: 1 | Status: SHIPPED | OUTPATIENT
Start: 2024-08-12

## 2024-08-13 ENCOUNTER — OFFICE VISIT (OUTPATIENT)
Dept: SURGERY | Facility: CLINIC | Age: 69
End: 2024-08-13
Payer: MEDICARE

## 2024-08-13 VITALS
BODY MASS INDEX: 26.84 KG/M2 | HEIGHT: 66 IN | WEIGHT: 167 LBS | DIASTOLIC BLOOD PRESSURE: 82 MMHG | SYSTOLIC BLOOD PRESSURE: 114 MMHG

## 2024-08-13 DIAGNOSIS — K80.20 CALCULUS OF GALLBLADDER WITHOUT CHOLECYSTITIS WITHOUT OBSTRUCTION: Primary | Chronic | ICD-10-CM

## 2024-08-13 PROCEDURE — 1159F MED LIST DOCD IN RCRD: CPT | Performed by: SURGERY

## 2024-08-13 PROCEDURE — 3074F SYST BP LT 130 MM HG: CPT | Performed by: SURGERY

## 2024-08-13 PROCEDURE — 99024 POSTOP FOLLOW-UP VISIT: CPT | Performed by: SURGERY

## 2024-08-13 PROCEDURE — 3079F DIAST BP 80-89 MM HG: CPT | Performed by: SURGERY

## 2024-08-13 PROCEDURE — 1160F RVW MEDS BY RX/DR IN RCRD: CPT | Performed by: SURGERY

## 2024-08-13 NOTE — PROGRESS NOTES
General Surgery Post-Operative Follow Up Note     History of Present Illness:    Patrick Mckeon is a 69 y.o. year old male who presents for post-operative follow up from da Kushal assisted laparoscopic cholecystectomy with intraoperative cholangiogram.  He has been doing well.  No nausea or vomiting.  Pain is well-controlled.    Procedure:    Da Kushal assisted laparoscopic cholecystectomy with intraoperative cholangiogram    Pathology:    1. Gallbladder, Cholecystectomy: Benign gallbladder with               A. Cholelithiasis.               B.  Acute and chronic cholecystitis.    Physical Exam:   Constitutional: Well-developed well-nourished, no acute distress  Eyes: Conjunctiva normal, sclera nonicteric  ENMT: Hearing grossly normal, oral mucosa moist  Respiratory: No increased work of breathing, normal inspiratory effort  Cardiovascular: Regular rate, no peripheral edema, no jugular venous distention  Gastrointestinal: Soft, nontender, incisions clean, dry and intact.  No evidence of hernias  Skin:  Warm, dry, no rash on visualized skin surfaces  Musculoskeletal: Symmetric strength, normal gait  Psychiatric: Alert and oriented ×3, normal affect       Assessment/Plan:  1.  Acute and chronic cholecystitis  -Okay to resume activities as tolerated.  Okay to lift more than 10 pounds.  -Follow-up with me as needed      Danelle Brown M.D.  General, Robotic and Endoscopic Surgery  [facility], [place], operated by [facility]    [address], [address]  [place], [place], [address]  P: [phone]  F: [phone]

## 2024-08-21 ENCOUNTER — PATIENT OUTREACH (OUTPATIENT)
Dept: CASE MANAGEMENT | Facility: OTHER | Age: 69
End: 2024-08-21
Payer: MEDICARE

## 2024-08-21 NOTE — OUTREACH NOTE
"AMBULATORY CASE MANAGEMENT NOTE    Names and Relationships of Patient/Support Persons: Contact: PhiliporianaPatrick NEREIDA \"MU\"; Relationship: Self -     Patient Outreach  RN-ACM outreach with patient following Call Center Handoff. Discussed 7/26/24 to 7/29/24 hospitalization regarding intractable abdominal pain; acute cholecystitis and cholecystectomy. Patient states to be doing well and has had 8/13/24 surgery follow up and has 9/3/24 PCP appointment. Patient states improvement regarding abdominal pain; states no difficulty with fever; chest pain; SOB; appetite or sleeping. Patient states to be compliant with medications; medical appointments and monitoring of blood pressure with values WNL's. Reviewed with patient education and patient verbalized understanding. Patient states to appreciate outreach and declines needs for further outreach at this time. No further questions voiced at this time.       Education Documentation  Provider Follow-Up, taught by Felipa Drake, RN at 8/21/2024  1:12 PM.  Learner: Patient  Readiness: Acceptance  Method: Explanation  Response: Verbalizes Understanding    Blood Pressure Monitoring, taught by Felipa Drake, RN at 8/21/2024  1:12 PM.  Learner: Patient  Readiness: Acceptance  Method: Explanation  Response: Verbalizes Understanding    Provider Follow-Up, taught by Felipa Drake, RN at 8/21/2024  1:12 PM.  Learner: Patient  Readiness: Acceptance  Method: Explanation  Response: Verbalizes Understanding          Felipa SANTIAGO  Ambulatory Case Management    8/21/2024, 13:12 EDT  "

## 2024-09-03 ENCOUNTER — OFFICE VISIT (OUTPATIENT)
Dept: INTERNAL MEDICINE | Age: 69
End: 2024-09-03
Payer: MEDICARE

## 2024-09-03 VITALS
SYSTOLIC BLOOD PRESSURE: 122 MMHG | HEIGHT: 66 IN | HEART RATE: 69 BPM | OXYGEN SATURATION: 97 % | TEMPERATURE: 97.5 F | RESPIRATION RATE: 16 BRPM | WEIGHT: 170 LBS | BODY MASS INDEX: 27.32 KG/M2 | DIASTOLIC BLOOD PRESSURE: 80 MMHG

## 2024-09-03 DIAGNOSIS — F17.219 CIGARETTE NICOTINE DEPENDENCE WITH NICOTINE-INDUCED DISORDER: Chronic | ICD-10-CM

## 2024-09-03 DIAGNOSIS — I25.10 ATHEROSCLEROSIS OF NATIVE CORONARY ARTERY OF NATIVE HEART WITHOUT ANGINA PECTORIS: Chronic | ICD-10-CM

## 2024-09-03 DIAGNOSIS — J43.9 PULMONARY EMPHYSEMA, UNSPECIFIED EMPHYSEMA TYPE: Chronic | ICD-10-CM

## 2024-09-03 DIAGNOSIS — E78.2 MIXED HYPERLIPIDEMIA: Primary | Chronic | ICD-10-CM

## 2024-09-03 DIAGNOSIS — I10 PRIMARY HYPERTENSION: Chronic | ICD-10-CM

## 2024-09-03 DIAGNOSIS — Z12.5 ENCOUNTER FOR SCREENING FOR MALIGNANT NEOPLASM OF PROSTATE: ICD-10-CM

## 2024-09-03 DIAGNOSIS — N18.31 STAGE 3A CHRONIC KIDNEY DISEASE: Chronic | ICD-10-CM

## 2024-09-03 DIAGNOSIS — K21.00 GASTROESOPHAGEAL REFLUX DISEASE WITH ESOPHAGITIS WITHOUT HEMORRHAGE: Chronic | ICD-10-CM

## 2024-09-03 PROBLEM — K81.0 ACUTE CHOLECYSTITIS: Status: RESOLVED | Noted: 2024-07-26 | Resolved: 2024-09-03

## 2024-09-03 PROBLEM — R10.9 INTRACTABLE ABDOMINAL PAIN: Status: RESOLVED | Noted: 2024-07-26 | Resolved: 2024-09-03

## 2024-09-03 LAB
BUN SERPL-MCNC: 18 MG/DL (ref 8–23)
BUN/CREAT SERPL: 14.5 (ref 7–25)
CALCIUM SERPL-MCNC: 9.4 MG/DL (ref 8.6–10.5)
CHLORIDE SERPL-SCNC: 105 MMOL/L (ref 98–107)
CHOLEST SERPL-MCNC: 131 MG/DL (ref 0–200)
CHOLEST/HDLC SERPL: 2.18 {RATIO}
CO2 SERPL-SCNC: 24.2 MMOL/L (ref 22–29)
CREAT SERPL-MCNC: 1.24 MG/DL (ref 0.76–1.27)
EGFRCR SERPLBLD CKD-EPI 2021: 62.5 ML/MIN/1.73
GLUCOSE SERPL-MCNC: 89 MG/DL (ref 65–99)
HDLC SERPL-MCNC: 60 MG/DL (ref 40–60)
LDLC SERPL CALC-MCNC: 56 MG/DL (ref 0–100)
POTASSIUM SERPL-SCNC: 4.5 MMOL/L (ref 3.5–5.2)
PSA SERPL-MCNC: 1.35 NG/ML (ref 0–4)
SODIUM SERPL-SCNC: 140 MMOL/L (ref 136–145)
TRIGL SERPL-MCNC: 75 MG/DL (ref 0–150)
VLDLC SERPL CALC-MCNC: 15 MG/DL (ref 5–40)

## 2024-09-03 NOTE — ASSESSMENT & PLAN NOTE
BP Readings from Last 3 Encounters:   09/03/24 122/80   08/13/24 114/82   08/02/24 120/80    Continue amlodipine 10 mg daily

## 2024-09-03 NOTE — ASSESSMENT & PLAN NOTE
Continue to smoke 1 pack/day.  I strongly advised him to consider smoking cessation.  Continue annual low-dose CT of the chest for lung cancer screening.

## 2024-09-03 NOTE — PROGRESS NOTES
I N T E R N A L  M E D I C I N E    J U N O H  K I M,  M D      ENCOUNTER DATE:  09/03/2024    Patrick Mckeon / 69 y.o. / male      MEDICARE ANNUAL WELLNESS VISIT       Chief Complaint:    Chief Complaint   Patient presents with    Medicare Wellness-subsequent    Follow-up for chronic medical problems         Patient's general assessment of his health since a year ago:     - Compared to one year ago, he feels his physical health is:   STABLE/SAME    - Compared to one year ago, he feels his mental health is:  STABLE/SAME    Recent Hospitalization (within past 365 days) (NO unless indicated)  Yes at [facility] in July for cholecystectomy       Patient Care Team:    Patient Care Team:  Lester Carter MD as PCP - General (Internal Medicine)  Humble Hamilton MD as Surgeon (Vascular Surgery)  Felipa Drake RN as Ambulatory  (Population Health)  Antolin Wolfe MD as Consulting Physician (Gastroenterology)  Rick Rocha MD (Ophthalmology)  Allegra Ferrari MD as Consulting Physician (Nephrology)    Allergies:  Patient has no known allergies.    Medications:  Current Outpatient Medications on File Prior to Visit   Medication Sig Dispense Refill    albuterol sulfate HFA (Ventolin HFA) 108 (90 Base) MCG/ACT inhaler Inhale 2 puffs Every 4 (Four) Hours As Needed for Wheezing or Shortness of Air. 18 g 5    amLODIPine (NORVASC) 10 MG tablet TAKE ONE TABLET BY MOUTH EVERY DAY 90 tablet 1    aspirin 81 MG EC tablet Take 1 tablet by mouth Daily.      Fluticasone-Umeclidin-Vilant (Trelegy Ellipta) 200-62.5-25 MCG/ACT inhaler Inhale 1 puff Daily. 28 each 5    pantoprazole (PROTONIX) 40 MG EC tablet TAKE 1 TABLET BY MOUTH DAILY. 90 tablet 1    pravastatin (PRAVACHOL) 40 MG tablet TAKE ONE TABLET BY MOUTH EVERY DAY 90 tablet 1     No current facility-administered medications on file prior to visit.          No opioid medication identified on active medication list. I have reviewed  chart for other potential  high risk medication/s and harmful drug interactions in the elderly.       Opioid Pain Assessment: No opioid listed on medication list       HPI for other active medical problems:     Hypertension: controlled on amlodipine 10 mg daily     Emphysema: stable on Trelegy inhaler, does not see a pulmonologist. Smokes 1 pack per day. Annual lung CT up to date.     Sees vascular surgery for PVD / AAA     Takes pantoprazole for GERD without change in symptoms     On pravastatin 40 mg for hyperlipidemia without problems     Sees nephrologist for stage 3a CKD, last potassium was 5.2, creatinine gradually rising       HISTORY     PFSH:     The following portions of the patient's history were reviewed and updated as appropriate: Allergies / Current Medications / Past Medical History / Surgical History / Social History / Family History    Problem List:  Patient Active Problem List   Diagnosis    Primary hypertension    Cigarette nicotine dependence with nicotine-induced disorder    Emphysema lung    Hyperlipidemia    Abdominal aortic aneurysm (AAA) without rupture    Congenital absence of left kidney    Atherosclerosis of native coronary artery of native heart without angina pectoris    Calculus of gallbladder without cholecystitis without obstruction    Gastroesophageal reflux disease with esophagitis without hemorrhage    CKD (chronic kidney disease) stage 3, GFR 30-59 ml/min    Anemia    Stage 3a chronic kidney disease    History of endovascular stent graft for abdominal aortic aneurysm (AAA)       Past Medical History:  Past Medical History:   Diagnosis Date    Abdominal aortic aneurysm without rupture 06/11/2019    Anemia 11/20/2021    Atherosclerosis of native coronary artery of native heart without angina pectoris 03/10/2020    Cigarette nicotine dependence with nicotine-induced disorder     Colon polyp     Diverticulosis of large intestine without hemorrhage 08/09/2017    Emphysema lung  02/28/2017    Essential (primary) hypertension     H/O abdominal aortic aneurysm repair 06/11/2019    History of endovascuar abodominal aortic aneursym repair    Hiatal hernia with gastroesophageal reflux disease and esophagitis 03/10/2020    Hyperlipidemia 02/28/2017    Liver abscess 11/19/2021    Nicotine dependence, other tobacco product, uncomplicated 06/11/2019    Portal vein thrombosis 08/29/2017    Sepsis 08/02/2017    Shingles 09/2010    Skin cancer 05/31/2017    Thrombosis, 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    coag hickey by ladonna velazquez; completed 6 months of AC and maintained on ASA       Past Surgical History:  Past Surgical History:   Procedure Laterality Date    ARTERIOGRAM AORTIC N/A 05/08/2017    Procedure:  AORTIC STENT GRAFT REPAIR W/ GORE BILATERAL FEMORAL MINI CUTDOWNS;  Surgeon: Humble Hamilton MD;  Location: [facility] HYBRID OR 18/19;  Service:     CATARACT EXTRACTION Left 10/2023    CATARACT EXTRACTION Right 11/2023    CHOLECYSTECTOMY WITH INTRAOPERATIVE CHOLANGIOGRAM N/A 7/28/2024    Procedure: CHOLECYSTECTOMY LAPAROSCOPIC INTRAOPERATIVE CHOLANGIOGRAM WITH DAVINCI ROBOT;  Surgeon: Danelle Brown MD;  Location: [facility] MAIN OR;  Service: Robotics - DaVinci;  Laterality: N/A;    COLONOSCOPY N/A 08/03/2017    Procedure: COLONOSCOPY to cecum;  Surgeon: Patrick Elizabeth MD;  Location: [facility] ENDOSCOPY;  Service:     COLONOSCOPY  09/2010    COLONOSCOPY N/A 01/31/2023    Procedure: COLONOSCOPY INTO CECUM WITH COLD SNARE POLYPECTOMIES;  Surgeon: Antolin Wolfe MD;  Location: [facility] ENDOSCOPY;  Service: Gastroenterology;  Laterality: N/A;  PRE; PERSONAL H/O COLON POLYPS; FAMILY H/O COLON CANCER  POST: DIVERTICULOSIS, POLYPS, HEMORRHIDS    ENDOSCOPY  08/03/2017    Procedure: ESOPHAGOGASTRODUODENOSCOPY;  Surgeon: Patrick Elizabeth MD;  Location: [facility] ENDOSCOPY;  Service:     HERNIA REPAIR  1980    KIDNEY SURGERY      Kidney Removal    UMBILICAL HERNIA REPAIR  1975       Social History:  Social History  "    Socioeconomic History    Marital status:      Spouse name: Samira    Number of children: 1    Years of education: High School   Tobacco Use    Smoking status: Every Day     Current packs/day: 1.00     Average packs/day: 1 pack/day for 42.0 years (42.0 ttl pk-yrs)     Types: Cigarettes    Smokeless tobacco: Never    Tobacco comments:     Patient smokes 1 pack per day         Vaping Use    Vaping status: Former    Substances: Nicotine   Substance and Sexual Activity    Alcohol use: Yes     Comment: 6 days (1-2 drinks)    Drug use: No     Comment: Drug Abuse: no drug abuse    Sexual activity: Not Currently     Partners: Female       Family History:  Family History   Problem Relation Age of Onset    Hypertension Mother     Hyperlipidemia Mother     Sudden death Mother 78    Aneurysm Mother     Heart disease Mother     Stroke Mother     Colon cancer Father 55    Heart attack Father 71    Coronary artery disease Father     COPD Sister     Benign prostatic hyperplasia Brother     Hypertension Brother     Benign prostatic hyperplasia Brother     Brain cancer Maternal Grandmother     Heart disease Maternal Grandfather     Sudden death Maternal Grandfather     Heart disease Other     Cancer Other         no details    Prostate cancer Neg Hx     Dementia Neg Hx     Malig Hyperthermia Neg Hx          PATIENT ASSESSMENT     Vitals:  Vitals:    09/03/24 0752   BP: 122/80   Pulse: 69   Resp: 16   Temp: 97.5 °F (36.4 °C)   SpO2: 97%   Weight: 77.1 kg (170 lb)   Height: 167.6 cm (65.98\")       BP Readings from Last 3 Encounters:   09/03/24 122/80   08/13/24 114/82   08/02/24 120/80     Wt Readings from Last 3 Encounters:   09/03/24 77.1 kg (170 lb)   08/13/24 75.8 kg (167 lb)   08/02/24 77.1 kg (170 lb)      Body mass index is 27.45 kg/m².    Blood pressure readings recorded on patient flowsheet:       No data to display                    Review of Systems:    Review of Systems  Constitutional neg except per HPI   Resp " neg  CV neg   GI negative    negative for change  Neuro negative for change  No significant change in mood.   MuSk negative for change     Physical Exam:    Physical Exam  Alert with normal thought and judgment.   Cardiovascular: Normal rate, regular rhythm.   Lungs: decreased breath sounds bilaterally without rales or wheezing   No carotid bruit     Reviewed Data:    Labs:   Lab Results   Component Value Date     07/29/2024    K 4.8 07/29/2024    CALCIUM 8.2 (L) 07/29/2024    AST 67 (H) 07/29/2024    ALT 60 (H) 07/29/2024    BUN 13 07/29/2024    CREATININE 1.22 07/29/2024    CREATININE 1.06 07/27/2024    CREATININE 1.31 (H) 07/26/2024    EGFRIFNONA 82 01/14/2022    EGFRIFAFRI 72 03/29/2021       Lab Results   Component Value Date    HGBA1C 5.60 01/27/2020    HGBA1C 5.60 01/11/2019    HGBA1C 5.10 02/21/2017    MICROALBUR <1.2 01/14/2022       Lab Results   Component Value Date    LDL 74 08/30/2023    LDL 60 08/18/2022    LDL 80 02/01/2022    HDL 59 08/30/2023    TRIG 87 08/30/2023    CHOLHDLRATIO 2.53 08/30/2023       Lab Results   Component Value Date    TSH 1.110 01/11/2019    FREET4 1.26 01/11/2019     Lab Results   Component Value Date    PROTEIN Negative 11/09/2022    GLUCOSEU Negative 07/26/2024    BLOODU Negative 07/26/2024    NITRITEU Negative 07/26/2024    LEUKOCYTESUR Negative 07/26/2024         Lab Results   Component Value Date    WBC 9.71 07/29/2024    HGB 13.3 07/29/2024    HGB 14.0 07/27/2024    HGB 15.7 07/26/2024     07/29/2024                   Lab Results   Component Value Date    PSA 1.340 08/30/2023    PSA 1.3 08/25/2022    PSA 0.937 03/29/2021       Imaging:     CT Chest Low Dose Cancer Screening WO (05/01/2024 09:09)   1. No concerning pulmonary nodule. Chronic changes in both lungs with no  acute intrathoracic abnormality.  2. Lung-RADS category 1, negative.  3. Based on [employer], Lung-RADS v2022, management  based on above findings is follow-up with  12-month LDCT.  4. Moderate calcified coronary artery disease.  5. Likely healing nondisplaced fracture of the superior sternum, new  since the prior study.  6. Tiny hiatal hernia with mild nonspecific thickening of the distal  esophageal wall. Recommend correlating for GERD.     CT Abdomen Pelvis With Contrast (07/26/2024 11:42)   1. Cholelithiasis and there may be a small amount of hyperdense bile in  the gallbladder.  2. Aortobiiliac stent graft is unchanged from the 07/21/2024 study with  dilatation of the abdominal aorta also stable.  3. Colonic diverticulosis.  4. No acute findings are seen to explain patient's reported abdominal  pain.  5. Absent left kidney.     Medical Tests:              Summary of old records / correspondence / consultant report:             Request outside records:           SCREENING ASSESSMENT      Screening for Glaucoma:  Previous screening for glaucoma?: Yes    Hearing Loss Screen:  Finger Rub Hearing Test (right ear): Passed  Finger Rub Hearing Test (left ear): Passed    Urinary Incontinence Screen:  Episodes of urinary incontinence? : NO    Depression Screen:      9/3/2024     8:01 AM   PHQ-2/PHQ-9 Depression Screening   Little Interest or Pleasure in Doing Things 0-->not at all   Feeling Down, Depressed or Hopeless 0-->not at all   PHQ-9: Brief Depression Severity Measure Score 0        PHQ-2: 0 (Not depressed)    PHQ-9: 0 (Negative screening for depression)         FUNCTIONAL, FALL RISK, & COGNITIVE SCREENING (components below):     A) Functional and cognitive status based on patient responses:        9/3/2024     7:57 AM   Functional & Cognitive Status   Do you have difficulty preparing food and eating? No   Do you have difficulty bathing yourself, getting dressed or grooming yourself? No   Do you have difficulty using the toilet? No   Do you have difficulty moving around from place to place? No   Do you have trouble with steps or getting out of a bed or a chair? No   Current  Diet Well Balanced Diet   Dental Exam Up to date   Eye Exam Not up to date   Exercise (times per week) 4 times per week   Current Exercises Include Light Weights;Stationary Bicycling/Spin Class   Do you need help using the phone?  No   Are you deaf or do you have serious difficulty hearing?  No   Do you need help to go to places out of walking distance? No   Do you need help shopping? No   Do you need help preparing meals?  No   Do you need help with housework?  No   Do you need help with laundry? No   Do you need help taking your medications? No   Do you need help managing money? No   Do you ever drive or ride in a car without wearing a seat belt? No   Have you felt unusual stress, anger or loneliness in the last month? No   Who do you live with? Spouse   If you need help, do you have trouble finding someone available to you? No   Have you been bothered in the last four weeks by sexual problems? No   Do you have difficulty concentrating, remembering or making decisions? No       B) Assessment of Fall Risk:    Fall Risk Assessment was completed, and patient is at moderate risk for falls.    Need for further evaluation of gait, strength, and balance? : YES    Timed Up and Go (TUG): 8 seconds   (>= 12 seconds indicates high risk for falling)    Observable abnormalities included:   NORMAL PACE  unsteady (mild)  proper shoe wear     C. Assessment of Cognitive Function:    Mini-Cog Test:     1) Registration (3 objects): YES   2) Clock Draw: Passed? : Yes   3) Number of objects recalled: 2 (MA) and 3 (PROVIDER)     Further evaluation required? : Mild age related change noted without evidence of significant cognitive impairment    **OVERALL ASSESSMENT OF FUNCTIONAL ABILITY**  (Assessment of ability to perform ADL's (showering/bathing, using toilet, dressing, feeding self, moving self around) and IADL's (use telephone, shop, prepare food, housekeep, do laundry, transport independently, take medications independently, and  handle finances)    DEGREE OF FUNCTIONAL IMPAIRMENT:   MODERATE (based on assessment noted above)    ABILITY TO LIVE INDEPENDENTLY:   Capable of living independently       COUNSELING       A. Identification of Health Risk Factors:    Risk factors include: cardiovascular risk factors and tobacco use      B. Age-Appropriate Screening Schedule:  (Refer to the list below for future screening recommendations based on patient's age, sex and/or medical conditions. Orders for these recommended tests are listed in the plan section. The patient has been provided with a written plan)    Health Maintenance Topics  Health Maintenance   Topic Date Due    GLAUCOMA SCREENING  08/01/2024    ANNUAL WELLNESS VISIT  08/21/2024    BMI FOLLOWUP  08/21/2024    PROSTATE CANCER SCREENING  08/30/2024    LIPID PANEL  08/30/2024    COVID-19 Vaccine (8 - 2023-24 season) 09/01/2024    INFLUENZA VACCINE  08/01/2024    LUNG CANCER SCREENING  05/01/2025    COLORECTAL CANCER SCREENING  01/31/2026    TDAP/TD VACCINES (3 - Td or Tdap) 12/01/2030    HEPATITIS C SCREENING  Completed    Pneumococcal Vaccine 65+  Completed    AAA SCREEN (ONE-TIME)  Completed    ZOSTER VACCINE  Addressed       Health Maintenance Topics Due or Over-Due  Health Maintenance Due   Topic Date Due    GLAUCOMA SCREENING  08/01/2024    ANNUAL WELLNESS VISIT  08/21/2024    BMI FOLLOWUP  08/21/2024    PROSTATE CANCER SCREENING  08/30/2024    LIPID PANEL  08/30/2024    COVID-19 Vaccine (8 - 2023-24 season) 09/01/2024    INFLUENZA VACCINE  08/01/2024         C. Advanced Care Planning:    Advance Care Planning   ACP discussion was held with the patient during this visit. Patient has an advance directive in EMR which is still valid.        D. Patient Self-Management and Personalized Health Advice:    He has been provided with PERSONALIZED COUNSELING/INFORMATION (AVS educational information) about:     -- optimizing diet/nutrition plans, improving exercise / conditioning, weight  management, tobacco cessation, alcohol use, reducing risk for cardiovascular disease (heart, stroke, vascular), reducing risk for cardiac/vascular events with pre-existing disease, and fall prevention      He has been recommended for the following PREVENTATIVE SERVICES which has been performed today, will be ordered today or ordered/performed on upcoming follow-up visit:     LIFESTYLE PREVENTATIVE MEASURES  SMOKING cessation counseling completed, ALCOHOL use counseling completed, NUTRITION counseling provided, EXERCISE counseling provided, EYE exam for GLAUCOMA screening recommended annually , CARDIOVASCULAR disease risk reduction counseling performed, FALL RISK assessment / plan of care completed, URINARY incontinence assessment done    CARDIOVASCULAR SCREENING  Has established history of CV disease    CANCER SCREENING  COLORECTAL cancer: Colonoscopy/Cologuard discussed     MISC SCREENING  DIABETES screening performed (current/reviewed labs/lab ordered)    VACCINATION/IMMUNIZATION  vaccination for INFLUENZA administered/recommended/discussed , COVID-19 vaccination discussed/recommended, vaccination for HEPATITIS B administered/recommended/discussed      E. Miscellaneous Items: [id number]    Aspirin use counseling: Taking ASA appropriately as indicated    Discussed BMI with him. The BMI is above average; BMI management plan is completed (discussed plans for weight loss)    Reviewed use of high risk medication in the elderly: YES    Reviewed for potential of harmful drug interactions in the elderly: YES        WRAP UP       Assessment & Plan:    1) MEDICARE ANNUAL WELLNESS VISIT    2) OTHER MEDICAL CONDITIONS ADDRESSED TODAY:            Problem List Items Addressed This Visit          High    Emphysema lung (Chronic)    Overview     Noted on CT in 2014  Moderate COPD on PFT 2017         Current Assessment & Plan     Advised to quit smoking  Continue Trelegy inhaler daily   Continue annual low dose CT chest           Relevant Medications    albuterol sulfate HFA (Ventolin HFA) 108 (90 Base) MCG/ACT inhaler    Fluticasone-Umeclidin-Vilant (Trelegy Ellipta) 200-62.5-25 MCG/ACT inhaler       Medium    Primary hypertension (Chronic)    Overview     Amlodipine 10 mg          Current Assessment & Plan     BP Readings from Last 3 Encounters:   09/03/24 122/80   08/13/24 114/82   08/02/24 120/80    Continue amlodipine 10 mg daily          Relevant Medications    amLODIPine (NORVASC) 10 MG tablet    Hyperlipidemia - Primary (Chronic)    Overview     Continue pravastatin 40 mg qd.          Relevant Medications    aspirin 81 MG EC tablet    pravastatin (PRAVACHOL) 40 MG tablet    Other Relevant Orders    Lipid Panel With / Chol / HDL Ratio    Atherosclerosis of native coronary artery of native heart without angina pectoris (Chronic)    Overview     Continue aspirin 81 mg and statin therapy         Relevant Medications    amLODIPine (NORVASC) 10 MG tablet    Stage 3a chronic kidney disease (Chronic)    Overview     Sees nephrologist   Has 1 kidney         Current Assessment & Plan     Check BMP today and continue nephrologist follow-up   Maintain lower K diet          Relevant Orders    Basic Metabolic Panel       Low    Cigarette nicotine dependence with nicotine-induced disorder (Chronic)    Current Assessment & Plan     Continue to smoke 1 pack/day.  I strongly advised him to consider smoking cessation.  Continue annual low-dose CT of the chest for lung cancer screening.           Gastroesophageal reflux disease with esophagitis without hemorrhage (Chronic)    Overview     Continue pantoprazole 40 mg qd         Relevant Medications    pantoprazole (PROTONIX) 40 MG EC tablet     Other Visit Diagnoses       Encounter for screening for malignant neoplasm of prostate        Relevant Orders    PSA Screen                      Orders Placed This Encounter   Procedures    Lipid Panel With / Chol / HDL Ratio    PSA Screen    Basic Metabolic Panel        Discussion / Summary:        Medications as of TODAY:              Current Outpatient Medications   Medication Sig Dispense Refill    albuterol sulfate HFA (Ventolin HFA) 108 (90 Base) MCG/ACT inhaler Inhale 2 puffs Every 4 (Four) Hours As Needed for Wheezing or Shortness of Air. 18 g 5    amLODIPine (NORVASC) 10 MG tablet TAKE ONE TABLET BY MOUTH EVERY DAY 90 tablet 1    aspirin 81 MG EC tablet Take 1 tablet by mouth Daily.      Fluticasone-Umeclidin-Vilant (Trelegy Ellipta) 200-62.5-25 MCG/ACT inhaler Inhale 1 puff Daily. 28 each 5    pantoprazole (PROTONIX) 40 MG EC tablet TAKE 1 TABLET BY MOUTH DAILY. 90 tablet 1    pravastatin (PRAVACHOL) 40 MG tablet TAKE ONE TABLET BY MOUTH EVERY DAY 90 tablet 1     No current facility-administered medications for this visit.         FOLLOW-UP:            Return in about 6 months (around 3/3/2025) for Reassess chronic medical problems, **SCHEDULE COMB AWV/MED FU FOR NEXT YR (30 MIN)**.              Future Appointments   Date Time Provider Department Center   10/21/2024 12:15 PM [facility]   10/29/2024 10:00 AM Humble Hamilton MD [facility]   3/3/2025 11:30 AM Lester Carter MD [facility]   9/5/2025  9:00 AM Lester Carter MD [facility]           After Visit Summary (AVS) including the Personalized Prevention  Plan Services (PPPS) was either printed and given to the patient at check-out today and/or sent to [facility] [place] for review.

## 2024-09-16 NOTE — ASSESSMENT & PLAN NOTE
Emergency Department Note      History of Present Illness     Chief Complaint   Fall and Head Injury      HPI   Connie Greenwood is a 77 year old female with a history of hypertension, primary degenerative dementia and sleep apnea who presents to the ED for an evaluation a fall and head injury. The patient reports that she was walking at a music festival this afternoon and has unwitnessed fall. She notes that she doesn't recall anything after the fall, all she remembers was people helping her up. She complains of bilateral wrist pain with more pain in her left wrist. Endorses left side head injury. Patient is right handed. Denies any pain in her neck, shoulder, collarbone, hip and ankle. No chest pain. No abdominal pain. No history of loss of conscious. No history of cardiac disease. Patient last Tdap was 2015     Independent Historian   Patient, as per HPI.       Review of External Notes   None    Past Medical History     Medical History and Problem List   Hypertension   Sleep apnea   Primary degenerative dementia   Hyperlipidemia     Medications   Simvastatin   Spironolactone     Surgical History   Cholecystectomy   DaVinci pelvic procedure       Physical Exam     Patient Vitals for the past 24 hrs:   BP Temp Temp src Pulse Resp SpO2   09/15/24 2322 96/49 -- -- 59 -- 98 %   09/15/24 2200 111/83 -- -- 64 12 --   09/15/24 2120 -- -- -- 58 -- --   09/15/24 2110 -- -- -- 64 -- --   09/15/24 2100 130/63 -- -- -- -- --   09/15/24 1903 -- 97.7  F (36.5  C) Oral -- 20 100 %   09/15/24 1900 (!) 144/84 -- -- 69 -- --     Physical Exam  General: Alert, appears well-developed and well-nourished. Cooperative.     In mild distress  HEENT:  Head:  There is a laceration to the left forehead.  Ears:  External ears are normal, bilateral TMs normal.   Mouth/Throat:  Oropharynx is without erythema or exudate and mucous membranes are moist. No dental trauma.   Eyes:   Conjunctivae normal and EOM are normal. No scleral  Continue amlodipine 5 mg. Send BP in 1 month.    icterus.    Pupils are equal, round, and reactive to light.   Neck:   Normal range of motion. Neck supple.  CV:  Normal rate, regular rhythm, normal heart sounds and radial pulses are 2+ and symmetric.  No murmur.  Resp:  Breath sounds are clear bilaterally    Non-labored, no retractions or accessory muscle use  GI:  Abdomen is soft, no distension, no tenderness. No rebound or guarding.  No CVA tenderness bilaterally  MS:  Normal range of motion. No edema.    Back atraumatic.    No midline cervical, thoracic, or lumbar tenderness    Left Elbow:    The humerus is non-tender    The olecranon is non-tender    The lateral and medial supracondylar regions are non-tender    There is no obvious clinical effusion    There is no pain with Pronation and Supination    There is no pain with Flexion and Extension    The radial head and neck are non-tender    The proximal ulnar is non-tender and there is no step off    There is swelling and pain with deformity to the distal left forearm.  Suspect underlying distal radius fracture.    Distal Hand Exam:    The finger flexors (FDS/FDP) are intact    The finger extensors are intact    The thumb exam is normal, including:    Adduction, abduction, flexion, extension, opposition    There are no sensory deficits    Median, Ulnar, and Radial nerve function is normal    Radial artery pulsations are normal    Capillary refill is normal  Skin:  Warm and dry. Laceration to the left forehead.    Neuro:   Alert. Normal strength.  GCS: 15  Psych: Normal mood and affect.    Diagnostics     Lab Results   Labs Ordered and Resulted from Time of ED Arrival to Time of ED Departure   COMPREHENSIVE METABOLIC PANEL - Normal       Result Value    Sodium 142      Potassium 3.9      Carbon Dioxide (CO2) 25      Anion Gap 12      Urea Nitrogen 17.8      Creatinine 0.81      GFR Estimate 74      Calcium 8.8      Chloride 105      Glucose 94      Alkaline Phosphatase 61      AST 27      ALT 16      Protein  Total 6.5      Albumin 3.8      Bilirubin Total 0.4     TROPONIN T, HIGH SENSITIVITY - Normal    Troponin T, High Sensitivity 9     TROPONIN T, HIGH SENSITIVITY - Normal    Troponin T, High Sensitivity 11     CBC WITH PLATELETS AND DIFFERENTIAL    WBC Count 5.4      RBC Count 3.97      Hemoglobin 12.6      Hematocrit 37.6      MCV 95      MCH 31.7      MCHC 33.5      RDW 12.7      Platelet Count 183      % Neutrophils 46      % Lymphocytes 42      % Monocytes 10      % Eosinophils 1      % Basophils 0      % Immature Granulocytes 0      NRBCs per 100 WBC 0      Absolute Neutrophils 2.5      Absolute Lymphocytes 2.3      Absolute Monocytes 0.6      Absolute Eosinophils 0.1      Absolute Basophils 0.0      Absolute Immature Granulocytes 0.0      Absolute NRBCs 0.0         Imaging   CT Head w/o Contrast   Final Result   IMPRESSION:   1.  No CT evidence for acute intracranial process.   2.  Brain atrophy and presumed chronic microvascular ischemic changes as above.      XR Wrist Left G/E 3 Views   Final Result   IMPRESSION: Comminuted, transverse predominant, impacted and angulated fracture distal left radial metaphysis. Mildly impacted transverse fracture distal left ulna. Mild ulnar positive variance. Left wrist soft tissue swelling. Carpal bones and    metacarpals intact. Degenerative change at the thumb CMC and STT joints. Diffuse bone demineralization.      NOTE: ABNORMAL REPORT      THE DICTATION ABOVE DESCRIBES AN ABNORMALITY FOR WHICH FOLLOW-UP IS NEEDED.           EKG   ECG taken at 1910, ECG read [place] [address]  Sinus rhythm with premature supraventricular complexes   Nonspecific ST and T wave abnormality   Abnormal ECG   Rate 66 bpm. HI interval 150 ms. QRS duration 82 ms. QT/QTc 418/438 ms. P-R-T axes 56 20 31.    Independent Interpretation   CT Head: No intracranial hemorrhage.    ED Course      Medications Administered   Medications   sodium chloride 0.9% BOLUS 1,000 mL (has no administration in time range)    acetaminophen (TYLENOL) tablet 1,000 mg (1,000 mg Oral $Given 9/15/24 2105)   Tdap (tetanus-diphtheria-acell pertussis) (ADACEL) injection 0.5 mL (0.5 mLs Intramuscular $Given 9/15/24 2106)   oxyCODONE (ROXICODONE) tablet 5 mg (5 mg Oral $Given 9/15/24 2121)       Procedures   [facility]    -Laceration Repair    Date/Time: 9/15/2024 8:09 PM    Performed by: Francisco Blue MD  Authorized by: Francisco Blue MD    Risks, benefits and alternatives discussed.      ANESTHESIA (see MAR for exact dosages):     Anesthesia method:  Local infiltration    Local anesthetic:  Bupivacaine 0.25% WITH epi  LACERATION DETAILS     Location:  Scalp    Scalp location:  L temporal    Length (cm):  1.8    REPAIR TYPE:     Repair type:  Simple    EXPLORATION:     Hemostasis achieved with:  Direct pressure    Wound exploration: wound explored through full range of motion      Contaminated: no      TREATMENT:     Area cleansed with:  Saline    Amount of cleaning:  Standard    Irrigation solution:  Sterile saline    Irrigation method:  Syringe    Visualized foreign bodies/material removed: no      SKIN REPAIR     Repair method:  Sutures    Suture size:  5-0    Suture material:  Nylon    Suture technique:  Simple interrupted    Number of sutures:  3    APPROXIMATION     Approximation:  Close    POST-PROCEDURE DETAILS     Dressing:  Antibiotic ointment      PROCEDURE    Patient Tolerance:  Patient tolerated the procedure well with no immediate complications       Splint Placement     Procedure: Splint Placement     Indication: Fracture    Consent: Verbal     Location: Left Wrist    Preparation: Wounds were cleansed and dressed with a non-adherent bandage     Procedure detail:   Splint was applied by Tech/Nurse with my assistance  Splint type: Sugar-tong   Splint materilal: Fiberglass  After placement I checked and adjusted the fit as needed to ensure proper positioning/fit   Sensation and circulation are intact after  splint placement     Patient Status: The patient tolerated the procedure well: Yes. There were no complications.    Discussion of Management   Admitting [facility]    ED Course   ED Course as of 09/15/24 2328   Sun Sep 15, 2024   1948 I have obtained history and evaluated the patient.      2327 I spoke with Dr. Sabillon of the hospitalist service who agreed to observation admission.        Additional Documentation  None    Medical Decision Making / Diagnosis     CMS Diagnoses: None    MIPS       None    MDM   Connie Greenwood is a 77 year old female who presents after a suspected fall or syncopal event at a music festival this afternoon with a left forehead scalp laceration as well as deformity and swelling to the distal left forearm/wrist.  Patient is not sure what happened today but high suspicion that she may have had a syncopal event.  Patient had a EKG showing no concerning ischemic changes nor arrhythmias.  Troponin studies have been nondynamic here in the emergency department and very low suspicion for ACS.  Patient denies any chest pain or shortness of breath symptoms.  She has normal electrolytes and renal function.  CBC is normal.  Patient had a CT scan of the head given head injury and laceration.  Thankfully no evidence of acute intracranial processes.  No skull fracture.  Patient had left wrist x-ray performed due to swelling and deformity which showed a comminuted, transverse predominant impacted and angulated fracture of the distal left radial metaphysis.  There was a mildly impacted transverse fracture of the distal left ulna as well.  Patient appears comfortable even with does a deformity at this time.  I did place the patient in finger traps so we placed a sugar-tong Ortho-Glass splint.  I did not feel that the patient would benefit from a closed reduction this evening as distal CMS intact hand pain relatively well-controlled even with the amount of swelling in the distal forearm tonight.   Will plan to refer to outpatient orthopedic surgery for surgical consultation.  She did receive a dose of oxycodone here in the emergency department as well as Tylenol.  She will be discharged with Tylenol and oxycodone to use at home until follow-up for surgical consultation with orthopedic surgery can be obtained.  Patient had her tetanus addressed this evening as well.  Laceration was repaired per procedure note.  Sutures will need to be removed in 5 days.  Ultimately low concern for sinister etiologies for the patient's possible syncopal event earlier today.  Vital signs of remained stable throughout her course in the emergency department.      Unfortunately when attempting to discharge the patient was quite dizzy and lightheaded.  She did appear orthostatic and I have concern for safety upon discharge this evening that she does live independently.  I still not certain what ultimately led her to falling today and so there is a concern that syncope may have led to her fall and traumatic injuries.  Will plan to observe overnight in the hospital under the care of the hospitalist service for continued cardiac monitoring and IV fluids.  We can continue to manage her pain as well and ensure a safe and appropriate discharge tomorrow should no other concerning symptoms develop in the overnight hours.  I spoke with Dr. Sabillon of the hospitalist service who agreed to admission.    Disposition   The patient was admitted for observation.       Diagnosis     ICD-10-CM    1. Syncope and collapse  R55       2. Closed head injury, initial encounter  S09.90XA       3. Laceration of forehead, initial encounter  S01.81XA       4. Wrist fracture, left, closed, initial encounter  S62.102A Neck/Shoulder/Back/Abdomen Bracing Supplies Order Sling; Left      5. Radius with ulna, distal end fracture, left, closed, initial encounter  S52.502A     S52.602A            Discharge Medications   New Prescriptions    ACETAMINOPHEN (TYLENOL)  500 MG TABLET    Take 2 tablets (1,000 mg) by mouth every 6 hours as needed for pain or fever.    OXYCODONE (ROXICODONE) 5 MG TABLET    Take 1 tablet (5 mg) by mouth every 6 hours as needed for moderate to severe pain.         Scribe Disclosure:  I, Blanche SORENSONJami Yahir, am serving as a scribe at 7:57 PM on 9/15/2024 to document services personally performed by Francisco Blue MD based on my observations and the provider's statements to me.        Francisco Blue MD  09/15/24 0639

## 2024-10-21 ENCOUNTER — HOSPITAL ENCOUNTER (OUTPATIENT)
Dept: CT IMAGING | Facility: HOSPITAL | Age: 69
Discharge: HOME | End: 2024-10-21
Admitting: SURGERY
Payer: MEDICARE

## 2024-10-21 DIAGNOSIS — Z95.828 HISTORY OF ENDOVASCULAR STENT GRAFT FOR ABDOMINAL AORTIC ANEURYSM: ICD-10-CM

## 2024-10-21 LAB — CREAT BLDA-MCNC: 1.4 MG/DL (ref 0.6–1.3)

## 2024-10-21 PROCEDURE — 25510000001 IOPAMIDOL PER 1 ML: Performed by: SURGERY

## 2024-10-21 PROCEDURE — 82565 ASSAY OF CREATININE: CPT

## 2024-10-21 PROCEDURE — 74174 CTA ABD&PLVS W/CONTRAST: CPT

## 2024-10-21 RX ORDER — IOPAMIDOL 755 MG/ML
100 INJECTION, SOLUTION INTRAVASCULAR
Status: COMPLETED | OUTPATIENT
Start: 2024-10-21 | End: 2024-10-21

## 2024-10-21 RX ADMIN — IOPAMIDOL 95 ML: 755 INJECTION, SOLUTION INTRAVENOUS at 13:07

## 2024-10-29 ENCOUNTER — OFFICE VISIT (OUTPATIENT)
Age: 69
End: 2024-10-29
Payer: MEDICARE

## 2024-10-29 VITALS
HEART RATE: 87 BPM | HEIGHT: 66 IN | DIASTOLIC BLOOD PRESSURE: 86 MMHG | WEIGHT: 170 LBS | SYSTOLIC BLOOD PRESSURE: 129 MMHG | BODY MASS INDEX: 27.32 KG/M2

## 2024-10-29 DIAGNOSIS — I71.43 INFRARENAL ABDOMINAL AORTIC ANEURYSM (AAA) WITHOUT RUPTURE: Primary | ICD-10-CM

## 2024-10-29 DIAGNOSIS — Z95.828 HISTORY OF ENDOVASCULAR STENT GRAFT FOR ABDOMINAL AORTIC ANEURYSM: ICD-10-CM

## 2024-10-29 DIAGNOSIS — F17.210 CIGARETTE SMOKER: ICD-10-CM

## 2024-10-29 PROCEDURE — 3079F DIAST BP 80-89 MM HG: CPT | Performed by: SURGERY

## 2024-10-29 PROCEDURE — G2211 COMPLEX E/M VISIT ADD ON: HCPCS | Performed by: SURGERY

## 2024-10-29 PROCEDURE — 1160F RVW MEDS BY RX/DR IN RCRD: CPT | Performed by: SURGERY

## 2024-10-29 PROCEDURE — 99214 OFFICE O/P EST MOD 30 MIN: CPT | Performed by: SURGERY

## 2024-10-29 PROCEDURE — 1159F MED LIST DOCD IN RCRD: CPT | Performed by: SURGERY

## 2024-10-29 PROCEDURE — 3074F SYST BP LT 130 MM HG: CPT | Performed by: SURGERY

## 2024-10-29 NOTE — PROGRESS NOTES
"Chief Complaint  Aortic Aneurysm    Subjective        Patrick Mckeon presents to [facility] VASCULAR [facility]ERY  History of Present Illness    Patient comes in today for follow-up of his abdominal aortic aneurysm.  Abdominal pains resolved after cholecystectomy.  Overall doing well.    Objective   Vital Signs:  /86   Pulse 87   Ht 167.6 cm (65.98\")   Wt 77.1 kg (170 lb)   BMI 27.46 kg/m²   Estimated body mass index is 27.46 kg/m² as calculated from the following:    Height as of this encounter: 167.6 cm (65.98\").    Weight as of this encounter: 77.1 kg (170 lb).     BMI is >= 25 and <30. (Overweight) The following options were offered after discussion;: weight loss educational material (shared in after visit summary)    Patrick Mckeon  reports that he has been smoking cigarettes. He has a 42 pack-year smoking history. He has never used smokeless tobacco. I have educated him on the risk of diseases from using tobacco products such as arterial disease.     I advised him to quit and he is not willing to quit.    I spent 3  minutes counseling the patient.         Physical Exam   Result Review :    The following data was reviewed by: Humble Hamilton MD on 10/29/24  CBC          7/26/2024    10:51 7/27/2024    06:32 7/29/2024    05:33   CBC   WBC 7.58  7.35  9.71    RBC 5.04  4.52  4.30    Hemoglobin 15.7  14.0  13.3    Hematocrit 45.9  41.6  39.3    MCV 91.1  92.0  91.4    MCH 31.2  31.0  30.9    MCHC 34.2  33.7  33.8    RDW 13.8  13.9  13.6    Platelets 177  172  167       BMP          7/29/2024    05:33 9/3/2024    09:00 10/21/2024    12:32   BMP   BUN 13  18     Creatinine 1.22  1.24  1.40    Sodium 137  140     Potassium 4.8  4.5     Chloride 106  105     CO2 20.1  24.2     Calcium 8.2  9.4            Data reviewed : Radiologic studies as below and Cardiology studies as below  Vascular Surgical History:  5/8/2017: Endovascular repair of asymptomatic 5.1 cm abdominal aortic aneurysm with " irregular morphology (NTS)    Vascular Imaging History:  CT angiogram:  10/21/2024:  1. Status post aortobiiliac stent graft with dilatation of the native  aorta measuring up to approximately 4.8 cm x 3.1 cm.  2. Small endoleak is seen with contrast blush seen in the posterior  aspect of the native aortic sac similar to the CT angiogram of  07/21/2024.  3. The overall size of the aorta appears relatively stable and there is  no evidence of aneurysm rupture.    (Text in bold font has been individually reviewed by myself and confirmed)         Assessment and Plan     Vascular surgery following for:  Abdominal aortic aneurysm with endoleak.    Diagnoses and all orders for this visit:    1. Infrarenal abdominal aortic aneurysm (AAA) without rupture (Primary)    2. History of endovascular stent graft for abdominal aortic aneurysm  -     CT Angiogram Abdomen Pelvis; Future    3. Cigarette smoker    Patient with a history 7 years ago of endovascular repair of aneurysm.  Initially was still pretty prompt sac progression but have seen some growth recently.  On multiple recent studies however his endoleak is stabilized at 4.8 cm.  Recommended 6-month interval CT scan follow-up.    Long ru conversation about the absolute need for tobacco cessation.     Vascular medical management: Continue aspirin 81 mg daily and pravastatin 40 mg daily.  Follow Up     Return in about 6 months (around 4/29/2025), or if symptoms worsen or fail to improve, for after CTA .  Patient was given instructions and counseling regarding his condition or for health maintenance advice. Please see specific information pulled into the AVS if appropriate.

## 2024-10-29 NOTE — PATIENT INSTRUCTIONS
Nutrition and Heart Health  In this video, you'll learn why nutrition is an important part of a healthy lifestyle, especially if you have heart disease.  To view the content, go to this web address:  [email or web address]    This video will  on: 2026. If you need access to this video following this date, please reach out to the healthcare provider who assigned it to you.  This information is not intended to replace advice given to you by your health care provider. Make sure you discuss any questions you have with your health care provider.  [employer] Patient Education ©  [employer]. Smoking Cessation  You will learn methods to help you quit smoking and how quitting can help prevent a variety of health problems and improve your health.  To view the content, go to this web address:  [email or web address]    This video will  on: 2026. If you need access to this video following this date, please reach out to the healthcare provider who assigned it to you.  This information is not intended to replace advice given to you by your health care provider. Make sure you discuss any questions you have with your health care provider.  [employer] Patient Education ©  [employer]        Steps to Quit Smoking  Smoking tobacco is the leading cause of preventable death. It can affect almost every organ in the body. Smoking puts you and people around you at risk for many serious, long-lasting (chronic) diseases. Quitting smoking can be hard, but it is one of the best things that you can do for your health. It is never too late to quit.  Do not give up if you cannot quit the first time. Some people need to try many times to quit. Do your best to stick to your quit plan, and talk with your doctor if you have any questions or concerns.  How do I get ready to quit?  Pick a date to quit. Set a date within the next 2 weeks to give you time to prepare.  Write down the reasons why you are  quitting. Keep this list in places where you will see it often.  Tell your family, friends, and co-workers that you are quitting. Their support is important.  Talk with your doctor about the choices that may help you quit.  Find out if your health insurance will pay for these treatments.  Know the people, places, things, and activities that make you want to smoke (triggers). Avoid them.  What first steps can I take to quit smoking?  Throw away all cigarettes at home, at work, and in your car.  Throw away the things that you use when you smoke, such as ashtrays and lighters.  Clean your car. Empty the ashtray.  Clean your home, including curtains and carpets.  What can I do to help me quit smoking?  Talk with your doctor about taking medicines and seeing a counselor. You are more likely to succeed when you do both.  If you are pregnant or breastfeeding:  Talk with your doctor about counseling or other ways to quit smoking.  Do not take medicine to help you quit smoking unless your doctor tells you to.  Quit right away  Quit smoking completely, instead of slowly cutting back on how much you smoke over a period of time. Stopping smoking right away may be more successful than slowly quitting.  Go to counseling. In-person is best if this is an option. You are more likely to quit if you go to counseling sessions regularly.  Take medicine  You may take medicines to help you quit. Some medicines need a prescription, and some you can buy over-the-counter. Some medicines may contain a drug called nicotine to replace the nicotine in cigarettes. Medicines may:  Help you stop having the desire to smoke (cravings).  Help to stop the problems that come when you stop smoking (withdrawal symptoms).  Your doctor may ask you to use:  Nicotine patches, gum, or lozenges.  Nicotine inhalers or sprays.  Non-nicotine medicine that you take by mouth.  Find resources  Find resources and other ways to help you quit smoking and remain  smoke-free after you quit. They include:  Online chats with a counselor.  Phone quitlines.  Printed self-help materials.  Support groups or group counseling.  Text messaging programs.  Mobile phone apps. Use apps on your mobile phone or tablet that can help you stick to your quit plan. Examples of free services include Quit Guide from the CDC and smokefree.gov    What can I do to make it easier to quit?    Talk to your family and friends. Ask them to support and encourage you.  Call a phone quitline, such as 1-800-QUIT-NOW, reach out to support groups, or work with a counselor.  Ask people who smoke to not smoke around you.  Avoid places that make you want to smoke, such as:  Bars.  Parties.  Smoke-break areas at work.  Spend time with people who do not smoke.  Lower the stress in your life. Stress can make you want to smoke. Try these things to lower stress:  Getting regular exercise.  Doing deep-breathing exercises.  Doing yoga.  Meditating.  What benefits will I see if I quit smoking?  Over time, you may have:  A better sense of smell and taste.  Less coughing and sore throat.  A slower heart rate.  Lower blood pressure.  Clearer skin.  Better breathing.  Fewer sick days.  Summary  Quitting smoking can be hard, but it is one of the best things that you can do for your health.  Do not give up if you cannot quit the first time. Some people need to try many times to quit.  When you decide to quit smoking, make a plan to help you succeed.  Quit smoking right away, not slowly over a period of time.  When you start quitting, get help and support to keep you smoke-free.    This information is not intended to replace advice given to you by your health care provider. Make sure you discuss any questions you have with your health care provider.  Document Revised: 12/09/2022 Document Reviewed: 12/09/2022  Elsevier Patient Education © 2024 [employer].

## 2025-02-08 DIAGNOSIS — K44.9 HIATAL HERNIA WITH GASTROESOPHAGEAL REFLUX DISEASE AND ESOPHAGITIS: Chronic | ICD-10-CM

## 2025-02-08 DIAGNOSIS — J43.9 PULMONARY EMPHYSEMA, UNSPECIFIED EMPHYSEMA TYPE: Chronic | ICD-10-CM

## 2025-02-08 DIAGNOSIS — I10 PRIMARY HYPERTENSION: Chronic | ICD-10-CM

## 2025-02-08 DIAGNOSIS — K21.00 HIATAL HERNIA WITH GASTROESOPHAGEAL REFLUX DISEASE AND ESOPHAGITIS: Chronic | ICD-10-CM

## 2025-02-08 DIAGNOSIS — E78.5 HYPERLIPIDEMIA, UNSPECIFIED HYPERLIPIDEMIA TYPE: ICD-10-CM

## 2025-02-10 RX ORDER — PRAVASTATIN SODIUM 40 MG
40 TABLET ORAL DAILY
Qty: 90 TABLET | Refills: 1 | Status: SHIPPED | OUTPATIENT
Start: 2025-02-10

## 2025-02-10 RX ORDER — PANTOPRAZOLE SODIUM 40 MG/1
40 TABLET, DELAYED RELEASE ORAL DAILY
Qty: 90 TABLET | Refills: 1 | Status: SHIPPED | OUTPATIENT
Start: 2025-02-10

## 2025-02-10 RX ORDER — FLUTICASONE FUROATE, UMECLIDINIUM BROMIDE AND VILANTEROL TRIFENATATE 200; 62.5; 25 UG/1; UG/1; UG/1
POWDER RESPIRATORY (INHALATION)
Qty: 60 EACH | Refills: 5 | Status: SHIPPED | OUTPATIENT
Start: 2025-02-10

## 2025-02-10 RX ORDER — AMLODIPINE BESYLATE 10 MG/1
10 TABLET ORAL DAILY
Qty: 90 TABLET | Refills: 1 | Status: SHIPPED | OUTPATIENT
Start: 2025-02-10

## 2025-03-03 ENCOUNTER — OFFICE VISIT (OUTPATIENT)
Dept: INTERNAL MEDICINE | Age: 70
End: 2025-03-03
Payer: MEDICARE

## 2025-03-03 VITALS
DIASTOLIC BLOOD PRESSURE: 84 MMHG | WEIGHT: 178 LBS | TEMPERATURE: 97.1 F | OXYGEN SATURATION: 95 % | BODY MASS INDEX: 28.61 KG/M2 | SYSTOLIC BLOOD PRESSURE: 130 MMHG | HEART RATE: 78 BPM | HEIGHT: 66 IN

## 2025-03-03 DIAGNOSIS — N18.31 STAGE 3A CHRONIC KIDNEY DISEASE: Chronic | ICD-10-CM

## 2025-03-03 DIAGNOSIS — I10 PRIMARY HYPERTENSION: Primary | Chronic | ICD-10-CM

## 2025-03-03 DIAGNOSIS — E78.2 MIXED HYPERLIPIDEMIA: Chronic | ICD-10-CM

## 2025-03-03 DIAGNOSIS — J43.9 PULMONARY EMPHYSEMA, UNSPECIFIED EMPHYSEMA TYPE: Chronic | ICD-10-CM

## 2025-03-03 PROCEDURE — G2211 COMPLEX E/M VISIT ADD ON: HCPCS | Performed by: INTERNAL MEDICINE

## 2025-03-03 PROCEDURE — 1160F RVW MEDS BY RX/DR IN RCRD: CPT | Performed by: INTERNAL MEDICINE

## 2025-03-03 PROCEDURE — 3075F SYST BP GE 130 - 139MM HG: CPT | Performed by: INTERNAL MEDICINE

## 2025-03-03 PROCEDURE — 1159F MED LIST DOCD IN RCRD: CPT | Performed by: INTERNAL MEDICINE

## 2025-03-03 PROCEDURE — 99214 OFFICE O/P EST MOD 30 MIN: CPT | Performed by: INTERNAL MEDICINE

## 2025-03-03 PROCEDURE — 1126F AMNT PAIN NOTED NONE PRSNT: CPT | Performed by: INTERNAL MEDICINE

## 2025-03-03 PROCEDURE — 3079F DIAST BP 80-89 MM HG: CPT | Performed by: INTERNAL MEDICINE

## 2025-03-03 RX ORDER — CLINDAMYCIN PHOSPHATE 11.9 MG/ML
SOLUTION TOPICAL
COMMUNITY
Start: 2024-12-16

## 2025-03-03 NOTE — PROGRESS NOTES
"        J  U  N  O  H    K  I  M ,   M  D       I  N  T  E  R  N  A  L    M  E  D  I  C  I  N  E         ENCOUNTER DATE:  03/03/2025    Patrick Mckeon / 69 y.o. / male    OFFICE VISIT ENCOUNTER       CHIEF COMPLAINT / REASON FOR OFFICE VISIT     Emphysema lung, Hypertension, Atherosclerosis of native coronary artery of native heart , and ckd stage 3      ASSESSMENT & PLAN     Problem List Items Addressed This Visit          High    Emphysema lung (Chronic)    Overview     Noted on CT in 2014  Moderate COPD on PFT 2017         Current Assessment & Plan     Continue Trelegy 200 but use daily. Strongly advised to curtail/stop smoking.          Relevant Medications    albuterol sulfate HFA (Ventolin HFA) 108 (90 Base) MCG/ACT inhaler    Trelegy Ellipta 200-62.5-25 MCG/ACT inhaler       Medium    Primary hypertension - Primary (Chronic)    Overview     Continue amlodipine 10 mg          Relevant Medications    amLODIPine (NORVASC) 10 MG tablet    Hyperlipidemia (Chronic)    Overview     Continue pravastatin 40 mg qd.          Relevant Medications    aspirin 81 MG EC tablet    pravastatin (PRAVACHOL) 40 MG tablet    Stage 3a chronic kidney disease (Chronic)    Overview     Sees nephrologist   Has 1 kidney          No orders of the defined types were placed in this encounter.    No orders of the defined types were placed in this encounter.      SUMMARY/DISCUSSION        TOTAL TIME OF ENCOUNTER:        Next Appointment with me: Visit date not found    Return for Next scheduled followup for AWV.      VITAL SIGNS     Vitals:    03/03/25 1117   BP: 130/84   Pulse: 78   Temp: 97.1 °F (36.2 °C)   SpO2: 95%   Weight: 80.7 kg (178 lb)   Height: 167.6 cm (65.98\")       BP Readings from Last 3 Encounters:   03/03/25 130/84   10/29/24 129/86   09/03/24 122/80     Wt Readings from Last 3 Encounters:   03/03/25 80.7 kg (178 lb)   10/29/24 77.1 kg (170 lb)   09/03/24 77.1 kg (170 lb)     Body mass index is 28.75 kg/m².    Blood " pressure readings recorded on patient flowsheet:       No data to display                  MEDICATIONS AT THE TIME OF OFFICE VISIT     Current Outpatient Medications on File Prior to Visit   Medication Sig    albuterol sulfate HFA (Ventolin HFA) 108 (90 Base) MCG/ACT inhaler Inhale 2 puffs Every 4 (Four) Hours As Needed for Wheezing or Shortness of Air.    amLODIPine (NORVASC) 10 MG tablet TAKE ONE TABLET BY MOUTH EVERY DAY    aspirin 81 MG EC tablet Take 1 tablet by mouth Daily.    clindamycin (CLEOCIN T) 1 % external solution prn    pantoprazole (PROTONIX) 40 MG EC tablet TAKE ONE TABLET BY MOUTH EVERY DAY    pravastatin (PRAVACHOL) 40 MG tablet TAKE ONE TABLET BY MOUTH EVERY DAY    Trelegy Ellipta 200-62.5-25 MCG/ACT inhaler INHALE ONE PUFF BY MOUTH EVERY DAY     No current facility-administered medications on file prior to visit.          HISTORY OF PRESENT ILLNESS     Patient denies any acute problems or changes.  He denies unusual chest pain or increased dyspnea exertion.  He still smokes about a pack per day and reports using Trelegy Ellipta but does not use it every day.  He states that he has not needed to use albuterol inhaler that frequently.  He is on pantoprazole 40 mg daily for GERD.  He is on pravastatin 40 mg for hyperlipidemia.  He remains on aspirin 81 mg daily.  He takes amlodipine 10 mg for hypertension.    Patient Care Team:  Lester Carter MD as PCP - General (Internal Medicine)  Humble Hamilton MD as Surgeon (Vascular Surgery)  Antolin Wolfe MD as Consulting Physician (Gastroenterology)  Rick Rocha MD (Ophthalmology)  Allegra Ferrari MD as Consulting Physician (Nephrology)    REVIEW OF SYSTEMS     Review of Systems       PHYSICAL EXAMINATION     Physical Exam  Alert with normal thought and judgment.   Cardiovascular: Normal rate, regular rhythm.   Lungs: decreased breath sounds bilaterally without wheezing or rales       REVIEWED DATA     Labs:     Lab Results   Component  Value Date     09/03/2024    K 4.5 09/03/2024    CALCIUM 9.4 09/03/2024    AST 67 (H) 07/29/2024    ALT 60 (H) 07/29/2024    BUN 18 09/03/2024    CREATININE 1.40 (H) 10/21/2024    CREATININE 1.24 09/03/2024    CREATININE 1.22 07/29/2024     Lab Results   Component Value Date    HGBA1C 5.60 01/27/2020    HGBA1C 5.60 01/11/2019    HGBA1C 5.10 02/21/2017     Lab Results   Component Value Date    MALBCRERATIO  01/14/2022      Comment:      Unable to calculate     Lab Results   Component Value Date    LDL 56 09/03/2024    LDL 74 08/30/2023    LDL 60 08/18/2022    HDL 60 09/03/2024    HDL 59 08/30/2023    TRIG 75 09/03/2024    TRIG 87 08/30/2023     Lab Results   Component Value Date    TSH 1.110 01/11/2019    TSH 1.380 02/21/2017    FREET4 1.26 01/11/2019    FREET4 1.22 02/21/2017     Lab Results   Component Value Date    WBC 9.71 07/29/2024    HGB 13.3 07/29/2024     07/29/2024     Lab Results   Component Value Date    PSA 1.350 09/03/2024    PSA 1.340 08/30/2023    PSA 1.3 08/25/2022        Imaging:   CT Chest Low Dose Cancer Screening WO (05/01/2024 09:09)   Impression:  1. No concerning pulmonary nodule. Chronic changes in both lungs with no  acute intrathoracic abnormality.  2. Lung-RADS category 1, negative.  3. Based on American College of [employer]iology, Lung-RADS v2022, management  based on above findings is follow-up with 12-month LDCT.  4. Moderate calcified coronary artery disease.  5. Likely healing nondisplaced fracture of the superior sternum, new  since the prior study.  6. Tiny hiatal hernia with mild nonspecific thickening of the distal  esophageal wall. Recommend correlating for GERD.          Medical Tests:               Summary of old records / correspondence / consultant report:             Request outside records:

## 2025-04-10 ENCOUNTER — PATIENT MESSAGE (OUTPATIENT)
Dept: INTERNAL MEDICINE | Age: 70
End: 2025-04-10
Payer: MEDICARE

## 2025-04-10 DIAGNOSIS — Z87.891 PERSONAL HISTORY OF TOBACCO USE, PRESENTING HAZARDS TO HEALTH: Primary | ICD-10-CM

## 2025-05-01 ENCOUNTER — HOSPITAL ENCOUNTER (OUTPATIENT)
Dept: CT IMAGING | Facility: HOSPITAL | Age: 70
Discharge: HOME | End: 2025-05-01
Admitting: SURGERY
Payer: MEDICARE

## 2025-05-01 DIAGNOSIS — Z95.828 HISTORY OF ENDOVASCULAR STENT GRAFT FOR ABDOMINAL AORTIC ANEURYSM: ICD-10-CM

## 2025-05-01 LAB — CREAT BLDA-MCNC: 1.4 MG/DL (ref 0.6–1.3)

## 2025-05-01 PROCEDURE — 82565 ASSAY OF CREATININE: CPT

## 2025-05-01 PROCEDURE — 25510000001 IOPAMIDOL 61 % SOLUTION: Performed by: SURGERY

## 2025-05-01 PROCEDURE — 74174 CTA ABD&PLVS W/CONTRAST: CPT

## 2025-05-01 RX ORDER — IOPAMIDOL 612 MG/ML
100 INJECTION, SOLUTION INTRAVASCULAR
Status: COMPLETED | OUTPATIENT
Start: 2025-05-01 | End: 2025-05-01

## 2025-05-01 RX ADMIN — IOPAMIDOL 95 ML: 612 INJECTION, SOLUTION INTRAVENOUS at 15:06

## 2025-05-06 ENCOUNTER — OFFICE VISIT (OUTPATIENT)
Age: 70
End: 2025-05-06
Payer: MEDICARE

## 2025-05-06 VITALS
SYSTOLIC BLOOD PRESSURE: 123 MMHG | DIASTOLIC BLOOD PRESSURE: 71 MMHG | HEART RATE: 94 BPM | BODY MASS INDEX: 28.61 KG/M2 | WEIGHT: 178 LBS | HEIGHT: 66 IN

## 2025-05-06 DIAGNOSIS — I97.89 TYPE II ENDOLEAK OF AORTIC GRAFT: ICD-10-CM

## 2025-05-06 DIAGNOSIS — I65.23 BILATERAL CAROTID ARTERY STENOSIS: ICD-10-CM

## 2025-05-06 DIAGNOSIS — F17.210 CIGARETTE SMOKER: ICD-10-CM

## 2025-05-06 DIAGNOSIS — Z95.828 HISTORY OF ENDOVASCULAR STENT GRAFT FOR ABDOMINAL AORTIC ANEURYSM: ICD-10-CM

## 2025-05-06 DIAGNOSIS — I71.43 INFRARENAL ABDOMINAL AORTIC ANEURYSM (AAA) WITHOUT RUPTURE: Primary | ICD-10-CM

## 2025-05-06 PROCEDURE — 1159F MED LIST DOCD IN RCRD: CPT | Performed by: SURGERY

## 2025-05-06 PROCEDURE — 3074F SYST BP LT 130 MM HG: CPT | Performed by: SURGERY

## 2025-05-06 PROCEDURE — 1160F RVW MEDS BY RX/DR IN RCRD: CPT | Performed by: SURGERY

## 2025-05-06 PROCEDURE — G2211 COMPLEX E/M VISIT ADD ON: HCPCS | Performed by: SURGERY

## 2025-05-06 PROCEDURE — 99214 OFFICE O/P EST MOD 30 MIN: CPT | Performed by: SURGERY

## 2025-05-06 PROCEDURE — 3078F DIAST BP <80 MM HG: CPT | Performed by: SURGERY

## 2025-05-06 NOTE — PATIENT INSTRUCTIONS
Nutrition and Heart Health  In this video, you'll learn why nutrition is an important part of a healthy lifestyle, especially if you have heart disease.  To view the content, go to this web address:  [email or web address]    This video will  on: 2026. If you need access to this video following this date, please reach out to the healthcare provider who assigned it to you.  This information is not intended to replace advice given to you by your health care provider. Make sure you discuss any questions you have with your health care provider.  [employer] Patient Education ©  Elsevier Inc. Smoking Cessation  You will learn methods to help you quit smoking and how quitting can help prevent a variety of health problems and improve your health.  To view the content, go to this web address:  [email or web address]    This video will  on: 2026. If you need access to this video following this date, please reach out to the healthcare provider who assigned it to you.  This information is not intended to replace advice given to you by your health care provider. Make sure you discuss any questions you have with your health care provider.  [employer] Patient Education ©  [employer]vier [employer].        Steps to Quit Smoking  Smoking tobacco is the leading cause of preventable death. It can affect almost every organ in the body. Smoking puts you and people around you at risk for many serious, long-lasting (chronic) diseases. Quitting smoking can be hard, but it is one of the best things that you can do for your health. It is never too late to quit.  Do not give up if you cannot quit the first time. Some people need to try many times to quit. Do your best to stick to your quit plan, and talk with your doctor if you have any questions or concerns.  How do I get ready to quit?  Pick a date to quit. Set a date within the next 2 weeks to give you time to prepare.  Write down the reasons why you are  quitting. Keep this list in places where you will see it often.  Tell your family, friends, and co-workers that you are quitting. Their support is important.  Talk with your doctor about the choices that may help you quit.  Find out if your health insurance will pay for these treatments.  Know the people, places, things, and activities that make you want to smoke (triggers). Avoid them.  What first steps can I take to quit smoking?  Throw away all cigarettes at home, at work, and in your car.  Throw away the things that you use when you smoke, such as ashtrays and lighters.  Clean your car. Empty the ashtray.  Clean your home, including curtains and carpets.  What can I do to help me quit smoking?  Talk with your doctor about taking medicines and seeing a counselor. You are more likely to succeed when you do both.  If you are pregnant or breastfeeding:  Talk with your doctor about counseling or other ways to quit smoking.  Do not take medicine to help you quit smoking unless your doctor tells you to.  Quit right away  Quit smoking completely, instead of slowly cutting back on how much you smoke over a period of time. Stopping smoking right away may be more successful than slowly quitting.  Go to counseling. In-person is best if this is an option. You are more likely to quit if you go to counseling sessions regularly.  Take medicine  You may take medicines to help you quit. Some medicines need a prescription, and some you can buy over-the-counter. Some medicines may contain a drug called nicotine to replace the nicotine in cigarettes. Medicines may:  Help you stop having the desire to smoke (cravings).  Help to stop the problems that come when you stop smoking (withdrawal symptoms).  Your doctor may ask you to use:  Nicotine patches, gum, or lozenges.  Nicotine inhalers or sprays.  Non-nicotine medicine that you take by mouth.  Find resources  Find resources and other ways to help you quit smoking and remain  smoke-free after you quit. They include:  Online chats with a counselor.  Phone quitlines.  Printed self-help materials.  Support groups or group counseling.  Text messaging programs.  Mobile phone apps. Use apps on your mobile phone or tablet that can help you stick to your quit plan. Examples of free services include Quit Guide from the CDC and smokefree.gov    What can I do to make it easier to quit?    Talk to your family and friends. Ask them to support and encourage you.  Call a phone quitline, such as 1-800-QUIT-NOW, reach out to support groups, or work with a counselor.  Ask people who smoke to not smoke around you.  Avoid places that make you want to smoke, such as:  Bars.  Parties.  Smoke-break areas at work.  Spend time with people who do not smoke.  Lower the stress in your life. Stress can make you want to smoke. Try these things to lower stress:  Getting regular exercise.  Doing deep-breathing exercises.  Doing yoga.  Meditating.  What benefits will I see if I quit smoking?  Over time, you may have:  A better sense of smell and taste.  Less coughing and sore throat.  A slower heart rate.  Lower blood pressure.  Clearer skin.  Better breathing.  Fewer sick days.  Summary  Quitting smoking can be hard, but it is one of the best things that you can do for your health.  Do not give up if you cannot quit the first time. Some people need to try many times to quit.  When you decide to quit smoking, make a plan to help you succeed.  Quit smoking right away, not slowly over a period of time.  When you start quitting, get help and support to keep you smoke-free.    This information is not intended to replace advice given to you by your health care provider. Make sure you discuss any questions you have with your health care provider.  Document Revised: 12/09/2022 Document Reviewed: 12/09/2022  Elsevier Patient Education © 2024 [employer]vier Inc.

## 2025-05-06 NOTE — PROGRESS NOTES
"Chief Complaint  Infrarenal abdominal aortic aneurysm (AAA) without rupture    Subjective        Patrick Mckeon presents to [facility] VASCULAR SURGERY  History of Present Illness    CT angiogram follow-up after aneurysm repair.  Patient doing well.  No complaints of abdominal or flank pain.  Still smoking.    Objective   Vital Signs:  /71   Pulse 94   Ht 167.6 cm (65.98\")   Wt 80.7 kg (178 lb)   BMI 28.75 kg/m²   Estimated body mass index is 28.75 kg/m² as calculated from the following:    Height as of this encounter: 167.6 cm (65.98\").    Weight as of this encounter: 80.7 kg (178 lb).     BMI is >= 25 and <30. (Overweight) The following options were offered after discussion;: weight loss educational material (shared in after visit summary)    Patrick Mckeon  reports that he has been smoking cigarettes. He has a 42 pack-year smoking history. He has never used smokeless tobacco. I have educated him on the risk of diseases from using tobacco products such as arterial disease.     I advised him to quit and he is not willing to quit.    I spent 3  minutes counseling the patient.         Physical Exam  Constitutional:       Appearance: He is well-developed.   Pulmonary:      Effort: Pulmonary effort is normal. No respiratory distress.   Abdominal:      General: There is no distension.      Palpations: Abdomen is soft.   Neurological:      Mental Status: He is alert and oriented to person, place, and time.        Result Review :    The following data was reviewed by: Humble Hamilton MD on 05/06/25  CBC          7/26/2024    10:51 7/27/2024    06:32 7/29/2024    05:33   CBC   WBC 7.58  7.35  9.71    RBC 5.04  4.52  4.30    Hemoglobin 15.7  14.0  13.3    Hematocrit 45.9  41.6  39.3    MCV 91.1  92.0  91.4    MCH 31.2  31.0  30.9    MCHC 34.2  33.7  33.8    RDW 13.8  13.9  13.6    Platelets 177  172  167       BMP          9/3/2024    09:00 10/21/2024    12:32 5/1/2025    14:53   BMP   BUN " 18      Creatinine 1.24  1.40  1.40    Sodium 140      Potassium 4.5      Chloride 105      CO2 24.2      Calcium 9.4             Data reviewed : Radiologic studies as below and Cardiology studies as below  Vascular Surgical History:  5/8/2017: Endovascular repair of asymptomatic 5.1 cm abdominal aortic aneurysm with irregular morphology (NTS)    Vascular Imaging History:  Carotid duplex:  SCA 2023: Bilateral less than 50%.    CT angiogram abdomen pelvis:  5/1/2025: Radiology read pending but by my measurements approximately 4.8 cm in maximum dimension.    CT angiogram:  10/21/2024:  1. Status post aortobiiliac stent graft with dilatation of the native  aorta measuring up to approximately 4.8 cm x 3.1 cm.  2. Small endoleak is seen with contrast blush seen in the posterior  aspect of the native aortic sac similar to the CT angiogram of  07/21/2024.  3. The overall size of the aorta appears relatively stable and there is  no evidence of aneurysm rupture.    (Text in bold font has been individually reviewed by myself and confirmed)         Assessment and Plan     Vascular surgery following for:  Abdominal aortic aneurysm with endoleak.    Diagnoses and all orders for this visit:    1. Infrarenal abdominal aortic aneurysm (AAA) without rupture (Primary)  -     Duplex Aorta IVC Iliac Graft Complete CAR; Future    2. History of endovascular stent graft for abdominal aortic aneurysm    3. Cigarette smoker    4. Bilateral carotid artery stenosis    5. Type II endoleak of aortic graft      Nice 69-year-old gentleman status post aneurysm repair almost 7 years ago now.  Aneurysm sac appears stable on recent study is compared to October 2024 study.  Still awaiting radiology's official interpretation.  Will get him set up for a 1 year interval duplex.    Long ru conversation about the absolute need for tobacco cessation.     Vascular medical management: Continue aspirin 81 mg daily and pravastatin 40 mg daily.  Follow Up      Return in about 1 year (around 5/6/2026), or if symptoms worsen or fail to improve, for Follow up with vascular ultrasound.  Patient was given instructions and counseling regarding his condition or for health maintenance advice. Please see specific information pulled into the AVS if appropriate.

## 2025-07-17 ENCOUNTER — OFFICE VISIT (OUTPATIENT)
Dept: INTERNAL MEDICINE | Age: 70
End: 2025-07-17
Payer: MEDICARE

## 2025-07-17 ENCOUNTER — HOSPITAL ENCOUNTER (OUTPATIENT)
Facility: HOSPITAL | Age: 70
Discharge: HOME | End: 2025-07-17
Payer: MEDICARE

## 2025-07-17 VITALS
DIASTOLIC BLOOD PRESSURE: 89 MMHG | BODY MASS INDEX: 28.61 KG/M2 | RESPIRATION RATE: 18 BRPM | HEIGHT: 66 IN | TEMPERATURE: 96.5 F | SYSTOLIC BLOOD PRESSURE: 128 MMHG | HEART RATE: 55 BPM | OXYGEN SATURATION: 95 % | WEIGHT: 178 LBS

## 2025-07-17 DIAGNOSIS — M79.671 RIGHT FOOT PAIN: ICD-10-CM

## 2025-07-17 DIAGNOSIS — L03.115 CELLULITIS OF RIGHT LOWER EXTREMITY: Primary | ICD-10-CM

## 2025-07-17 DIAGNOSIS — Z87.891 HISTORY OF SMOKING 25-50 PACK YEARS: ICD-10-CM

## 2025-07-17 DIAGNOSIS — M25.571 ACUTE RIGHT ANKLE PAIN: ICD-10-CM

## 2025-07-17 DIAGNOSIS — Z12.2 ENCOUNTER FOR SCREENING FOR MALIGNANT NEOPLASM OF LUNG: ICD-10-CM

## 2025-07-17 PROCEDURE — 73630 X-RAY EXAM OF FOOT: CPT

## 2025-07-17 PROCEDURE — 73610 X-RAY EXAM OF ANKLE: CPT

## 2025-07-17 RX ORDER — CLINDAMYCIN HYDROCHLORIDE 300 MG/1
300 CAPSULE ORAL 3 TIMES DAILY
Qty: 21 CAPSULE | Refills: 0 | Status: SHIPPED | OUTPATIENT
Start: 2025-07-17

## 2025-07-17 NOTE — PROGRESS NOTES
JUSTUS DE LA CRUZ PA-C                  I  N  T  E  R  N  A  L    M  E  D  I  C  I  N  E         ENCOUNTER DATE:  07/17/2025    Patrick NEREIDA Mckeon / 70 y.o. / male    OFFICE VISIT ENCOUNTER       CHIEF COMPLAINT / REASON FOR OFFICE VISIT     Leg Swelling (Red and scratched right leg- on a wedensday a week ago/Neosporin cream /)      ASSESSMENT & PLAN     Problem List Items Addressed This Visit    None  Visit Diagnoses         Cellulitis of right lower extremity    -  Primary    Relevant Medications    clindamycin (Cleocin) 300 MG capsule      Right foot pain        Relevant Orders    XR Foot 3+ View Right      Acute right ankle pain        Relevant Orders    XR Ankle 3+ View Right      History of smoking 25-50 pack years        Relevant Orders     CT Chest Low Dose Cancer Screening WO      Encounter for screening for malignant neoplasm of lung        Relevant Orders     CT Chest Low Dose Cancer Screening WO          Orders Placed This Encounter   Procedures    XR Foot 3+ View Right     Reason for Exam::   Right heel pain and bruising following fall through a deck.     Release to patient:   Routine Release [[id number]]    XR Ankle 3+ View Right     Reason for Exam::   Right ankle pain and bruising following fall through a deck.     Release to patient:   Routine Release [[id number]]     CT Chest Low Dose Cancer Screening WO     Standing Status:   Future     Expected Date:   7/18/2025     Expiration Date:   10/17/2026     Reason for Exam::   Lung Cancer Screening     New Medications Ordered This Visit   Medications    clindamycin (Cleocin) 300 MG capsule     Sig: Take 1 capsule by mouth 3 (Three) Times a Day.     Dispense:  21 capsule     Refill:  0       SUMMARY/DISCUSSION  Right lower leg injury following fall through deck:  X-rays ordered for right ankle and foot to rule out structural damage.  Start clindamycin 300 mg 3 times daily x 7 days for associated cellulitis to right shin.  "  Counseled to discontinue use of Neosporin, in favor of regular petroleum jelly.   Elevate leg to help with swelling.  CT Lung CA Screening: Ordered updated screening scan.         Next Appointment:     Return for Follow-up with Dr. Carter as scheduled 9/05/25.      VITAL SIGNS     Vitals:    07/17/25 0743   BP: 128/89   BP Location: Left arm   Patient Position: Sitting   Cuff Size: Adult   Pulse: 55   Resp: 18   Temp: 96.5 °F (35.8 °C)   TempSrc: Temporal   SpO2: 95%   Weight: 80.7 kg (178 lb)   Height: 167.6 cm (65.98\")       BP Readings from Last 3 Encounters:   07/17/25 128/89   05/06/25 123/71   03/03/25 130/84     Wt Readings from Last 3 Encounters:   07/17/25 80.7 kg (178 lb)   05/06/25 80.7 kg (178 lb)   03/03/25 80.7 kg (178 lb)     Body mass index is 28.74 kg/m².         No data to display                   MEDICATIONS AT THE TIME OF OFFICE VISIT     Current Outpatient Medications on File Prior to Visit   Medication Sig    albuterol sulfate HFA (Ventolin HFA) 108 (90 Base) MCG/ACT inhaler Inhale 2 puffs Every 4 (Four) Hours As Needed for Wheezing or Shortness of Air.    amLODIPine (NORVASC) 10 MG tablet TAKE ONE TABLET BY MOUTH EVERY DAY    aspirin 81 MG EC tablet Take 1 tablet by mouth Daily.    clindamycin (CLEOCIN T) 1 % external solution prn    pantoprazole (PROTONIX) 40 MG EC tablet TAKE ONE TABLET BY MOUTH EVERY DAY    pravastatin (PRAVACHOL) 40 MG tablet TAKE ONE TABLET BY MOUTH EVERY DAY    Trelegy Ellipta 200-62.5-25 MCG/ACT inhaler INHALE ONE PUFF BY MOUTH EVERY DAY     No current facility-administered medications on file prior to visit.          HISTORY OF PRESENT ILLNESS     Reports a fall through a rotted-out deck on 8 days ago causing injuries to his right lower leg and ankle. Initially following his fall his leg was bleeding heavily from cuts and abrasions to the anterior right lower leg. The wound has become more pronounced and his right leg had begun to swell on around Tuesday of this week. " "Most of his pain was concentrated to the area of wounds at first, until two days ago when he noted pain and bruising to his right ankle. The ankle pain is most pronounced when leaving his bed in the AM for his first \"15 to 20 steps\", and seems to improve the more he walks. Denies foot drop, pain or nodules to calf, pitting with swelling, fever, or chills. No history of documented MRSA, though he expresses some concern that he could have MRSA after recently having a brother-in-law hospitalized this. Tetanus immunization up to date as of December 2020.  He has attempted Neosporin, wound cleansing, and bandaging.       Lung CA Screen: He reports that he has been due for follow up scan since early May 2025 and is requesting CT to be ordered.        Patient Care Team:  Lester Carter MD as PCP - General (Internal Medicine)  Humble Hamilton MD as Surgeon (Vascular Surgery)  Antolin Wolfe MD as Consulting Physician (Gastroenterology)  Rick Rocha MD (Ophthalmology)  Allegra Ferrari MD as Consulting Physician (Nephrology)    REVIEW OF SYSTEMS     Review of Systems   As Per HPI.  All other systems negative.     PHYSICAL EXAMINATION     Physical Exam  Vitals and nursing note reviewed.   Constitutional:       General: He is not in acute distress.     Appearance: Normal appearance. He is not ill-appearing.   Cardiovascular:      Rate and Rhythm: Normal rate and regular rhythm.      Pulses: Normal pulses.      Heart sounds: Normal heart sounds. No murmur heard.     No friction rub. No gallop.   Pulmonary:      Effort: Pulmonary effort is normal. No respiratory distress.      Breath sounds: Normal breath sounds. No stridor. No wheezing.   Musculoskeletal:      Right lower leg: 3+ Edema present.   Skin:     Findings: Abrasion (Right Shin accompanied by erythema and warmth), bruising (To right foot/heel, and RT Medial Knee) and erythema (right shin (surrounding wound)) present.          Neurological:      Mental " Status: He is alert.   Psychiatric:         Mood and Affect: Mood normal.         Behavior: Behavior normal.             REVIEWED DATA     Labs:     Lab Results   Component Value Date     09/03/2024    K 4.5 09/03/2024    CALCIUM 9.4 09/03/2024    AST 67 (H) 07/29/2024    ALT 60 (H) 07/29/2024    BUN 18 09/03/2024    CREATININE 1.40 (H) 05/01/2025    CREATININE 1.40 (H) 10/21/2024    CREATININE 1.24 09/03/2024     Lab Results   Component Value Date    HGBA1C 5.60 01/27/2020    HGBA1C 5.60 01/11/2019    HGBA1C 5.10 02/21/2017     Lab Results   Component Value Date    LDL 56 09/03/2024    LDL 74 08/30/2023    LDL 60 08/18/2022    HDL 60 09/03/2024    HDL 59 08/30/2023    TRIG 75 09/03/2024    TRIG 87 08/30/2023     Lab Results   Component Value Date    TSH 1.110 01/11/2019    TSH 1.380 02/21/2017    FREET4 1.26 01/11/2019    FREET4 1.22 02/21/2017     Lab Results   Component Value Date    WBC 9.71 07/29/2024    HGB 13.3 07/29/2024     07/29/2024     Lab Results   Component Value Date    MALBCRERATIO  01/14/2022      Comment:      Unable to calculate             Imaging:               Medical Tests:               Summary of old records / correspondence / consultant report:             Request outside records:

## 2025-07-23 ENCOUNTER — HOSPITAL ENCOUNTER (EMERGENCY)
Facility: HOSPITAL | Age: 70
Discharge: HOME | End: 2025-07-23
Attending: STUDENT IN AN ORGANIZED HEALTH CARE EDUCATION/TRAINING PROGRAM | Admitting: STUDENT IN AN ORGANIZED HEALTH CARE EDUCATION/TRAINING PROGRAM
Payer: MEDICARE

## 2025-07-23 ENCOUNTER — LAB (OUTPATIENT)
Facility: HOSPITAL | Age: 70
End: 2025-07-23
Payer: MEDICARE

## 2025-07-23 ENCOUNTER — OFFICE VISIT (OUTPATIENT)
Dept: INTERNAL MEDICINE | Age: 70
End: 2025-07-23
Payer: MEDICARE

## 2025-07-23 ENCOUNTER — APPOINTMENT (OUTPATIENT)
Dept: CARDIOLOGY | Facility: HOSPITAL | Age: 70
End: 2025-07-23
Payer: MEDICARE

## 2025-07-23 VITALS
BODY MASS INDEX: 28.61 KG/M2 | RESPIRATION RATE: 17 BRPM | OXYGEN SATURATION: 95 % | HEIGHT: 66 IN | SYSTOLIC BLOOD PRESSURE: 122 MMHG | HEART RATE: 94 BPM | DIASTOLIC BLOOD PRESSURE: 82 MMHG | WEIGHT: 178 LBS | TEMPERATURE: 96.8 F

## 2025-07-23 VITALS
OXYGEN SATURATION: 97 % | TEMPERATURE: 98.2 F | RESPIRATION RATE: 16 BRPM | DIASTOLIC BLOOD PRESSURE: 94 MMHG | SYSTOLIC BLOOD PRESSURE: 152 MMHG | HEART RATE: 52 BPM

## 2025-07-23 DIAGNOSIS — U07.1 COVID-19 VIRUS INFECTION: ICD-10-CM

## 2025-07-23 DIAGNOSIS — I87.2 EDEMA OF RIGHT LOWER EXTREMITY DUE TO PERIPHERAL VENOUS INSUFFICIENCY: Primary | ICD-10-CM

## 2025-07-23 DIAGNOSIS — R60.0 EDEMA OF RIGHT LOWER EXTREMITY: Primary | ICD-10-CM

## 2025-07-23 DIAGNOSIS — L03.115 CELLULITIS OF RIGHT LOWER EXTREMITY: ICD-10-CM

## 2025-07-23 LAB
ALBUMIN SERPL-MCNC: 4.2 G/DL (ref 3.5–5.2)
ALBUMIN/GLOB SERPL: 1.5 G/DL
ALP SERPL-CCNC: 99 U/L (ref 39–117)
ALT SERPL W P-5'-P-CCNC: 18 U/L (ref 1–41)
ANION GAP SERPL CALCULATED.3IONS-SCNC: 11.6 MMOL/L (ref 5–15)
ANION GAP SERPL CALCULATED.3IONS-SCNC: 9.5 MMOL/L (ref 5–15)
AST SERPL-CCNC: 17 U/L (ref 1–40)
BASOPHILS # BLD AUTO: 0.02 10*3/MM3 (ref 0–0.2)
BASOPHILS NFR BLD AUTO: 0.3 % (ref 0–1.5)
BILIRUB SERPL-MCNC: 0.3 MG/DL (ref 0–1.2)
BUN SERPL-MCNC: 18 MG/DL (ref 8–23)
BUN SERPL-MCNC: 20 MG/DL (ref 8–23)
BUN/CREAT SERPL: 13.8 (ref 7–25)
BUN/CREAT SERPL: 14 (ref 7–25)
CALCIUM SPEC-SCNC: 9.2 MG/DL (ref 8.6–10.5)
CALCIUM SPEC-SCNC: 9.8 MG/DL (ref 8.6–10.5)
CHLORIDE SERPL-SCNC: 105 MMOL/L (ref 98–107)
CHLORIDE SERPL-SCNC: 105 MMOL/L (ref 98–107)
CO2 SERPL-SCNC: 23.4 MMOL/L (ref 22–29)
CO2 SERPL-SCNC: 24.5 MMOL/L (ref 22–29)
CREAT SERPL-MCNC: 1.3 MG/DL (ref 0.76–1.27)
CREAT SERPL-MCNC: 1.43 MG/DL (ref 0.76–1.27)
D DIMER PPP FEU-MCNC: 1.72 MCGFEU/ML (ref 0–0.71)
DEPRECATED RDW RBC AUTO: 46.3 FL (ref 37–54)
EGFRCR SERPLBLD CKD-EPI 2021: 52.4 ML/MIN/1.73
EGFRCR SERPLBLD CKD-EPI 2021: 58.7 ML/MIN/1.73
EOSINOPHIL # BLD AUTO: 0.25 10*3/MM3 (ref 0–0.4)
EOSINOPHIL NFR BLD AUTO: 3.5 % (ref 0.3–6.2)
ERYTHROCYTE [DISTWIDTH] IN BLOOD BY AUTOMATED COUNT: 13.8 % (ref 12.3–15.4)
ERYTHROCYTE [SEDIMENTATION RATE] IN BLOOD: 4 MM/HR (ref 0–20)
GLOBULIN UR ELPH-MCNC: 2.8 GM/DL
GLUCOSE SERPL-MCNC: 89 MG/DL (ref 65–99)
GLUCOSE SERPL-MCNC: 99 MG/DL (ref 65–99)
HCT VFR BLD AUTO: 44.2 % (ref 37.5–51)
HGB BLD-MCNC: 15.6 G/DL (ref 13–17.7)
HOLD SPECIMEN: NORMAL
HOLD SPECIMEN: NORMAL
IMM GRANULOCYTES # BLD AUTO: 0.02 10*3/MM3 (ref 0–0.05)
IMM GRANULOCYTES NFR BLD AUTO: 0.3 % (ref 0–0.5)
LYMPHOCYTES # BLD AUTO: 1.87 10*3/MM3 (ref 0.7–3.1)
LYMPHOCYTES NFR BLD AUTO: 26 % (ref 19.6–45.3)
MCH RBC QN AUTO: 32.8 PG (ref 26.6–33)
MCHC RBC AUTO-ENTMCNC: 35.3 G/DL (ref 31.5–35.7)
MCV RBC AUTO: 92.9 FL (ref 79–97)
MONOCYTES # BLD AUTO: 0.51 10*3/MM3 (ref 0.1–0.9)
MONOCYTES NFR BLD AUTO: 7.1 % (ref 5–12)
NEUTROPHILS NFR BLD AUTO: 4.53 10*3/MM3 (ref 1.7–7)
NEUTROPHILS NFR BLD AUTO: 62.8 % (ref 42.7–76)
NRBC BLD AUTO-RTO: 0 /100 WBC (ref 0–0.2)
PLATELET # BLD AUTO: 189 10*3/MM3 (ref 140–450)
PMV BLD AUTO: 9.6 FL (ref 6–12)
POTASSIUM SERPL-SCNC: 4.6 MMOL/L (ref 3.5–5.2)
POTASSIUM SERPL-SCNC: 5.7 MMOL/L (ref 3.5–5.2)
PROT SERPL-MCNC: 7 G/DL (ref 6–8.5)
RBC # BLD AUTO: 4.76 10*6/MM3 (ref 4.14–5.8)
SODIUM SERPL-SCNC: 139 MMOL/L (ref 136–145)
SODIUM SERPL-SCNC: 140 MMOL/L (ref 136–145)
WBC NRBC COR # BLD AUTO: 7.2 10*3/MM3 (ref 3.4–10.8)
WHOLE BLOOD HOLD COAG: NORMAL
WHOLE BLOOD HOLD SPECIMEN: NORMAL

## 2025-07-23 PROCEDURE — 80053 COMPREHEN METABOLIC PANEL: CPT | Performed by: PHYSICIAN ASSISTANT

## 2025-07-23 PROCEDURE — 93971 EXTREMITY STUDY: CPT | Performed by: SURGERY

## 2025-07-23 PROCEDURE — 85025 COMPLETE CBC W/AUTO DIFF WBC: CPT | Performed by: STUDENT IN AN ORGANIZED HEALTH CARE EDUCATION/TRAINING PROGRAM

## 2025-07-23 PROCEDURE — 99284 EMERGENCY DEPT VISIT MOD MDM: CPT

## 2025-07-23 PROCEDURE — 93971 EXTREMITY STUDY: CPT

## 2025-07-23 PROCEDURE — 36415 COLL VENOUS BLD VENIPUNCTURE: CPT | Performed by: PHYSICIAN ASSISTANT

## 2025-07-23 PROCEDURE — 85652 RBC SED RATE AUTOMATED: CPT | Performed by: PHYSICIAN ASSISTANT

## 2025-07-23 PROCEDURE — 85379 FIBRIN DEGRADATION QUANT: CPT | Performed by: PHYSICIAN ASSISTANT

## 2025-07-23 PROCEDURE — 36415 COLL VENOUS BLD VENIPUNCTURE: CPT

## 2025-07-23 RX ORDER — FUROSEMIDE 20 MG/1
20 TABLET ORAL 2 TIMES DAILY
Qty: 14 TABLET | Refills: 0 | Status: SHIPPED | OUTPATIENT
Start: 2025-07-23

## 2025-07-23 NOTE — PROGRESS NOTES
JUSTUS DE LA CRUZ PA-C                  I  N  T  E  R  N  A  L    M  E  D  I  C  I  N  E         ENCOUNTER DATE:  07/23/2025    Patrick Mckeon / 70 y.o. / male    OFFICE VISIT ENCOUNTER       CHIEF COMPLAINT / REASON FOR OFFICE VISIT     Leg Swelling (Right leg, pt states it gotten worse)      ASSESSMENT & PLAN     Problem List Items Addressed This Visit    None  Visit Diagnoses         Edema of right lower extremity    -  Primary    Relevant Medications    furosemide (Lasix) 20 MG tablet    Other Relevant Orders    Basic Metabolic Panel (Completed)    D-dimer, Quantitative (Completed)    Sedimentation Rate (Completed)      Cellulitis of right lower extremity          COVID-19 virus infection              Orders Placed This Encounter   Procedures    Basic Metabolic Panel     Release to patient:   Routine Release [[id number]]    D-dimer, Quantitative     Release to patient:   Routine Release [[id number]]    Sedimentation Rate     Release to patient:   Routine Release [[id number]]     New Medications Ordered This Visit   Medications    furosemide (Lasix) 20 MG tablet     Sig: Take 1 tablet by mouth 2 (Two) Times a Day.     Dispense:  14 tablet     Refill:  0       SUMMARY/DISCUSSION  Right lower leg injury/cellulitis follow-up.  Cellulitis and inflammation of wound has improved with discontinuing of Neosporin and prescription of clindamycin 300 mg 3 times daily.  Edema and stiffness of right lower extremity has worsened since last week.  Previously 3+ nonpitting edema now 4+ nonpitting edema.  Wells criteria of 3 (bedridden following injury, swollen calf >3 cm larger than opposing calf, and tenderness near deep vein).  Will order D-dimer.  If D-dimer is positive determination will be made whether patient needs ER visit versus stat Doppler imaging.        Next Appointment:     Return in about 1 week (around 7/30/2025) for Recheck RT Lower leg.      VITAL SIGNS     Vitals:    07/23/25 1506  "  BP: 122/82   BP Location: Left arm   Patient Position: Sitting   Cuff Size: Adult   Pulse: 94   Resp: 17   Temp: 96.8 °F (36 °C)   TempSrc: Temporal   SpO2: 95%   Weight: 80.7 kg (178 lb)   Height: 167.6 cm (65.98\")       BP Readings from Last 3 Encounters:   07/23/25 122/82   07/17/25 128/89   05/06/25 123/71     Wt Readings from Last 3 Encounters:   07/23/25 80.7 kg (178 lb)   07/17/25 80.7 kg (178 lb)   05/06/25 80.7 kg (178 lb)     Body mass index is 28.74 kg/m².         No data to display                   MEDICATIONS AT THE TIME OF OFFICE VISIT     Current Outpatient Medications on File Prior to Visit   Medication Sig    albuterol sulfate HFA (Ventolin HFA) 108 (90 Base) MCG/ACT inhaler Inhale 2 puffs Every 4 (Four) Hours As Needed for Wheezing or Shortness of Air.    amLODIPine (NORVASC) 10 MG tablet TAKE ONE TABLET BY MOUTH EVERY DAY    aspirin 81 MG EC tablet Take 1 tablet by mouth Daily.    clindamycin (CLEOCIN T) 1 % external solution prn    clindamycin (Cleocin) 300 MG capsule Take 1 capsule by mouth 3 (Three) Times a Day.    pantoprazole (PROTONIX) 40 MG EC tablet TAKE ONE TABLET BY MOUTH EVERY DAY    pravastatin (PRAVACHOL) 40 MG tablet TAKE ONE TABLET BY MOUTH EVERY DAY    Trelegy Ellipta 200-62.5-25 MCG/ACT inhaler INHALE ONE PUFF BY MOUTH EVERY DAY     No current facility-administered medications on file prior to visit.          HISTORY OF PRESENT ILLNESS     Presents for one week follow up for right lower leg swelling following a recent injury where he fell through a rotting deck. On last week he's right anterior leg wound was inflamed and appeared to be infected. He was started on Clindamycin 300 mg TID which did help with redness and wound inflammation. Pain and tenderness hasve improved, though stiffness has continued and his leg and foot swelling have worsened appreciably.     Patient Care Team:  Lester Carter MD as PCP - General (Internal Medicine)  Humble Hamilton MD as Surgeon (Vascular " Surgery)  Antolin Wolfe MD as Consulting Physician (Gastroenterology)  Rick Rocha MD (Ophthalmology)  Allegra Ferrari MD as Consulting Physician (Nephrology)    REVIEW OF SYSTEMS     Review of Systems   As Per HPI.  All other systems negative.     PHYSICAL EXAMINATION     Physical Exam  Vitals and nursing note reviewed.   Constitutional:       General: He is not in acute distress.     Appearance: Normal appearance. He is not ill-appearing.   Cardiovascular:      Rate and Rhythm: Normal rate and regular rhythm.      Pulses: Normal pulses.      Heart sounds: Normal heart sounds. No murmur heard.     No friction rub. No gallop.   Pulmonary:      Effort: Pulmonary effort is normal. No respiratory distress.      Breath sounds: Normal breath sounds. No stridor. No wheezing.   Musculoskeletal:      Right lower le+ Edema present.      Left lower leg: No edema.   Skin:     Findings: Signs of injury (scabbed wound) present.          Neurological:      Mental Status: He is alert.   Psychiatric:         Mood and Affect: Mood normal.         Behavior: Behavior normal.             REVIEWED DATA     Labs:     Lab Results   Component Value Date     2025    K 5.7 (H) 2025    CALCIUM 9.8 2025    AST 67 (H) 2024    ALT 60 (H) 2024    BUN 20.0 2025    CREATININE 1.43 (H) 2025    CREATININE 1.40 (H) 2025    CREATININE 1.40 (H) 10/21/2024     Lab Results   Component Value Date    HGBA1C 5.60 2020    HGBA1C 5.60 2019    HGBA1C 5.10 2017     Lab Results   Component Value Date    LDL 56 2024    LDL 74 2023    LDL 60 2022    HDL 60 2024    HDL 59 2023    TRIG 75 2024    TRIG 87 2023     Lab Results   Component Value Date    TSH 1.110 2019    TSH 1.380 2017    FREET4 1.26 2019    FREET4 1.22 2017     Lab Results   Component Value Date    WBC 9.71 2024    HGB 13.3 2024    PLT  167 07/29/2024     Lab Results   Component Value Date    MALBCRERATIO  01/14/2022      Comment:      Unable to calculate             Imaging:               Medical Tests:               Summary of old records / correspondence / consultant report:             Request outside records:

## 2025-07-23 NOTE — ED NOTES
Swelling to the R lower leg that started 2 weeks ago but got much worse in the last week. Positive d-dimer from his pcp.

## 2025-07-24 LAB
BH CV LOW VAS RIGHT MID FEMORAL SPONT: 1
BH CV LOW VAS RIGHT VARICOSITY BK VESSEL: 1
BH CV LOWER VASCULAR LEFT COMMON FEMORAL AUGMENT: NORMAL
BH CV LOWER VASCULAR LEFT COMMON FEMORAL COMPETENT: NORMAL
BH CV LOWER VASCULAR LEFT COMMON FEMORAL COMPRESS: NORMAL
BH CV LOWER VASCULAR LEFT COMMON FEMORAL PHASIC: NORMAL
BH CV LOWER VASCULAR LEFT COMMON FEMORAL SPONT: NORMAL
BH CV LOWER VASCULAR RIGHT COMMON FEMORAL AUGMENT: NORMAL
BH CV LOWER VASCULAR RIGHT COMMON FEMORAL COMPETENT: NORMAL
BH CV LOWER VASCULAR RIGHT COMMON FEMORAL COMPRESS: NORMAL
BH CV LOWER VASCULAR RIGHT COMMON FEMORAL PHASIC: NORMAL
BH CV LOWER VASCULAR RIGHT COMMON FEMORAL SPONT: NORMAL
BH CV LOWER VASCULAR RIGHT DISTAL FEMORAL COMPRESS: NORMAL
BH CV LOWER VASCULAR RIGHT GASTRONEMIUS COMPRESS: NORMAL
BH CV LOWER VASCULAR RIGHT GREATER SAPH AK COMPRESS: NORMAL
BH CV LOWER VASCULAR RIGHT GREATER SAPH BK COMPRESS: NORMAL
BH CV LOWER VASCULAR RIGHT LESSER SAPH COMPRESS: NORMAL
BH CV LOWER VASCULAR RIGHT MID FEMORAL AUGMENT: NORMAL
BH CV LOWER VASCULAR RIGHT MID FEMORAL COMPETENT: NORMAL
BH CV LOWER VASCULAR RIGHT MID FEMORAL COMPRESS: NORMAL
BH CV LOWER VASCULAR RIGHT MID FEMORAL PHASIC: NORMAL
BH CV LOWER VASCULAR RIGHT MID FEMORAL SPONT: NORMAL
BH CV LOWER VASCULAR RIGHT PERONEAL COMPRESS: NORMAL
BH CV LOWER VASCULAR RIGHT POPLITEAL AUGMENT: NORMAL
BH CV LOWER VASCULAR RIGHT POPLITEAL COMPETENT: NORMAL
BH CV LOWER VASCULAR RIGHT POPLITEAL COMPRESS: NORMAL
BH CV LOWER VASCULAR RIGHT POPLITEAL PHASIC: NORMAL
BH CV LOWER VASCULAR RIGHT POPLITEAL SPONT: NORMAL
BH CV LOWER VASCULAR RIGHT POSTERIOR TIBIAL COMPRESS: NORMAL
BH CV LOWER VASCULAR RIGHT PROFUNDA FEMORAL COMPRESS: NORMAL
BH CV LOWER VASCULAR RIGHT PROXIMAL FEMORAL COMPRESS: NORMAL
BH CV LOWER VASCULAR RIGHT SAPHENOFEMORAL JUNCTION COMPRESS: NORMAL
BH CV LOWER VASCULAR RIGHT VARICOSITY BK COMPETENT: NORMAL
BH CV LOWER VASCULAR RIGHT VARICOSITY BK COMPRESS: NORMAL
BH CV VAS PRELIMINARY FINDINGS SCRIPTING: 1

## 2025-07-24 NOTE — ED PROVIDER NOTES
EMERGENCY DEPARTMENT ENCOUNTER  Room Number:  18/18  PCP: Lester Carter MD  Independent Historians: Historian: Patient      HPI:  Chief Complaint: had concerns including Leg Swelling.     A complete HPI/ROS/PMH/PSH/SH/FH are unobtainable due to: EM_Unobtainable History : None    Chronic or social conditions impacting patient care (Social Determinants of Health): None      Context: Patrick Mckeon is a 70 y.o. male with a medical history of hypertension, emphysema, hyperlipidemia, CKD, who presents to the ED c/o acute right lower extremity swelling.  He states 2 weeks ago he fell through rotten wood on a deck and scraped the front of his leg.  He was eventually treated with antibiotics with his family doctor.  Despite this, the swelling has worsened.  Denies any chest pain difficulty breathing.  He had an outpatient D-dimer test today  which was positive and sent for further evaluation.    Review of prior external notes (non-ED) -and- Review of prior external test results outside of this encounter: I reviewed right foot ankle x-ray from 7/17/2025.  No acute fractures.      Prescription drug monitoring program review:         PAST MEDICAL HISTORY  Active Ambulatory Problems     Diagnosis Date Noted    Primary hypertension 10/28/2016    Cigarette nicotine dependence with nicotine-induced disorder 10/28/2016    Emphysema lung 02/28/2017    Hyperlipidemia 02/28/2017    Abdominal aortic aneurysm (AAA) without rupture 03/13/2017    Congenital absence of left kidney 03/13/2017    Atherosclerosis of native coronary artery of native heart without angina pectoris 03/10/2020    Calculus of gallbladder without cholecystitis without obstruction 03/10/2020    Gastroesophageal reflux disease with esophagitis without hemorrhage 03/10/2020    CKD (chronic kidney disease) stage 3, GFR 30-59 ml/min 11/20/2021    Anemia 11/20/2021    Stage 3a chronic kidney disease 10/25/2022    History of endovascular stent graft for abdominal aortic  aneurysm (AAA) 07/16/2024     Resolved Ambulatory Problems     Diagnosis Date Noted    Diverticulosis of large intestine without hemorrhage 08/09/2017    Thrombosis, 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    Epigastric abdominal pain 09/06/2018    Pulmonary nodules 03/10/2020    Liver abscess 11/19/2021    Sepsis 11/20/2021    Postoperative hypoxemia 11/24/2021    Encounter for screening for malignant neoplasm of colon 10/18/2022    Intractable abdominal pain 07/26/2024    Acute cholecystitis 07/26/2024     Past Medical History:   Diagnosis Date    Abdominal aortic aneurysm without rupture 06/11/2019    Aneurysm     Cataract 4/15/2018    Colon polyp     Diverticulosis 8/9/2017    Essential (primary) hypertension     H/O abdominal aortic aneurysm repair 06/11/2019    Hiatal hernia with gastroesophageal reflux disease and esophagitis 03/10/2020    Nicotine dependence, other tobacco product, uncomplicated 06/11/2019    Portal vein thrombosis 08/29/2017    Shingles 09/2010    Skin cancer 05/31/2017    Substance abuse          PAST SURGICAL HISTORY  Past Surgical History:   Procedure Laterality Date    ARTERIOGRAM AORTIC N/A 05/08/2017    Procedure:  AORTIC STENT GRAFT REPAIR W/ GORE BILATERAL FEMORAL MINI CUTDOWNS;  Surgeon: Humble Hamilton MD;  Location: [facility] HYBRID OR 18/19;  Service:     CATARACT EXTRACTION Left 10/2023    CATARACT EXTRACTION Right 11/2023    CHOLECYSTECTOMY WITH INTRAOPERATIVE CHOLANGIOGRAM N/A 07/28/2024    Procedure: CHOLECYSTECTOMY LAPAROSCOPIC INTRAOPERATIVE CHOLANGIOGRAM WITH DAVINCI ROBOT;  Surgeon: Danelle Brown MD;  Location: [facility] MAIN OR;  Service: Robotics - DaVinci;  Laterality: N/A;    COLONOSCOPY N/A 08/03/2017    Procedure: COLONOSCOPY to cecum;  Surgeon: Patrick Elizabeth MD;  Location: [facility] ENDOSCOPY;  Service:     COLONOSCOPY  09/2010    COLONOSCOPY N/A 01/31/2023    Procedure: COLONOSCOPY INTO CECUM WITH COLD SNARE POLYPECTOMIES;  Surgeon: Antolin Wolfe MD;  Location: [facility]  ENDOSCOPY;  Service: Gastroenterology;  Laterality: N/A;  PRE; PERSONAL H/O COLON POLYPS; FAMILY H/O COLON CANCER  POST: DIVERTICULOSIS, POLYPS, HEMORRHIDS    ENDOSCOPY  08/03/2017    Procedure: ESOPHAGOGASTRODUODENOSCOPY;  Surgeon: Patrick Elizabeth MD;  Location: [facility] ENDOSCOPY;  Service:     HERNIA REPAIR  1975    KIDNEY SURGERY      Kidney Removal    UMBILICAL HERNIA REPAIR  1975         FAMILY HISTORY  Family History   Problem Relation Age of Onset    Hypertension Mother     Hyperlipidemia Mother     Sudden death Mother 78    Aneurysm Mother     Heart disease Mother     Stroke Mother     Colon cancer Father 55    Heart attack Father     Coronary artery disease Father     COPD Sister     Benign prostatic hyperplasia Brother     Hypertension Brother     Benign prostatic hyperplasia Brother     Brain cancer Maternal Grandmother     Heart disease Maternal Grandfather     Sudden death Maternal Grandfather     Heart disease Other     Cancer Other         no details    Hypertension Brother     Prostate cancer Neg Hx     Dementia Neg Hx     Malig Hyperthermia Neg Hx          SOCIAL HISTORY  Social History     Socioeconomic History    Marital status:      Spouse name: Samira    Number of children: 1    Years of education: High School   Tobacco Use    Smoking status: Every Day     Current packs/day: 1.00     Average packs/day: 1 pack/day for 50.6 years (50.6 ttl pk-yrs)     Types: Cigarettes     Start date: 1975     Passive exposure: Never    Smokeless tobacco: Never    Tobacco comments:     Patient smokes 1 pack per day         Vaping Use    Vaping status: Former    Substances: Nicotine   Substance and Sexual Activity    Alcohol use: Yes     Comment: 6 days (1-2 drinks)    Drug use: No     Comment: Drug Abuse: no drug abuse    Sexual activity: Not Currently     Partners: Female       ALLERGIES  Patient has no known allergies.      PHYSICAL EXAM    I have reviewed the triage vital signs and nursing notes.    ED  Triage Vitals   Temp Heart Rate Resp BP SpO2   03/02/25 1448 03/02/25 1448 03/02/25 1448 03/02/25 1450 03/02/25 1448   97.4 °F (36.3 °C) 95 16 141/84 97 %      Temp src Heart Rate Source Patient Position BP Location FiO2 (%)   -- -- -- -- --              Physical Exam  GENERAL: alert, no acute distress  SKIN: Warm, dry  HENT: Normocephalic, atraumatic  EYES: no scleral icterus  CV: regular rhythm, regular rate  RESPIRATORY: normal effort, lungs clear  MUSCULOSKELETAL: no deformity, 2+ pitting edema to right lower extremity to the knee, well-healing abrasion to right anterior shin  NEURO: alert, moves all extremities, follows commands        LAB RESULTS  Recent Results (from the past 24 hours)   Basic Metabolic Panel    Collection Time: 07/23/25  3:52 PM    Specimen: Blood   Result Value Ref Range    Glucose 89 65 - 99 mg/dL    BUN 20.0 8.0 - 23.0 mg/dL    Creatinine 1.43 (H) 0.76 - 1.27 mg/dL    Sodium 139 136 - 145 mmol/L    Potassium 5.7 (H) 3.5 - 5.2 mmol/L    Chloride 105 98 - 107 mmol/L    CO2 24.5 22.0 - 29.0 mmol/L    Calcium 9.8 8.6 - 10.5 mg/dL    BUN/Creatinine Ratio 14.0 7.0 - 25.0    Anion Gap 9.5 5.0 - 15.0 mmol/L    eGFR 52.4 (L) >60.0 mL/min/1.73   D-dimer, Quantitative    Collection Time: 07/23/25  3:52 PM    Specimen: Blood   Result Value Ref Range    D-Dimer, Quantitative 1.72 (H) 0.00 - 0.71 MCGFEU/mL   Sedimentation Rate    Collection Time: 07/23/25  3:52 PM    Specimen: Blood   Result Value Ref Range    Sed Rate 4 0 - 20 mm/hr   Comprehensive Metabolic Panel    Collection Time: 07/23/25  6:59 PM    Specimen: Arm, Right; Blood   Result Value Ref Range    Glucose 99 65 - 99 mg/dL    BUN 18.0 8.0 - 23.0 mg/dL    Creatinine 1.30 (H) 0.76 - 1.27 mg/dL    Sodium 140 136 - 145 mmol/L    Potassium 4.6 3.5 - 5.2 mmol/L    Chloride 105 98 - 107 mmol/L    CO2 23.4 22.0 - 29.0 mmol/L    Calcium 9.2 8.6 - 10.5 mg/dL    Total Protein 7.0 6.0 - 8.5 g/dL    Albumin 4.2 3.5 - 5.2 g/dL    ALT (SGPT) 18 1 - 41 U/L     AST (SGOT) 17 1 - 40 U/L    Alkaline Phosphatase 99 39 - 117 U/L    Total Bilirubin 0.3 0.0 - 1.2 mg/dL    Globulin 2.8 gm/dL    A/G Ratio 1.5 g/dL    BUN/Creatinine Ratio 13.8 7.0 - 25.0    Anion Gap 11.6 5.0 - 15.0 mmol/L    eGFR 58.7 (L) >60.0 mL/min/1.73   Green Top (Gel)    Collection Time: 07/23/25  6:59 PM   Result Value Ref Range    Extra Tube Hold for add-ons.    Lavender Top    Collection Time: 07/23/25  6:59 PM   Result Value Ref Range    Extra Tube hold for add-on    Gold Top - SST    Collection Time: 07/23/25  6:59 PM   Result Value Ref Range    Extra Tube Hold for add-ons.    Light Blue Top    Collection Time: 07/23/25  6:59 PM   Result Value Ref Range    Extra Tube Hold for add-ons.    CBC Auto Differential    Collection Time: 07/23/25  6:59 PM    Specimen: Arm, Right; Blood   Result Value Ref Range    WBC 7.20 3.40 - 10.80 10*3/mm3    RBC 4.76 4.14 - 5.80 10*6/mm3    Hemoglobin 15.6 13.0 - 17.7 g/dL    Hematocrit 44.2 37.5 - 51.0 %    MCV 92.9 79.0 - 97.0 fL    MCH 32.8 26.6 - 33.0 pg    MCHC 35.3 31.5 - 35.7 g/dL    RDW 13.8 12.3 - 15.4 %    RDW-SD 46.3 37.0 - 54.0 fl    MPV 9.6 6.0 - 12.0 fL    Platelets 189 140 - 450 10*3/mm3    Neutrophil % 62.8 42.7 - 76.0 %    Lymphocyte % 26.0 19.6 - 45.3 %    Monocyte % 7.1 5.0 - 12.0 %    Eosinophil % 3.5 0.3 - 6.2 %    Basophil % 0.3 0.0 - 1.5 %    Immature Grans % 0.3 0.0 - 0.5 %    Neutrophils, Absolute 4.53 1.70 - 7.00 10*3/mm3    Lymphocytes, Absolute 1.87 0.70 - 3.10 10*3/mm3    Monocytes, Absolute 0.51 0.10 - 0.90 10*3/mm3    Eosinophils, Absolute 0.25 0.00 - 0.40 10*3/mm3    Basophils, Absolute 0.02 0.00 - 0.20 10*3/mm3    Immature Grans, Absolute 0.02 0.00 - 0.05 10*3/mm3    nRBC 0.0 0.0 - 0.2 /100 WBC   Duplex Venous Lower Extremity Right    Collection Time: 07/23/25  8:51 PM   Result Value Ref Range    Right Common Femoral Spont Y     Right Common Femoral Competent Y     Right Common Femoral Phasic Y     Right Common Femoral Compress C      Right Common Femoral Augment Y     Right Saphenofemoral Junction Compress C     Right Profunda Femoral Compress C     Right Proximal Femoral Compress C     Right Mid Femoral Spont Y     Right Mid Femoral Competent N     Right Mid Femoral Phasic Y     Right Mid Femoral Compress C     Right Mid Femoral Augment Y     Right Distal Femoral Compress C     Right Popliteal Spont Y     Right Popliteal Competent Y     Right Popliteal Phasic Y     Right Popliteal Compress C     Right Popliteal Augment Y     Right Posterior Tibial Compress C     Right Peroneal Compress C     Right Gastronemius Compress C     Right Greater Saph AK Compress C     Right Greater Saph BK Compress C     Right Lesser Saph Compress C     Left Common Femoral Spont Y     Left Common Femoral Competent Y     Left Common Femoral Phasic Y     Left Common Femoral Compress C     Left Common Femoral Augment Y     Right Mid Femoral Spont 1.0     Right Varicosity BK Vessel 1.0     Right Varicosity BK Competent N     Right Varicosity BK Compress C     BH CV VAS PRELIMINARY FINDINGS SCRIPTING 1.0        RADIOLOGY  No Radiology Exams Resulted Within Past 24 Hours      MEDICATIONS GIVEN IN ER  Medications - No data to display      ORDERS PLACED DURING THIS VISIT:  Orders Placed This Encounter   Procedures    [place]itter Draw    Comprehensive Metabolic Panel    CBC Auto Differential    Undress & Gown    CBC & Differential    Green Top (Gel)    Lavender Top    Gold Top - SST    Light Blue Top         OUTPATIENT MEDICATION MANAGEMENT:  No current Epic-ordered facility-administered medications on file.     Current Outpatient Medications Ordered in Epic   Medication Sig Dispense Refill    albuterol sulfate HFA (Ventolin HFA) 108 (90 Base) MCG/ACT inhaler Inhale 2 puffs Every 4 (Four) Hours As Needed for Wheezing or Shortness of Air. 18 g 5    amLODIPine (NORVASC) 10 MG tablet TAKE ONE TABLET BY MOUTH EVERY DAY 90 tablet 1    aspirin 81 MG EC tablet Take 1 tablet by mouth  Daily.      clindamycin (CLEOCIN T) 1 % external solution prn      clindamycin (Cleocin) 300 MG capsule Take 1 capsule by mouth 3 (Three) Times a Day. 21 capsule 0    furosemide (Lasix) 20 MG tablet Take 1 tablet by mouth 2 (Two) Times a Day. 14 tablet 0    pantoprazole (PROTONIX) 40 MG EC tablet TAKE ONE TABLET BY MOUTH EVERY DAY 90 tablet 1    pravastatin (PRAVACHOL) 40 MG tablet TAKE ONE TABLET BY MOUTH EVERY DAY 90 tablet 1    Trelegy Ellipta 200-62.5-25 MCG/ACT inhaler INHALE ONE PUFF BY MOUTH EVERY DAY 60 each 5         PROCEDURES  Procedures            PROGRESS, DATA ANALYSIS, CONSULTS, AND MEDICAL DECISION MAKING  All labs have been independently interpreted by me.  All radiology studies have been reviewed by me. All EKG's have been independently viewed and interpreted by me.  Discussion below represents my analysis of pertinent findings related to patient's condition, differential diagnosis, treatment plan and final disposition.    Differential diagnosis includes but is not limited to DVT, superficial venous thrombosis, cellulitis, fracture, venous insufficiency.    Clinical Scores:                                       ED Course as of 07/24/25 0039   Wed Jul 23, 2025 2051 Patient presents ED for evaluation of worsening right lower extremity swelling.  He states 2 weeks ago he fell through rotten wood on a deck and scraped the front of his leg.  He was eventually treated with antibiotics with his family doctor.  Despite this, the swelling has worsened.  Denies any chest pain difficulty breathing.  He had an outpatient D-dimer test today  which was positive and sent for further evaluation.    On exam patient has 2+ pretibial pitting edema to the right knee, well-healing abrasion to anterior shin, lungs are clear    Will obtain right lower extremity ultrasound.  Patient reports negative x-rays of right ankle, patient agreeable [DN]   2052 Duplex Venous Lower Extremity Right  I reviewed patient's US  image(s), no obvious DVT, interpreted by self  Formal ultrasound pending [DN]   2052 Creatinine(!): 1.30  Stable from 2 months ago [DN]   2100 I discussed patient with vascular technician.  Negative for DVT and SVT.  He does have reflux in his varicose vein and deep vein [DN]   2101 I discussed results with patient, plan for conservative management at home for peripheral edema and vascular insufficiency, he is agreeable plan for discharge home [DN]      ED Course User Index  [DN] Max Saab MD             AS OF 00:39 EDT VITALS:    BP - 152/94  HR - 52  TEMP - 98.2 °F (36.8 °C) (Oral)  O2 SATS - 97%    COMPLEXITY OF CARE  Admission was considered but after careful review of the patient's presentation, physical examination, diagnostic results, and response to treatment the patient may be safely discharged with outpatient follow-up.      DIAGNOSIS  Final diagnoses:   Edema of right lower extremity due to peripheral venous insufficiency         DISPOSITION  ED Disposition       ED Disposition   Discharge    Condition   Stable    Comment   --                    Please note that portions of this document were completed with a voice recognition program.    Note Disclaimer: At [facility], we believe that sharing information builds trust and better relationships. You are receiving this note because you recently visited [facility]. It is possible you will see health information before a provider has talked with you about it. This kind of information can be easy to misunderstand. To help you fully understand what it means for your health, we urge you to discuss this note with your provider.         Max Saab MD  07/23/25 2103       Max Saab MD  07/24/25 0039

## 2025-08-01 ENCOUNTER — HOSPITAL ENCOUNTER (OUTPATIENT)
Facility: HOSPITAL | Age: 70
Discharge: HOME | End: 2025-08-01
Payer: MEDICARE

## 2025-08-01 DIAGNOSIS — Z12.2 ENCOUNTER FOR SCREENING FOR MALIGNANT NEOPLASM OF LUNG: ICD-10-CM

## 2025-08-01 DIAGNOSIS — Z87.891 HISTORY OF SMOKING 25-50 PACK YEARS: ICD-10-CM

## 2025-08-01 PROCEDURE — 71271 CT THORAX LUNG CANCER SCR C-: CPT

## 2025-08-03 DIAGNOSIS — E78.5 HYPERLIPIDEMIA, UNSPECIFIED HYPERLIPIDEMIA TYPE: ICD-10-CM

## 2025-08-03 DIAGNOSIS — J43.9 PULMONARY EMPHYSEMA, UNSPECIFIED EMPHYSEMA TYPE: Chronic | ICD-10-CM

## 2025-08-03 DIAGNOSIS — K44.9 HIATAL HERNIA WITH GASTROESOPHAGEAL REFLUX DISEASE AND ESOPHAGITIS: Chronic | ICD-10-CM

## 2025-08-03 DIAGNOSIS — K21.00 HIATAL HERNIA WITH GASTROESOPHAGEAL REFLUX DISEASE AND ESOPHAGITIS: Chronic | ICD-10-CM

## 2025-08-03 DIAGNOSIS — I10 PRIMARY HYPERTENSION: Chronic | ICD-10-CM

## 2025-08-04 RX ORDER — PRAVASTATIN SODIUM 40 MG
40 TABLET ORAL DAILY
Qty: 90 TABLET | Refills: 1 | Status: SHIPPED | OUTPATIENT
Start: 2025-08-04

## 2025-08-04 RX ORDER — FLUTICASONE FUROATE, UMECLIDINIUM BROMIDE AND VILANTEROL TRIFENATATE 200; 62.5; 25 UG/1; UG/1; UG/1
1 POWDER RESPIRATORY (INHALATION) DAILY
Qty: 90 EACH | Refills: 1 | Status: SHIPPED | OUTPATIENT
Start: 2025-08-04

## 2025-08-04 RX ORDER — PANTOPRAZOLE SODIUM 40 MG/1
40 TABLET, DELAYED RELEASE ORAL DAILY
Qty: 90 TABLET | Refills: 1 | Status: SHIPPED | OUTPATIENT
Start: 2025-08-04

## 2025-08-04 RX ORDER — AMLODIPINE BESYLATE 10 MG/1
10 TABLET ORAL DAILY
Qty: 90 TABLET | Refills: 1 | Status: SHIPPED | OUTPATIENT
Start: 2025-08-04

## (undated) DEVICE — INTRO SHEATH DRYSEAL FLEX 12F4TO4.7MM 33CM

## (undated) DEVICE — CATH IV INSYTE AUTOGARD 14G 1 1/2IN ORNG

## (undated) DEVICE — THE SINGLE USE ETRAP – POLYP TRAP IS USED FOR SUCTION RETRIEVAL OF ENDOSCOPICALLY REMOVED POLYPS.: Brand: ETRAP

## (undated) DEVICE — SUT PROLN 6/0 BV1 D/A 30IN 8709H

## (undated) DEVICE — TBG 02 CRUSH RESIST LF CLR 7FT

## (undated) DEVICE — CANNULA,ADULT,SOFT-TOUCH,7TUBE,SC: Brand: MEDLINE

## (undated) DEVICE — TOTAL TRAY, 16FR 10ML SIL FOLEY, URN: Brand: MEDLINE

## (undated) DEVICE — 1016 S-DRAPE IRRIG POUCH 10/BOX: Brand: STERI-DRAPE™

## (undated) DEVICE — THE TORRENT IRRIGATION SCOPE CONNECTOR IS USED WITH THE TORRENT IRRIGATION TUBING TO PROVIDE IRRIGATION FLUIDS SUCH AS STERILE WATER DURING GASTROINTESTINAL ENDOSCOPIC PROCEDURES WHEN USED IN CONJUNCTION WITH AN IRRIGATION PUMP (OR ELECTROSURGICAL UNIT).: Brand: TORRENT

## (undated) DEVICE — 3M™ IOBAN™ 2 ANTIMICROBIAL INCISE DRAPE 6648EZ: Brand: IOBAN™ 2

## (undated) DEVICE — STPLR SKIN VISISTAT WD 35CT

## (undated) DEVICE — ANTIBACTERIAL UNDYED BRAIDED (POLYGLACTIN 910), SYNTHETIC ABSORBABLE SUTURE: Brand: COATED VICRYL

## (undated) DEVICE — NDL PERC 1PRT THNWALL W/BASEPLT 18G 7CM

## (undated) DEVICE — ADHS SKIN SURG TISS VISC PREMIERPRO EXOFIN HI/VISC FAST/DRY

## (undated) DEVICE — 200 ML FASTURN SYRINGE WITH QUICK FILL TUBE(ANGIO-SET,PKG,200ML FASTURN SYR W/QFT,MC)(60729695): Brand: MEDRAD® MARK V PROVIS 200ML FASTURN STERILE DISPOSABLE SYRINGE & QFT

## (undated) DEVICE — CVR EQ IMG 48X27

## (undated) DEVICE — ARM DRAPE

## (undated) DEVICE — DRAPE,REIN 53X77,STERILE: Brand: MEDLINE

## (undated) DEVICE — SUT PROLN 5/0 RB1 D/A 36IN 8556H

## (undated) DEVICE — CATH URETRL SOF/FLEX OPN/END 4F 70CM

## (undated) DEVICE — TUBING, SUCTION, 1/4" X 10', STRAIGHT: Brand: MEDLINE

## (undated) DEVICE — SUT SILK 4/0 TIES 18IN A183H

## (undated) DEVICE — TBG PRESS HI FLX BR 96IN

## (undated) DEVICE — SUT SILK 2/0 TIES 18IN A185H

## (undated) DEVICE — LOU LAP CHOLE: Brand: MEDLINE INDUSTRIES, INC.

## (undated) DEVICE — BLADELESS OBTURATOR: Brand: WECK VISTA

## (undated) DEVICE — SUT VIC 3/0 CTI 36IN J944H

## (undated) DEVICE — THE STERILE LIGHT HANDLE COVER IS USED WITH STERIS SURGICAL LIGHTING AND VISUALIZATION SYSTEMS.

## (undated) DEVICE — ST TBG AIRSEAL BIF FLTR W/ACT/CHARCOAL/FLTR

## (undated) DEVICE — TROC BLADLES AIRSEAL/OPTI THRD 8X120MM 1P/U

## (undated) DEVICE — PINNACLE R/O II INTRODUCER SHEATH WITH RADIOPAQUE MARKER: Brand: PINNACLE

## (undated) DEVICE — EXTENSION SET, MALE LUER LOCK ADAPTER WITH RETRACTABLE COLLAR

## (undated) DEVICE — LN SMPL CO2 SHTRM SD STREAM W/M LUER

## (undated) DEVICE — ADAPT CLN BIOGUARD AIR/H2O DISP

## (undated) DEVICE — COVER,TABLE,HEAVY DUTY,79"X110",STRL: Brand: MEDLINE

## (undated) DEVICE — ENDOPATH PNEUMONEEDLE INSUFFLATION NEEDLES WITH LUER LOCK CONNECTORS 120MM: Brand: ENDOPATH

## (undated) DEVICE — GOWN,NON-REINFORCED,SIRUS,SET IN SLV,XL: Brand: MEDLINE

## (undated) DEVICE — CANN O2 ETCO2 FITS ALL CONN CO2 SMPL A/ 7IN DISP LF

## (undated) DEVICE — COVER,C-ARM,41X74: Brand: MEDLINE

## (undated) DEVICE — SENSR O2 OXIMAX FNGR A/ 18IN NONSTR

## (undated) DEVICE — SEAL

## (undated) DEVICE — SUCTION IRRIGATOR: Brand: ENDOWRIST

## (undated) DEVICE — 3M™ STERI-STRIP™ REINFORCED ADHESIVE SKIN CLOSURES, R1547, 1/2 IN X 4 IN (12 MM X 100 MM), 6 STRIPS/ENVELOPE: Brand: 3M™ STERI-STRIP™

## (undated) DEVICE — STPCK 3WY D201 DISCOFIX

## (undated) DEVICE — SUT SILK 3/0 TIES 18IN A184H

## (undated) DEVICE — BALN STENTGR Q50

## (undated) DEVICE — APPL CHLORAPREP HI/LITE 26ML ORNG

## (undated) DEVICE — SUT VIC 0 UR6 27IN VCP603H

## (undated) DEVICE — GLV SURG BIOGEL LTX PF 8 1/2

## (undated) DEVICE — RADIFOCUS GLIDEWIRE: Brand: GLIDEWIRE

## (undated) DEVICE — SNAR POLYP SENSATION STDOVL 27 240 BX40

## (undated) DEVICE — ADHS SKIN DERMABOND TOP ADVANCED

## (undated) DEVICE — GOWN,PREVENTION PLUS,XLONG/XLARGE,STRL: Brand: MEDLINE

## (undated) DEVICE — CATH TEMPO 5F BER II 65CM: Brand: TEMPO

## (undated) DEVICE — FLOSEAL MATRIX IS INDICATED IN SURGICAL PROCEDURES (OTHER THAN IN OPHTHALMIC) AS AN ADJUNCT TO HEMOSTASIS WHEN CONTROL OF BLEEDING BY LIGATURE OR CONVENTIONAL PROCEDURES IS INEFFECTIVE OR IMPRACTICAL.: Brand: FLOSEAL HEMOSTATIC MATRIX

## (undated) DEVICE — THE DEVICE IS A DISPOSABLE, LIGATURE PASSING, SUTURING APPARATUS AND NEEDLE GUIDE FOR THE ABDOMINAL WALL WHICH IS NON-POWERED, HAND-HELD, AND HAND-MANIPULATED,INTENDED TO BE USED IN VARIOUS GENERAL SURGICAL PROCEDURES. THE DEVICE INCLUDES A LIGATURE CARRIER PATHWAY, NEEDLE GUIDE, TWO NEEDLES, REFERENCE PLANE T-BAR, AND A GUIDEWIRE. THE HANDLE OF THE DEVICE PROVIDES TWO DIAMETRICALLY OPPOSED ENCLOSED GUIDEWAYS FOR THE ADVANCEMENT AND RETRACTION OF THE NEEDLES UNDER MANUAL CONTROL OF A PLUNGER LOCATED AT THE PROXIMAL END OF THE DEVICE.AS PART OF THE M-CLOSE CONVENIENCE KIT, A NERVE BLOCK NEEDLE IS INCLUDED FOR THE ADMINISTRATION OF LOCAL ANESTHETIC AGENTS TO PROVIDE REGIONAL AND LOCAL ANESTHESIA.  A TELFA ANTIMICROBIAL, NON-ADHERENT PAD IS ALSO PROVIDED IN THE KIT FOR USE AS A PRIMARY DRESSING FOR THE SURGICAL INCISION.: Brand: M-CLOSE KIT

## (undated) DEVICE — GLV SURG SENSICARE GREEN W/ALOE PF LF 8.5 STRL

## (undated) DEVICE — Device: Brand: DEFENDO AIR/WATER/SUCTION AND BIOPSY VALVE

## (undated) DEVICE — INTRO SHEATH DRYSEAL FLEX 16F 5.3TO6.1MM 33CM

## (undated) DEVICE — SUT MNCRYL PLS ANTIB UD 4/0 PS2 18IN

## (undated) DEVICE — MAT FLR ABSORBENT LG 4FT 10 2.5FT

## (undated) DEVICE — COLUMN DRAPE

## (undated) DEVICE — ST ACC MICROPUNCTURE STFF .018 ECHO/PLDM/TP 4F/10CM 21G/7CM

## (undated) DEVICE — GLV SURG SENSICARE PI MIC PF SZ6.5 LF STRL

## (undated) DEVICE — SYR LUERLOK 30CC

## (undated) DEVICE — PK AAA 40

## (undated) DEVICE — TISSUE RETRIEVAL SYSTEM: Brand: INZII RETRIEVAL SYSTEM

## (undated) DEVICE — SUT SILK 2/0 SH CR5 18IN C0125

## (undated) DEVICE — KT ORCA ORCAPOD DISP STRL

## (undated) DEVICE — PERCLOSE PROGLIDE™ SUTURE-MEDIATED CLOSURE SYSTEM: Brand: PERCLOSE PROGLIDE™

## (undated) DEVICE — Device

## (undated) DEVICE — SOL NACL 0.9PCT 1000ML